# Patient Record
Sex: FEMALE | Race: WHITE | NOT HISPANIC OR LATINO | Employment: OTHER | URBAN - METROPOLITAN AREA
[De-identification: names, ages, dates, MRNs, and addresses within clinical notes are randomized per-mention and may not be internally consistent; named-entity substitution may affect disease eponyms.]

---

## 2018-02-08 ENCOUNTER — TRANSCRIBE ORDERS (OUTPATIENT)
Dept: ADMINISTRATIVE | Facility: HOSPITAL | Age: 83
End: 2018-02-08

## 2018-02-08 DIAGNOSIS — R11.0 NAUSEA: Primary | ICD-10-CM

## 2018-02-22 ENCOUNTER — HOSPITAL ENCOUNTER (OUTPATIENT)
Dept: RADIOLOGY | Facility: HOSPITAL | Age: 83
Discharge: HOME | End: 2018-02-22
Payer: MEDICARE

## 2018-02-22 DIAGNOSIS — R11.0 NAUSEA: ICD-10-CM

## 2018-02-22 PROCEDURE — 74220 X-RAY XM ESOPHAGUS 1CNTRST: CPT

## 2019-05-21 ENCOUNTER — APPOINTMENT (EMERGENCY)
Dept: RADIOLOGY | Facility: HOSPITAL | Age: 84
DRG: 854 | End: 2019-05-21
Payer: MEDICARE

## 2019-05-21 ENCOUNTER — APPOINTMENT (EMERGENCY)
Dept: CT IMAGING | Facility: HOSPITAL | Age: 84
DRG: 854 | End: 2019-05-21
Payer: MEDICARE

## 2019-05-21 ENCOUNTER — HOSPITAL ENCOUNTER (INPATIENT)
Facility: HOSPITAL | Age: 84
LOS: 4 days | Discharge: NURSING, REHAB OR LONG-TERM CARE | DRG: 854 | End: 2019-05-25
Attending: EMERGENCY MEDICINE | Admitting: HOSPITALIST
Payer: MEDICARE

## 2019-05-21 DIAGNOSIS — N13.30 HYDRONEPHROSIS: ICD-10-CM

## 2019-05-21 DIAGNOSIS — N20.1 URETEROLITHIASIS: Primary | ICD-10-CM

## 2019-05-21 DIAGNOSIS — N30.01 ACUTE CYSTITIS WITH HEMATURIA: ICD-10-CM

## 2019-05-21 DIAGNOSIS — N39.0 ACUTE URINARY TRACT INFECTION: ICD-10-CM

## 2019-05-21 DIAGNOSIS — R78.81 BACTEREMIA: ICD-10-CM

## 2019-05-21 DIAGNOSIS — R50.9 FEVER: ICD-10-CM

## 2019-05-21 DIAGNOSIS — I48.0 PAROXYSMAL A-FIB (HCC): ICD-10-CM

## 2019-05-21 PROBLEM — E78.2 MIXED HYPERLIPIDEMIA: Status: ACTIVE | Noted: 2019-05-21

## 2019-05-21 PROBLEM — N17.9 ACUTE KIDNEY INJURY (HCC): Status: ACTIVE | Noted: 2019-05-21

## 2019-05-21 PROBLEM — I10 ESSENTIAL HYPERTENSION: Status: ACTIVE | Noted: 2019-05-21

## 2019-05-21 PROBLEM — E66.01 MORBID OBESITY (HCC): Status: ACTIVE | Noted: 2019-05-21

## 2019-05-21 PROBLEM — R79.1 ELEVATED INR: Status: ACTIVE | Noted: 2019-05-21

## 2019-05-21 PROBLEM — D69.6 THROMBOCYTOPENIA (HCC): Status: ACTIVE | Noted: 2019-05-21

## 2019-05-21 LAB
ALBUMIN SERPL BCP-MCNC: 2.5 G/DL (ref 3.5–5)
ALP SERPL-CCNC: 56 U/L (ref 46–116)
ALT SERPL W P-5'-P-CCNC: 9 U/L (ref 12–78)
ANION GAP SERPL CALCULATED.3IONS-SCNC: 7 MMOL/L (ref 4–13)
APTT PPP: 48 SECONDS (ref 26–38)
AST SERPL W P-5'-P-CCNC: 28 U/L (ref 5–45)
BACTERIA UR QL AUTO: ABNORMAL /HPF
BASOPHILS # BLD MANUAL: 0 THOUSAND/UL (ref 0–0.1)
BASOPHILS NFR MAR MANUAL: 0 % (ref 0–1)
BILIRUB SERPL-MCNC: 0.78 MG/DL (ref 0.2–1)
BILIRUB UR QL STRIP: ABNORMAL
BUN SERPL-MCNC: 25 MG/DL (ref 5–25)
CALCIUM SERPL-MCNC: 8.9 MG/DL (ref 8.3–10.1)
CHLORIDE SERPL-SCNC: 108 MMOL/L (ref 100–108)
CLARITY UR: ABNORMAL
CO2 SERPL-SCNC: 26 MMOL/L (ref 21–32)
COLOR UR: ABNORMAL
CREAT SERPL-MCNC: 1.63 MG/DL (ref 0.6–1.3)
EOSINOPHIL # BLD MANUAL: 0 THOUSAND/UL (ref 0–0.4)
EOSINOPHIL NFR BLD MANUAL: 0 % (ref 0–6)
ERYTHROCYTE [DISTWIDTH] IN BLOOD BY AUTOMATED COUNT: 14 % (ref 11.6–15.1)
GFR SERPL CREATININE-BSD FRML MDRD: 29 ML/MIN/1.73SQ M
GLUCOSE SERPL-MCNC: 118 MG/DL (ref 65–140)
GLUCOSE UR STRIP-MCNC: NEGATIVE MG/DL
HCT VFR BLD AUTO: 39.2 % (ref 34.8–46.1)
HGB BLD-MCNC: 12.4 G/DL (ref 11.5–15.4)
HGB UR QL STRIP.AUTO: ABNORMAL
INR PPP: 2.81 (ref 0.86–1.17)
KETONES UR STRIP-MCNC: ABNORMAL MG/DL
LACTATE SERPL-SCNC: 1.1 MMOL/L (ref 0.5–2)
LEUKOCYTE ESTERASE UR QL STRIP: ABNORMAL
LYMPHOCYTES # BLD AUTO: 0.21 THOUSAND/UL (ref 0.6–4.47)
LYMPHOCYTES # BLD AUTO: 2 % (ref 14–44)
MCH RBC QN AUTO: 31.3 PG (ref 26.8–34.3)
MCHC RBC AUTO-ENTMCNC: 31.6 G/DL (ref 31.4–37.4)
MCV RBC AUTO: 99 FL (ref 82–98)
MONOCYTES # BLD AUTO: 0.85 THOUSAND/UL (ref 0–1.22)
MONOCYTES NFR BLD: 8 % (ref 4–12)
NEUTROPHILS # BLD MANUAL: 9.57 THOUSAND/UL (ref 1.85–7.62)
NEUTS BAND NFR BLD MANUAL: 8 % (ref 0–8)
NEUTS SEG NFR BLD AUTO: 82 % (ref 43–75)
NITRITE UR QL STRIP: POSITIVE
NON-SQ EPI CELLS URNS QL MICRO: ABNORMAL /HPF
NRBC BLD AUTO-RTO: 0 /100 WBCS
PH UR STRIP.AUTO: 8.5 [PH]
PLATELET # BLD AUTO: 139 THOUSANDS/UL (ref 149–390)
PLATELET BLD QL SMEAR: ADEQUATE
PMV BLD AUTO: 11.2 FL (ref 8.9–12.7)
POTASSIUM SERPL-SCNC: 3.8 MMOL/L (ref 3.5–5.3)
PROT SERPL-MCNC: 6.9 G/DL (ref 6.4–8.2)
PROT UR STRIP-MCNC: >=300 MG/DL
PROTHROMBIN TIME: 29.6 SECONDS (ref 11.8–14.2)
RBC # BLD AUTO: 3.96 MILLION/UL (ref 3.81–5.12)
RBC #/AREA URNS AUTO: ABNORMAL /HPF
RBC MORPH BLD: NORMAL
SODIUM SERPL-SCNC: 141 MMOL/L (ref 136–145)
SP GR UR STRIP.AUTO: 1.01 (ref 1–1.03)
TOTAL CELLS COUNTED SPEC: 100
UROBILINOGEN UR QL STRIP.AUTO: 1 E.U./DL
WBC # BLD AUTO: 10.63 THOUSAND/UL (ref 4.31–10.16)
WBC #/AREA URNS AUTO: ABNORMAL /HPF

## 2019-05-21 PROCEDURE — 96360 HYDRATION IV INFUSION INIT: CPT

## 2019-05-21 PROCEDURE — 85027 COMPLETE CBC AUTOMATED: CPT | Performed by: EMERGENCY MEDICINE

## 2019-05-21 PROCEDURE — 74176 CT ABD & PELVIS W/O CONTRAST: CPT

## 2019-05-21 PROCEDURE — 81001 URINALYSIS AUTO W/SCOPE: CPT | Performed by: EMERGENCY MEDICINE

## 2019-05-21 PROCEDURE — 99285 EMERGENCY DEPT VISIT HI MDM: CPT

## 2019-05-21 PROCEDURE — 87086 URINE CULTURE/COLONY COUNT: CPT | Performed by: EMERGENCY MEDICINE

## 2019-05-21 PROCEDURE — 83605 ASSAY OF LACTIC ACID: CPT | Performed by: EMERGENCY MEDICINE

## 2019-05-21 PROCEDURE — 99285 EMERGENCY DEPT VISIT HI MDM: CPT | Performed by: EMERGENCY MEDICINE

## 2019-05-21 PROCEDURE — 99223 1ST HOSP IP/OBS HIGH 75: CPT | Performed by: PHYSICIAN ASSISTANT

## 2019-05-21 PROCEDURE — 87040 BLOOD CULTURE FOR BACTERIA: CPT | Performed by: EMERGENCY MEDICINE

## 2019-05-21 PROCEDURE — 96361 HYDRATE IV INFUSION ADD-ON: CPT

## 2019-05-21 PROCEDURE — 80053 COMPREHEN METABOLIC PANEL: CPT | Performed by: EMERGENCY MEDICINE

## 2019-05-21 PROCEDURE — 85610 PROTHROMBIN TIME: CPT | Performed by: EMERGENCY MEDICINE

## 2019-05-21 PROCEDURE — 1123F ACP DISCUSS/DSCN MKR DOCD: CPT | Performed by: EMERGENCY MEDICINE

## 2019-05-21 PROCEDURE — 85730 THROMBOPLASTIN TIME PARTIAL: CPT | Performed by: EMERGENCY MEDICINE

## 2019-05-21 PROCEDURE — 36415 COLL VENOUS BLD VENIPUNCTURE: CPT | Performed by: EMERGENCY MEDICINE

## 2019-05-21 PROCEDURE — 87186 SC STD MICRODIL/AGAR DIL: CPT | Performed by: EMERGENCY MEDICINE

## 2019-05-21 PROCEDURE — 85007 BL SMEAR W/DIFF WBC COUNT: CPT | Performed by: EMERGENCY MEDICINE

## 2019-05-21 PROCEDURE — 0T9B70Z DRAINAGE OF BLADDER WITH DRAINAGE DEVICE, VIA NATURAL OR ARTIFICIAL OPENING: ICD-10-PCS | Performed by: EMERGENCY MEDICINE

## 2019-05-21 PROCEDURE — 87077 CULTURE AEROBIC IDENTIFY: CPT | Performed by: EMERGENCY MEDICINE

## 2019-05-21 PROCEDURE — 71046 X-RAY EXAM CHEST 2 VIEWS: CPT

## 2019-05-21 PROCEDURE — 84145 PROCALCITONIN (PCT): CPT | Performed by: EMERGENCY MEDICINE

## 2019-05-21 PROCEDURE — 93005 ELECTROCARDIOGRAM TRACING: CPT

## 2019-05-21 RX ORDER — DOCUSATE SODIUM 100 MG/1
100 CAPSULE, LIQUID FILLED ORAL 2 TIMES DAILY
COMMUNITY

## 2019-05-21 RX ORDER — ACETAMINOPHEN 325 MG/1
650 TABLET ORAL EVERY 6 HOURS PRN
COMMUNITY

## 2019-05-21 RX ORDER — CELECOXIB 100 MG/1
100 CAPSULE ORAL 2 TIMES DAILY
COMMUNITY
End: 2020-08-10 | Stop reason: ALTCHOICE

## 2019-05-21 RX ORDER — MINERAL OIL/PETROLATUM,WHITE
CREAM (GRAM) TOPICAL
COMMUNITY

## 2019-05-21 RX ORDER — MELATONIN
1000 DAILY
Status: DISCONTINUED | OUTPATIENT
Start: 2019-05-22 | End: 2019-05-25 | Stop reason: HOSPADM

## 2019-05-21 RX ORDER — FUROSEMIDE 40 MG/1
40 TABLET ORAL DAILY
COMMUNITY

## 2019-05-21 RX ORDER — LOSARTAN POTASSIUM 25 MG/1
25 TABLET ORAL DAILY
COMMUNITY
End: 2020-08-17 | Stop reason: HOSPADM

## 2019-05-21 RX ORDER — ESOMEPRAZOLE MAGNESIUM 40 MG/1
40 CAPSULE, DELAYED RELEASE ORAL
COMMUNITY
End: 2020-01-22 | Stop reason: HOSPADM

## 2019-05-21 RX ORDER — RANITIDINE 300 MG/1
300 CAPSULE ORAL EVERY MORNING
COMMUNITY
End: 2020-01-22 | Stop reason: HOSPADM

## 2019-05-21 RX ORDER — ATORVASTATIN CALCIUM 10 MG/1
10 TABLET, FILM COATED ORAL
Status: DISCONTINUED | OUTPATIENT
Start: 2019-05-22 | End: 2019-05-25 | Stop reason: HOSPADM

## 2019-05-21 RX ORDER — DOCUSATE SODIUM 100 MG/1
100 CAPSULE, LIQUID FILLED ORAL 2 TIMES DAILY
Status: DISCONTINUED | OUTPATIENT
Start: 2019-05-22 | End: 2019-05-25 | Stop reason: HOSPADM

## 2019-05-21 RX ORDER — B-COMPLEX WITH VITAMIN C
1 TABLET ORAL
Status: ON HOLD | COMMUNITY
End: 2020-09-30 | Stop reason: CLARIF

## 2019-05-21 RX ORDER — ALENDRONATE SODIUM 70 MG/1
70 TABLET ORAL SEE ADMIN INSTRUCTIONS
COMMUNITY

## 2019-05-21 RX ORDER — PRAMIPEXOLE DIHYDROCHLORIDE 0.25 MG/1
0.25 TABLET ORAL 3 TIMES DAILY
Status: DISCONTINUED | OUTPATIENT
Start: 2019-05-21 | End: 2019-05-25 | Stop reason: HOSPADM

## 2019-05-21 RX ORDER — ASPIRIN 81 MG/1
81 TABLET ORAL DAILY
COMMUNITY

## 2019-05-21 RX ORDER — NITROGLYCERIN 0.4 MG/1
0.4 TABLET SUBLINGUAL
COMMUNITY

## 2019-05-21 RX ORDER — B-COMPLEX WITH VITAMIN C
1 TABLET ORAL
Status: DISCONTINUED | OUTPATIENT
Start: 2019-05-22 | End: 2019-05-25 | Stop reason: HOSPADM

## 2019-05-21 RX ORDER — ACETAMINOPHEN 325 MG/1
650 TABLET ORAL EVERY 6 HOURS PRN
Status: DISCONTINUED | OUTPATIENT
Start: 2019-05-21 | End: 2019-05-25 | Stop reason: HOSPADM

## 2019-05-21 RX ORDER — ASPIRIN 81 MG/1
81 TABLET ORAL DAILY
Status: DISCONTINUED | OUTPATIENT
Start: 2019-05-22 | End: 2019-05-25 | Stop reason: HOSPADM

## 2019-05-21 RX ORDER — MELATONIN
2000 DAILY
COMMUNITY

## 2019-05-21 RX ORDER — PRAMIPEXOLE DIHYDROCHLORIDE 1 MG/1
0.5 TABLET ORAL
COMMUNITY

## 2019-05-21 RX ORDER — ONDANSETRON 2 MG/ML
4 INJECTION INTRAMUSCULAR; INTRAVENOUS EVERY 6 HOURS PRN
Status: DISCONTINUED | OUTPATIENT
Start: 2019-05-21 | End: 2019-05-25 | Stop reason: HOSPADM

## 2019-05-21 RX ORDER — LEVOTHYROXINE SODIUM 0.15 MG/1
150 TABLET ORAL
COMMUNITY

## 2019-05-21 RX ORDER — GLUCOSA SU 2KCL/CHONDROITIN SU 500-400 MG
CAPSULE ORAL
COMMUNITY

## 2019-05-21 RX ORDER — ACETAMINOPHEN 650 MG/1
650 SUPPOSITORY RECTAL EVERY 4 HOURS
COMMUNITY
End: 2020-01-22 | Stop reason: HOSPADM

## 2019-05-21 RX ORDER — SODIUM CHLORIDE 9 MG/ML
50 INJECTION, SOLUTION INTRAVENOUS CONTINUOUS
Status: DISCONTINUED | OUTPATIENT
Start: 2019-05-22 | End: 2019-05-25 | Stop reason: HOSPADM

## 2019-05-21 RX ORDER — FAMOTIDINE 20 MG/1
40 TABLET, FILM COATED ORAL
Status: DISCONTINUED | OUTPATIENT
Start: 2019-05-21 | End: 2019-05-25 | Stop reason: HOSPADM

## 2019-05-21 RX ORDER — ATORVASTATIN CALCIUM 10 MG/1
10 TABLET, FILM COATED ORAL
COMMUNITY

## 2019-05-21 RX ORDER — LOSARTAN POTASSIUM 25 MG/1
25 TABLET ORAL DAILY
Status: DISCONTINUED | OUTPATIENT
Start: 2019-05-22 | End: 2019-05-21

## 2019-05-21 RX ORDER — PANTOPRAZOLE SODIUM 40 MG/1
40 TABLET, DELAYED RELEASE ORAL
Status: DISCONTINUED | OUTPATIENT
Start: 2019-05-22 | End: 2019-05-25 | Stop reason: HOSPADM

## 2019-05-21 RX ORDER — BISACODYL 10 MG
10 SUPPOSITORY, RECTAL RECTAL AS NEEDED
COMMUNITY

## 2019-05-21 RX ORDER — LEVOTHYROXINE SODIUM 0.07 MG/1
150 TABLET ORAL
Status: DISCONTINUED | OUTPATIENT
Start: 2019-05-22 | End: 2019-05-25 | Stop reason: HOSPADM

## 2019-05-21 RX ORDER — VITAMIN E ACETATE 670 MG
6 CAPSULE ORAL DAILY
COMMUNITY
End: 2020-08-10 | Stop reason: CLARIF

## 2019-05-21 RX ADMIN — CEFTRIAXONE SODIUM 1000 MG: 10 INJECTION, POWDER, FOR SOLUTION INTRAVENOUS at 22:35

## 2019-05-21 RX ADMIN — SODIUM CHLORIDE 1000 ML: 0.9 INJECTION, SOLUTION INTRAVENOUS at 20:43

## 2019-05-22 ENCOUNTER — ANESTHESIA EVENT (INPATIENT)
Dept: PERIOP | Facility: HOSPITAL | Age: 84
DRG: 854 | End: 2019-05-22
Payer: MEDICARE

## 2019-05-22 ENCOUNTER — APPOINTMENT (INPATIENT)
Dept: RADIOLOGY | Facility: HOSPITAL | Age: 84
DRG: 854 | End: 2019-05-22
Payer: MEDICARE

## 2019-05-22 ENCOUNTER — ANESTHESIA (INPATIENT)
Dept: PERIOP | Facility: HOSPITAL | Age: 84
DRG: 854 | End: 2019-05-22
Payer: MEDICARE

## 2019-05-22 PROBLEM — N13.2 HYDRONEPHROSIS WITH OBSTRUCTING CALCULUS: Status: ACTIVE | Noted: 2019-05-21

## 2019-05-22 PROBLEM — N13.30 HYDRONEPHROSIS: Status: ACTIVE | Noted: 2019-05-21

## 2019-05-22 PROBLEM — N39.0 ACUTE URINARY TRACT INFECTION: Status: ACTIVE | Noted: 2019-05-21

## 2019-05-22 LAB
ANION GAP SERPL CALCULATED.3IONS-SCNC: 7 MMOL/L (ref 4–13)
ATRIAL RATE: 86 BPM
BASOPHILS # BLD AUTO: 0.02 THOUSANDS/ΜL (ref 0–0.1)
BASOPHILS NFR BLD AUTO: 0 % (ref 0–1)
BUN SERPL-MCNC: 26 MG/DL (ref 5–25)
CALCIUM SERPL-MCNC: 8.9 MG/DL (ref 8.3–10.1)
CHLORIDE SERPL-SCNC: 109 MMOL/L (ref 100–108)
CO2 SERPL-SCNC: 24 MMOL/L (ref 21–32)
CREAT SERPL-MCNC: 1.71 MG/DL (ref 0.6–1.3)
EOSINOPHIL # BLD AUTO: 0 THOUSAND/ΜL (ref 0–0.61)
EOSINOPHIL NFR BLD AUTO: 0 % (ref 0–6)
ERYTHROCYTE [DISTWIDTH] IN BLOOD BY AUTOMATED COUNT: 14.2 % (ref 11.6–15.1)
GFR SERPL CREATININE-BSD FRML MDRD: 27 ML/MIN/1.73SQ M
GLUCOSE SERPL-MCNC: 126 MG/DL (ref 65–140)
HCT VFR BLD AUTO: 36.9 % (ref 34.8–46.1)
HGB BLD-MCNC: 11.8 G/DL (ref 11.5–15.4)
IMM GRANULOCYTES # BLD AUTO: 0.05 THOUSAND/UL (ref 0–0.2)
IMM GRANULOCYTES NFR BLD AUTO: 1 % (ref 0–2)
LACTATE SERPL-SCNC: 1.3 MMOL/L (ref 0.5–2)
LYMPHOCYTES # BLD AUTO: 0.5 THOUSANDS/ΜL (ref 0.6–4.47)
LYMPHOCYTES NFR BLD AUTO: 5 % (ref 14–44)
MCH RBC QN AUTO: 32 PG (ref 26.8–34.3)
MCHC RBC AUTO-ENTMCNC: 32 G/DL (ref 31.4–37.4)
MCV RBC AUTO: 100 FL (ref 82–98)
MONOCYTES # BLD AUTO: 0.85 THOUSAND/ΜL (ref 0.17–1.22)
MONOCYTES NFR BLD AUTO: 8 % (ref 4–12)
NEUTROPHILS # BLD AUTO: 9.4 THOUSANDS/ΜL (ref 1.85–7.62)
NEUTS SEG NFR BLD AUTO: 86 % (ref 43–75)
NRBC BLD AUTO-RTO: 0 /100 WBCS
P AXIS: 91 DEGREES
PLATELET # BLD AUTO: 138 THOUSANDS/UL (ref 149–390)
PMV BLD AUTO: 11 FL (ref 8.9–12.7)
POTASSIUM SERPL-SCNC: 4.5 MMOL/L (ref 3.5–5.3)
PR INTERVAL: 190 MS
PROCALCITONIN SERPL-MCNC: 2.99 NG/ML
QRS AXIS: -35 DEGREES
QRSD INTERVAL: 80 MS
QT INTERVAL: 358 MS
QTC INTERVAL: 428 MS
RBC # BLD AUTO: 3.69 MILLION/UL (ref 3.81–5.12)
SODIUM SERPL-SCNC: 140 MMOL/L (ref 136–145)
T WAVE AXIS: 48 DEGREES
VENTRICULAR RATE: 86 BPM
WBC # BLD AUTO: 10.82 THOUSAND/UL (ref 4.31–10.16)

## 2019-05-22 PROCEDURE — 74420 UROGRAPHY RTRGR +-KUB: CPT

## 2019-05-22 PROCEDURE — 99222 1ST HOSP IP/OBS MODERATE 55: CPT | Performed by: UROLOGY

## 2019-05-22 PROCEDURE — C2617 STENT, NON-COR, TEM W/O DEL: HCPCS | Performed by: UROLOGY

## 2019-05-22 PROCEDURE — 99232 SBSQ HOSP IP/OBS MODERATE 35: CPT | Performed by: HOSPITALIST

## 2019-05-22 PROCEDURE — 52332 CYSTOSCOPY AND TREATMENT: CPT | Performed by: UROLOGY

## 2019-05-22 PROCEDURE — 85025 COMPLETE CBC W/AUTO DIFF WBC: CPT | Performed by: PHYSICIAN ASSISTANT

## 2019-05-22 PROCEDURE — BT1DYZZ FLUOROSCOPY OF RIGHT KIDNEY, URETER AND BLADDER USING OTHER CONTRAST: ICD-10-PCS | Performed by: UROLOGY

## 2019-05-22 PROCEDURE — C1769 GUIDE WIRE: HCPCS | Performed by: UROLOGY

## 2019-05-22 PROCEDURE — 93010 ELECTROCARDIOGRAM REPORT: CPT | Performed by: INTERNAL MEDICINE

## 2019-05-22 PROCEDURE — 83605 ASSAY OF LACTIC ACID: CPT | Performed by: PHYSICIAN ASSISTANT

## 2019-05-22 PROCEDURE — 80048 BASIC METABOLIC PNL TOTAL CA: CPT | Performed by: PHYSICIAN ASSISTANT

## 2019-05-22 PROCEDURE — 0T778DZ DILATION OF LEFT URETER WITH INTRALUMINAL DEVICE, VIA NATURAL OR ARTIFICIAL OPENING ENDOSCOPIC: ICD-10-PCS | Performed by: UROLOGY

## 2019-05-22 DEVICE — INLAY OPTIMA URETERAL STENT W/O GUIDEWIRE
Type: IMPLANTABLE DEVICE | Site: URETER | Status: FUNCTIONAL
Brand: BARD® INLAY OPTIMA® URETERAL STENT

## 2019-05-22 RX ORDER — DEXAMETHASONE SODIUM PHOSPHATE 4 MG/ML
INJECTION, SOLUTION INTRA-ARTICULAR; INTRALESIONAL; INTRAMUSCULAR; INTRAVENOUS; SOFT TISSUE AS NEEDED
Status: DISCONTINUED | OUTPATIENT
Start: 2019-05-22 | End: 2019-05-22 | Stop reason: SURG

## 2019-05-22 RX ORDER — PROPOFOL 10 MG/ML
INJECTION, EMULSION INTRAVENOUS AS NEEDED
Status: DISCONTINUED | OUTPATIENT
Start: 2019-05-22 | End: 2019-05-22 | Stop reason: SURG

## 2019-05-22 RX ORDER — ONDANSETRON 2 MG/ML
INJECTION INTRAMUSCULAR; INTRAVENOUS AS NEEDED
Status: DISCONTINUED | OUTPATIENT
Start: 2019-05-22 | End: 2019-05-22 | Stop reason: SURG

## 2019-05-22 RX ORDER — ONDANSETRON 2 MG/ML
4 INJECTION INTRAMUSCULAR; INTRAVENOUS ONCE
Status: DISCONTINUED | OUTPATIENT
Start: 2019-05-22 | End: 2019-05-22 | Stop reason: HOSPADM

## 2019-05-22 RX ORDER — PHENAZOPYRIDINE HYDROCHLORIDE 100 MG/1
100 TABLET, FILM COATED ORAL 3 TIMES DAILY PRN
Status: DISCONTINUED | OUTPATIENT
Start: 2019-05-22 | End: 2019-05-25 | Stop reason: HOSPADM

## 2019-05-22 RX ORDER — FENTANYL CITRATE 50 UG/ML
INJECTION, SOLUTION INTRAMUSCULAR; INTRAVENOUS AS NEEDED
Status: DISCONTINUED | OUTPATIENT
Start: 2019-05-22 | End: 2019-05-22 | Stop reason: SURG

## 2019-05-22 RX ORDER — MAGNESIUM HYDROXIDE 1200 MG/15ML
LIQUID ORAL AS NEEDED
Status: DISCONTINUED | OUTPATIENT
Start: 2019-05-22 | End: 2019-05-22 | Stop reason: HOSPADM

## 2019-05-22 RX ORDER — HYDROMORPHONE HCL/PF 1 MG/ML
0.2 SYRINGE (ML) INJECTION ONCE
Status: COMPLETED | OUTPATIENT
Start: 2019-05-22 | End: 2019-05-22

## 2019-05-22 RX ORDER — FENTANYL CITRATE/PF 50 MCG/ML
25 SYRINGE (ML) INJECTION
Status: DISCONTINUED | OUTPATIENT
Start: 2019-05-22 | End: 2019-05-22 | Stop reason: HOSPADM

## 2019-05-22 RX ADMIN — LIDOCAINE HYDROCHLORIDE 60 MG: 20 INJECTION, SOLUTION INTRAVENOUS at 09:54

## 2019-05-22 RX ADMIN — ONDANSETRON 4 MG: 2 INJECTION INTRAMUSCULAR; INTRAVENOUS at 06:03

## 2019-05-22 RX ADMIN — FENTANYL CITRATE 50 MCG: 50 INJECTION, SOLUTION INTRAMUSCULAR; INTRAVENOUS at 09:54

## 2019-05-22 RX ADMIN — PHENAZOPYRIDINE 100 MG: 100 TABLET ORAL at 16:23

## 2019-05-22 RX ADMIN — ACETAMINOPHEN 650 MG: 325 TABLET ORAL at 00:30

## 2019-05-22 RX ADMIN — FAMOTIDINE 40 MG: 20 TABLET ORAL at 00:11

## 2019-05-22 RX ADMIN — LEVOTHYROXINE SODIUM 150 MCG: 75 TABLET ORAL at 05:59

## 2019-05-22 RX ADMIN — PROPOFOL 200 MG: 10 INJECTION, EMULSION INTRAVENOUS at 09:54

## 2019-05-22 RX ADMIN — FENTANYL CITRATE 50 MCG: 50 INJECTION, SOLUTION INTRAMUSCULAR; INTRAVENOUS at 10:08

## 2019-05-22 RX ADMIN — DEXAMETHASONE SODIUM PHOSPHATE 4 MG: 4 INJECTION, SOLUTION INTRAMUSCULAR; INTRAVENOUS at 10:00

## 2019-05-22 RX ADMIN — PRAMIPEXOLE DIHYDROCHLORIDE 0.25 MG: 0.25 TABLET ORAL at 16:23

## 2019-05-22 RX ADMIN — SODIUM CHLORIDE 50 ML/HR: 0.9 INJECTION, SOLUTION INTRAVENOUS at 00:11

## 2019-05-22 RX ADMIN — ACETAMINOPHEN 650 MG: 325 TABLET ORAL at 20:11

## 2019-05-22 RX ADMIN — PANTOPRAZOLE SODIUM 40 MG: 40 TABLET, DELAYED RELEASE ORAL at 05:59

## 2019-05-22 RX ADMIN — ONDANSETRON HYDROCHLORIDE 4 MG: 2 INJECTION, SOLUTION INTRAVENOUS at 10:00

## 2019-05-22 RX ADMIN — PRAMIPEXOLE DIHYDROCHLORIDE 0.25 MG: 0.25 TABLET ORAL at 00:30

## 2019-05-22 RX ADMIN — CEFTRIAXONE SODIUM 1000 MG: 10 INJECTION, POWDER, FOR SOLUTION INTRAVENOUS at 22:17

## 2019-05-22 RX ADMIN — ONDANSETRON 4 MG: 2 INJECTION INTRAMUSCULAR; INTRAVENOUS at 00:30

## 2019-05-22 RX ADMIN — FAMOTIDINE 40 MG: 20 TABLET ORAL at 22:05

## 2019-05-22 RX ADMIN — PHENAZOPYRIDINE 100 MG: 100 TABLET ORAL at 22:05

## 2019-05-22 RX ADMIN — PHENYLEPHRINE HYDROCHLORIDE 100 MCG: 10 INJECTION INTRAVENOUS at 10:05

## 2019-05-22 RX ADMIN — HYDROMORPHONE HYDROCHLORIDE 0.2 MG: 1 INJECTION, SOLUTION INTRAMUSCULAR; INTRAVENOUS; SUBCUTANEOUS at 07:24

## 2019-05-22 RX ADMIN — METOPROLOL TARTRATE 25 MG: 25 TABLET ORAL at 08:55

## 2019-05-22 RX ADMIN — DOCUSATE SODIUM 100 MG: 100 CAPSULE, LIQUID FILLED ORAL at 20:11

## 2019-05-22 RX ADMIN — SODIUM CHLORIDE 50 ML/HR: 0.9 INJECTION, SOLUTION INTRAVENOUS at 14:28

## 2019-05-22 RX ADMIN — ATORVASTATIN CALCIUM 10 MG: 10 TABLET, FILM COATED ORAL at 16:23

## 2019-05-22 RX ADMIN — PRAMIPEXOLE DIHYDROCHLORIDE 0.25 MG: 0.25 TABLET ORAL at 22:06

## 2019-05-23 ENCOUNTER — TELEPHONE (OUTPATIENT)
Dept: OTHER | Facility: HOSPITAL | Age: 84
End: 2019-05-23

## 2019-05-23 PROBLEM — R78.81 BACTEREMIA: Status: ACTIVE | Noted: 2019-05-23

## 2019-05-23 LAB
ANION GAP SERPL CALCULATED.3IONS-SCNC: 9 MMOL/L (ref 4–13)
BASOPHILS # BLD AUTO: 0.01 THOUSANDS/ΜL (ref 0–0.1)
BASOPHILS NFR BLD AUTO: 0 % (ref 0–1)
BUN SERPL-MCNC: 24 MG/DL (ref 5–25)
CALCIUM SERPL-MCNC: 8.6 MG/DL (ref 8.3–10.1)
CHLORIDE SERPL-SCNC: 110 MMOL/L (ref 100–108)
CO2 SERPL-SCNC: 24 MMOL/L (ref 21–32)
CREAT SERPL-MCNC: 1.27 MG/DL (ref 0.6–1.3)
EOSINOPHIL # BLD AUTO: 0 THOUSAND/ΜL (ref 0–0.61)
EOSINOPHIL NFR BLD AUTO: 0 % (ref 0–6)
ERYTHROCYTE [DISTWIDTH] IN BLOOD BY AUTOMATED COUNT: 14.3 % (ref 11.6–15.1)
GFR SERPL CREATININE-BSD FRML MDRD: 39 ML/MIN/1.73SQ M
GLUCOSE SERPL-MCNC: 120 MG/DL (ref 65–140)
HCT VFR BLD AUTO: 35.7 % (ref 34.8–46.1)
HGB BLD-MCNC: 11.1 G/DL (ref 11.5–15.4)
IMM GRANULOCYTES # BLD AUTO: 0.08 THOUSAND/UL (ref 0–0.2)
IMM GRANULOCYTES NFR BLD AUTO: 1 % (ref 0–2)
LYMPHOCYTES # BLD AUTO: 0.38 THOUSANDS/ΜL (ref 0.6–4.47)
LYMPHOCYTES NFR BLD AUTO: 4 % (ref 14–44)
MAGNESIUM SERPL-MCNC: 2 MG/DL (ref 1.6–2.6)
MCH RBC QN AUTO: 31.4 PG (ref 26.8–34.3)
MCHC RBC AUTO-ENTMCNC: 31.1 G/DL (ref 31.4–37.4)
MCV RBC AUTO: 101 FL (ref 82–98)
MONOCYTES # BLD AUTO: 0.82 THOUSAND/ΜL (ref 0.17–1.22)
MONOCYTES NFR BLD AUTO: 8 % (ref 4–12)
NEUTROPHILS # BLD AUTO: 9.32 THOUSANDS/ΜL (ref 1.85–7.62)
NEUTS SEG NFR BLD AUTO: 87 % (ref 43–75)
NRBC BLD AUTO-RTO: 0 /100 WBCS
PLATELET # BLD AUTO: 142 THOUSANDS/UL (ref 149–390)
PMV BLD AUTO: 10.6 FL (ref 8.9–12.7)
POTASSIUM SERPL-SCNC: 3.6 MMOL/L (ref 3.5–5.3)
RBC # BLD AUTO: 3.53 MILLION/UL (ref 3.81–5.12)
SODIUM SERPL-SCNC: 143 MMOL/L (ref 136–145)
WBC # BLD AUTO: 10.61 THOUSAND/UL (ref 4.31–10.16)

## 2019-05-23 PROCEDURE — 87040 BLOOD CULTURE FOR BACTERIA: CPT | Performed by: INTERNAL MEDICINE

## 2019-05-23 PROCEDURE — 99232 SBSQ HOSP IP/OBS MODERATE 35: CPT | Performed by: PHYSICIAN ASSISTANT

## 2019-05-23 PROCEDURE — 87081 CULTURE SCREEN ONLY: CPT | Performed by: PHYSICIAN ASSISTANT

## 2019-05-23 PROCEDURE — 85025 COMPLETE CBC W/AUTO DIFF WBC: CPT | Performed by: HOSPITALIST

## 2019-05-23 PROCEDURE — 80048 BASIC METABOLIC PNL TOTAL CA: CPT | Performed by: HOSPITALIST

## 2019-05-23 PROCEDURE — 99223 1ST HOSP IP/OBS HIGH 75: CPT | Performed by: INTERNAL MEDICINE

## 2019-05-23 PROCEDURE — 99232 SBSQ HOSP IP/OBS MODERATE 35: CPT | Performed by: HOSPITALIST

## 2019-05-23 PROCEDURE — 83735 ASSAY OF MAGNESIUM: CPT | Performed by: HOSPITALIST

## 2019-05-23 RX ADMIN — PRAMIPEXOLE DIHYDROCHLORIDE 0.25 MG: 0.25 TABLET ORAL at 21:20

## 2019-05-23 RX ADMIN — DOCUSATE SODIUM 100 MG: 100 CAPSULE, LIQUID FILLED ORAL at 08:21

## 2019-05-23 RX ADMIN — FAMOTIDINE 40 MG: 20 TABLET ORAL at 21:20

## 2019-05-23 RX ADMIN — METOPROLOL TARTRATE 25 MG: 25 TABLET ORAL at 21:20

## 2019-05-23 RX ADMIN — VITAMIN D, TAB 1000IU (100/BT) 1000 UNITS: 25 TAB at 08:21

## 2019-05-23 RX ADMIN — PRAMIPEXOLE DIHYDROCHLORIDE 0.25 MG: 0.25 TABLET ORAL at 08:21

## 2019-05-23 RX ADMIN — PHENAZOPYRIDINE 100 MG: 100 TABLET ORAL at 12:58

## 2019-05-23 RX ADMIN — DOCUSATE SODIUM 100 MG: 100 CAPSULE, LIQUID FILLED ORAL at 17:24

## 2019-05-23 RX ADMIN — SODIUM CHLORIDE 50 ML/HR: 0.9 INJECTION, SOLUTION INTRAVENOUS at 08:22

## 2019-05-23 RX ADMIN — ASPIRIN 81 MG: 81 TABLET, COATED ORAL at 08:21

## 2019-05-23 RX ADMIN — PRAMIPEXOLE DIHYDROCHLORIDE 0.25 MG: 0.25 TABLET ORAL at 17:22

## 2019-05-23 RX ADMIN — PANTOPRAZOLE SODIUM 40 MG: 40 TABLET, DELAYED RELEASE ORAL at 05:57

## 2019-05-23 RX ADMIN — LEVOTHYROXINE SODIUM 150 MCG: 75 TABLET ORAL at 05:57

## 2019-05-23 RX ADMIN — CEFTRIAXONE SODIUM 2000 MG: 10 INJECTION, POWDER, FOR SOLUTION INTRAVENOUS at 21:23

## 2019-05-23 RX ADMIN — CALCIUM CARBONATE-VITAMIN D TAB 500 MG-200 UNIT 1 TABLET: 500-200 TAB at 08:21

## 2019-05-23 RX ADMIN — ACETAMINOPHEN 650 MG: 325 TABLET ORAL at 05:57

## 2019-05-23 RX ADMIN — ATORVASTATIN CALCIUM 10 MG: 10 TABLET, FILM COATED ORAL at 17:24

## 2019-05-24 LAB
ANION GAP SERPL CALCULATED.3IONS-SCNC: 9 MMOL/L (ref 4–13)
BACTERIA BLD CULT: ABNORMAL
BASOPHILS # BLD AUTO: 0.02 THOUSANDS/ΜL (ref 0–0.1)
BASOPHILS NFR BLD AUTO: 0 % (ref 0–1)
BUN SERPL-MCNC: 24 MG/DL (ref 5–25)
CALCIUM SERPL-MCNC: 8.7 MG/DL (ref 8.3–10.1)
CHLORIDE SERPL-SCNC: 110 MMOL/L (ref 100–108)
CO2 SERPL-SCNC: 23 MMOL/L (ref 21–32)
CREAT SERPL-MCNC: 1.06 MG/DL (ref 0.6–1.3)
EOSINOPHIL # BLD AUTO: 0.03 THOUSAND/ΜL (ref 0–0.61)
EOSINOPHIL NFR BLD AUTO: 0 % (ref 0–6)
ERYTHROCYTE [DISTWIDTH] IN BLOOD BY AUTOMATED COUNT: 14.3 % (ref 11.6–15.1)
GFR SERPL CREATININE-BSD FRML MDRD: 49 ML/MIN/1.73SQ M
GLUCOSE SERPL-MCNC: 92 MG/DL (ref 65–140)
GRAM STN SPEC: ABNORMAL
HCT VFR BLD AUTO: 36.2 % (ref 34.8–46.1)
HGB BLD-MCNC: 11.1 G/DL (ref 11.5–15.4)
IMM GRANULOCYTES # BLD AUTO: 0.06 THOUSAND/UL (ref 0–0.2)
IMM GRANULOCYTES NFR BLD AUTO: 1 % (ref 0–2)
LYMPHOCYTES # BLD AUTO: 0.63 THOUSANDS/ΜL (ref 0.6–4.47)
LYMPHOCYTES NFR BLD AUTO: 7 % (ref 14–44)
MCH RBC QN AUTO: 30.9 PG (ref 26.8–34.3)
MCHC RBC AUTO-ENTMCNC: 30.7 G/DL (ref 31.4–37.4)
MCV RBC AUTO: 101 FL (ref 82–98)
MONOCYTES # BLD AUTO: 0.58 THOUSAND/ΜL (ref 0.17–1.22)
MONOCYTES NFR BLD AUTO: 7 % (ref 4–12)
MRSA NOSE QL CULT: NORMAL
NEUTROPHILS # BLD AUTO: 7.46 THOUSANDS/ΜL (ref 1.85–7.62)
NEUTS SEG NFR BLD AUTO: 85 % (ref 43–75)
NRBC BLD AUTO-RTO: 0 /100 WBCS
PLATELET # BLD AUTO: 171 THOUSANDS/UL (ref 149–390)
PMV BLD AUTO: 11.1 FL (ref 8.9–12.7)
POTASSIUM SERPL-SCNC: 3.5 MMOL/L (ref 3.5–5.3)
RBC # BLD AUTO: 3.59 MILLION/UL (ref 3.81–5.12)
SODIUM SERPL-SCNC: 142 MMOL/L (ref 136–145)
WBC # BLD AUTO: 8.78 THOUSAND/UL (ref 4.31–10.16)

## 2019-05-24 PROCEDURE — G8987 SELF CARE CURRENT STATUS: HCPCS

## 2019-05-24 PROCEDURE — G8981 BODY POS CURRENT STATUS: HCPCS

## 2019-05-24 PROCEDURE — G8988 SELF CARE GOAL STATUS: HCPCS

## 2019-05-24 PROCEDURE — 85025 COMPLETE CBC W/AUTO DIFF WBC: CPT | Performed by: INTERNAL MEDICINE

## 2019-05-24 PROCEDURE — 97167 OT EVAL HIGH COMPLEX 60 MIN: CPT

## 2019-05-24 PROCEDURE — G8982 BODY POS GOAL STATUS: HCPCS

## 2019-05-24 PROCEDURE — 97163 PT EVAL HIGH COMPLEX 45 MIN: CPT

## 2019-05-24 PROCEDURE — 99232 SBSQ HOSP IP/OBS MODERATE 35: CPT | Performed by: HOSPITALIST

## 2019-05-24 PROCEDURE — G8989 SELF CARE D/C STATUS: HCPCS

## 2019-05-24 PROCEDURE — 99232 SBSQ HOSP IP/OBS MODERATE 35: CPT | Performed by: INTERNAL MEDICINE

## 2019-05-24 PROCEDURE — G8983 BODY POS D/C STATUS: HCPCS

## 2019-05-24 PROCEDURE — 80048 BASIC METABOLIC PNL TOTAL CA: CPT | Performed by: INTERNAL MEDICINE

## 2019-05-24 RX ORDER — CEFDINIR 300 MG/1
300 CAPSULE ORAL EVERY 12 HOURS SCHEDULED
Status: DISCONTINUED | OUTPATIENT
Start: 2019-05-24 | End: 2019-05-25 | Stop reason: HOSPADM

## 2019-05-24 RX ADMIN — CEFDINIR 300 MG: 300 CAPSULE ORAL at 21:21

## 2019-05-24 RX ADMIN — CEFDINIR 300 MG: 300 CAPSULE ORAL at 10:16

## 2019-05-24 RX ADMIN — FAMOTIDINE 40 MG: 20 TABLET ORAL at 21:21

## 2019-05-24 RX ADMIN — SODIUM CHLORIDE 50 ML/HR: 0.9 INJECTION, SOLUTION INTRAVENOUS at 05:13

## 2019-05-24 RX ADMIN — ACETAMINOPHEN 650 MG: 325 TABLET ORAL at 23:37

## 2019-05-24 RX ADMIN — LEVOTHYROXINE SODIUM 150 MCG: 75 TABLET ORAL at 05:11

## 2019-05-24 RX ADMIN — PHENAZOPYRIDINE 100 MG: 100 TABLET ORAL at 16:35

## 2019-05-24 RX ADMIN — METOPROLOL TARTRATE 25 MG: 25 TABLET ORAL at 08:13

## 2019-05-24 RX ADMIN — PRAMIPEXOLE DIHYDROCHLORIDE 0.25 MG: 0.25 TABLET ORAL at 21:21

## 2019-05-24 RX ADMIN — DOCUSATE SODIUM 100 MG: 100 CAPSULE, LIQUID FILLED ORAL at 08:13

## 2019-05-24 RX ADMIN — ASPIRIN 81 MG: 81 TABLET, COATED ORAL at 08:13

## 2019-05-24 RX ADMIN — ACETAMINOPHEN 650 MG: 325 TABLET ORAL at 01:56

## 2019-05-24 RX ADMIN — SODIUM CHLORIDE 50 ML/HR: 0.9 INJECTION, SOLUTION INTRAVENOUS at 23:38

## 2019-05-24 RX ADMIN — VITAMIN D, TAB 1000IU (100/BT) 1000 UNITS: 25 TAB at 08:13

## 2019-05-24 RX ADMIN — CALCIUM CARBONATE-VITAMIN D TAB 500 MG-200 UNIT 1 TABLET: 500-200 TAB at 07:38

## 2019-05-24 RX ADMIN — ATORVASTATIN CALCIUM 10 MG: 10 TABLET, FILM COATED ORAL at 16:34

## 2019-05-24 RX ADMIN — PRAMIPEXOLE DIHYDROCHLORIDE 0.25 MG: 0.25 TABLET ORAL at 08:13

## 2019-05-24 RX ADMIN — RIVAROXABAN 20 MG: 20 TABLET, FILM COATED ORAL at 16:35

## 2019-05-24 RX ADMIN — METOPROLOL TARTRATE 25 MG: 25 TABLET ORAL at 21:22

## 2019-05-24 RX ADMIN — DOCUSATE SODIUM 100 MG: 100 CAPSULE, LIQUID FILLED ORAL at 16:36

## 2019-05-24 RX ADMIN — PRAMIPEXOLE DIHYDROCHLORIDE 0.25 MG: 0.25 TABLET ORAL at 16:35

## 2019-05-24 RX ADMIN — PANTOPRAZOLE SODIUM 40 MG: 40 TABLET, DELAYED RELEASE ORAL at 05:11

## 2019-05-25 VITALS
OXYGEN SATURATION: 98 % | HEART RATE: 90 BPM | BODY MASS INDEX: 51.91 KG/M2 | RESPIRATION RATE: 18 BRPM | HEIGHT: 63 IN | DIASTOLIC BLOOD PRESSURE: 85 MMHG | WEIGHT: 293 LBS | SYSTOLIC BLOOD PRESSURE: 159 MMHG | TEMPERATURE: 97.6 F

## 2019-05-25 PROBLEM — N13.2 HYDRONEPHROSIS WITH OBSTRUCTING CALCULUS: Status: RESOLVED | Noted: 2019-05-21 | Resolved: 2019-05-25

## 2019-05-25 PROBLEM — N17.9 ACUTE KIDNEY INJURY (HCC): Status: RESOLVED | Noted: 2019-05-21 | Resolved: 2019-05-25

## 2019-05-25 PROBLEM — A49.8 BACTERIAL INFECTION DUE TO PROTEUS MIRABILIS: Status: ACTIVE | Noted: 2019-05-23

## 2019-05-25 LAB
ANION GAP SERPL CALCULATED.3IONS-SCNC: 7 MMOL/L (ref 4–13)
BACTERIA UR CULT: ABNORMAL
BACTERIA UR CULT: ABNORMAL
BASOPHILS # BLD AUTO: 0.03 THOUSANDS/ΜL (ref 0–0.1)
BASOPHILS NFR BLD AUTO: 1 % (ref 0–1)
BUN SERPL-MCNC: 18 MG/DL (ref 5–25)
CALCIUM SERPL-MCNC: 8.8 MG/DL (ref 8.3–10.1)
CHLORIDE SERPL-SCNC: 112 MMOL/L (ref 100–108)
CO2 SERPL-SCNC: 26 MMOL/L (ref 21–32)
CREAT SERPL-MCNC: 0.85 MG/DL (ref 0.6–1.3)
EOSINOPHIL # BLD AUTO: 0.17 THOUSAND/ΜL (ref 0–0.61)
EOSINOPHIL NFR BLD AUTO: 3 % (ref 0–6)
ERYTHROCYTE [DISTWIDTH] IN BLOOD BY AUTOMATED COUNT: 14.5 % (ref 11.6–15.1)
GFR SERPL CREATININE-BSD FRML MDRD: 64 ML/MIN/1.73SQ M
GLUCOSE SERPL-MCNC: 98 MG/DL (ref 65–140)
HCT VFR BLD AUTO: 37.1 % (ref 34.8–46.1)
HGB BLD-MCNC: 11.6 G/DL (ref 11.5–15.4)
IMM GRANULOCYTES # BLD AUTO: 0.08 THOUSAND/UL (ref 0–0.2)
IMM GRANULOCYTES NFR BLD AUTO: 1 % (ref 0–2)
LYMPHOCYTES # BLD AUTO: 0.82 THOUSANDS/ΜL (ref 0.6–4.47)
LYMPHOCYTES NFR BLD AUTO: 13 % (ref 14–44)
MAGNESIUM SERPL-MCNC: 1.9 MG/DL (ref 1.6–2.6)
MCH RBC QN AUTO: 31.3 PG (ref 26.8–34.3)
MCHC RBC AUTO-ENTMCNC: 31.3 G/DL (ref 31.4–37.4)
MCV RBC AUTO: 100 FL (ref 82–98)
MONOCYTES # BLD AUTO: 0.52 THOUSAND/ΜL (ref 0.17–1.22)
MONOCYTES NFR BLD AUTO: 8 % (ref 4–12)
NEUTROPHILS # BLD AUTO: 4.88 THOUSANDS/ΜL (ref 1.85–7.62)
NEUTS SEG NFR BLD AUTO: 74 % (ref 43–75)
NRBC BLD AUTO-RTO: 0 /100 WBCS
PLATELET # BLD AUTO: 189 THOUSANDS/UL (ref 149–390)
PMV BLD AUTO: 10.5 FL (ref 8.9–12.7)
POTASSIUM SERPL-SCNC: 3.3 MMOL/L (ref 3.5–5.3)
RBC # BLD AUTO: 3.71 MILLION/UL (ref 3.81–5.12)
SODIUM SERPL-SCNC: 145 MMOL/L (ref 136–145)
WBC # BLD AUTO: 6.5 THOUSAND/UL (ref 4.31–10.16)

## 2019-05-25 PROCEDURE — 85025 COMPLETE CBC W/AUTO DIFF WBC: CPT | Performed by: HOSPITALIST

## 2019-05-25 PROCEDURE — 99239 HOSP IP/OBS DSCHRG MGMT >30: CPT | Performed by: HOSPITALIST

## 2019-05-25 PROCEDURE — 80048 BASIC METABOLIC PNL TOTAL CA: CPT | Performed by: HOSPITALIST

## 2019-05-25 PROCEDURE — 83735 ASSAY OF MAGNESIUM: CPT | Performed by: HOSPITALIST

## 2019-05-25 RX ORDER — GUAIFENESIN 600 MG
1200 TABLET, EXTENDED RELEASE 12 HR ORAL EVERY 12 HOURS SCHEDULED
Qty: 10 TABLET | Refills: 0 | Status: SHIPPED | OUTPATIENT
Start: 2019-05-25 | End: 2020-01-22 | Stop reason: HOSPADM

## 2019-05-25 RX ORDER — CEFDINIR 300 MG/1
300 CAPSULE ORAL EVERY 12 HOURS SCHEDULED
Qty: 16 CAPSULE | Refills: 0 | Status: SHIPPED | OUTPATIENT
Start: 2019-05-25 | End: 2019-06-02

## 2019-05-25 RX ADMIN — PHENAZOPYRIDINE 100 MG: 100 TABLET ORAL at 08:38

## 2019-05-25 RX ADMIN — METOPROLOL TARTRATE 25 MG: 25 TABLET ORAL at 08:37

## 2019-05-25 RX ADMIN — LEVOTHYROXINE SODIUM 150 MCG: 75 TABLET ORAL at 06:12

## 2019-05-25 RX ADMIN — ATORVASTATIN CALCIUM 10 MG: 10 TABLET, FILM COATED ORAL at 17:30

## 2019-05-25 RX ADMIN — DOCUSATE SODIUM 100 MG: 100 CAPSULE, LIQUID FILLED ORAL at 08:38

## 2019-05-25 RX ADMIN — PANTOPRAZOLE SODIUM 40 MG: 40 TABLET, DELAYED RELEASE ORAL at 06:13

## 2019-05-25 RX ADMIN — PRAMIPEXOLE DIHYDROCHLORIDE 0.25 MG: 0.25 TABLET ORAL at 08:38

## 2019-05-25 RX ADMIN — ASPIRIN 81 MG: 81 TABLET, COATED ORAL at 08:38

## 2019-05-25 RX ADMIN — DOCUSATE SODIUM 100 MG: 100 CAPSULE, LIQUID FILLED ORAL at 17:31

## 2019-05-25 RX ADMIN — CEFDINIR 300 MG: 300 CAPSULE ORAL at 08:37

## 2019-05-25 RX ADMIN — RIVAROXABAN 20 MG: 20 TABLET, FILM COATED ORAL at 17:31

## 2019-05-25 RX ADMIN — CALCIUM CARBONATE-VITAMIN D TAB 500 MG-200 UNIT 1 TABLET: 500-200 TAB at 08:37

## 2019-05-25 RX ADMIN — PRAMIPEXOLE DIHYDROCHLORIDE 0.25 MG: 0.25 TABLET ORAL at 17:30

## 2019-05-25 RX ADMIN — VITAMIN D, TAB 1000IU (100/BT) 1000 UNITS: 25 TAB at 08:37

## 2019-05-26 LAB — BACTERIA BLD CULT: NORMAL

## 2019-05-28 LAB
BACTERIA BLD CULT: NORMAL
BACTERIA BLD CULT: NORMAL

## 2019-05-30 ENCOUNTER — TELEPHONE (OUTPATIENT)
Dept: UROLOGY | Facility: MEDICAL CENTER | Age: 84
End: 2019-05-30

## 2019-06-11 ENCOUNTER — OFFICE VISIT (OUTPATIENT)
Dept: UROLOGY | Facility: MEDICAL CENTER | Age: 84
End: 2019-06-11
Payer: MEDICARE

## 2019-06-11 VITALS
HEIGHT: 63 IN | BODY MASS INDEX: 51.91 KG/M2 | HEART RATE: 66 BPM | DIASTOLIC BLOOD PRESSURE: 80 MMHG | SYSTOLIC BLOOD PRESSURE: 118 MMHG | WEIGHT: 293 LBS

## 2019-06-11 DIAGNOSIS — N20.1 LEFT URETERAL CALCULUS: Primary | ICD-10-CM

## 2019-06-11 PROCEDURE — 87086 URINE CULTURE/COLONY COUNT: CPT | Performed by: UROLOGY

## 2019-06-11 PROCEDURE — 99214 OFFICE O/P EST MOD 30 MIN: CPT | Performed by: UROLOGY

## 2019-06-11 RX ORDER — LEVOFLOXACIN 5 MG/ML
750 INJECTION, SOLUTION INTRAVENOUS ONCE
Status: CANCELLED | OUTPATIENT
Start: 2019-06-26 | End: 2019-06-11

## 2019-06-11 RX ORDER — WARFARIN SODIUM 2 MG/1
TABLET ORAL
COMMUNITY
End: 2019-06-26 | Stop reason: HOSPADM

## 2019-06-11 RX ORDER — DARIFENACIN HYDROBROMIDE 7.5 MG/1
TABLET, EXTENDED RELEASE ORAL
COMMUNITY
Start: 2011-11-03 | End: 2020-01-22 | Stop reason: HOSPADM

## 2019-06-11 RX ORDER — LINEZOLID 600 MG/1
TABLET, FILM COATED ORAL
COMMUNITY
End: 2020-01-22 | Stop reason: HOSPADM

## 2019-06-11 RX ORDER — HYDROCODONE BITARTRATE AND ACETAMINOPHEN 5; 325 MG/1; MG/1
TABLET ORAL
COMMUNITY
End: 2020-01-22 | Stop reason: HOSPADM

## 2019-06-11 RX ORDER — AMLODIPINE BESYLATE 10 MG/1
10 TABLET ORAL DAILY
COMMUNITY
End: 2020-08-17 | Stop reason: HOSPADM

## 2019-06-11 RX ORDER — GABAPENTIN 300 MG/1
CAPSULE ORAL
COMMUNITY
End: 2020-01-22 | Stop reason: HOSPADM

## 2019-06-11 RX ORDER — POTASSIUM CHLORIDE 20 MEQ/1
TABLET, EXTENDED RELEASE ORAL
COMMUNITY
End: 2020-01-22 | Stop reason: HOSPADM

## 2019-06-12 PROBLEM — N20.1 LEFT URETERAL CALCULUS: Status: ACTIVE | Noted: 2019-06-12

## 2019-06-12 LAB — BACTERIA UR CULT: NORMAL

## 2019-06-24 ENCOUNTER — TELEPHONE (OUTPATIENT)
Dept: UROLOGY | Facility: AMBULATORY SURGERY CENTER | Age: 84
End: 2019-06-24

## 2019-06-24 DIAGNOSIS — E87.6 HYPOKALEMIA: Primary | ICD-10-CM

## 2019-06-24 RX ORDER — POTASSIUM CHLORIDE 20 MEQ/1
20 TABLET, EXTENDED RELEASE ORAL 2 TIMES DAILY
Qty: 4 TABLET | Refills: 0 | Status: SHIPPED | OUTPATIENT
Start: 2019-06-24 | End: 2020-01-22 | Stop reason: HOSPADM

## 2019-06-25 ENCOUNTER — ANESTHESIA EVENT (OUTPATIENT)
Dept: PERIOP | Facility: HOSPITAL | Age: 84
End: 2019-06-25
Payer: MEDICARE

## 2019-06-26 ENCOUNTER — TELEPHONE (OUTPATIENT)
Dept: UROLOGY | Facility: MEDICAL CENTER | Age: 84
End: 2019-06-26

## 2019-06-26 ENCOUNTER — HOSPITAL ENCOUNTER (OUTPATIENT)
Facility: HOSPITAL | Age: 84
Setting detail: OUTPATIENT SURGERY
Discharge: HOME | End: 2019-06-26
Attending: UROLOGY | Admitting: UROLOGY
Payer: MEDICARE

## 2019-06-26 ENCOUNTER — APPOINTMENT (OUTPATIENT)
Dept: RADIOLOGY | Facility: HOSPITAL | Age: 84
End: 2019-06-26
Payer: MEDICARE

## 2019-06-26 ENCOUNTER — ANESTHESIA (OUTPATIENT)
Dept: PERIOP | Facility: HOSPITAL | Age: 84
End: 2019-06-26
Payer: MEDICARE

## 2019-06-26 VITALS
OXYGEN SATURATION: 94 % | RESPIRATION RATE: 17 BRPM | WEIGHT: 293 LBS | SYSTOLIC BLOOD PRESSURE: 128 MMHG | BODY MASS INDEX: 51.91 KG/M2 | TEMPERATURE: 97.4 F | HEART RATE: 74 BPM | DIASTOLIC BLOOD PRESSURE: 60 MMHG | HEIGHT: 63 IN

## 2019-06-26 DIAGNOSIS — N20.1 LEFT URETERAL CALCULUS: ICD-10-CM

## 2019-06-26 LAB
ANION GAP SERPL CALCULATED.3IONS-SCNC: 5 MMOL/L (ref 4–13)
BUN SERPL-MCNC: 19 MG/DL (ref 5–25)
CALCIUM SERPL-MCNC: 8.8 MG/DL (ref 8.3–10.1)
CHLORIDE SERPL-SCNC: 110 MMOL/L (ref 100–108)
CO2 SERPL-SCNC: 31 MMOL/L (ref 21–32)
CREAT SERPL-MCNC: 1.02 MG/DL (ref 0.6–1.3)
GFR SERPL CREATININE-BSD FRML MDRD: 51 ML/MIN/1.73SQ M
GLUCOSE P FAST SERPL-MCNC: 91 MG/DL (ref 65–99)
GLUCOSE SERPL-MCNC: 91 MG/DL (ref 65–140)
POTASSIUM SERPL-SCNC: 4 MMOL/L (ref 3.5–5.3)
SODIUM SERPL-SCNC: 146 MMOL/L (ref 136–145)

## 2019-06-26 PROCEDURE — 80048 BASIC METABOLIC PNL TOTAL CA: CPT | Performed by: UROLOGY

## 2019-06-26 PROCEDURE — C2617 STENT, NON-COR, TEM W/O DEL: HCPCS | Performed by: UROLOGY

## 2019-06-26 PROCEDURE — C1758 CATHETER, URETERAL: HCPCS | Performed by: UROLOGY

## 2019-06-26 PROCEDURE — 52356 CYSTO/URETERO W/LITHOTRIPSY: CPT | Performed by: UROLOGY

## 2019-06-26 PROCEDURE — C1769 GUIDE WIRE: HCPCS | Performed by: UROLOGY

## 2019-06-26 PROCEDURE — 76000 FLUOROSCOPY <1 HR PHYS/QHP: CPT

## 2019-06-26 RX ORDER — LEVOFLOXACIN 250 MG/1
250 TABLET ORAL EVERY 24 HOURS
Qty: 3 TABLET | Refills: 0 | Status: SHIPPED | OUTPATIENT
Start: 2019-06-26 | End: 2019-06-29

## 2019-06-26 RX ORDER — NEOSTIGMINE METHYLSULFATE 1 MG/ML
INJECTION INTRAVENOUS AS NEEDED
Status: DISCONTINUED | OUTPATIENT
Start: 2019-06-26 | End: 2019-06-26 | Stop reason: SURG

## 2019-06-26 RX ORDER — HYDROMORPHONE HCL/PF 1 MG/ML
0.5 SYRINGE (ML) INJECTION
Status: DISCONTINUED | OUTPATIENT
Start: 2019-06-26 | End: 2019-06-26 | Stop reason: HOSPADM

## 2019-06-26 RX ORDER — MAGNESIUM HYDROXIDE 1200 MG/15ML
LIQUID ORAL AS NEEDED
Status: DISCONTINUED | OUTPATIENT
Start: 2019-06-26 | End: 2019-06-26 | Stop reason: HOSPADM

## 2019-06-26 RX ORDER — SODIUM CHLORIDE 9 MG/ML
125 INJECTION, SOLUTION INTRAVENOUS CONTINUOUS
Status: DISCONTINUED | OUTPATIENT
Start: 2019-06-26 | End: 2019-06-26 | Stop reason: HOSPADM

## 2019-06-26 RX ORDER — HYDROCODONE BITARTRATE AND ACETAMINOPHEN 5; 325 MG/1; MG/1
1-2 TABLET ORAL EVERY 6 HOURS PRN
Qty: 20 TABLET | Refills: 0 | Status: SHIPPED | OUTPATIENT
Start: 2019-06-26 | End: 2020-01-22 | Stop reason: HOSPADM

## 2019-06-26 RX ORDER — PROPOFOL 10 MG/ML
INJECTION, EMULSION INTRAVENOUS AS NEEDED
Status: DISCONTINUED | OUTPATIENT
Start: 2019-06-26 | End: 2019-06-26 | Stop reason: SURG

## 2019-06-26 RX ORDER — EPHEDRINE SULFATE 50 MG/ML
INJECTION INTRAVENOUS AS NEEDED
Status: DISCONTINUED | OUTPATIENT
Start: 2019-06-26 | End: 2019-06-26 | Stop reason: SURG

## 2019-06-26 RX ORDER — FENTANYL CITRATE 50 UG/ML
INJECTION, SOLUTION INTRAMUSCULAR; INTRAVENOUS AS NEEDED
Status: DISCONTINUED | OUTPATIENT
Start: 2019-06-26 | End: 2019-06-26 | Stop reason: SURG

## 2019-06-26 RX ORDER — MEPERIDINE HYDROCHLORIDE 50 MG/ML
12.5 INJECTION INTRAMUSCULAR; INTRAVENOUS; SUBCUTANEOUS ONCE AS NEEDED
Status: DISCONTINUED | OUTPATIENT
Start: 2019-06-26 | End: 2019-06-26 | Stop reason: HOSPADM

## 2019-06-26 RX ORDER — FENTANYL CITRATE/PF 50 MCG/ML
50 SYRINGE (ML) INJECTION
Status: DISCONTINUED | OUTPATIENT
Start: 2019-06-26 | End: 2019-06-26 | Stop reason: HOSPADM

## 2019-06-26 RX ORDER — ONDANSETRON 2 MG/ML
INJECTION INTRAMUSCULAR; INTRAVENOUS AS NEEDED
Status: DISCONTINUED | OUTPATIENT
Start: 2019-06-26 | End: 2019-06-26 | Stop reason: SURG

## 2019-06-26 RX ORDER — LEVOFLOXACIN 5 MG/ML
750 INJECTION, SOLUTION INTRAVENOUS ONCE
Status: COMPLETED | OUTPATIENT
Start: 2019-06-26 | End: 2019-06-26

## 2019-06-26 RX ORDER — OXYBUTYNIN CHLORIDE 5 MG/1
5 TABLET, EXTENDED RELEASE ORAL DAILY
Status: DISCONTINUED | OUTPATIENT
Start: 2019-06-26 | End: 2019-06-26 | Stop reason: HOSPADM

## 2019-06-26 RX ORDER — DEXAMETHASONE SODIUM PHOSPHATE 4 MG/ML
INJECTION, SOLUTION INTRA-ARTICULAR; INTRALESIONAL; INTRAMUSCULAR; INTRAVENOUS; SOFT TISSUE AS NEEDED
Status: DISCONTINUED | OUTPATIENT
Start: 2019-06-26 | End: 2019-06-26 | Stop reason: SURG

## 2019-06-26 RX ORDER — GLYCOPYRROLATE 0.2 MG/ML
INJECTION INTRAMUSCULAR; INTRAVENOUS AS NEEDED
Status: DISCONTINUED | OUTPATIENT
Start: 2019-06-26 | End: 2019-06-26 | Stop reason: SURG

## 2019-06-26 RX ORDER — HYDROCODONE BITARTRATE AND ACETAMINOPHEN 5; 325 MG/1; MG/1
1 TABLET ORAL EVERY 6 HOURS PRN
Status: DISCONTINUED | OUTPATIENT
Start: 2019-06-26 | End: 2019-06-26 | Stop reason: HOSPADM

## 2019-06-26 RX ORDER — ONDANSETRON 2 MG/ML
4 INJECTION INTRAMUSCULAR; INTRAVENOUS ONCE AS NEEDED
Status: DISCONTINUED | OUTPATIENT
Start: 2019-06-26 | End: 2019-06-26 | Stop reason: HOSPADM

## 2019-06-26 RX ORDER — ROCURONIUM BROMIDE 10 MG/ML
INJECTION, SOLUTION INTRAVENOUS AS NEEDED
Status: DISCONTINUED | OUTPATIENT
Start: 2019-06-26 | End: 2019-06-26 | Stop reason: SURG

## 2019-06-26 RX ADMIN — SODIUM CHLORIDE 125 ML/HR: 0.9 INJECTION, SOLUTION INTRAVENOUS at 08:32

## 2019-06-26 RX ADMIN — PROPOFOL 200 MG: 10 INJECTION, EMULSION INTRAVENOUS at 10:33

## 2019-06-26 RX ADMIN — FENTANYL CITRATE 50 MCG: 50 INJECTION, SOLUTION INTRAMUSCULAR; INTRAVENOUS at 10:33

## 2019-06-26 RX ADMIN — ONDANSETRON HYDROCHLORIDE 4 MG: 2 INJECTION, SOLUTION INTRAVENOUS at 10:44

## 2019-06-26 RX ADMIN — NEOSTIGMINE METHYLSULFATE 4 MG: 1 INJECTION, SOLUTION INTRAVENOUS at 11:38

## 2019-06-26 RX ADMIN — DEXAMETHASONE SODIUM PHOSPHATE 4 MG: 4 INJECTION, SOLUTION INTRAMUSCULAR; INTRAVENOUS at 10:44

## 2019-06-26 RX ADMIN — EPHEDRINE SULFATE 10 MG: 50 INJECTION, SOLUTION INTRAVENOUS at 10:44

## 2019-06-26 RX ADMIN — EPHEDRINE SULFATE 10 MG: 50 INJECTION, SOLUTION INTRAVENOUS at 11:10

## 2019-06-26 RX ADMIN — ROCURONIUM BROMIDE 30 MG: 10 INJECTION, SOLUTION INTRAVENOUS at 10:33

## 2019-06-26 RX ADMIN — SODIUM CHLORIDE: 0.9 INJECTION, SOLUTION INTRAVENOUS at 11:05

## 2019-06-26 RX ADMIN — FENTANYL CITRATE 50 MCG: 50 INJECTION, SOLUTION INTRAMUSCULAR; INTRAVENOUS at 11:01

## 2019-06-26 RX ADMIN — LEVOFLOXACIN: 5 INJECTION, SOLUTION INTRAVENOUS at 10:36

## 2019-06-26 RX ADMIN — EPHEDRINE SULFATE 10 MG: 50 INJECTION, SOLUTION INTRAVENOUS at 10:41

## 2019-06-26 RX ADMIN — GLYCOPYRROLATE 0.8 MG: 0.2 INJECTION INTRAMUSCULAR; INTRAVENOUS at 11:38

## 2019-07-19 ENCOUNTER — PROCEDURE VISIT (OUTPATIENT)
Dept: UROLOGY | Facility: MEDICAL CENTER | Age: 84
End: 2019-07-19
Payer: MEDICARE

## 2019-07-19 DIAGNOSIS — N20.1 LEFT URETERAL CALCULUS: Primary | ICD-10-CM

## 2019-07-19 DIAGNOSIS — E87.6 HYPOKALEMIA: ICD-10-CM

## 2019-07-19 PROCEDURE — 52310 CYSTOSCOPY AND TREATMENT: CPT | Performed by: UROLOGY

## 2019-07-19 PROCEDURE — 1124F ACP DISCUSS-NO DSCNMKR DOCD: CPT | Performed by: UROLOGY

## 2019-07-19 PROCEDURE — 99212 OFFICE O/P EST SF 10 MIN: CPT | Performed by: UROLOGY

## 2019-07-19 RX ORDER — CIPROFLOXACIN 500 MG/1
500 TABLET, FILM COATED ORAL ONCE
Qty: 1 TABLET | Refills: 0 | Status: SHIPPED | OUTPATIENT
Start: 2019-07-19 | End: 2019-07-19

## 2019-07-19 NOTE — PROGRESS NOTES
[address] [facility]  [facility]  [address] [facility]  [place], [address] [place] [address]  [phone]  www  [facility]  org      NAME: Julia Mcfarland  AGE: 81 y o  SEX: female  : 1935   MRN: [id number]    DATE: 2019  TIME: 2:51 PM    Assessment and Plan:    Left ureteral renal calculi, status post laser lithotripsy-stent removed cystoscopically  Follow-up 6 months with renal ultrasound  Chief Complaint   No chief complaint on file  History of Present Illness   Status post cystoscopy and left ureteroscopy with laser lithotripsy of left proximal ureteral and renal calculi  Here for cystoscopy and stent removal     Cystoscopy  Date/Time: 2019 2:53 PM  Performed by: Dari Haley MD  Authorized by: Dari Haley MD     Procedure details: simple removal of a foreign body, stone, or stent    Additional Procedure Details: Cystoscopy Procedure Note        Pre-operative Diagnosis:  Retained left ureteral stent    Post-operative Diagnosis:  Same    Procedure: Flexible cystoscopy, cystoscopic removal of retained left ureteral stent    Surgeon: Gena King MD    Anesthesia: 1% Xylocaine per urethra    EBL: Minimal    Complications: none    Procedure Details   The risks, benefits, complications, treatment options, and expected outcomes were discussed with the patient  The patient concurred with the proposed plan, giving informed consent  Cystoscopy was performed today under local anesthesia, using sterile technique  The patient was placed in the dorsal lithotomy position, prepped with Betadine, and draped in the usual sterile fashion  The flexible cystocope was used to inspect both the urethra and bladder    Findings:  Urethra:  Normal without stricture  Bladder:  Smooth, not trabeculated and there were no stones tumors or other lesions  The orifices were orthotopic and intact  The stent was grasped and removed intact             Specimens:  None Complications:  None           Disposition: To home            Condition:  Stable            The following portions of the patient's history were reviewed and updated as appropriate: allergies, current medications, past family history, past medical history, past social history, past surgical history and problem list   Past Medical History:   Diagnosis Date    Acute kidney failure ([facility] )     Anemia     Arthritis     Chafing     folds of skin and back of thighs    Chronic indwelling Montes De Oca catheter     #16Fr    Difficulty walking     Discitis     Discitis     Dysphagia     Dysphagia     Dysuria     Gait abnormality     GERD (gastroesophageal reflux disease)     Hypothyroidism     Kidney stone     Lymphedema     Major depressive disorder     MDD (major depressive disorder)     [facility]     Morbid obesity ([facility] )     Morbid obesity ([facility] )     Muscle weakness     Muscle weakness (generalized)     NSTEMI (non-ST elevated myocardial infarction) ([place] [address] )     Osteoporosis     Paroxysmal A-fib ([place] [address] )     Renal disorder     Sleep apnea      Past Surgical History:   Procedure Laterality Date    CHOLECYSTECTOMY      FL RETROGRADE PYELOGRAM  5/22/2019    HYSTERECTOMY      JOINT REPLACEMENT Bilateral     knee replacment    LAPAROSCOPIC GASTRIC BANDING      MT CYSTO/URETERO W/LITHOTRIPSY &INDWELL STENT INSRT Left 6/26/2019    Procedure: CYSTO, URETEROSCOPY W/HOLMIUM LASER, STENT EXCHANGE;  Surgeon: Angel Guadalupe MD;  [facility];  Service: Urology    MT CYSTOURETHROSCOPY,URETER CATHETER Left 5/22/2019    Procedure: CYSTOSCOPY; RIGHT RETROGRADE PYELOGRAM WITH INSERTION STENT URETERAL LEFT;  Surgeon: Angel Guadalupe MD;  [facility];  Service: Urology    REMOVAL GASTRIC BAND LAPAROSCOPIC      VENTRAL HERNIA REPAIR      x4     shoulder  Review of Systems   Review of Systems    Active Problem List     Patient Active Problem List   Diagnosis    Ureterolithiasis    Urinary tract infection    Essential hypertension    Morbid obesity (HCC)    Mixed hyperlipidemia    Paroxysmal A-fib (HCC)    Elevated INR    Thrombocytopenia (HCC)    Acute urinary tract infection    Bacterial infection due to Proteus mirabilis    Left ureteral calculus       Objective   There were no vitals taken for this visit      Physical Exam        Current Medications     Current Outpatient Medications:     acetaminophen (TYLENOL) 325 mg tablet, Take 650 mg by mouth every 6 (six) hours as needed for mild pain , Disp: , Rfl:     acetaminophen (TYLENOL) 650 mg suppository, 650 mg every 4 (four) hours, Disp: , Rfl:     alendronate (FOSAMAX) 70 mg tablet, Take 70 mg by mouth every 7 days, Disp: , Rfl:     amLODIPine (NORVASC) 10 mg tablet, amlodipine 10 mg tablet, Disp: , Rfl:     aspirin (ECOTRIN LOW STRENGTH) 81 mg EC tablet, Take 81 mg by mouth daily, Disp: , Rfl:     atorvastatin (LIPITOR) 10 mg tablet, Take 10 mg by mouth daily, Disp: , Rfl:     bisacodyl (DULCOLAX) 10 mg suppository, Insert 10 mg into the rectum daily as needed , Disp: , Rfl:     calcium carbonate-vitamin D (OSCAL-D) 500 mg-200 units per tablet, Take 1 tablet by mouth daily with breakfast, Disp: , Rfl:     celecoxib (CeleBREX) 100 mg capsule, Take 100 mg by mouth 2 (two) times a day, Disp: , Rfl:     cholecalciferol (VITAMIN D3) 1,000 units tablet, Take 1,000 Units by mouth daily, Disp: , Rfl:     Coenzyme Q10 (CO Q10) 100 MG CAPS, Take by mouth, Disp: , Rfl:     darifenacin (ENABLEX) 7 5 MG 24 hr tablet, Enablex 7 5 mg tablet,extended release, Disp: , Rfl:     docusate sodium (COLACE) 100 mg capsule, Take 100 mg by mouth 2 (two) times a day, Disp: , Rfl:     esomeprazole (NexIUM) 40 MG capsule, Take 40 mg by mouth every morning before breakfast, Disp: , Rfl:     fluticasone (FLOVENT DISKUS) 50 MCG/BLIST diskus inhaler, Inhale 1 puff 2 (two) times a day, Disp: , Rfl:     furosemide (LASIX) 20 mg tablet, Take 20 mg by mouth 2 (two) times a day, Disp: , Rfl:     gabapentin (NEURONTIN) 300 mg capsule, gabapentin 300 mg capsule, Disp: , Rfl:     guaiFENesin (MUCINEX) 600 mg 12 hr tablet, Take 2 tablets (1,200 mg total) by mouth every 12 (twelve) hours (Patient not taking: Reported on 6/11/2019), Disp: 10 tablet, Rfl: 0    HYDROcodone-acetaminophen (NORCO) 5-325 mg per tablet, hydrocodone 5 mg-acetaminophen 325 mg tablet, Disp: , Rfl:     HYDROcodone-acetaminophen (NORCO) 5-325 mg per tablet, Take 1-2 tablets by mouth every 6 (six) hours as needed for pain for up to 20 dosesMax Daily Amount: 8 tablets, Disp: 20 tablet, Rfl: 0    levothyroxine 150 mcg tablet, Take 150 mcg by mouth daily, Disp: , Rfl:     linezolid (ZYVOX) 600 mg tablet, linezolid 600 mg tablet, Disp: , Rfl:     losartan (COZAAR) 25 mg tablet, Take 25 mg by mouth daily, Disp: , Rfl:     Lutein-Zeaxanthin 6-0 24 MG CAPS, Take 6 mg by mouth, Disp: , Rfl:     magnesium hydroxide (MILK OF MAGNESIA) 400 mg/5 mL oral suspension, Milk of Magnesia  2400mg/30ml, Disp: , Rfl:     Methenamine-Sodium Salicylate (CYSTEX PO), Take 15 mL by mouth 2 (two) times a day, Disp: , Rfl:     metoprolol tartrate (LOPRESSOR) 25 mg tablet, Take 25 mg by mouth every 12 (twelve) hours, Disp: , Rfl:     nitroglycerin (NITROSTAT) 0 4 mg SL tablet, Place 0 4 mg under the tongue every 5 (five) minutes as needed for chest pain , Disp: , Rfl:     potassium chloride (K-DUR,KLOR-CON) 20 mEq tablet, potassium chloride ER 20 mEq tablet,extended release(part/cryst), Disp: , Rfl:     potassium chloride (K-DUR,KLOR-CON) 20 mEq tablet, Take 1 tablet (20 mEq total) by mouth 2 (two) times a day for 2 days, Disp: 4 tablet, Rfl: 0    pramipexole (MIRAPEX) 1 mg tablet, Take 1 mg by mouth daily at bedtime , Disp: , Rfl:     ranitidine (ZANTAC) 300 MG capsule, Take 300 mg by mouth every morning , Disp: , Rfl:     rivaroxaban (XARELTO) 20 mg tablet, Take 20 mg by mouth every evening , Disp: , Rfl:     Skin Protectants, Misc   (DERMACERIN) CREA, Apply topically, Disp: , Rfl:     talc, Apply topically as needed for irritation, Disp: , Rfl:         Yaritza Pederson MD

## 2019-07-19 NOTE — LETTER
2019     Gideon Rodgers MD  [address] [facility]  [address]    Patient: Carrington Gerardo   YOB: 1935   Date of Visit: 2019       Dear Dr Jd Jarvis: Thank you for referring Patricia Boyce to me for evaluation  Below are my notes for this consultation  If you have questions, please do not hesitate to call me  I look forward to following your patient along with you  Sincerely,        Surinder Leonardo MD        CC: No Recipients  Surinder Leonardo MD  2019  4:25 PM  Sign at close encounter  [address] [facility]  [address], [address] [place] [phone]  www  [facility]  org      NAME: Carrington Gerardo  AGE: 81 y o  SEX: female  : 1935   MRN: [id number]    DATE: 2019  TIME: 2:51 PM    Assessment and Plan:    Left ureteral renal calculi, status post laser lithotripsy-stent removed cystoscopically  Follow-up 6 months with renal ultrasound  Chief Complaint   No chief complaint on file  History of Present Illness   Status post cystoscopy and left ureteroscopy with laser lithotripsy of left proximal ureteral and renal calculi  Here for cystoscopy and stent removal     Cystoscopy  Date/Time: 2019 2:53 PM  Performed by: Surinder Leonardo MD  Authorized by: Surinder Leonardo MD     Procedure details: simple removal of a foreign body, stone, or stent    Additional Procedure Details: Cystoscopy Procedure Note        Pre-operative Diagnosis:  Retained left ureteral stent    Post-operative Diagnosis:  Same    Procedure: Flexible cystoscopy, cystoscopic removal of retained left ureteral stent    Surgeon: Paul Stock MD    Anesthesia: 1% Xylocaine per urethra    EBL: Minimal    Complications: none    Procedure Details   The risks, benefits, complications, treatment options, and expected outcomes were discussed with the patient   The patient concurred with the proposed plan, giving informed consent  Cystoscopy was performed today under local anesthesia, using sterile technique  The patient was placed in the dorsal lithotomy position, prepped with Betadine, and draped in the usual sterile fashion  The flexible cystocope was used to inspect both the urethra and bladder    Findings:  Urethra:  Normal without stricture  Bladder:  Smooth, not trabeculated and there were no stones tumors or other lesions  The orifices were orthotopic and intact  The stent was grasped and removed intact             Specimens:  None                 Complications:  None           Disposition: To home            Condition:  Stable            The following portions of the patient's history were reviewed and updated as appropriate: allergies, current medications, past family history, past medical history, past social history, past surgical history and problem list   Past Medical History:   Diagnosis Date    [facility] ([facility] )     Anemia     Arthritis     Chafing     folds of skin and back of thighs    Chronic indwelling Montes De Oca catheter     #16Fr    Difficulty walking     Discitis     Discitis     Dysphagia     Dysphagia     Dysuria     Gait abnormality     GERD (gastroesophageal reflux disease)     Hypothyroidism     Kidney stone     Lymphedema     Major depressive disorder     MDD (major depressive disorder)     MI (my[facility] infarction) ([facility] )     Morbid obesity ([facility] )     Morbid obesity ([facility] )     Muscle weakness     Muscle weakness (generalized)     NSTEMI (non-ST elevated myocardial infarction) ([facility] )     Osteoporosis     Paroxysmal A-fib ([facility] )     Renal disorder     Sleep apnea      Past Surgical History:   Procedure Laterality Date    CHOLECYSTECTOMY      FL RETROGRADE PYELOGRAM  5/22/2019    HYSTERECTOMY      JOINT REPLACEMENT Bilateral     knee replacment    LAPAROSCOPIC GASTRIC BANDING      [place] CYSTO/URETERO W/LITHOTRIPSY &INDWELL STENT INSRT Left 6/26/2019    Procedure: CYSTO, URETEROSCOPY W/HOLMIUM LASER, STENT EXCHANGE;  Surgeon: Elder Marcus MD;  [facility];  Service: Urology    NE CYSTOURETHROSCOPY,URETER CATHETER Left 5/22/2019    Procedure: CYSTOSCOPY; RIGHT RETROGRADE PYELOGRAM WITH INSERTION STENT URETERAL LEFT;  Surgeon: Elder Marcus MD;  [facility];  Service: Urology    REMOVAL GASTRIC BAND LAPAROSCOPIC      VENTRAL HERNIA REPAIR      x4     shoulder  Review of Systems   Review of Systems    Active Problem List     Patient Active Problem List   Diagnosis    Ureterolithiasis    Urinary tract infection    Essential hypertension    [facility]    Mixed hyperlipidemia    Paroxysmal A-fib ([facility] )    Elevated INR    Thrombocytopenia (HCC)    Acute urinary tract infection    Bacterial infection due to Proteus mirabilis    Left ureteral calculus       Objective   There were no vitals taken for this visit      Physical Exam        Current Medications     Current Outpatient Medications:     acetaminophen (TYLENOL) 325 mg tablet, Take 650 mg by mouth every 6 (six) hours as needed for mild pain , Disp: , Rfl:     acetaminophen (TYLENOL) 650 mg suppository, 650 mg every 4 (four) hours, Disp: , Rfl:     alendronate (FOSAMAX) 70 mg tablet, Take 70 mg by mouth every 7 days, Disp: , Rfl:     amLODIPine (NORVASC) 10 mg tablet, amlodipine 10 mg tablet, Disp: , Rfl:     aspirin (ECOTRIN LOW STRENGTH) 81 mg EC tablet, Take 81 mg by mouth daily, Disp: , Rfl:     atorvastatin (LIPITOR) 10 mg tablet, Take 10 mg by mouth daily, Disp: , Rfl:     bisacodyl (DULCOLAX) 10 mg suppository, Insert 10 mg into the rectum daily as needed , Disp: , Rfl:     calcium carbonate-vitamin D (OSCAL-D) 500 mg-200 units per tablet, Take 1 tablet by mouth daily with breakfast, Disp: , Rfl:     celecoxib (CeleBREX) 100 mg capsule, Take 100 mg by mouth 2 (two) times a day, Disp: , Rfl:     cholecalciferol (VITAMIN D3) 1,000 units tablet, Take 1,000 Units by mouth daily, Disp: , Rfl:     Coenzyme Q10 (CO Q10) 100 MG CAPS, Take by mouth, Disp: , Rfl:     darifenacin (ENABLEX) 7 5 MG 24 hr tablet, Enablex 7 5 mg tablet,extended release, Disp: , Rfl:     docusate sodium (COLACE) 100 mg capsule, Take 100 mg by mouth 2 (two) times a day, Disp: , Rfl:     esomeprazole (NexIUM) 40 MG capsule, Take 40 mg by mouth every morning before breakfast, Disp: , Rfl:     fluticasone (FLOVENT DISKUS) 50 MCG/BLIST diskus inhaler, Inhale 1 puff 2 (two) times a day, Disp: , Rfl:     furosemide (LASIX) 20 mg tablet, Take 20 mg by mouth 2 (two) times a day, Disp: , Rfl:     gabapentin (NEURONTIN) 300 mg capsule, gabapentin 300 mg capsule, Disp: , Rfl:     guaiFENesin (MUCINEX) 600 mg 12 hr tablet, Take 2 tablets (1,200 mg total) by mouth every 12 (twelve) hours (Patient not taking: Reported on 6/11/2019), Disp: 10 tablet, Rfl: 0    HYDROcodone-acetaminophen (NORCO) 5-325 mg per tablet, hydrocodone 5 mg-acetaminophen 325 mg tablet, Disp: , Rfl:     HYDROcodone-acetaminophen (NORCO) 5-325 mg per tablet, Take 1-2 tablets by mouth every 6 (six) hours as needed for pain for up to 20 dosesMax Daily Amount: 8 tablets, Disp: 20 tablet, Rfl: 0    levothyroxine 150 mcg tablet, Take 150 mcg by mouth daily, Disp: , Rfl:     linezolid (ZYVOX) 600 mg tablet, linezolid 600 mg tablet, Disp: , Rfl:     losartan (COZAAR) 25 mg tablet, Take 25 mg by mouth daily, Disp: , Rfl:     Lutein-Zeaxanthin 6-0 24 MG CAPS, Take 6 mg by mouth, Disp: , Rfl:     magnesium hydroxide (MILK OF MAGNESIA) 400 mg/5 mL oral suspension, Milk of Magnesia  2400mg/30ml, Disp: , Rfl:     Methenamine-Sodium Salicylate (CYSTEX PO), Take 15 mL by mouth 2 (two) times a day, Disp: , Rfl:     metoprolol tartrate (LOPRESSOR) 25 mg tablet, Take 25 mg by mouth every 12 (twelve) hours, Disp: , Rfl:     nitroglycerin (NITROSTAT) 0 4 mg SL tablet, Place 0 4 mg under the tongue every 5 (five) minutes as needed for chest pain , Disp: , Rfl:    potassium chloride (K-DUR,KLOR-CON) 20 mEq tablet, potassium chloride ER 20 mEq tablet,extended release(part/cryst), Disp: , Rfl:     potassium chloride (K-DUR,KLOR-CON) 20 mEq tablet, Take 1 tablet (20 mEq total) by mouth 2 (two) times a day for 2 days, Disp: 4 tablet, Rfl: 0    pramipexole (MIRAPEX) 1 mg tablet, Take 1 mg by mouth daily at bedtime , Disp: , Rfl:     ranitidine (ZANTAC) 300 MG capsule, Take 300 mg by mouth every morning , Disp: , Rfl:     rivaroxaban (XARELTO) 20 mg tablet, Take 20 mg by mouth every evening , Disp: , Rfl:     Skin Protectants, Misc   (DERMACERIN) CREA, Apply topically, Disp: , Rfl:     talc, Apply topically as needed for irritation, Disp: , Rfl:         Dari Haley MD

## 2019-07-24 ENCOUNTER — TELEPHONE (OUTPATIENT)
Dept: UROLOGY | Facility: MEDICAL CENTER | Age: 84
End: 2019-07-24

## 2019-07-24 NOTE — TELEPHONE ENCOUNTER
Vik Hodgson is a patient of Madie Mohs and is seen at [facility] called to find out what orders and appointments are needed  Please call

## 2019-07-24 NOTE — TELEPHONE ENCOUNTER
Called the facility for [place] Left message with  to call back office regarding orders for Essence Banda  Orders from  is 6 months follow up and Renal Ultrasound

## 2020-01-08 ENCOUNTER — TELEPHONE (OUTPATIENT)
Dept: UROLOGY | Facility: MEDICAL CENTER | Age: 85
End: 2020-01-08

## 2020-01-08 NOTE — TELEPHONE ENCOUNTER
Call placed to [facility] [address] [place] [address] where patient resides and spoke with Leigh Ann Chowdhury from patient scheduling and offered appointment with Dr Garth Ulrich for this Friday January 10th at 1:15pm  Leigh Ann Chowdhury accepted appointment  Spoke with patient's nurse, Froy Lambert, and advised possibly obtaining a UA C&S and KUB prior to appointment on Friday based on patient's symptoms or burning with urination and blood in her urine  Patient's nurse, Froy Lambert, stated she will talk to the Physician at [facility] and see what type of testing they can do  Advised patient's nurse to call the office back with any questions, concerns, or if symptoms worsen

## 2020-01-08 NOTE — TELEPHONE ENCOUNTER
Spoke with Noreen Manriquez, PB not in the office on 1/13 and no openings on 1/15    Scheduled pt 1/17

## 2020-01-08 NOTE — TELEPHONE ENCOUNTER
Nursing home called back and they cannot get transportation on 1/10/20  They said they could come in 1/13/20 or 1/15/20  Please call Chetna Saavedra at [phone]

## 2020-01-08 NOTE — TELEPHONE ENCOUNTER
Patient of Dr Mario Mcgowan from [address] [place] [address] calling for sooner appointment for patient  Patient has blood in urine with pain  Willow Mcgowan states no clots or fever  Please advise

## 2020-01-15 ENCOUNTER — HOSPITAL ENCOUNTER (INPATIENT)
Facility: HOSPITAL | Age: 85
LOS: 7 days | Discharge: NURSING, REHAB OR LONG-TERM CARE | DRG: 813 | End: 2020-01-22
Attending: EMERGENCY MEDICINE | Admitting: INTERNAL MEDICINE
Payer: MEDICARE

## 2020-01-15 ENCOUNTER — APPOINTMENT (EMERGENCY)
Dept: RADIOLOGY | Facility: HOSPITAL | Age: 85
DRG: 813 | End: 2020-01-15
Payer: MEDICARE

## 2020-01-15 DIAGNOSIS — B37.81 CANDIDAL ESOPHAGITIS (HCC): ICD-10-CM

## 2020-01-15 DIAGNOSIS — D64.9 SYMPTOMATIC ANEMIA: ICD-10-CM

## 2020-01-15 DIAGNOSIS — I50.9 CHF (CONGESTIVE HEART FAILURE) (HCC): ICD-10-CM

## 2020-01-15 DIAGNOSIS — R31.9 HEMATURIA: ICD-10-CM

## 2020-01-15 DIAGNOSIS — J20.5 RSV BRONCHITIS: ICD-10-CM

## 2020-01-15 DIAGNOSIS — D64.9 SEVERE ANEMIA: Primary | ICD-10-CM

## 2020-01-15 DIAGNOSIS — R55 SYNCOPE, UNSPECIFIED SYNCOPE TYPE: ICD-10-CM

## 2020-01-15 LAB
ABO GROUP BLD: NORMAL
ALBUMIN SERPL BCP-MCNC: 2.9 G/DL (ref 3.5–5)
ALP SERPL-CCNC: 62 U/L (ref 46–116)
ALT SERPL W P-5'-P-CCNC: 11 U/L (ref 12–78)
ANION GAP SERPL CALCULATED.3IONS-SCNC: 3 MMOL/L (ref 4–13)
APTT PPP: 34 SECONDS (ref 23–37)
AST SERPL W P-5'-P-CCNC: 15 U/L (ref 5–45)
BASOPHILS # BLD AUTO: 0 THOUSANDS/ΜL (ref 0–0.1)
BASOPHILS NFR BLD AUTO: 0 % (ref 0–1)
BILIRUB SERPL-MCNC: 0.53 MG/DL (ref 0.2–1)
BLD GP AB SCN SERPL QL: NEGATIVE
BUN SERPL-MCNC: 13 MG/DL (ref 5–25)
CALCIUM SERPL-MCNC: 8.3 MG/DL (ref 8.3–10.1)
CHLORIDE SERPL-SCNC: 111 MMOL/L (ref 100–108)
CO2 SERPL-SCNC: 31 MMOL/L (ref 21–32)
CREAT SERPL-MCNC: 0.97 MG/DL (ref 0.6–1.3)
EOSINOPHIL # BLD AUTO: 0.04 THOUSAND/ΜL (ref 0–0.61)
EOSINOPHIL NFR BLD AUTO: 1 % (ref 0–6)
ERYTHROCYTE [DISTWIDTH] IN BLOOD BY AUTOMATED COUNT: 22.7 % (ref 11.6–15.1)
FERRITIN SERPL-MCNC: 5 NG/ML (ref 8–388)
FLUAV RNA NPH QL NAA+PROBE: ABNORMAL
FLUBV RNA NPH QL NAA+PROBE: ABNORMAL
GFR SERPL CREATININE-BSD FRML MDRD: 54 ML/MIN/1.73SQ M
GLUCOSE SERPL-MCNC: 106 MG/DL (ref 65–140)
HCT VFR BLD AUTO: 17.2 % (ref 34.8–46.1)
HGB BLD-MCNC: 3.8 G/DL (ref 11.5–15.4)
IMM GRANULOCYTES # BLD AUTO: 0.07 THOUSAND/UL (ref 0–0.2)
IMM GRANULOCYTES NFR BLD AUTO: 2 % (ref 0–2)
INR PPP: 1.82 (ref 0.84–1.19)
IRON SATN MFR SERPL: 12 %
IRON SERPL-MCNC: 44 UG/DL (ref 50–170)
LYMPHOCYTES # BLD AUTO: 0.55 THOUSANDS/ΜL (ref 0.6–4.47)
LYMPHOCYTES NFR BLD AUTO: 18 % (ref 14–44)
MCH RBC QN AUTO: 15.3 PG (ref 26.8–34.3)
MCHC RBC AUTO-ENTMCNC: 22.1 G/DL (ref 31.4–37.4)
MCV RBC AUTO: 69 FL (ref 82–98)
MONOCYTES # BLD AUTO: 0.43 THOUSAND/ΜL (ref 0.17–1.22)
MONOCYTES NFR BLD AUTO: 14 % (ref 4–12)
NEUTROPHILS # BLD AUTO: 1.99 THOUSANDS/ΜL (ref 1.85–7.62)
NEUTS SEG NFR BLD AUTO: 65 % (ref 43–75)
NRBC BLD AUTO-RTO: 1 /100 WBCS
NT-PROBNP SERPL-MCNC: 1203 PG/ML
PLATELET # BLD AUTO: 182 THOUSANDS/UL (ref 149–390)
PMV BLD AUTO: 10.3 FL (ref 8.9–12.7)
POTASSIUM SERPL-SCNC: 4 MMOL/L (ref 3.5–5.3)
PROT SERPL-MCNC: 6.6 G/DL (ref 6.4–8.2)
PROTHROMBIN TIME: 21.4 SECONDS (ref 11.6–14.5)
RBC # BLD AUTO: 2.48 MILLION/UL (ref 3.81–5.12)
RH BLD: POSITIVE
RSV RNA NPH QL NAA+PROBE: DETECTED
SODIUM SERPL-SCNC: 145 MMOL/L (ref 136–145)
SPECIMEN EXPIRATION DATE: NORMAL
TIBC SERPL-MCNC: 366 UG/DL (ref 250–450)
TROPONIN I SERPL-MCNC: 0.02 NG/ML
TROPONIN I SERPL-MCNC: 0.02 NG/ML
WBC # BLD AUTO: 3.08 THOUSAND/UL (ref 4.31–10.16)

## 2020-01-15 PROCEDURE — 99223 1ST HOSP IP/OBS HIGH 75: CPT | Performed by: INTERNAL MEDICINE

## 2020-01-15 PROCEDURE — 82272 OCCULT BLD FECES 1-3 TESTS: CPT

## 2020-01-15 PROCEDURE — 83550 IRON BINDING TEST: CPT | Performed by: INTERNAL MEDICINE

## 2020-01-15 PROCEDURE — 80053 COMPREHEN METABOLIC PANEL: CPT | Performed by: EMERGENCY MEDICINE

## 2020-01-15 PROCEDURE — 93005 ELECTROCARDIOGRAM TRACING: CPT

## 2020-01-15 PROCEDURE — 86850 RBC ANTIBODY SCREEN: CPT | Performed by: EMERGENCY MEDICINE

## 2020-01-15 PROCEDURE — 86923 COMPATIBILITY TEST ELECTRIC: CPT

## 2020-01-15 PROCEDURE — P9040 RBC LEUKOREDUCED IRRADIATED: HCPCS

## 2020-01-15 PROCEDURE — 84484 ASSAY OF TROPONIN QUANT: CPT | Performed by: EMERGENCY MEDICINE

## 2020-01-15 PROCEDURE — P9016 RBC LEUKOCYTES REDUCED: HCPCS

## 2020-01-15 PROCEDURE — 1123F ACP DISCUSS/DSCN MKR DOCD: CPT | Performed by: INTERNAL MEDICINE

## 2020-01-15 PROCEDURE — 96374 THER/PROPH/DIAG INJ IV PUSH: CPT

## 2020-01-15 PROCEDURE — 82728 ASSAY OF FERRITIN: CPT | Performed by: INTERNAL MEDICINE

## 2020-01-15 PROCEDURE — C9113 INJ PANTOPRAZOLE SODIUM, VIA: HCPCS | Performed by: INTERNAL MEDICINE

## 2020-01-15 PROCEDURE — 85025 COMPLETE CBC W/AUTO DIFF WBC: CPT | Performed by: EMERGENCY MEDICINE

## 2020-01-15 PROCEDURE — 85610 PROTHROMBIN TIME: CPT | Performed by: EMERGENCY MEDICINE

## 2020-01-15 PROCEDURE — 83540 ASSAY OF IRON: CPT | Performed by: INTERNAL MEDICINE

## 2020-01-15 PROCEDURE — 30233N1 TRANSFUSION OF NONAUTOLOGOUS RED BLOOD CELLS INTO PERIPHERAL VEIN, PERCUTANEOUS APPROACH: ICD-10-PCS | Performed by: FAMILY MEDICINE

## 2020-01-15 PROCEDURE — 99285 EMERGENCY DEPT VISIT HI MDM: CPT

## 2020-01-15 PROCEDURE — 85730 THROMBOPLASTIN TIME PARTIAL: CPT | Performed by: EMERGENCY MEDICINE

## 2020-01-15 PROCEDURE — 36430 TRANSFUSION BLD/BLD COMPNT: CPT

## 2020-01-15 PROCEDURE — 83880 ASSAY OF NATRIURETIC PEPTIDE: CPT | Performed by: EMERGENCY MEDICINE

## 2020-01-15 PROCEDURE — 84484 ASSAY OF TROPONIN QUANT: CPT | Performed by: INTERNAL MEDICINE

## 2020-01-15 PROCEDURE — 71045 X-RAY EXAM CHEST 1 VIEW: CPT

## 2020-01-15 PROCEDURE — 36415 COLL VENOUS BLD VENIPUNCTURE: CPT | Performed by: EMERGENCY MEDICINE

## 2020-01-15 PROCEDURE — 86901 BLOOD TYPING SEROLOGIC RH(D): CPT | Performed by: EMERGENCY MEDICINE

## 2020-01-15 PROCEDURE — 86900 BLOOD TYPING SEROLOGIC ABO: CPT | Performed by: EMERGENCY MEDICINE

## 2020-01-15 PROCEDURE — 99291 CRITICAL CARE FIRST HOUR: CPT | Performed by: EMERGENCY MEDICINE

## 2020-01-15 PROCEDURE — 87631 RESP VIRUS 3-5 TARGETS: CPT | Performed by: EMERGENCY MEDICINE

## 2020-01-15 RX ORDER — SODIUM CHLORIDE FOR INHALATION 0.9 %
3 VIAL, NEBULIZER (ML) INHALATION
Status: DISCONTINUED | OUTPATIENT
Start: 2020-01-16 | End: 2020-01-22 | Stop reason: HOSPADM

## 2020-01-15 RX ORDER — ALBUTEROL SULFATE 2.5 MG/3ML
SOLUTION RESPIRATORY (INHALATION)
Status: DISPENSED
Start: 2020-01-15 | End: 2020-01-16

## 2020-01-15 RX ORDER — ALBUTEROL SULFATE 2.5 MG/3ML
5 SOLUTION RESPIRATORY (INHALATION) ONCE
Status: COMPLETED | OUTPATIENT
Start: 2020-01-15 | End: 2020-01-15

## 2020-01-15 RX ORDER — FLUTICASONE FUROATE AND VILANTEROL 100; 25 UG/1; UG/1
1 POWDER RESPIRATORY (INHALATION) DAILY
Status: DISCONTINUED | OUTPATIENT
Start: 2020-01-16 | End: 2020-01-22 | Stop reason: HOSPADM

## 2020-01-15 RX ORDER — ACETAMINOPHEN 325 MG/1
650 TABLET ORAL EVERY 6 HOURS PRN
Status: DISCONTINUED | OUTPATIENT
Start: 2020-01-15 | End: 2020-01-15 | Stop reason: SDUPTHER

## 2020-01-15 RX ORDER — POTASSIUM CHLORIDE 20 MEQ/1
20 TABLET, EXTENDED RELEASE ORAL DAILY
Status: DISCONTINUED | OUTPATIENT
Start: 2020-01-16 | End: 2020-01-18

## 2020-01-15 RX ORDER — FUROSEMIDE 10 MG/ML
40 INJECTION INTRAMUSCULAR; INTRAVENOUS ONCE
Status: COMPLETED | OUTPATIENT
Start: 2020-01-15 | End: 2020-01-15

## 2020-01-15 RX ORDER — PANTOPRAZOLE SODIUM 40 MG/1
40 INJECTION, POWDER, FOR SOLUTION INTRAVENOUS EVERY 12 HOURS SCHEDULED
Status: DISCONTINUED | OUTPATIENT
Start: 2020-01-15 | End: 2020-01-21

## 2020-01-15 RX ORDER — PRAMIPEXOLE DIHYDROCHLORIDE 0.5 MG/1
0.5 TABLET ORAL
Status: DISCONTINUED | OUTPATIENT
Start: 2020-01-15 | End: 2020-01-22 | Stop reason: HOSPADM

## 2020-01-15 RX ORDER — BENZONATATE 100 MG/1
200 CAPSULE ORAL 3 TIMES DAILY
Status: DISCONTINUED | OUTPATIENT
Start: 2020-01-15 | End: 2020-01-22 | Stop reason: HOSPADM

## 2020-01-15 RX ORDER — LEVOTHYROXINE SODIUM 0.07 MG/1
150 TABLET ORAL
Status: DISCONTINUED | OUTPATIENT
Start: 2020-01-16 | End: 2020-01-22 | Stop reason: HOSPADM

## 2020-01-15 RX ORDER — LEVALBUTEROL 1.25 MG/.5ML
1.25 SOLUTION, CONCENTRATE RESPIRATORY (INHALATION) EVERY 6 HOURS
Status: DISCONTINUED | OUTPATIENT
Start: 2020-01-15 | End: 2020-01-22 | Stop reason: HOSPADM

## 2020-01-15 RX ORDER — ACETAMINOPHEN 325 MG/1
650 TABLET ORAL EVERY 6 HOURS PRN
Status: DISCONTINUED | OUTPATIENT
Start: 2020-01-15 | End: 2020-01-15

## 2020-01-15 RX ORDER — ACETAMINOPHEN 325 MG/1
650 TABLET ORAL EVERY 6 HOURS PRN
Status: DISCONTINUED | OUTPATIENT
Start: 2020-01-15 | End: 2020-01-22 | Stop reason: HOSPADM

## 2020-01-15 RX ORDER — ATORVASTATIN CALCIUM 10 MG/1
10 TABLET, FILM COATED ORAL
Status: DISCONTINUED | OUTPATIENT
Start: 2020-01-16 | End: 2020-01-22 | Stop reason: HOSPADM

## 2020-01-15 RX ORDER — GUAIFENESIN 600 MG
1200 TABLET, EXTENDED RELEASE 12 HR ORAL EVERY 12 HOURS SCHEDULED
Status: DISCONTINUED | OUTPATIENT
Start: 2020-01-15 | End: 2020-01-22 | Stop reason: HOSPADM

## 2020-01-15 RX ORDER — METHYLPREDNISOLONE SODIUM SUCCINATE 40 MG/ML
20 INJECTION, POWDER, LYOPHILIZED, FOR SOLUTION INTRAMUSCULAR; INTRAVENOUS EVERY 12 HOURS SCHEDULED
Status: DISCONTINUED | OUTPATIENT
Start: 2020-01-15 | End: 2020-01-17

## 2020-01-15 RX ORDER — MELATONIN
1000 DAILY
Status: DISCONTINUED | OUTPATIENT
Start: 2020-01-16 | End: 2020-01-22 | Stop reason: HOSPADM

## 2020-01-15 RX ORDER — ONDANSETRON 2 MG/ML
4 INJECTION INTRAMUSCULAR; INTRAVENOUS EVERY 6 HOURS PRN
Status: DISCONTINUED | OUTPATIENT
Start: 2020-01-15 | End: 2020-01-22 | Stop reason: HOSPADM

## 2020-01-15 RX ORDER — FUROSEMIDE 10 MG/ML
40 INJECTION INTRAMUSCULAR; INTRAVENOUS
Status: DISCONTINUED | OUTPATIENT
Start: 2020-01-16 | End: 2020-01-19

## 2020-01-15 RX ADMIN — BENZONATATE 200 MG: 100 CAPSULE ORAL at 20:55

## 2020-01-15 RX ADMIN — PANTOPRAZOLE SODIUM 40 MG: 40 INJECTION, POWDER, FOR SOLUTION INTRAVENOUS at 21:36

## 2020-01-15 RX ADMIN — METOPROLOL TARTRATE 25 MG: 25 TABLET ORAL at 20:55

## 2020-01-15 RX ADMIN — LEVALBUTEROL HYDROCHLORIDE 1.25 MG: 1.25 SOLUTION, CONCENTRATE RESPIRATORY (INHALATION) at 20:26

## 2020-01-15 RX ADMIN — ALBUTEROL SULFATE 5 MG: 2.5 SOLUTION RESPIRATORY (INHALATION) at 15:24

## 2020-01-15 RX ADMIN — GUAIFENESIN 1200 MG: 600 TABLET, EXTENDED RELEASE ORAL at 20:55

## 2020-01-15 RX ADMIN — METHYLPREDNISOLONE SODIUM SUCCINATE 20 MG: 40 INJECTION, POWDER, FOR SOLUTION INTRAMUSCULAR; INTRAVENOUS at 21:38

## 2020-01-15 RX ADMIN — FUROSEMIDE 40 MG: 10 INJECTION, SOLUTION INTRAMUSCULAR; INTRAVENOUS at 16:37

## 2020-01-15 RX ADMIN — IPRATROPIUM BROMIDE 0.5 MG: 0.5 SOLUTION RESPIRATORY (INHALATION) at 15:24

## 2020-01-15 RX ADMIN — PRAMIPEXOLE DIHYDROCHLORIDE 0.5 MG: 0.5 TABLET ORAL at 23:56

## 2020-01-15 NOTE — H&P
History and Physical - [facility]    Patient Information: Cale Cisneros 81 y o  female MRN: [id number]  Unit/Bed#: ED 09 Encounter: [id number]  Admitting Physician: Russel Dugan DO  PCP: Cesar Pérez MD  Date of Admission:  01/15/20    Assessment/Plan:  1  Syncopal episode due to anemia and rsv    2  Acute respiratory failure due to rsv and anemia- will start supportive treatment consisting of bronchodilators, mucinex, IV solumedrol, and breo  Will also add cough medications  3  Symptomatic anemia on xarelto- likely multifactorial due to hematuria and gi bleed while on xarelto  Hemoglobin is 3 8  Will d/c xarelto  Will transfuse 2 units  Check hemoglobin after 2 units and treat accordingly  Will consult both gi and urology  Will check ua  Will start IV protonix bid  May need to taper steroids quickly if worry about upper gi bleed    4  Elevated bnp- pt does appear fluid overloaded  Will check echo to r/o cardiomyopathy from anemia  Will continue IV lasix  Will monitor creatinine  5  paf- hold xarelto  Continue metoprolol  6  Hyperlipidemia- continue statin    VTE Prophylaxis: Pharmacologic VTE Prophylaxis contraindicated due to anemia  / sequential compression device   Code Status: dnr/dni    Anticipated Length of Stay:  Patient will be admitted on an Inpatient basis with an anticipated length of stay of  Greater than 2 midnights  Chief Complaint:   Cough and shortness of breath    History of Present Illness:    Cale Cisneros is a 81 y o  female who presents with cough and shortness of breath  Pt is a resident at [facility]  She was speaking to her doctor when she had a syncopal episode in bed  She also had a fever at the nursing home  Pt was found to be wheezing in the ed  She was given a neb and lasix  Her breathing has improved   She also was found to have a hemoglobin of 3 8  She was heme positive on rectal exam  Pt also reports that she has been having blood in her urine  It is not continuous  She reports sometimes she passes clots  She does follow with urology as outpt and has an appointment with urology on Friday  No cp no n/v/d no abd pain    Review of Systems:    Review of Systems   Constitutional: Positive for chills and fever  Negative for appetite change  HENT: Negative  Eyes: Negative  Respiratory: Positive for cough, shortness of breath and wheezing  Cardiovascular: Positive for leg swelling  Gastrointestinal: Negative  Endocrine: Negative  Genitourinary: Positive for hematuria  Musculoskeletal: Negative  Neurological: Negative  Hematological: Negative  Psychiatric/Behavioral: Negative        Past Medical and Surgical History:     Past Medical History:   Diagnosis Date    Acute kidney failure (HCC)     Anemia     Arthritis     Chafing     folds of skin and back of thighs    Chronic indwelling [place] catheter     #16Fr    Difficulty walking     Discitis     Discitis     Dysphagia     Dysphagia     Dysuria     Gait abnormality     GERD (gastroesophageal reflux disease)     Hypothyroidism     Kidney stone     Lymphedema     Major depressive disorder     MDD (major depressive disorder)     MI (myocardial infarction) ([address]     Morbid obesity ([address]     Morbid obesity (HCC)     Muscle weakness     Muscle weakness (generalized)     NSTEMI (non-ST elevated myocardial infarction) ([address] )     Osteoporosis     Paroxysmal A-fib ([address] )     Renal disorder     Sleep apnea        Past Surgical History:   Procedure Laterality Date    CHOLECYSTECTOMY      FL RETROGRADE PYELOGRAM  5/22/2019    HYSTERECTOMY      JOINT REPLACEMENT Bilateral     knee replacment    LAPAROSCOPIC GASTRIC BANDING      MO CYSTO/URETERO W/LITHOTRIPSY &INDWELL STENT INSRT Left 6/26/2019    Procedure: CYSTO, URETEROSCOPY W/HOLMIUM LASER, STENT EXCHANGE;  Surgeon: Chadd Gray MD;  Location: [facility];  Service: Urology    MO [address] [place] [address],[facility] Left 5/22/2019    Procedure: CYSTOSCOPY; RIGHT RETROGRADE PYELOGRAM WITH INSERTION STENT URETERAL LEFT;  Surgeon: Erickson Ceja MD;  Location: [facility];  Service: Urology    REMOVAL GASTRIC BAND LAPAROSCOPIC      VENTRAL HERNIA REPAIR      x4       Meds/Allergies:    Prior to Admission medications    Medication Sig Start Date End Date Taking?  Authorizing Provider   acetaminophen (TYLENOL) 325 mg tablet Take 650 mg by mouth every 6 (six) hours as needed for mild pain    Yes Historical Provider, MD   acetaminophen (TYLENOL) 650 mg suppository 650 mg every 4 (four) hours   Yes Historical Provider, MD   alendronate (FOSAMAX) 70 mg tablet Take 70 mg by mouth every 7 days   Yes Historical Provider, MD   aspirin (ECOTRIN LOW STRENGTH) 81 mg EC tablet Take 81 mg by mouth daily   Yes Historical Provider, MD   atorvastatin (LIPITOR) 10 mg tablet Take 10 mg by mouth daily   Yes Historical Provider, MD   bisacodyl (DULCOLAX) 10 mg suppository Insert 10 mg into the rectum daily as needed    Yes Historical Provider, MD   calcium carbonate-vitamin D (OSCAL-D) 500 mg-200 units per tablet Take 1 tablet by mouth daily with breakfast   Yes Historical Provider, MD   celecoxib (CeleBREX) 100 mg capsule Take 100 mg by mouth 2 (two) times a day   Yes Historical Provider, MD   cholecalciferol (VITAMIN D3) 1,000 units tablet Take 1,000 Units by mouth daily   Yes Historical Provider, MD   Coenzyme Q10 (CO Q10) 100 MG CAPS Take by mouth   Yes Historical Provider, MD   docusate sodium (COLACE) 100 mg capsule Take 100 mg by mouth 2 (two) times a day   Yes Historical Provider, MD   esomeprazole (NexIUM) 40 MG capsule Take 40 mg by mouth every morning before breakfast   Yes Historical Provider, MD   furosemide (LASIX) 20 mg tablet Take 40 mg by mouth daily    Yes Historical Provider, MD   levothyroxine 150 mcg tablet Take 150 mcg by mouth daily   Yes Historical Provider, MD   Lutein-Zeaxanthin 6-0 24 MG CAPS Take 6 mg by mouth daily    Yes Historical Provider, MD   magnesium hydroxide (MILK OF MAGNESIA) 400 mg/5 mL oral suspension daily as needed    Yes Historical Provider, MD   metoprolol tartrate (LOPRESSOR) 25 mg tablet Take 25 mg by mouth every 12 (twelve) hours   Yes Historical Provider, MD   nitroglycerin (NITROSTAT) 0 4 mg SL tablet Place 0 4 mg under the tongue every 5 (five) minutes as needed for chest pain    Yes Historical Provider, MD   potassium chloride (K-DUR,KLOR-CON) 20 mEq tablet potassium chloride ER 20 mEq tablet,extended release(part/cryst)   Yes Historical Provider, MD   pramipexole (MIRAPEX) 1 mg tablet Take 0 5 mg by mouth daily at bedtime    Yes Historical Provider, MD   ranitidine (ZANTAC) 300 MG capsule Take 300 mg by mouth every morning    Yes Historical Provider, MD   rivaroxaban (XARELTO) 20 mg tablet Take 20 mg by mouth every evening    Yes Historical Provider, MD   Skin Protectants, Misc   (DERMACERIN) CREA Apply topically   Yes Historical Provider, MD   talc Apply topically as needed for irritation   Yes Historical Provider, MD   amLODIPine (NORVASC) 10 mg tablet amlodipine 10 mg tablet    Historical Provider, MD   darifenacin (ENABLEX) 7 5 MG 24 hr tablet Enablex 7 5 mg tablet,extended release 11/3/11   Historical Provider, MD   fluticasone (FLOVENT DISKUS) 50 MCG/BLIST diskus inhaler Inhale 1 puff 2 (two) times a day    Historical Provider, MD   gabapentin (NEURONTIN) 300 mg capsule gabapentin 300 mg capsule    Historical Provider, MD   guaiFENesin (MUCINEX) 600 mg 12 hr tablet Take 2 tablets (1,200 mg total) by mouth every 12 (twelve) hours  Patient not taking: Reported on 6/11/2019 5/25/19   Maldonado [place],    HYDROcodone-acetaminophen (NORCO) 5-325 mg per tablet hydrocodone 5 mg-acetaminophen 325 mg tablet    Historical Provider, MD   HYDROcodone-acetaminophen (NORCO) 5-325 mg per tablet Take 1-2 tablets by mouth every 6 (six) hours as needed for pain for up to 20 dosesMax Daily Amount: 8 tablets  Patient not taking: Reported on 1/15/2020 6/26/19   Mojgan Colvin MD   linezolid (ZYVOX) 600 mg tablet linezolid 600 mg tablet    Historical Provider, MD   losartan (COZAAR) 25 mg tablet Take 25 mg by mouth daily    Historical Provider, MD   Methenamine-Sodium Salicylate (CYSTEX PO) Take 15 mL by mouth 2 (two) times a day    Historical Provider, MD   potassium chloride (K-DUR,KLOR-CON) 20 mEq tablet Take 1 tablet (20 mEq total) by mouth 2 (two) times a day for 2 days 6/24/19 6/26/19  DHRUV Rojo     I have reviewed home medications with patient personally  Allergies: Allergies   Allergen Reactions    Augmentin [Amoxicillin-Pot Clavulanate] Rash     Patient reports that she has tolerated amoxicillin in the past and would be willing to try penicillin if needed   Hydralazine Other (See Comments)     Headache, dizziness, nausea,    Sulfamethoxazole-Trimethoprim Rash    Actonel  [Risedronate Sodium]     Lisinopril Other (See Comments)     "cough"      Medical Tape      Pulls off skin      Meloxicam Cough    Wound Dressing Adhesive Other (See Comments)     "pulls skin off"- darrel paper tape    Conjugated Estrogens Rash     Premarin Cream      Neurontin [Gabapentin] Rash       Social History:     Marital Status:       Substance Use History:   Social History     Substance and Sexual Activity   Alcohol Use Yes    Alcohol/week: 1 0 standard drinks    Types: 1 Glasses of wine per week    Frequency: Monthly or less    Drinks per session: 1 or 2    Comment: socially      Social History     Tobacco Use   Smoking Status Former Smoker   Smokeless Tobacco Never Used   Tobacco Comment    smoked when was very much younger for one summer     Social History     Substance and Sexual Activity   Drug Use Never       Family History:    non-contributory    Physical Exam:     Vitals:   Blood Pressure: 143/65 (01/15/20 1814)  Pulse: 73 (01/15/20 1814)  Temperature: 97 8 °F (36 6 °C) (01/15/20 1814)  Temp Source: Oral (01/15/20 1814)  Respirations: 20 (01/15/20 1814)  SpO2: 97 % (01/15/20 1814)    Physical Exam        Additional Data:     Lab Results: I have personally reviewed pertinent reports  rsv positive  bnp is 1203  Trop 0 02  inr 1 82    Results from last 7 days   Lab Units 01/15/20  1532   WBC Thousand/uL 3 08*   HEMOGLOBIN g/dL 3 8*   HEMATOCRIT % 17 2*   PLATELETS Thousands/uL 182   NEUTROS PCT % 65   LYMPHS PCT % 18   MONOS PCT % 14*   EOS PCT % 1     Results from last 7 days   Lab Units 01/15/20  1532   POTASSIUM mmol/L 4 0   CHLORIDE mmol/L 111*   CO2 mmol/L 31   BUN mg/dL 13   CREATININE mg/dL 0 97   CALCIUM mg/dL 8 3   ALK PHOS U/L 62   ALT U/L 11*   AST U/L 15     Results from last 7 days   Lab Units 01/15/20  1532   INR  1 82*       Imaging: I have personally reviewed pertinent reports  Xr Chest 1 View Portable    Result Date: 1/15/2020  Narrative: CHEST INDICATION:   sob  COMPARISON:  5/21/2019 EXAM PERFORMED/VIEWS:  XR CHEST PORTABLE  AP semierect Images: 1 FINDINGS: Heart shadow is enlarged but unchanged from prior exam  The lungs are clear  No pneumothorax or pleural effusion  Osseous structures appear within normal limits for patient age  Impression: No acute cardiopulmonary disease  Workstation performed: [id number]       EKG, Pathology, and Other Studies Reviewed on Admission:   · EKG: junctional at 64 bpm    [facility] / Care Everywhere Records Reviewed:  Yes

## 2020-01-15 NOTE — ED NOTES
Attempted to obtain EKG; unable to get clear image d/t Pt's breathing  Dr Mary Conner aware and ok with retrying once Pt settles        Sae Garrison  97/12/49 [id number]

## 2020-01-15 NOTE — ED PROVIDER NOTES
History  Chief Complaint   Patient presents with    Shortness of Breath     Pt from [facility]  Reports speaking with doctor in bed when she had a syncopal episode  Denies headache or dizziness  C/o feeling "unwell" x1 week with cough and sob  She also has nausea  Denies chest pain, v/d  1 albuterol treatment given by ems  28-year-old female from a nursing home with a history of atrial fibrillation, hypertension presents to the emergency department after syncopal event and also shortness of breath  Patient states she started with a cough productive of clear sputum last night with shortness of breath  Today she was speaking with her doctor while lying in bed and had a syncopal episode  Uncertain how long this lasted  She states she has had these in the past   She denies chest pain, fevers or chills  EMS found patient's O2 sats in the upper 80s and she was given albuterol pre-hospital       History provided by:  Patient   used: No    Shortness of Breath   Severity:  Unable to specify  Onset quality:  Gradual  Duration:  1 day  Timing:  Constant  Progression:  Unchanged  Chronicity:  New  Context: URI    Relieved by:  Nothing  Worsened by:   Activity and coughing  Ineffective treatments:  None tried  Associated symptoms: cough, fever, sputum production and wheezing    Associated symptoms: no abdominal pain, no chest pain, no claudication, no diaphoresis, no ear pain, no headaches, no hemoptysis, no neck pain, no PND, no rash, no sore throat, no syncope, no swollen glands and no vomiting    Cough:     Cough characteristics:  Productive    Sputum characteristics:  Clear    Severity:  Unable to specify    Onset quality:  Gradual    Duration:  1 day    Timing:  Intermittent    Progression:  Unchanged    Chronicity:  New  Fever:     Duration:  1 day    Timing:  Intermittent    Max temp PTA:  101  Risk factors: obesity    Risk factors: no tobacco use    Syncope   Episode history:  Single  Most recent episode: Today  Duration: Unknown  Progression:  Resolved  Chronicity:  Recurrent  Context: inactivity    Witnessed: yes    Associated symptoms: fever and shortness of breath    Associated symptoms: no chest pain, no diaphoresis, no dizziness, no headaches, no nausea, no palpitations, no rectal bleeding, no vomiting and no weakness    Shortness of breath:     Severity:  Unable to specify    Onset quality:  Gradual    Duration:  1 day    Timing:  Constant    Progression:  Unchanged      Prior to Admission Medications   Prescriptions Last Dose Informant Patient Reported? Taking? Coenzyme Q10 (CO Q10) 100 MG CAPS   Yes No   Sig: Take by mouth   HYDROcodone-acetaminophen (NORCO) 5-325 mg per tablet   Yes No   Sig: hydrocodone 5 mg-acetaminophen 325 mg tablet   HYDROcodone-acetaminophen (NORCO) 5-325 mg per tablet   No No   Sig: Take 1-2 tablets by mouth every 6 (six) hours as needed for pain for up to 20 dosesMax Daily Amount: 8 tablets   Lutein-Zeaxanthin 6-0 24 MG CAPS   Yes No   Sig: Take 6 mg by mouth   Methenamine-Sodium Salicylate (CYSTEX PO)  Outside Facility (Specify) Yes No   Sig: Take 15 mL by mouth 2 (two) times a day   Skin Protectants, Misc   (Jaquita Penning) CREA   Yes No   Sig: Apply topically   acetaminophen (TYLENOL) 325 mg tablet   Yes No   Sig: Take 650 mg by mouth every 6 (six) hours as needed for mild pain    acetaminophen (TYLENOL) 650 mg suppository   Yes No   Si mg every 4 (four) hours   alendronate (FOSAMAX) 70 mg tablet   Yes No   Sig: Take 70 mg by mouth every 7 days   amLODIPine (NORVASC) 10 mg tablet   Yes No   Sig: amlodipine 10 mg tablet   aspirin (ECOTRIN LOW STRENGTH) 81 mg EC tablet   Yes No   Sig: Take 81 mg by mouth daily   atorvastatin (LIPITOR) 10 mg tablet   Yes No   Sig: Take 10 mg by mouth daily   bisacodyl (DULCOLAX) 10 mg suppository   Yes No   Sig: Insert 10 mg into the rectum daily as needed    calcium carbonate-vitamin D (OSCAL-D) 500 mg-200 units per tablet Yes No   Sig: Take 1 tablet by mouth daily with breakfast   celecoxib (CeleBREX) 100 mg capsule   Yes No   Sig: Take 100 mg by mouth 2 (two) times a day   cholecalciferol (VITAMIN D3) 1,000 units tablet   Yes No   Sig: Take 1,000 Units by mouth daily   darifenacin (ENABLEX) 7 5 MG 24 hr tablet   Yes No   Sig: Enablex 7 5 mg tablet,extended release   docusate sodium (COLACE) 100 mg capsule   Yes No   Sig: Take 100 mg by mouth 2 (two) times a day   esomeprazole (NexIUM) 40 MG capsule   Yes No   Sig: Take 40 mg by mouth every morning before breakfast   fluticasone (FLOVENT DISKUS) 50 MCG/BLIST diskus inhaler   Yes No   Sig: Inhale 1 puff 2 (two) times a day   furosemide (LASIX) 20 mg tablet   Yes No   Sig: Take 20 mg by mouth 2 (two) times a day   gabapentin (NEURONTIN) 300 mg capsule   Yes No   Sig: gabapentin 300 mg capsule   guaiFENesin (MUCINEX) 600 mg 12 hr tablet   No No   Sig: Take 2 tablets (1,200 mg total) by mouth every 12 (twelve) hours   Patient not taking: Reported on 6/11/2019   levothyroxine 150 mcg tablet   Yes No   Sig: Take 150 mcg by mouth daily   linezolid (ZYVOX) 600 mg tablet   Yes No   Sig: linezolid 600 mg tablet   losartan (COZAAR) 25 mg tablet   Yes No   Sig: Take 25 mg by mouth daily   magnesium hydroxide (MILK OF MAGNESIA) 400 mg/5 mL oral suspension   Yes No   Sig: Milk of Magnesia   2400mg/30ml   metoprolol tartrate (LOPRESSOR) 25 mg tablet   Yes No   Sig: Take 25 mg by mouth every 12 (twelve) hours   nitroglycerin (NITROSTAT) 0 4 mg SL tablet   Yes No   Sig: Place 0 4 mg under the tongue every 5 (five) minutes as needed for chest pain    potassium chloride (K-DUR,KLOR-CON) 20 mEq tablet   Yes No   Sig: potassium chloride ER 20 mEq tablet,extended release(part/cryst)   potassium chloride (K-DUR,KLOR-CON) 20 mEq tablet   No No   Sig: Take 1 tablet (20 mEq total) by mouth 2 (two) times a day for 2 days   pramipexole (MIRAPEX) 1 mg tablet   Yes No   Sig: Take 1 mg by mouth daily at bedtime ranitidine (ZANTAC) 300 MG capsule   Yes No   Sig: Take 300 mg by mouth every morning    rivaroxaban (XARELTO) 20 mg tablet   Yes No   Sig: Take 20 mg by mouth every evening    talc   Yes No   Sig: Apply topically as needed for irritation      Facility-Administered Medications: None       Past Medical History:   Diagnosis Date    Acute kidney failure (HCC)     Anemia     Arthritis     Chafing     folds of skin and back of thighs    Chronic indwelling [place] [facility] catheter     #16Fr    Difficulty walking     Discitis     Discitis     Dysphagia     Dysphagia     Dysuria     Gait abnormality     GERD (gastroesophageal reflux disease)     Hypothyroidism     Kidney stone     Lymphedema     Major depressive disorder     MDD (major depressive disorder)     [facility] [facility]     Morbid obesity [facility] [facility]     Morbid obesity (HCC)     Muscle weakness     Muscle weakness (generalized)     NSTEMI (non-ST elevated myocardial infarction) ([facility] [address] [facility]     Osteoporosis     Paroxysmal A-fib (HCC)     Renal disorder     Sleep apnea        Past Surgical History:   Procedure Laterality Date    CHOLECYSTECTOMY      FL RETROGRADE PYELOGRAM  5/22/2019    HYSTERECTOMY      JOINT REPLACEMENT Bilateral     knee replacment    LAPAROSCOPIC GASTRIC BANDING      CO CYSTO/URETERO W/LITHOTRIPSY &INDWELL STENT INSRT Left 6/26/2019    Procedure: CYSTO, URETEROSCOPY W/HOLMIUM LASER, STENT EXCHANGE;  Surgeon: Haroon Chun MD;  Location: [facility];  Service: Urology    CO CYSTOURETHROSCOPY,URETER CATHETER Left 5/22/2019    Procedure: CYSTOSCOPY; RIGHT RETROGRADE PYELOGRAM WITH INSERTION STENT URETERAL LEFT;  Surgeon: Haroon Chun MD;  Location: [facility];  Service: Urology    REMOVAL GASTRIC BAND LAPAROSCOPIC      VENTRAL HERNIA REPAIR      x4       History reviewed  No pertinent family history  I have reviewed and agree with the history as documented      Social History     Tobacco Use    Smoking status: Former Smoker    Smokeless tobacco: Never Used    Tobacco comment: smoked when was very much younger for one summer   Substance Use Topics    Alcohol use: Yes     Alcohol/week: 1 0 standard drinks     Types: 1 Glasses of wine per week     Frequency: Monthly or less     Drinks per session: 1 or 2     Comment: socially     Drug use: Never        Review of Systems   Constitutional: Positive for fever  Negative for chills, diaphoresis and fatigue  HENT: Negative  Negative for congestion, ear pain, rhinorrhea and sore throat  Eyes: Negative  Negative for discharge, redness and itching  Respiratory: Positive for cough, sputum production, shortness of breath and wheezing  Negative for apnea, hemoptysis and chest tightness  Cardiovascular: Negative for chest pain, palpitations, claudication, leg swelling, syncope and PND  Gastrointestinal: Negative  Negative for abdominal pain, nausea and vomiting  Endocrine: Negative  Genitourinary: Negative  Negative for flank pain, frequency and urgency  Musculoskeletal: Negative  Negative for back pain and neck pain  Skin: Negative  Negative for rash  Allergic/Immunologic: Negative  Neurological: Negative  Negative for dizziness, syncope, weakness, light-headedness, numbness and headaches  Hematological: Negative  All other systems reviewed and are negative  Physical Exam  Physical Exam   Constitutional: She is oriented to person, place, and time  She appears well-developed and well-nourished  Non-toxic appearance  She has a sickly appearance  She does not appear ill  No distress  HENT:   Head: Normocephalic and atraumatic  Right Ear: External ear normal    Left Ear: External ear normal    Mouth/Throat: Oropharynx is clear and moist    Eyes: Pupils are equal, round, and reactive to light  Conjunctivae are normal  Right eye exhibits no discharge  Left eye exhibits no discharge  No scleral icterus     Cardiovascular: Normal rate, regular rhythm and normal heart sounds  Exam reveals no gallop and no friction rub  No murmur heard  Pulmonary/Chest: Effort normal  No accessory muscle usage or stridor  No tachypnea  No respiratory distress  She has wheezes in the right upper field, the right middle field, the right lower field, the left upper field, the left middle field and the left lower field  She has no rales  She exhibits no tenderness  Abdominal: Soft  Bowel sounds are normal  She exhibits no distension and no mass  There is no tenderness  No hernia  Obese   Genitourinary: Rectal exam shows guaiac positive stool (Brown heme-positive)  Musculoskeletal: Normal range of motion  She exhibits no edema, tenderness or deformity  Neurological: She is alert and oriented to person, place, and time  She has normal reflexes  She displays normal reflexes  No cranial nerve deficit  She exhibits normal muscle tone  Coordination normal    Skin: Skin is warm and dry  No rash noted  She is not diaphoretic  No erythema  There is pallor  Psychiatric: She has a normal mood and affect  Nursing note and vitals reviewed        Vital Signs  ED Triage Vitals [01/15/20 1505]   Temperature Pulse Respirations Blood Pressure SpO2   98 °F (36 7 °C) 69 22 143/52 95 %      Temp Source Heart Rate Source Patient Position - Orthostatic VS BP Location FiO2 (%)   Oral Monitor Lying Right arm --      Pain Score       No Pain           Vitals:    01/15/20 1505   BP: 143/52   Pulse: 69   Patient Position - Orthostatic VS: Lying         Visual Acuity  Visual Acuity      Most Recent Value   L Pupil Size (mm)  3   R Pupil Size (mm)  3          ED Medications  Medications   albuterol (2 5 mg/3 mL) 0 083 % inhalation solution **ADS Override Pull** (has no administration in time range)   albuterol inhalation solution 5 mg (5 mg Nebulization Given 1/15/20 1524)   ipratropium (ATROVENT) 0 02 % inhalation solution 0 5 mg (0 5 mg Nebulization Given 1/15/20 1524)       Diagnostic Studies  Results Reviewed     Procedure Component Value Units Date/Time    CBC and differential [[id number]] Collected:  01/15/20 1532    Lab Status:  No result Specimen:  Blood from Arm, Right     Protime-INR [[id number]] Collected:  01/15/20 1532    Lab Status:  No result Specimen:  Blood from Arm, Right     APTT [[id number]] Collected:  01/15/20 1532    Lab Status:  No result Specimen:  Blood from Arm, Right     Comprehensive metabolic panel [[id number]] Collected:  01/15/20 1532    Lab Status:  No result Specimen:  Blood from Arm, Right     Troponin I [[id number]] Collected:  01/15/20 1532    Lab Status:  No result Specimen:  Blood from Arm, Right     NT-BNP PRO [[id number]] Collected:  01/15/20 1532    Lab Status:  No result Specimen:  Blood from Arm, Right     Influenza A/B and RSV PCR [[id number]] Collected:  01/15/20 1532    Lab Status:  No result Specimen:  Nose                  XR chest 1 view portable    (Results Pending)              Procedures  ECG 12 Lead Documentation Only  Date/Time: 1/15/2020 4:15 PM  Performed by: Helena Brownlee DO  Authorized by: Helena Brownlee DO     Indications / Diagnosis:  Syncope  ECG reviewed by me, the ED Provider: yes    Patient location:  ED  Interpretation:     Interpretation: normal    Rate:     ECG rate assessment: normal    Rhythm:     Rhythm: sinus rhythm    Ectopy:     Ectopy: none    Conduction:     Conduction: abnormal      Abnormal conduction: incomplete RBBB    ST segments:     ST segments:  Normal  T waves:     T waves: normal      CriticalCare Time  Performed by: Helena Brownlee DO  Authorized by: Helena Brownlee DO     Critical care provider statement:     Critical care time (minutes):  30    Critical care time was exclusive of:  Separately billable procedures and treating other patients and teaching time    Critical care was necessary to treat or prevent imminent or life-threatening deterioration of the following conditions:  Cardiac failure and shock    Critical care was time spent personally by me on the following activities:  Blood draw for specimens, obtaining history from patient or surrogate, development of treatment plan with patient or surrogate, discussions with consultants, evaluation of patient's response to treatment, examination of patient, interpretation of cardiac output measurements, ordering and performing treatments and interventions, ordering and review of laboratory studies, ordering and review of radiographic studies, re-evaluation of patient's condition and review of old charts    I assumed direction of critical care for this patient from another provider in my specialty: no               ED Course                               MDM  Number of Diagnoses or Management Options  Diagnosis management comments: 32-year-old female from a nursing home presents with cough productive of clear sputum, intermittent fevers, shortness of breath and syncopal event  She states she was laying in bed this morning and speaking with her doctor when she passed out for an unknown period of time  She states that this has happened to her before in the past   No chest pain  Over the last day she has had a cough productive clear sputum, fevers of 101 intermittently and shortness of breath  On arrival to the nursing home today EMS noted patient to have O2 sats in the upper 80s  She was given a albuterol treatment  On exam here in the emergency department she is able to converse but has audible wheezing  Will do cardiac workup, give albuterol treatment  She will require admission for syncope and shortness of breath         Amount and/or Complexity of Data Reviewed  Clinical lab tests: ordered and reviewed  Tests in the radiology section of CPT®: ordered and reviewed  Review and summarize past medical records: yes  Discuss the patient with other providers: yes  Independent visualization of images, tracings, or specimens: yes          Disposition  Final diagnoses:   None     ED Disposition     None      Follow-up Information    None         Patient's Medications   Discharge Prescriptions    No medications on file     No discharge procedures on file      ED Provider  Electronically Signed by           Brittni Lee,   01/15/20 1600 ZAHIRA Wilson DO  01/15/20 9595

## 2020-01-16 ENCOUNTER — APPOINTMENT (INPATIENT)
Dept: NON INVASIVE DIAGNOSTICS | Facility: HOSPITAL | Age: 85
DRG: 813 | End: 2020-01-16
Payer: MEDICARE

## 2020-01-16 LAB
ABO GROUP BLD BPU: NORMAL
ABO GROUP BLD BPU: NORMAL
ANION GAP SERPL CALCULATED.3IONS-SCNC: 4 MMOL/L (ref 4–13)
BACTERIA UR QL AUTO: ABNORMAL /HPF
BILIRUB UR QL STRIP: NEGATIVE
BPU ID: NORMAL
BPU ID: NORMAL
BUN SERPL-MCNC: 11 MG/DL (ref 5–25)
CALCIUM SERPL-MCNC: 8.2 MG/DL (ref 8.3–10.1)
CHLORIDE SERPL-SCNC: 109 MMOL/L (ref 100–108)
CLARITY UR: ABNORMAL
CO2 SERPL-SCNC: 32 MMOL/L (ref 21–32)
COLOR UR: ABNORMAL
CREAT SERPL-MCNC: 0.85 MG/DL (ref 0.6–1.3)
CROSSMATCH: NORMAL
CROSSMATCH: NORMAL
ERYTHROCYTE [DISTWIDTH] IN BLOOD BY AUTOMATED COUNT: 23.1 % (ref 11.6–15.1)
GFR SERPL CREATININE-BSD FRML MDRD: 64 ML/MIN/1.73SQ M
GLUCOSE SERPL-MCNC: 109 MG/DL (ref 65–140)
GLUCOSE UR STRIP-MCNC: NEGATIVE MG/DL
HCT VFR BLD AUTO: 23.5 % (ref 34.8–46.1)
HGB BLD-MCNC: 6.3 G/DL (ref 11.5–15.4)
HGB UR QL STRIP.AUTO: ABNORMAL
KETONES UR STRIP-MCNC: NEGATIVE MG/DL
LEUKOCYTE ESTERASE UR QL STRIP: ABNORMAL
MCH RBC QN AUTO: 20.1 PG (ref 26.8–34.3)
MCHC RBC AUTO-ENTMCNC: 26.8 G/DL (ref 31.4–37.4)
MCV RBC AUTO: 75 FL (ref 82–98)
NITRITE UR QL STRIP: POSITIVE
NON-SQ EPI CELLS URNS QL MICRO: ABNORMAL /HPF
PH UR STRIP.AUTO: 7 [PH]
PLATELET # BLD AUTO: 188 THOUSANDS/UL (ref 149–390)
PMV BLD AUTO: 11.4 FL (ref 8.9–12.7)
POTASSIUM SERPL-SCNC: 3.7 MMOL/L (ref 3.5–5.3)
PROT UR STRIP-MCNC: ABNORMAL MG/DL
RBC # BLD AUTO: 3.13 MILLION/UL (ref 3.81–5.12)
RBC #/AREA URNS AUTO: ABNORMAL /HPF
SODIUM SERPL-SCNC: 145 MMOL/L (ref 136–145)
SP GR UR STRIP.AUTO: 1.02 (ref 1–1.03)
TROPONIN I SERPL-MCNC: 0.02 NG/ML
UNIT DISPENSE STATUS: NORMAL
UNIT DISPENSE STATUS: NORMAL
UNIT PRODUCT CODE: NORMAL
UNIT PRODUCT CODE: NORMAL
UNIT RH: NORMAL
UNIT RH: NORMAL
UROBILINOGEN UR QL STRIP.AUTO: 0.2 E.U./DL
WBC # BLD AUTO: 5.94 THOUSAND/UL (ref 4.31–10.16)
WBC #/AREA URNS AUTO: ABNORMAL /HPF

## 2020-01-16 PROCEDURE — 87086 URINE CULTURE/COLONY COUNT: CPT | Performed by: INTERNAL MEDICINE

## 2020-01-16 PROCEDURE — 94640 AIRWAY INHALATION TREATMENT: CPT

## 2020-01-16 PROCEDURE — 87077 CULTURE AEROBIC IDENTIFY: CPT | Performed by: INTERNAL MEDICINE

## 2020-01-16 PROCEDURE — 30233N1 TRANSFUSION OF NONAUTOLOGOUS RED BLOOD CELLS INTO PERIPHERAL VEIN, PERCUTANEOUS APPROACH: ICD-10-PCS | Performed by: FAMILY MEDICINE

## 2020-01-16 PROCEDURE — 93306 TTE W/DOPPLER COMPLETE: CPT | Performed by: INTERNAL MEDICINE

## 2020-01-16 PROCEDURE — 81001 URINALYSIS AUTO W/SCOPE: CPT | Performed by: INTERNAL MEDICINE

## 2020-01-16 PROCEDURE — C9113 INJ PANTOPRAZOLE SODIUM, VIA: HCPCS | Performed by: INTERNAL MEDICINE

## 2020-01-16 PROCEDURE — 99223 1ST HOSP IP/OBS HIGH 75: CPT | Performed by: UROLOGY

## 2020-01-16 PROCEDURE — 94760 N-INVAS EAR/PLS OXIMETRY 1: CPT

## 2020-01-16 PROCEDURE — 99232 SBSQ HOSP IP/OBS MODERATE 35: CPT | Performed by: INTERNAL MEDICINE

## 2020-01-16 PROCEDURE — P9016 RBC LEUKOCYTES REDUCED: HCPCS

## 2020-01-16 PROCEDURE — 84484 ASSAY OF TROPONIN QUANT: CPT | Performed by: INTERNAL MEDICINE

## 2020-01-16 PROCEDURE — 87186 SC STD MICRODIL/AGAR DIL: CPT | Performed by: INTERNAL MEDICINE

## 2020-01-16 PROCEDURE — 93306 TTE W/DOPPLER COMPLETE: CPT

## 2020-01-16 PROCEDURE — 80048 BASIC METABOLIC PNL TOTAL CA: CPT | Performed by: INTERNAL MEDICINE

## 2020-01-16 PROCEDURE — 85027 COMPLETE CBC AUTOMATED: CPT | Performed by: INTERNAL MEDICINE

## 2020-01-16 RX ADMIN — FUROSEMIDE 40 MG: 10 INJECTION, SOLUTION INTRAMUSCULAR; INTRAVENOUS at 21:37

## 2020-01-16 RX ADMIN — BENZONATATE 200 MG: 100 CAPSULE ORAL at 15:31

## 2020-01-16 RX ADMIN — LEVALBUTEROL HYDROCHLORIDE 1.25 MG: 1.25 SOLUTION, CONCENTRATE RESPIRATORY (INHALATION) at 19:03

## 2020-01-16 RX ADMIN — ISODIUM CHLORIDE 3 ML: 0.03 SOLUTION RESPIRATORY (INHALATION) at 01:15

## 2020-01-16 RX ADMIN — LEVALBUTEROL HYDROCHLORIDE 1.25 MG: 1.25 SOLUTION, CONCENTRATE RESPIRATORY (INHALATION) at 13:04

## 2020-01-16 RX ADMIN — ISODIUM CHLORIDE 3 ML: 0.03 SOLUTION RESPIRATORY (INHALATION) at 13:04

## 2020-01-16 RX ADMIN — LEVALBUTEROL HYDROCHLORIDE 1.25 MG: 1.25 SOLUTION, CONCENTRATE RESPIRATORY (INHALATION) at 01:15

## 2020-01-16 RX ADMIN — MELATONIN 1000 UNITS: at 12:23

## 2020-01-16 RX ADMIN — GUAIFENESIN 1200 MG: 600 TABLET, EXTENDED RELEASE ORAL at 21:28

## 2020-01-16 RX ADMIN — METHYLPREDNISOLONE SODIUM SUCCINATE 20 MG: 40 INJECTION, POWDER, FOR SOLUTION INTRAMUSCULAR; INTRAVENOUS at 21:28

## 2020-01-16 RX ADMIN — LEVALBUTEROL HYDROCHLORIDE 1.25 MG: 1.25 SOLUTION, CONCENTRATE RESPIRATORY (INHALATION) at 07:31

## 2020-01-16 RX ADMIN — ATORVASTATIN CALCIUM 10 MG: 10 TABLET, FILM COATED ORAL at 15:31

## 2020-01-16 RX ADMIN — METOPROLOL TARTRATE 25 MG: 25 TABLET ORAL at 21:28

## 2020-01-16 RX ADMIN — PANTOPRAZOLE SODIUM 40 MG: 40 INJECTION, POWDER, FOR SOLUTION INTRAVENOUS at 21:28

## 2020-01-16 RX ADMIN — FLUTICASONE FUROATE AND VILANTEROL TRIFENATATE 1 PUFF: 100; 25 POWDER RESPIRATORY (INHALATION) at 08:58

## 2020-01-16 RX ADMIN — BENZONATATE 200 MG: 100 CAPSULE ORAL at 21:28

## 2020-01-16 RX ADMIN — FUROSEMIDE 40 MG: 10 INJECTION, SOLUTION INTRAMUSCULAR; INTRAVENOUS at 08:43

## 2020-01-16 RX ADMIN — CEFTRIAXONE 1000 MG: 1 INJECTION, POWDER, FOR SOLUTION INTRAMUSCULAR; INTRAVENOUS at 21:27

## 2020-01-16 RX ADMIN — ISODIUM CHLORIDE 3 ML: 0.03 SOLUTION RESPIRATORY (INHALATION) at 07:31

## 2020-01-16 RX ADMIN — GUAIFENESIN 1200 MG: 600 TABLET, EXTENDED RELEASE ORAL at 12:22

## 2020-01-16 RX ADMIN — BENZONATATE 200 MG: 100 CAPSULE ORAL at 12:22

## 2020-01-16 RX ADMIN — ISODIUM CHLORIDE 3 ML: 0.03 SOLUTION RESPIRATORY (INHALATION) at 19:03

## 2020-01-16 RX ADMIN — POTASSIUM CHLORIDE 20 MEQ: 1500 TABLET, EXTENDED RELEASE ORAL at 12:22

## 2020-01-16 RX ADMIN — METOPROLOL TARTRATE 25 MG: 25 TABLET ORAL at 12:23

## 2020-01-16 RX ADMIN — PANTOPRAZOLE SODIUM 40 MG: 40 INJECTION, POWDER, FOR SOLUTION INTRAVENOUS at 08:43

## 2020-01-16 RX ADMIN — METHYLPREDNISOLONE SODIUM SUCCINATE 20 MG: 40 INJECTION, POWDER, FOR SOLUTION INTRAMUSCULAR; INTRAVENOUS at 08:42

## 2020-01-16 RX ADMIN — PRAMIPEXOLE DIHYDROCHLORIDE 0.5 MG: 0.5 TABLET ORAL at 21:34

## 2020-01-16 NOTE — PROGRESS NOTES
[facility] Progress Note  Patient: Miley Mi 81 y o  female   MRN: [id number]  PCP: Santy Hester MD  Unit/Bed#: E4 -02 Encounter: [id number]  Date Of Visit: 01/16/20      Assessment/plan  1  Syncopal episode due to anemia and rsv     2  Acute respiratory failure due to rsv and anemia-continue supportive treatment consisting of bronchodilators, mucinex, IV solumedrol, and breo  continue cough medications  Will taper steroids tomorrow  Wean oxygen as tolerated       3  Symptomatic anemia on xarelto- likely multifactorial due to hematuria and gi bleed while on xarelto  Hemoglobin was 3 8 on admission  Increased to 6 3 after 2 units  Pt transfused additional 2 units  Will check h/h in am  Holding asa and xarelto  Appreciate both gi and urology recommendations  Pt will continue IV protonix bid  She will require egd but will need to wait for respiratory status to improve  For the hematuria urology stated to treat empirically with antibiotics as awaiting urine culture  Pt may require cysto with fulguration  Will monitor  Will allow clear liquids     4  Acute/chronic diastolic chf- continue IV lasix  Check bmp in am  Monitor I/o  Reviewed echo       5  paf- hold xarelto  Continue metoprolol       6  Hyperlipidemia- continue statin    Subjective:   Pt seen and examined  Pt reports her breathing has improved  She states she is still coughing  She states she has some upper abd discomfort at times  No f/c no cp no n/v/d no abd pain  Objective:     Vitals: Blood pressure 134/60, pulse 76, temperature 98 6 °F (37 °C), temperature source Oral, resp  rate 20, weight (!) 138 kg (304 lb 10 8 oz), SpO2 98 %  ,Body mass index is 53 97 kg/m²      Lab, Imaging and other studies:  Results from last 7 days   Lab Units 01/16/20  0502 01/15/20  1532   WBC Thousand/uL 5 94 3 08*   HEMOGLOBIN g/dL 6 3* 3 8*   HEMATOCRIT % 23 5* 17 2*   PLATELETS Thousands/uL 188 182   INR   --  1 82*     Results from last 7 days Lab Units 01/16/20  0502 01/15/20  1532   POTASSIUM mmol/L 3 7 4 0   CHLORIDE mmol/L 109* 111*   CO2 mmol/L 32 31   BUN mg/dL 11 13   CREATININE mg/dL 0 85 0 97   CALCIUM mg/dL 8 2* 8 3   ALK PHOS U/L  --  62   ALT U/L  --  11*   AST U/L  --  15     Results from last 7 days   Lab Units 01/16/20  0053 01/15/20  2032 01/15/20  1532   TROPONIN I ng/mL 0 02 0 02 0 02     Lab Results   Component Value Date    BLOODCX No Growth After 5 Days  05/23/2019    BLOODCX No Growth After 5 Days  05/23/2019    BLOODCX No Growth After 5 Days  05/21/2019    URINECX >100,000 cfu/ml  06/11/2019    URINECX >100,000 cfu/ml Proteus mirabilis (A) 05/21/2019    URINECX 10,000-19,000 cfu/ml Enterococcus avium (A) 05/21/2019         Xr Chest 1 View Portable    Result Date: 1/15/2020  Narrative: CHEST INDICATION:   sob  COMPARISON:  5/21/2019 EXAM PERFORMED/VIEWS:  XR CHEST PORTABLE  AP semierect Images: 1 FINDINGS: Heart shadow is enlarged but unchanged from prior exam  The lungs are clear  No pneumothorax or pleural effusion  Osseous structures appear within normal limits for patient age  Impression: No acute cardiopulmonary disease   Workstation performed: [id number]       Scheduled Meds:   Current Facility-Administered Medications:  acetaminophen 650 mg Oral Q6H PRN Veronica Ambron, DO   atorvastatin 10 mg Oral Daily With Dexter-Val, DO   benzonatate 200 mg Oral TID Veronica Ambron, DO   cefTRIAXone 1,000 mg Intravenous Q24H Veronica Ambron, DO   cholecalciferol 1,000 Units Oral Daily Veronica Ambron, DO   fluticasone-vilanterol 1 puff Inhalation Daily Veronica Ambron, DO   furosemide 40 mg Intravenous BID (diuretic) Veronica Ambron, DO   guaiFENesin 1,200 mg Oral Q12H [facility] Veronica Ambron, DO   levalbuterol 1 25 mg Nebulization Q6H Veronica Ambron, DO   levothyroxine 150 mcg Oral Early Morning Veronica Ambron, DO   methylPREDNISolone sodium succinate 20 mg Intravenous Q12H [address] Veronica Farias DO   metoprolol tartrate 25 mg Oral Q12H [address] Veronica Ambron, DO   ondansetron 4 mg Intravenous Q6H PRN Veronica Ambron, DO   pantoprazole 40 mg Intravenous Q12H [address] Veronica Ambron, DO   potassium chloride 20 mEq Oral Daily Veronica Ambron, DO   pramipexole 0 5 mg Oral HS Veronica Ambron, DO   sodium chloride 3 mL Nebulization Q6H Veronica Ambron, DO     Continuous Infusions:    PRN Meds:   acetaminophen    ondansetron      Physical exam:  Physical Exam  General appearance: alert and oriented, in no acute distress  Head: Normocephalic, without obvious abnormality, atraumatic  Eyes: conjunctivae/corneas clear  PERRL, EOM's intact  Fundi benign    Neck: no adenopathy, no carotid bruit, no JVD, supple, symmetrical, trachea midline and thyroid not enlarged, symmetric, no tenderness/mass/nodules  Lungs: exp wheezing throughout  Heart: regular rate and rhythm, S1, S2 normal, no murmur, click, rub or gallop  Abdomen: soft, non-tender; bowel sounds normal; no masses,  no organomegaly  Extremities: edema +1 bilateral lower ext  Pulses: 2+ and symmetric  Skin: Skin color, texture, turgor normal  No rashes or lesions  Neurologic: Mental status: Alert, oriented, thought content appropriate      VTE Pharmacologic Prophylaxis: Reason for no pharmacologic prophylaxis anemia  VTE Mechanical Prophylaxis: sequential compression device    Counseling / Coordination of Care  Total floor / unit time spent today 20 minutes     Current Length of Stay: 1 day(s)    Current Patient Status: Inpatient     Code Status: Level 3 - DNAR and DNI

## 2020-01-16 NOTE — OCCUPATIONAL THERAPY NOTE
Occupational Therapy Cancellation Note        Patient Name: Rainer BECKMAN Date: 1/16/2020      OT consult received  Pt currently not appropriate for OT as receiving a blood transfusion  Will continue to follow to address OT POC      Jazz Ashley MS, OTR/L

## 2020-01-16 NOTE — CONSULTS
UROLOGY CONSULT    Patient Name: Kimberly Lares  Patient MRN: [id number]  Date of Service: 1/16/2020   Date / Time Note Created: 1/16/2020 2:03 PM   Referring Provider:  Dr Naila Nielsen  Provider Creating Note: Fabián Amaro MD    PCP: Ruiz Harrington  Attending Provider:  Jermaine Bryant DO    Reason for Consult: Hematuria    HPI   Source:the patient  The patient has been having gross hematuria since July  She has a history of symptomatic nephrolithiasis, abdomen been treated with cystoscopy, left ureteroscopy and laser lithotripsy in July of this year by Dr Jose Brannon  She denies any dysuria, difficulty voiding or flank pains  While emergency room she was found to be critically anemic with a hemoglobin of 3 6 so she was admitted for transfusion and further evaluation  Impressions  Hematuria  History nephrolithiasis    Recommendations  1  Check urine culture  2  Monitor H&H  Hold A/C ( if applicable)   3  ATBs empirically & await urine cultures  4  Renal US or CT if not completed   5  If urine does not improve or patient shows deterioration may need to perform a cystoscopy with fulguration  6  Will follow & update              Past Medical History:   Diagnosis Date    Acute kidney failure (HCC)     Anemia     Arthritis     Chafing     folds of skin and back of thighs    Chronic indwelling [place] catheter     #16Fr    Difficulty walking     Discitis     Discitis     Dysphagia     Dysphagia     Dysuria     Gait abnormality     GERD (gastroesophageal reflux disease)     Hypothyroidism     Kidney stone     Lymphedema     Major depressive disorder     MDD (major depressive disorder)     MI (myocardial infarction) ([address]     Morbid obesity ([address] )     Morbid obesity (HCC)     Muscle weakness     Muscle weakness (generalized)     NSTEMI (non-ST elevated myocardial infarction) ([address] )     Osteoporosis     Paroxysmal A-fib (HCC)     Renal disorder     Sleep apnea        Past Surgical History: Procedure Laterality Date    CHOLECYSTECTOMY      FL RETROGRADE PYELOGRAM  5/22/2019    HYSTERECTOMY      JOINT REPLACEMENT Bilateral     knee replacment    LAPAROSCOPIC GASTRIC BANDING      FL CYSTO/URETERO W/LITHOTRIPSY &INDWELL STENT INSRT Left 6/26/2019    Procedure: CYSTO, URETEROSCOPY W/HOLMIUM LASER, STENT EXCHANGE;  Surgeon: Corona Lockwood MD;  Location: [facility];  Service: Urology    FL CYSTOURETHROSCOPY,URETER CATHETER Left 5/22/2019    Procedure: CYSTOSCOPY; RIGHT RETROGRADE PYELOGRAM WITH INSERTION STENT URETERAL LEFT;  Surgeon: Corona Lockwood MD;  Location: [facility];  Service: Urology    REMOVAL GASTRIC BAND LAPAROSCOPIC      VENTRAL HERNIA REPAIR      x4       History reviewed  No pertinent family history  Social History     Socioeconomic History    Marital status:      Spouse name: Not on file    Number of children: Not on file    Years of education: Not on file    Highest education level: Not on file   Occupational History    Not on file   Social Needs    Financial resource strain: Not on file    Food insecurity:     Worry: Not on file     Inability: Not on file    Transportation needs:     Medical: Not on file     Non-medical: Not on file   Tobacco Use    Smoking status: Former Smoker    Smokeless tobacco: Never Used    Tobacco comment: smoked when was very much younger for one summer   Substance and Sexual Activity    Alcohol use:  Yes     Alcohol/week: 1 0 standard drinks     Types: 1 Glasses of wine per week     Frequency: Monthly or less     Drinks per session: 1 or 2     Comment: socially     Drug use: Never    Sexual activity: Not on file   Lifestyle    Physical activity:     Days per week: Not on file     Minutes per session: Not on file    Stress: Not on file   Relationships    Social connections:     Talks on phone: Not on file     Gets together: Not on file     Attends [employer] service: Not on file     Active member of club or organization: Not on file Attends meetings of clubs or organizations: Not on file     Relationship status: Not on file    Intimate partner violence:     Fear of current or ex partner: Not on file     Emotionally abused: Not on file     Physically abused: Not on file     Forced sexual activity: Not on file   Other Topics Concern    Not on file   Social History Narrative    Not on file       Allergies   Allergen Reactions    Augmentin [Amoxicillin-Pot Clavulanate] Rash     Patient reports that she has tolerated amoxicillin in the past and would be willing to try penicillin if needed      Hydralazine Other (See Comments)     Headache, dizziness, nausea,    Sulfamethoxazole-Trimethoprim Rash    Actonel  [Risedronate Sodium]     Lisinopril Other (See Comments)     "cough"      Medical Tape      Pulls off skin      Meloxicam Cough    Wound Dressing Adhesive Other (See Comments)     "pulls skin off"- darrel paper tape    Conjugated Estrogens Rash     Premarin Cream      Neurontin [Gabapentin] Rash       Review of Systems  10 point review of systems negative except as noted in HPI history of hematuria as mentioned above    Chart Review   Allergies, current medications, history, problem list    Vital Signs  /60   Pulse 76   Temp 98 6 °F (37 °C) (Oral)   Resp 20   Wt (!) 138 kg (304 lb 10 8 oz)   SpO2 98%   BMI 53 97 kg/m²     Physical Exam  /60   Pulse 76   Temp 98 6 °F (37 °C) (Oral)   Resp 20   Wt (!) 138 kg (304 lb 10 8 oz)   SpO2 98%   BMI 53 97 kg/m²     General Appearance:    Alert, cooperative, no distress, appears stated age, morbidly obese   Head:    Normocephalic, without obvious abnormality, atraumatic   Eyes:    PERRL, conjunctiva/corneas clear, EOM's intact, fundi     benign, both eyes   Ears:    Normal TM's and external ear canals, both ears   Nose:   Nares normal, septum midline, mucosa normal, no drainage    or sinus tenderness   Throat:   Lips, mucosa, and tongue normal; teeth and gums normal Neck:   Supple, symmetrical, trachea midline, no adenopathy;     thyroid:  no enlargement/tenderness/nodules; no carotid    bruit or JVD   Back:     Symmetric, no curvature, ROM normal, no CVA tenderness   Lungs:     Clear to auscultation bilaterally, respirations unlabored   Chest Wall:    No tenderness or deformity    Heart:    Regular rate and rhythm, S1 and S2 normal, no murmur, rub   or gallop   Breast Exam:    No tenderness, masses, or nipple abnormality   Abdomen:     Soft, non-tender, bowel sounds active all four quadrants,     no masses, no organomegaly   Genitalia:    Normal female without lesion, discharge or tenderness   Rectal:    Normal tone, no masses or tenderness; guaiac negative stool   Extremities:   Extremities with 2 to 3+ pitting edema   Pulses:   2+ and symmetric all extremities   Skin:   Skin color, texture, turgor normal, no rashes or lesions   Lymph nodes:   Cervical, supraclavicular, and axillary nodes normal   Neurologic:   CNII-XII intact, normal strength, sensation and reflexes     throughout        Laboratory Studies  Lab Results   Component Value Date     10/10/2014    K 3 7 01/16/2020    K 3 8 10/10/2014     (H) 01/16/2020     10/10/2014    CO2 32 01/16/2020    CO2 26 10/10/2014    GLUCOSE 97 10/10/2014    CREATININE 0 85 01/16/2020    CREATININE 0 63 10/10/2014    BUN 11 01/16/2020    BUN 11 10/10/2014    MG 1 9 05/25/2019    MG 1 6 09/30/2014     Lab Results   Component Value Date    WBC 5 94 01/16/2020    WBC 5 96 10/09/2014    RBC 3 13 (L) 01/16/2020    RBC 4 43 10/09/2014    HGB 6 3 (LL) 01/16/2020    HGB 13 5 10/09/2014    HCT 23 5 (L) 01/16/2020    HCT 41 2 10/09/2014    MCV 75 (L) 01/16/2020    MCV 93 10/09/2014    MCH 20 1 (L) 01/16/2020    MCH 30 5 10/09/2014    RDW 23 1 (H) 01/16/2020    RDW 14 4 10/09/2014     01/16/2020     10/09/2014       Imaging and Other Studies  )Xr Chest 1 View Portable    Result Date: 1/15/2020  Narrative: CHEST INDICATION:   sob  COMPARISON:  5/21/2019 EXAM PERFORMED/VIEWS:  XR CHEST PORTABLE  AP semierect Images: 1 FINDINGS: Heart shadow is enlarged but unchanged from prior exam  The lungs are clear  No pneumothorax or pleural effusion  Osseous structures appear within normal limits for patient age  Impression: No acute cardiopulmonary disease  Workstation performed: [id number]         Medications   reviewed    Total time spent with patient 45 minutes, >50% spent counseling and/or coordination of care           Ramona Shaver MD

## 2020-01-16 NOTE — PHYSICAL THERAPY NOTE
Physical Therapy Cancellation Note      PT orders received  Chart reviewed  Per nsg pt not appropriate for therapy at this time as she is receiving her second blood transfusion due to low hemoglobin levels  Will continue to follow during hospital stay as appropriate      Aviva Buck, PT

## 2020-01-16 NOTE — APP STUDENT NOTE
[facility]UC[facility] STUDENT  Inpatient Progress Note for TRAINING ONLY  Not Part of Legal Medical Record       Progress Note - Gabriella Rocha 81 y o  female MRN: [id number]    Unit/Bed#: E4 -01 Encounter: [id number]      Assessment/Plan:  1  Symptomatic anemia  Presented with Hb 3 8  Most recent labs reveal Hb of 6 3 following transfusion of 2 units  Improvement of symptoms with transfusion  Scheduled to receive 2 more units today  xarelto discontinued    2  Dysphagia  Associated with mid-sternal pain and post-prandial emesis   She was seen by GI who recommends EGD following improvement of respiratory symptoms  Will continue IV PPI BID    3  Hematuria  She continues to have hematuria which she states has been ongoing since July  Urology consulted     4  Elevated pro-BNP  Echo was done this morning to rule out cardiomyopathy, awaiting results  Continue furosemide 40mg IV BID  Monitor I&O's    5  Paroxysmal atrial fibrillation   xarelto discontinued  Continue metoprolol     6  Respiratory failure due to RSV    Continue guaifenesin, fluticasone-vilanterol inhaler, tessalon perles, albuterol nebulizer    7  HLD  Continue statin    Subjective:   Patient was seen and examined  She is moving bowels and bladder without difficulty  Patient states that she is feeling "90%" better following blood transfusion  She continues to have hematuria which she states has been ongoing since July  She is still experiencing cough productive of clear mucus and mild wheezing, but states her symptoms are improving with breathing treatments  Patient denies CP, palpitations, dizziness, SOB, abdominal pain, N/V    Objective:     Vitals: Blood pressure 143/60, pulse 67, temperature 99 4 °F (37 4 °C), temperature source Temporal, resp  rate 20, weight (!) 138 kg (304 lb 10 8 oz), SpO2 97 %  ,Body mass index is 53 97 kg/m²        Intake/Output Summary (Last 24 hours) at 1/16/2020 1100  Last data filed at 1/16/2020 0130  Gross per 24 hour   Intake 703 ml   Output 1650 ml   Net -947 ml       Physical Exam: General appearance: alert and oriented, in no acute distress and morbidly obese  Head: Normocephalic, without obvious abnormality, atraumatic  Neck: no adenopathy, no carotid bruit, no JVD, supple, symmetrical, trachea midline and thyroid not enlarged, symmetric, no tenderness/mass/nodules  Lungs: wheezes  Abdomen: soft, non-tender; bowel sounds normal; no masses,  no organomegaly  Extremities: edema of lower extremities biaterally     Invasive Devices     Peripheral Intravenous Line            Peripheral IV 01/15/20 Right Antecubital less than 1 day                Lab, Imaging and other studies: I have personally reviewed pertinent reports       Results Reviewed     Procedure Component Value Units Date/Time    Basic metabolic panel [[id number]]  (Abnormal) Collected:  01/16/20 0502    Lab Status:  Final result Specimen:  Blood from Arm, Right Updated:  01/16/20 0706     Sodium 145 mmol/L      Potassium 3 7 mmol/L      Chloride 109 mmol/L      CO2 32 mmol/L      ANION GAP 4 mmol/L      BUN 11 mg/dL      Creatinine 0 85 mg/dL      Glucose 109 mg/dL      Calcium 8 2 mg/dL      eGFR 64 ml/min/1 73sq m     Narrative:       [place] guidelines for Chronic Kidney Disease (CKD):     Stage 1 with normal or high GFR (GFR > 90 mL/min/1 73 square meters)    Stage 2 Mild CKD (GFR = 60-89 mL/min/1 73 square meters)    Stage 3A Moderate CKD (GFR = 45-59 mL/min/1 73 square meters)    Stage 3B Moderate CKD (GFR = 30-44 mL/min/1 73 square meters)    Stage 4 Severe CKD (GFR = 15-29 mL/min/1 73 square meters)    Stage 5 End Stage CKD (GFR <15 mL/min/1 73 square meters)  Note: GFR calculation is accurate only with a steady state creatinine    CBC (With Platelets) [[id number]]  (Abnormal) Collected:  01/16/20 0502    Lab Status:  Final result Specimen:  Blood from Arm, Right Updated:  01/16/20 0652     WBC 5 94 Thousand/uL      RBC 3 13 Million/uL Hemoglobin 6 3 g/dL      Hematocrit 23 5 %      MCV 75 fL      MCH 20 1 pg      MCHC 26 8 g/dL      RDW 23 1 %      Platelets 816 Thousands/uL      MPV 11 4 fL     Troponin I [[id number]]  (Normal) Collected:  01/16/20 0053    Lab Status:  Final result Specimen:  Blood from Hand, Left Updated:  01/16/20 0120     Troponin I 0 02 ng/mL     UA w Reflex to Microscopic w Reflex to Culture [[id number]]  (Abnormal) Collected:  01/16/20 0057    Lab Status:  Final result Specimen:  Urine, Other Updated:  01/16/20 0116     Color, UA Red     Clarity, UA Cloudy     Specific [place], UA 1 020     pH, UA 7 0     Leukocytes, UA Moderate     Nitrite, UA Positive     Protein,  (2+) mg/dl      Glucose, UA Negative mg/dl      Ketones, UA Negative mg/dl      Urobilinogen, UA 0 2 E U /dl      Bilirubin, UA Negative     Blood, UA Large    Iron Saturation % [[id number]]  (Abnormal) Collected:  01/15/20 2032    Lab Status:  Final result Specimen:  Blood from Arm, Left Updated:  01/15/20 2224     Iron Saturation 12 %      TIBC 366 ug/dL      Iron 44 ug/dL     Ferritin [[id number]]  (Abnormal) Collected:  01/15/20 2032    Lab Status:  Final result Specimen:  Blood from Arm, Left Updated:  01/15/20 2224     Ferritin 5 ng/mL     Troponin I [[id number]]  (Normal) Collected:  01/15/20 2032    Lab Status:  Final result Specimen:  Blood from Arm, Left Updated:  01/15/20 2058     Troponin I 0 02 ng/mL     Influenza A/B and RSV PCR [[id number]]  (Abnormal) Collected:  01/15/20 1532    Lab Status:  Final result Specimen:  Nose Updated:  01/15/20 1711     INFLUENZA A PCR None Detected     INFLUENZA B PCR None Detected     RSV PCR Detected    CBC and differential [[id number]]  (Abnormal) Collected:  01/15/20 1532    Lab Status:  Final result Specimen:  Blood from Arm, Right Updated:  01/15/20 1609     WBC 3 08 Thousand/uL      RBC 2 48 Million/uL      Hemoglobin 3 8 g/dL      Hematocrit 17 2 %      MCV 69 fL      MCH 15 3 pg      MCHC 22 1 g/dL RDW 22 7 %      MPV 10 3 fL      Platelets 635 Thousands/uL      nRBC 1 /100 WBCs      Neutrophils Relative 65 %      Immat GRANS % 2 %      Lymphocytes Relative 18 %      Monocytes Relative 14 %      Eosinophils Relative 1 %      Basophils Relative 0 %      Neutrophils Absolute 1 99 Thousands/µL      Immature Grans Absolute 0 07 Thousand/uL      Lymphocytes Absolute 0 55 Thousands/µL      Monocytes Absolute 0 43 Thousand/µL      Eosinophils Absolute 0 04 Thousand/µL      Basophils Absolute 0 00 Thousands/µL     NT-BNP PRO [[id number]]  (Abnormal) Collected:  01/15/20 1532    Lab Status:  Final result Specimen:  Blood from Arm, Right Updated:  01/15/20 1604     NT-proBNP 1,203 pg/mL     Protime-INR [[id number]]  (Abnormal) Collected:  01/15/20 1532    Lab Status:  Final result Specimen:  Blood from Arm, Right Updated:  01/15/20 1602     Protime 21 4 seconds      INR 1 82    APTT [[id number]]  (Normal) Collected:  01/15/20 1532    Lab Status:  Final result Specimen:  Blood from Arm, Right Updated:  01/15/20 1602     PTT 34 seconds     Troponin I [[id number]]  (Normal) Collected:  01/15/20 1532    Lab Status:  Final result Specimen:  Blood from Arm, Right Updated:  01/15/20 1558     Troponin I 0 02 ng/mL     Comprehensive metabolic panel [[id number]]  (Abnormal) Collected:  01/15/20 1532    Lab Status:  Final result Specimen:  Blood from Arm, Right Updated:  01/15/20 1555     Sodium 145 mmol/L      Potassium 4 0 mmol/L      Chloride 111 mmol/L      CO2 31 mmol/L      ANION GAP 3 mmol/L      BUN 13 mg/dL      Creatinine 0 97 mg/dL      Glucose 106 mg/dL      Calcium 8 3 mg/dL      AST 15 U/L      ALT 11 U/L      Alkaline Phosphatase 62 U/L      Total Protein 6 6 g/dL      Albumin 2 9 g/dL      Total Bilirubin 0 53 mg/dL      eGFR 54 ml/min/1 73sq m     Narrative:       [place] guidelines for Chronic Kidney Disease (CKD):     Stage 1 with normal or high GFR (GFR > 90 mL/min/1 73 square meters)   Stage 2 Mild CKD (GFR = 60-89 mL/min/1 73 square meters)    Stage 3A Moderate CKD (GFR = 45-59 mL/min/1 73 square meters)    Stage 3B Moderate CKD (GFR = 30-44 mL/min/1 73 square meters)    Stage 4 Severe CKD (GFR = 15-29 mL/min/1 73 square meters)    Stage 5 End Stage CKD (GFR <15 mL/min/1 73 square meters)  Note: GFR calculation is accurate only with a steady state creatinine        VTE Pharmacologic Prophylaxis: Sequential compression device (Venodyne)

## 2020-01-16 NOTE — CONSULTS
Patient MRN: [id number]  Date of Service: 1/16/2020  Referring Physician: Jaret Navarrete  Provider Creating Note: DHRUV Christian  PCP: Rob Nieves  Reason for Consult:  Severe anemia with heme-positive stool  HPI  Thaddeus Srinivasan is a 81 y o  female who was admitted with Symptomatic anemia  She presented after an episode of syncope at [facility]  Patient states that she has been feeling tired over the last few days  She does have a history of intermittent hematuria since she had her lithotripsy a year ago  She has not noted any bright red blood per rectum but was not sure whether her stool was dark or not  After presenting to the ER she was noted have a hemoglobin of 3 8 and heme-positive stool  She does have a history of 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 and band removed 2013  Patient states that she has had worsening reflux over the last few months and pain in the mid sternal region with eating  She tells me that sometime she will eat and have pain in the midsternal region and cannot eat anymore than will vomit and then will be able to eat a full meal   She has no trouble with liquids  She states that she is on Fosamax and Celebrex  She has been on Nexium which had helped with the reflux in the past but she feels that it is no longer effective  She has had no abdominal pain  She tells me that she had a colonoscopy between 5 and 10 years ago and some polyps removed this was done at [facility]   Patient is on Eliquis which has now been held  Patient also presented with acute respiratory failure rapid flu was negative, RSV was positive    Patient also states she has had a fever recently  Past Medical History:   Diagnosis Date    Acute kidney failure (HCC)     Anemia     Arthritis     Chafing     folds of skin and back of thighs    Chronic indwelling [place] catheter     #16Fr    Difficulty walking     Discitis     Discitis     Dysphagia     Dysphagia     Dysuria     Gait abnormality     GERD (gastroesophageal reflux disease)     Hypothyroidism     Kidney stone     Lymphedema     Major depressive disorder     MDD (major depressive disorder)     [facility]     Morbid obesity [facility]     Morbid obesity (HCC)     Muscle weakness     Muscle weakness (generalized)     [facility]STEM[facility]non-[facility]     Osteoporosis     Paroxysmal A-fib (HCC)     Renal disorder     Sleep apnea      Past Surgical History:   Procedure Laterality Date    CHOLECYSTECTOMY      FL RETROGRADE PYELOGRAM  5/22/2019    HYSTERECTOMY      JOINT REPLACEMENT Bilateral     knee replacment    LAPAROSCOPIC GASTRIC BANDING      SD CYSTO/URETERO W/LITHOTRIPSY &INDWELL STENT INSRT Left 6/26/2019    Procedure: CYSTO, URETEROSCOPY W/HOLMIUM LASER, STENT EXCHANGE;  Surgeon: Cat Thomas MD;  Location: [facility];  Service: Urology    SD CYSTOURETHROSCOPY,URETER CATHETER Left 5/22/2019    Procedure: CYSTOSCOPY; RIGHT RETROGRADE PYELOGRAM WITH INSERTION STENT URETERAL LEFT;  Surgeon: Cat Thomas MD;  Location[facility];  Service: Urology    REMOVAL GASTRIC BAND LAPAROSCOPIC      VENTRAL HERNIA REPAIR      x4     Medications  Home Medications:   Prior to Admission medications    Medication Sig Start Date End Date Taking?  Authorizing Provider   acetaminophen (TYLENOL) 325 mg tablet Take 650 mg by mouth every 6 (six) hours as needed for mild pain    Yes Historical Provider, MD   acetaminophen (TYLENOL) 650 mg suppository 650 mg every 4 (four) hours   Yes Historical Provider, MD   alendronate (FOSAMAX) 70 mg tablet Take 70 mg by mouth every 7 days   Yes Historical Provider, MD   aspirin (ECOTRIN LOW STRENGTH) 81 mg EC tablet Take 81 mg by mouth daily   Yes Historical Provider, MD   atorvastatin (LIPITOR) 10 mg tablet Take 10 mg by mouth daily   Yes Historical Provider, MD   bisacodyl (DULCOLAX) 10 mg suppository Insert 10 mg into the rectum daily as needed    Yes Historical Provider, MD   calcium carbonate-vitamin D (OSCAL-D) 500 mg-200 units per tablet Take 1 tablet by mouth daily with breakfast   Yes Historical Provider, MD   celecoxib (CeleBREX) 100 mg capsule Take 100 mg by mouth 2 (two) times a day   Yes Historical Provider, MD   cholecalciferol (VITAMIN D3) 1,000 units tablet Take 1,000 Units by mouth daily   Yes Historical Provider, MD   Coenzyme Q10 (CO Q10) 100 MG CAPS Take by mouth   Yes Historical Provider, MD   docusate sodium (COLACE) 100 mg capsule Take 100 mg by mouth 2 (two) times a day   Yes Historical Provider, MD   esomeprazole (NexIUM) 40 MG capsule Take 40 mg by mouth every morning before breakfast   Yes Historical Provider, MD   furosemide (LASIX) 20 mg tablet Take 40 mg by mouth daily    Yes Historical Provider, MD   levothyroxine 150 mcg tablet Take 150 mcg by mouth daily   Yes Historical Provider, MD   Lutein-Zeaxanthin 6-0 24 MG CAPS Take 6 mg by mouth daily    Yes Historical Provider, MD   magnesium hydroxide (MILK OF MAGNESIA) 400 mg/5 mL oral suspension daily as needed    Yes Historical Provider, MD   metoprolol tartrate (LOPRESSOR) 25 mg tablet Take 25 mg by mouth every 12 (twelve) hours   Yes Historical Provider, MD   nitroglycerin (NITROSTAT) 0 4 mg SL tablet Place 0 4 mg under the tongue every 5 (five) minutes as needed for chest pain    Yes Historical Provider, MD   potassium chloride (K-DUR,KLOR-CON) 20 mEq tablet potassium chloride ER 20 mEq tablet,extended release(part/cryst)   Yes Historical Provider, MD   pramipexole (MIRAPEX) 1 mg tablet Take 0 5 mg by mouth daily at bedtime    Yes Historical Provider, MD   ranitidine (ZANTAC) 300 MG capsule Take 300 mg by mouth every morning    Yes Historical Provider, MD   rivaroxaban (XARELTO) 20 mg tablet Take 20 mg by mouth every evening    Yes Historical Provider, MD   Skin Protectants, Misc   (DERMACERIN) CREA Apply topically   Yes Historical Provider, MD   talc Apply topically as needed for irritation   Yes Historical Provider, MD   amLODIPine (NORVASC) 10 mg tablet amlodipine 10 mg tablet    Historical Provider, MD   darifenacin (ENABLEX) 7 5 MG 24 hr tablet Enablex 7 5 mg tablet,extended release 11/3/11   Historical Provider, MD   fluticasone (FLOVENT DISKUS) 50 MCG/BLIST diskus inhaler Inhale 1 puff 2 (two) times a day    Historical Provider, MD   gabapentin (NEURONTIN) 300 mg capsule gabapentin 300 mg capsule    Historical Provider, MD   guaiFENesin (MUCINEX) 600 mg 12 hr tablet Take 2 tablets (1,200 mg total) by mouth every 12 (twelve) hours 5/25/19   Claritza Moody DO   HYDROcodone-acetaminophen (NORCO) 5-325 mg per tablet hydrocodone 5 mg-acetaminophen 325 mg tablet    Historical Provider, MD   HYDROcodone-acetaminophen ([address]) 5-325 mg per tablet Take 1-2 tablets by mouth every 6 (six) hours as needed for pain for up to 20 dosesMax Daily Amount: 8 tablets  Patient not taking: Reported on 1/15/2020 6/26/19   Cat Thomas MD   linezolid (ZYVOX) 600 mg tablet linezolid 600 mg tablet    Historical Provider, MD   losartan (COZAAR) 25 mg tablet Take 25 mg by mouth daily    Historical Provider, MD   Methenamine-Sodium Salicylate (CYSTEX PO) Take 15 mL by mouth 2 (two) times a day    Historical Provider, MD   potassium chloride (K-DUR,KLOR-CON) 20 mEq tablet Take 1 tablet (20 mEq total) by mouth 2 (two) times a day for 2 days 6/24/19 6/26/19  Merline Games, CRNP       Inhouse Medications    Current Facility-Administered Medications:     acetaminophen (TYLENOL) tablet 650 mg, 650 mg, Oral, Q6H PRN    atorvastatin (LIPITOR) tablet 10 mg, 10 mg, Oral, Daily With Dinner    benzonatate (TESSALON PERLES) capsule 200 mg, 200 mg, Oral, TID, 200 mg at 01/15/20 2055    cholecalciferol (VITAMIN D3) tablet 1,000 Units, 1,000 Units, Oral, Daily    fluticasone-vilanterol (BREO ELLIPTA) 100-25 mcg/inh inhaler 1 puff, 1 puff, Inhalation, Daily    furosemide (LASIX) injection 40 mg, 40 mg, Intravenous, BID (diuretic)    guaiFENesin (MUCINEX) 12 hr tablet 1,200 mg, 1,200 mg, Oral, Q12H NAVA, 1,200 mg at 01/15/20 2055    levalbuterol (XOPENEX) inhalation solution 1 25 mg, 1 25 mg, Nebulization, Q6H, 1 25 mg at 01/16/20 0731    levothyroxine tablet 150 mcg, 150 mcg, Oral, Early Morning, Stopped at 01/16/20 0656    methylPREDNISolone sodium succinate (Solu-MEDROL) injection 20 mg, 20 mg, Intravenous, Q12H [facility], 20 mg at 01/15/20 2138    metoprolol tartrate (LOPRESSOR) tablet 25 mg, 25 mg, Oral, Q12H NAVA, 25 mg at 01/15/20 2055    ondansetron (ZOFRAN) injection 4 mg, 4 mg, Intravenous, Q6H PRN    pantoprazole (PROTONIX) injection 40 mg, 40 mg, Intravenous, Q12H NAVA, 40 mg at 01/15/20 2136    potassium chloride (K-DUR,KLOR-CON) CR tablet 20 mEq, 20 mEq, Oral, Daily    pramipexole (MIRAPEX) tablet 0 5 mg, 0 5 mg, Oral, HS, 0 5 mg at 01/15/20 2356    sodium chloride 0 9 % inhalation solution 3 mL, 3 mL, Nebulization, Q6H, 3 mL at 01/16/20 0731    Allergies  Allergies   Allergen Reactions    Augmentin [Amoxicillin-Pot Clavulanate] Rash     Patient reports that she has tolerated amoxicillin in the past and would be willing to try penicillin if needed   Hydralazine Other (See Comments)     Headache, dizziness, nausea,    Sulfamethoxazole-Trimethoprim Rash    Actonel  [Risedronate Sodium]     Lisinopril Other (See Comments)     "cough"      Medical Tape      Pulls off skin      Meloxicam Cough    Wound Dressing Adhesive Other (See Comments)     "pulls skin off"- darrel paper tape    Conjugated Estrogens Rash     Premarin Cream      Neurontin [Gabapentin] Rash     Social History   reports that she has quit smoking  She has never used smokeless tobacco  She reports that she drinks about 1 0 standard drinks of alcohol per week  She reports that she does not use drugs  Family History  History reviewed  No pertinent family history  ROS  ROS: Denies CP, abdominal pain nausea or vomiting    Positive for mid turned sternal pain/burning with eating, dysphagia and reflux, fever and chills, shortness of breath, hematuria, or lower extremity swelling  All others negative except as noted in HPI  Objective   Vitals  Blood pressure 143/60, pulse 67, temperature 99 4 °F (37 4 °C), temperature source Temporal, resp  rate 20, weight (!) 138 kg (304 lb 10 8 oz), SpO2 97 %  General: Alert, no apparent distress  Eyes: No scleral icterus  ENT: MMM  Card: RRR no murmur  Lungs:  Expiratory wheezes throughout  Abdomen: Soft  Morbidly obese Nontender  Nondistended  Bowel sounds present and normoactive  Skin: No jaundice  Neuro: Alert and oriented x3  Extremities:  Bilateral lower extremity edema      Laboratory Studies  Lab Results   Component Value Date    WBC 5 94 01/16/2020    HGB 6 3 (LL) 01/16/2020    HCT 23 5 (L) 01/16/2020     01/16/2020    MCV 75 (L) 01/16/2020     Lab Results   Component Value Date    CREATININE 0 85 01/16/2020    BUN 11 01/16/2020    SODIUM 145 01/16/2020    K 3 7 01/16/2020     (H) 01/16/2020    CO2 32 01/16/2020    GLUCOSE 97 10/10/2014    CALCIUM 8 2 (L) 01/16/2020    PROT 7 8 09/29/2014    ALKPHOS 62 01/15/2020    BILITOT 0 9 09/29/2014    AST 15 01/15/2020    ALT 11 (L) 01/15/2020     Lab Results   Component Value Date    PROTIME 21 4 (H) 01/15/2020    INR 1 82 (H) 01/15/2020       Imaging and Other Studies      Assessment and Plan:  1  Severe symptomatic anemia with heme-positive stool and hematuria  Patient also describing intermittent dysphagia to solids with pain and burning in the mid sternal region with eating  Patient has a history of gastric band with torsion and removal   Patient is also on Fosamax and Celebrex  She will need an EGD when her respiratory status improves  Ppi IV b i d  Xarelto on hold, hold any NSAIDs  Frequent H&H transfuse as needed  Patient has already received 2 units is scheduled for 2 more  2  Hematuria-urology to follow  3  Elevated BMP with fluid overload  Patient scheduled for an echo to rule out cardiomyopathy  4   Acute respiratory failure due to RSV and anemia patient on bronchodilators, Mucinex on IV Solu-Medrol and Breo  Principal Problem:    Symptomatic anemia      DHRUV Mccartney

## 2020-01-16 NOTE — PLAN OF CARE
Problem: Prexisting or High Potential for Compromised Skin Integrity  Goal: Skin integrity is maintained or improved  Description  INTERVENTIONS:  - Identify patients at risk for skin breakdown  - Assess and monitor skin integrity  - Assess and monitor nutrition and hydration status  - Monitor labs   - Assess for incontinence   - Turn and reposition patient  - Assist with mobility/ambulation  - Relieve pressure over bony prominences  - Avoid friction and shearing  - Provide appropriate hygiene as needed including keeping skin clean and dry  - Evaluate need for skin moisturizer/barrier cream  - Collaborate with interdisciplinary team   - Patient/family teaching  - Consider wound care consult   Outcome: Progressing     Problem: Potential for Falls  Goal: Patient will remain free of falls  Description  INTERVENTIONS:  - Assess patient frequently for physical needs  -  Identify cognitive and physical deficits and behaviors that affect risk of falls    -  [place] fall precautions as indicated by assessment   - Educate patient/family on patient safety including physical limitations  - Instruct patient to call for assistance with activity based on assessment  - Modify environment to reduce risk of injury  - Consider OT/PT consult to assist with strengthening/mobility  Outcome: Progressing     Problem: CARDIOVASCULAR - ADULT  Goal: Maintains optimal cardiac output and hemodynamic stability  Description  INTERVENTIONS:  - Monitor I/O, vital signs and rhythm  - Monitor for S/S and trends of decreased cardiac output  - Administer and titrate ordered vasoactive medications to optimize hemodynamic stability  - Assess quality of pulses, skin color and temperature  - Assess for signs of decreased coronary artery perfusion  - Instruct patient to report change in severity of symptoms  Outcome: Progressing     Problem: HEMATOLOGIC - ADULT  Goal: Maintains hematologic stability  Description  INTERVENTIONS  - Assess for signs and symptoms of bleeding or hemorrhage  - Monitor labs  - Administer supportive blood products/factors as ordered and appropriate  Outcome: Progressing

## 2020-01-17 ENCOUNTER — APPOINTMENT (INPATIENT)
Dept: ULTRASOUND IMAGING | Facility: HOSPITAL | Age: 85
DRG: 813 | End: 2020-01-17
Payer: MEDICARE

## 2020-01-17 PROBLEM — I50.33 ACUTE ON CHRONIC DIASTOLIC CHF (CONGESTIVE HEART FAILURE) (HCC): Status: ACTIVE | Noted: 2020-01-17

## 2020-01-17 PROBLEM — R55 SYNCOPE: Status: ACTIVE | Noted: 2020-01-17

## 2020-01-17 PROBLEM — J96.00 ACUTE RESPIRATORY FAILURE (HCC): Status: ACTIVE | Noted: 2020-01-17

## 2020-01-17 LAB
ABO GROUP BLD BPU: NORMAL
ABO GROUP BLD BPU: NORMAL
ANION GAP SERPL CALCULATED.3IONS-SCNC: 4 MMOL/L (ref 4–13)
BPU ID: NORMAL
BPU ID: NORMAL
BUN SERPL-MCNC: 11 MG/DL (ref 5–25)
CALCIUM SERPL-MCNC: 8.2 MG/DL (ref 8.3–10.1)
CHLORIDE SERPL-SCNC: 107 MMOL/L (ref 100–108)
CO2 SERPL-SCNC: 32 MMOL/L (ref 21–32)
CREAT SERPL-MCNC: 0.93 MG/DL (ref 0.6–1.3)
CROSSMATCH: NORMAL
CROSSMATCH: NORMAL
ERYTHROCYTE [DISTWIDTH] IN BLOOD BY AUTOMATED COUNT: 23.7 % (ref 11.6–15.1)
GFR SERPL CREATININE-BSD FRML MDRD: 57 ML/MIN/1.73SQ M
GLUCOSE SERPL-MCNC: 113 MG/DL (ref 65–140)
HCT VFR BLD AUTO: 28.9 % (ref 34.8–46.1)
HCT VFR BLD AUTO: 32.6 % (ref 34.8–46.1)
HGB BLD-MCNC: 8.3 G/DL (ref 11.5–15.4)
HGB BLD-MCNC: 9.1 G/DL (ref 11.5–15.4)
MCH RBC QN AUTO: 22.3 PG (ref 26.8–34.3)
MCHC RBC AUTO-ENTMCNC: 28.7 G/DL (ref 31.4–37.4)
MCV RBC AUTO: 78 FL (ref 82–98)
PLATELET # BLD AUTO: 171 THOUSANDS/UL (ref 149–390)
POTASSIUM SERPL-SCNC: 3.6 MMOL/L (ref 3.5–5.3)
RBC # BLD AUTO: 3.72 MILLION/UL (ref 3.81–5.12)
SODIUM SERPL-SCNC: 143 MMOL/L (ref 136–145)
UNIT DISPENSE STATUS: NORMAL
UNIT DISPENSE STATUS: NORMAL
UNIT PRODUCT CODE: NORMAL
UNIT PRODUCT CODE: NORMAL
UNIT RH: NORMAL
UNIT RH: NORMAL
WBC # BLD AUTO: 5.85 THOUSAND/UL (ref 4.31–10.16)

## 2020-01-17 PROCEDURE — 99233 SBSQ HOSP IP/OBS HIGH 50: CPT | Performed by: NURSE PRACTITIONER

## 2020-01-17 PROCEDURE — 97163 PT EVAL HIGH COMPLEX 45 MIN: CPT

## 2020-01-17 PROCEDURE — 85018 HEMOGLOBIN: CPT | Performed by: NURSE PRACTITIONER

## 2020-01-17 PROCEDURE — 94760 N-INVAS EAR/PLS OXIMETRY 1: CPT

## 2020-01-17 PROCEDURE — 85027 COMPLETE CBC AUTOMATED: CPT | Performed by: INTERNAL MEDICINE

## 2020-01-17 PROCEDURE — C9113 INJ PANTOPRAZOLE SODIUM, VIA: HCPCS | Performed by: INTERNAL MEDICINE

## 2020-01-17 PROCEDURE — 76770 US EXAM ABDO BACK WALL COMP: CPT

## 2020-01-17 PROCEDURE — 94640 AIRWAY INHALATION TREATMENT: CPT

## 2020-01-17 PROCEDURE — 99232 SBSQ HOSP IP/OBS MODERATE 35: CPT | Performed by: UROLOGY

## 2020-01-17 PROCEDURE — 85014 HEMATOCRIT: CPT | Performed by: NURSE PRACTITIONER

## 2020-01-17 PROCEDURE — 80048 BASIC METABOLIC PNL TOTAL CA: CPT | Performed by: INTERNAL MEDICINE

## 2020-01-17 PROCEDURE — 97166 OT EVAL MOD COMPLEX 45 MIN: CPT

## 2020-01-17 RX ORDER — POTASSIUM CHLORIDE 20 MEQ/1
40 TABLET, EXTENDED RELEASE ORAL ONCE
Status: COMPLETED | OUTPATIENT
Start: 2020-01-17 | End: 2020-01-17

## 2020-01-17 RX ORDER — METHYLPREDNISOLONE SODIUM SUCCINATE 40 MG/ML
20 INJECTION, POWDER, LYOPHILIZED, FOR SOLUTION INTRAMUSCULAR; INTRAVENOUS DAILY
Status: DISCONTINUED | OUTPATIENT
Start: 2020-01-17 | End: 2020-01-19

## 2020-01-17 RX ADMIN — PANTOPRAZOLE SODIUM 40 MG: 40 INJECTION, POWDER, FOR SOLUTION INTRAVENOUS at 10:31

## 2020-01-17 RX ADMIN — ISODIUM CHLORIDE 3 ML: 0.03 SOLUTION RESPIRATORY (INHALATION) at 07:53

## 2020-01-17 RX ADMIN — PRAMIPEXOLE DIHYDROCHLORIDE 0.5 MG: 0.5 TABLET ORAL at 21:34

## 2020-01-17 RX ADMIN — FUROSEMIDE 40 MG: 10 INJECTION, SOLUTION INTRAMUSCULAR; INTRAVENOUS at 18:11

## 2020-01-17 RX ADMIN — BENZONATATE 200 MG: 100 CAPSULE ORAL at 18:11

## 2020-01-17 RX ADMIN — ISODIUM CHLORIDE 3 ML: 0.03 SOLUTION RESPIRATORY (INHALATION) at 00:21

## 2020-01-17 RX ADMIN — GUAIFENESIN 1200 MG: 600 TABLET, EXTENDED RELEASE ORAL at 21:30

## 2020-01-17 RX ADMIN — FLUTICASONE FUROATE AND VILANTEROL TRIFENATATE 1 PUFF: 100; 25 POWDER RESPIRATORY (INHALATION) at 10:27

## 2020-01-17 RX ADMIN — GUAIFENESIN 1200 MG: 600 TABLET, EXTENDED RELEASE ORAL at 10:27

## 2020-01-17 RX ADMIN — FUROSEMIDE 40 MG: 10 INJECTION, SOLUTION INTRAMUSCULAR; INTRAVENOUS at 10:35

## 2020-01-17 RX ADMIN — POTASSIUM CHLORIDE 20 MEQ: 1500 TABLET, EXTENDED RELEASE ORAL at 10:29

## 2020-01-17 RX ADMIN — POTASSIUM CHLORIDE 40 MEQ: 1500 TABLET, EXTENDED RELEASE ORAL at 18:11

## 2020-01-17 RX ADMIN — LEVALBUTEROL HYDROCHLORIDE 1.25 MG: 1.25 SOLUTION, CONCENTRATE RESPIRATORY (INHALATION) at 00:21

## 2020-01-17 RX ADMIN — LEVALBUTEROL HYDROCHLORIDE 1.25 MG: 1.25 SOLUTION, CONCENTRATE RESPIRATORY (INHALATION) at 18:38

## 2020-01-17 RX ADMIN — ISODIUM CHLORIDE 3 ML: 0.03 SOLUTION RESPIRATORY (INHALATION) at 18:38

## 2020-01-17 RX ADMIN — ATORVASTATIN CALCIUM 10 MG: 10 TABLET, FILM COATED ORAL at 18:12

## 2020-01-17 RX ADMIN — METHYLPREDNISOLONE SODIUM SUCCINATE 20 MG: 40 INJECTION, POWDER, FOR SOLUTION INTRAMUSCULAR; INTRAVENOUS at 10:34

## 2020-01-17 RX ADMIN — MELATONIN 1000 UNITS: at 10:28

## 2020-01-17 RX ADMIN — BENZONATATE 200 MG: 100 CAPSULE ORAL at 21:31

## 2020-01-17 RX ADMIN — LEVOTHYROXINE SODIUM 150 MCG: 75 TABLET ORAL at 05:38

## 2020-01-17 RX ADMIN — CEFTRIAXONE 1000 MG: 1 INJECTION, POWDER, FOR SOLUTION INTRAMUSCULAR; INTRAVENOUS at 21:34

## 2020-01-17 RX ADMIN — METOPROLOL TARTRATE 25 MG: 25 TABLET ORAL at 10:29

## 2020-01-17 RX ADMIN — METOPROLOL TARTRATE 25 MG: 25 TABLET ORAL at 21:31

## 2020-01-17 RX ADMIN — BENZONATATE 200 MG: 100 CAPSULE ORAL at 10:28

## 2020-01-17 RX ADMIN — ISODIUM CHLORIDE 3 ML: 0.03 SOLUTION RESPIRATORY (INHALATION) at 13:21

## 2020-01-17 RX ADMIN — LEVALBUTEROL HYDROCHLORIDE 1.25 MG: 1.25 SOLUTION, CONCENTRATE RESPIRATORY (INHALATION) at 07:53

## 2020-01-17 RX ADMIN — PANTOPRAZOLE SODIUM 40 MG: 40 INJECTION, POWDER, FOR SOLUTION INTRAVENOUS at 21:30

## 2020-01-17 RX ADMIN — LEVALBUTEROL HYDROCHLORIDE 1.25 MG: 1.25 SOLUTION, CONCENTRATE RESPIRATORY (INHALATION) at 13:21

## 2020-01-17 NOTE — PROGRESS NOTES
Progress Note - Ester García 1935, 81 y o  female MRN: [id number]    Unit/Bed#: E4 -01 Encounter: [id number]    Primary Care Provider: Julian Valentin MD   Date and time admitted to hospital: 1/15/2020  2:55 PM        * Syncope  Assessment & Plan  Secondary to severe anemia and RSV  Improving  Status post 4 units of RBC transfusion  Hemoglobin 8 3 this morning  Symptomatic anemia  Assessment & Plan  Likely multifactorial due to hematuria and GI bleed while on Xarelto  Stool occult blood positive  Hemoglobin was 3 8 on admission  Received 4 units of RBC total   Hemoglobin 8 3 today  Holding aspirin and Xarelto  Consulted urology and GI, appreciate input  · Continue IV Protonix b i d  Abhinav Ma · Will need EGD once respiratory status improves  · Treat empirically with antibiotics for hematuria while await urine culture  Patient may require cysto with fulguration  · Renal ultrasound pending  · Clear liquid diet  Iron panel - iron 44, ferritin 5  Check TSH  Monitor H&H every 12 hours  Need iron supplement on discharge    Acute respiratory failure [facility])  Assessment & Plan  Secondary to RSV and anemia  Continue bronchodilators, Mucinex, IV Solu-Medrol and Breo  Incentive spirometer  Patient is on room air, satting low to mid 90s currently  Acute on chronic diastolic CHF (congestive heart failure) (HCC)  Assessment & Plan  Wt Readings from Last 3 Encounters:   01/17/20 (!) 137 kg (301 lb 9 4 oz)   06/26/19 (!) 140 kg (309 lb)   06/11/19 (!) 147 kg (325 lb)     2D echo normal EF,G2DD  Continue IV Lasix  Continue metoprolol  Continue daily weight and I&Os  Weight improving  Urinary tract infection  Assessment & Plan  UTI with hematuria  Urine culture prelim report >100,000 cfu/ml Gram Negative Asim  Continue IV Rocephin,day # 2  Paroxysmal A-fib [facility])  Assessment & Plan  Continue metoprolol  Holding Xarelto  EKG sinus rhythm in ED        Mixed hyperlipidemia  Assessment & Plan  Continue Lipitor    Essential hypertension  Assessment & Plan  BP stable  Continue metoprolol  Monitor          VTE Pharmacologic Prophylaxis:   Pharmacologic: Pharmacologic VTE Prophylaxis contraindicated due to GI BLEED  Mechanical VTE Prophylaxis in Place: Yes    Patient Centered Rounds: I have performed bedside rounds with nursing staff today  Discussions with Specialists or Other Care Team Provider: yes    Education and Discussions with Family / Patient: yes    Time Spent for Care: 30 minutes  More than 50% of total time spent on counseling and coordination of care as described above  Current Length of Stay: 2 day(s)    Current Patient Status: Inpatient   Certification Statement: The patient will continue to require additional inpatient hospital stay due to Syncope, symptomatic anemia, hematuria/UTI, acute respiratory failure, acute on chronic CHF    Discharge Plan:  Return to NH once medically cleared    Code Status: Level 3 - DNAR and DNI      Subjective:   Patient reports feeling much much better  Denies dizziness, lightheadedness, chest pain, headaches, nausea, vomiting, diarrhea, constipation, fever or chills  Reports shortness breath at times, reports cough with clear phlegm ever since her URI since     Objective:     Vitals:   Temp (24hrs), Av 1 °F (36 7 °C), Min:96 8 °F (36 °C), Max:98 7 °F (37 1 °C)    Temp:  [96 8 °F (36 °C)-98 7 °F (37 1 °C)] 97 9 °F (36 6 °C)  HR:  [64-76] 64  Resp:  [18-20] 20  BP: (109-141)/(54-73) 138/73  SpO2:  [92 %-99 %] 94 %  Body mass index is 53 42 kg/m²  Input and Output Summary (last 24 hours): Intake/Output Summary (Last 24 hours) at 2020 1014  Last data filed at 2020 7296  Gross per 24 hour   Intake    Output 2400 ml   Net -2400 ml       Physical Exam:     Physical Exam   Constitutional: She is oriented to person, place, and time  She appears well-developed and well-nourished     HENT:   Head: Normocephalic and atraumatic  Neck: Normal range of motion  Neck supple  No JVD present  No tracheal deviation present  No thyromegaly present  Cardiovascular: Normal rate and regular rhythm  Murmur heard  Pulmonary/Chest: Effort normal  No respiratory distress  She has wheezes  She has no rales  Diffuse faint expiratory wheezing on auscultation  On room air, satting low to mid 90s  Abdominal: Soft  Bowel sounds are normal  She exhibits no distension  There is no tenderness  There is no guarding  Patient is obese   Musculoskeletal: Normal range of motion  She exhibits edema  She exhibits no tenderness or deformity  Lymphedema to lower extremities   Neurological: She is alert and oriented to person, place, and time  Skin: Skin is warm and dry  Psychiatric: She has a normal mood and affect  Judgment normal    Nursing note and vitals reviewed  Additional Data:     Labs:    Results from last 7 days   Lab Units 01/17/20  0623  01/15/20  1532   WBC Thousand/uL 5 85   < > 3 08*   HEMOGLOBIN g/dL 8 3*   < > 3 8*   HEMATOCRIT % 28 9*   < > 17 2*   PLATELETS Thousands/uL 171   < > 182   NEUTROS PCT %  --   --  65   LYMPHS PCT %  --   --  18   MONOS PCT %  --   --  14*   EOS PCT %  --   --  1    < > = values in this interval not displayed  Results from last 7 days   Lab Units 01/17/20  0623  01/15/20  1532   POTASSIUM mmol/L 3 6   < > 4 0   CHLORIDE mmol/L 107   < > 111*   CO2 mmol/L 32   < > 31   BUN mg/dL 11   < > 13   CREATININE mg/dL 0 93   < > 0 97   CALCIUM mg/dL 8 2*   < > 8 3   ALK PHOS U/L  --   --  62   ALT U/L  --   --  11*   AST U/L  --   --  15    < > = values in this interval not displayed  Results from last 7 days   Lab Units 01/15/20  1532   INR  1 82*       * I Have Reviewed All Lab Data Listed Above  * Additional Pertinent Lab Tests Reviewed:  All Labs For Current Hospital Admission Reviewed    Imaging:    Imaging Reports Reviewed Today Include:  Chest x-ray, 2D echo  Imaging Personally Reviewed by Myself Includes:  None    Recent Cultures (last 7 days):     Results from last 7 days   Lab Units 01/16/20  0057   URINE CULTURE  >100,000 cfu/ml Gram Negative Asim*  50,000-59,000 cfu/ml        Last 24 Hours Medication List:     Current Facility-Administered Medications:  acetaminophen 650 mg Oral Q6H PRN Veronica Ambron, DO    atorvastatin 10 mg Oral Daily With Dexter-Val, DO    benzonatate 200 mg Oral TID Veronica Ambron, DO    cefTRIAXone 1,000 mg Intravenous Q24H Veronica Ambron, DO Last Rate: 1,000 mg (01/16/20 2127)   cholecalciferol 1,000 Units Oral Daily Veronica Ambron, DO    fluticasone-vilanterol 1 puff Inhalation Daily Veronica Ambron, DO    furosemide 40 mg Intravenous BID (diuretic) Veronica Ambron, DO    guaiFENesin 1,200 mg Oral Q12H [facility] Veronica Ambron, DO    levalbuterol 1 25 mg Nebulization Q6H Veronica Ambron, DO    levothyroxine 150 mcg Oral Early Morning Veronica Ambron, DO    methylPREDNISolone sodium succinate 20 mg Intravenous Daily Veronica Ambron, DO    metoprolol tartrate 25 mg Oral Q12H [facility] Veronica Ambron, DO    ondansetron 4 mg Intravenous Q6H PRN Veronica Ambron, DO    pantoprazole 40 mg Intravenous Q12H [facility] Veronica Ambron, DO    potassium chloride 20 mEq Oral Daily Veronica Ambron, DO    pramipexole 0 5 mg Oral HS Veronica Ambron, DO    sodium chloride 3 mL Nebulization Q6H Veronica Ambron, DO         Today, Patient Was Seen By: DHRUV Smith    ** Please Note: Dragon 360 Dictation voice to text software may have been used in the creation of this document   **

## 2020-01-17 NOTE — ASSESSMENT & PLAN NOTE
Likely multifactorial due to hematuria and GI bleed while on Xarelto  Stool occult blood positive  Hemoglobin was 3 8 on admission  Received 4 units of RBC total   Hemoglobin 8 3 today  Holding aspirin and Xarelto  Consulted urology and GI, appreciate input  · Continue IV Protonix b i d  Velna Quamba · Will need EGD once respiratory status improves  · Treat empirically with antibiotics for hematuria while await urine culture  Patient may require cysto with fulguration  · Renal ultrasound pending  · Clear liquid diet  Iron panel - iron 44, ferritin 5  Check TSH  Monitor H&H every 12 hours     Need iron supplement on discharge

## 2020-01-17 NOTE — PROGRESS NOTES
Patient Name: Reece Anderson  Patient MRN: [id number]  Date: 01/17/20  Service: [facility]    Subjective   Reece Anderson is a 81 y o  female who was admitted with Syncope  She received 2 more units of packed red blood cells last night and her hemoglobin is currently 8 3  She is tolerating her liquids  She did have some loose stool during the night  She has had no bright red blood per rectum  Urology has scheduled her for an ultrasound today  She continues to have difficulty breathing with inspiratory wheezing as well as a productive cough  Objective     Vitals  Blood pressure 138/73, pulse 64, temperature 97 9 °F (36 6 °C), temperature source Temporal, resp  rate 20, weight (!) 137 kg (301 lb 9 4 oz), SpO2 94 %  General: Alert, no apparent distress  Eyes: No scleral icterus  ENT: MMM  Card: RRR no murmur  Lungs:  Coarse breath sounds with inspiratory wheezes throughout  Abdomen: Soft  Nontender  Nondistended   Bowel sounds present and normoactive  Skin: No jaundice  Neuro: Alert and oriented x3        Laboratory Studies  Lab Results   Component Value Date    CREATININE 0 93 01/17/2020    BUN 11 01/17/2020    SODIUM 143 01/17/2020    K 3 6 01/17/2020     01/17/2020    CO2 32 01/17/2020    GLUCOSE 97 10/10/2014    CALCIUM 8 2 (L) 01/17/2020    PROT 7 8 09/29/2014    ALKPHOS 62 01/15/2020    BILITOT 0 9 09/29/2014    AST 15 01/15/2020    ALT 11 (L) 01/15/2020     Lab Results   Component Value Date    WBC 5 85 01/17/2020    HGB 8 3 (L) 01/17/2020    HCT 28 9 (L) 01/17/2020     01/17/2020    MCV 78 (L) 01/17/2020     Lab Results   Component Value Date    PROTIME 21 4 (H) 01/15/2020    INR 1 82 (H) 01/15/2020       Imaging and Other Studies    Inhouse Medications       Current Facility-Administered Medications:     acetaminophen (TYLENOL) tablet 650 mg, 650 mg, Oral, Q6H PRN    atorvastatin (LIPITOR) tablet 10 mg, 10 mg, Oral, Daily With Dinner, 10 mg at 01/16/20 1531   benzonatate (TESSALON PERLES) capsule 200 mg, 200 mg, Oral, TID, 200 mg at 01/17/20 1028    cefTRIAXone (ROCEPHIN) 1,000 mg in dextrose 5 % 50 mL IVPB, 1,000 mg, Intravenous, Q24H, 1,000 mg at 01/16/20 2127    cholecalciferol (VITAMIN D3) tablet 1,000 Units, 1,000 Units, Oral, Daily, 1,000 Units at 01/17/20 1028    fluticasone-vilanterol (BREO ELLIPTA) 100-25 mcg/inh inhaler 1 puff, 1 puff, Inhalation, Daily, 1 puff at 01/17/20 1027    furosemide (LASIX) injection 40 mg, 40 mg, Intravenous, BID (diuretic), 40 mg at 01/17/20 1035    guaiFENesin (MUCINEX) 12 hr tablet 1,200 mg, 1,200 mg, Oral, Q12H NAVA, 1,200 mg at 01/17/20 1027    levalbuterol (XOPENEX) inhalation solution 1 25 mg, 1 25 mg, Nebulization, Q6H, 1 25 mg at 01/17/20 0753    levothyroxine tablet 150 mcg, 150 mcg, Oral, Early Morning, 150 mcg at 01/17/20 0538    methylPREDNISolone sodium succinate (Solu-MEDROL) injection 20 mg, 20 mg, Intravenous, Daily, 20 mg at 01/17/20 1034    metoprolol tartrate (LOPRESSOR) tablet 25 mg, 25 mg, Oral, Q12H NAVA, 25 mg at 01/17/20 1029    ondansetron (ZOFRAN) injection 4 mg, 4 mg, Intravenous, Q6H PRN    pantoprazole (PROTONIX) injection 40 mg, 40 mg, Intravenous, Q12H NAVA, 40 mg at 01/17/20 1031    potassium chloride (K-DUR,KLOR-CON) CR tablet 20 mEq, 20 mEq, Oral, Daily, 20 mEq at 01/17/20 1029    potassium chloride (K-DUR,KLOR-CON) CR tablet 40 mEq, 40 mEq, Oral, Once    pramipexole (MIRAPEX) tablet 0 5 mg, 0 5 mg, Oral, HS, 0 5 mg at 01/16/20 2134    sodium chloride 0 9 % inhalation solution 3 mL, 3 mL, Nebulization, Q6H, 3 mL at 01/17/20 0753      Assessment/Plan:  1  Severe symptomatic anemia with hematuria and melena  Patient has received 4 units of packed red blood cells and her hemoglobin has come up nicely  Patient will eventually need EGD for complete evaluation when respiratory status is optimized  If EGD is negative then colonoscopy may need to be considered    She has been taking Eliquis, Fosamax and Celebrex  Continue PPI b i d   2  Hematuria urology following  3  Acute respiratory failure secondary to RSV  Monitor H&H transfuse as needed      DHRUV Pike

## 2020-01-17 NOTE — ASSESSMENT & PLAN NOTE
Secondary to severe anemia and RSV  Improving  Status post 4 units of RBC transfusion  Hemoglobin 8 3 this morning

## 2020-01-17 NOTE — ASSESSMENT & PLAN NOTE
Secondary to RSV and anemia  Continue bronchodilators, Mucinex, IV Solu-Medrol and Breo  Incentive spirometer  Patient is on room air, satting low to mid 90s currently

## 2020-01-17 NOTE — OCCUPATIONAL THERAPY NOTE
[address]  Evaluation     Patient Name: Juanito SANDRA Date: 1/17/2020  Problem List  Principal Problem:    Syncope  Active Problems:    Urinary tract infection    Essential hypertension    Mixed hyperlipidemia    Paroxysmal A-fib (HCC)    Symptomatic anemia    Acute respiratory failure (HCC)    Acute on chronic diastolic CHF (congestive heart failure) ([facility] )    Past Medical History  Past Medical History:   Diagnosis Date    Acute kidney failure (HCC)     Anemia     Arthritis     Chafing     folds of skin and back of thighs    Chronic indwelling [place] catheter     #16Fr    Difficulty walking     Discitis     Discitis     Dysphagia     Dysphagia     Dysuria     Gait abnormality     GERD (gastroesophageal reflux disease)     Hypothyroidism     Kidney stone     Lymphedema     Major depressive disorder     MDD (major depressive disorder)     MI (myocardial infarction) ([facility] )     Morbid obesity ([facility] )     Morbid obesity (HCC)     Muscle weakness     Muscle weakness (generalized)     [facility]     Osteoporosis     Paroxysmal A-fib (HCC)     Renal disorder     Sleep apnea      Past Surgical History  Past Surgical History:   Procedure Laterality Date    CHOLECYSTECTOMY      FL RETROGRADE PYELOGRAM  5/22/2019    HYSTERECTOMY      JOINT REPLACEMENT Bilateral     knee replacment    LAPAROSCOPIC GASTRIC BANDING      NV CYSTO/URETERO W/LITHOTRIPSY &INDWELL STENT INSRT Left 6/26/2019    Procedure: CYSTO, URETEROSCOPY W/HOLMIUM LASER, STENT EXCHANGE;  Surgeon: Lien Addison MD;  [facility]: [facility];  Service: Urology    NV CYSTOURETHROSCOPY,URETER CATHETER Left 5/22/2019    Procedure: CYSTOSCOPY; RIGHT RETROGRADE PYELOGRAM WITH INSERTION STENT URETERAL LEFT;  Surgeon: Lien Addison MD;  [facility];  Service: Urology    REMOVAL GASTRIC BAND LAPAROSCOPIC      VENTRAL HERNIA REPAIR      x4             01/17/20 1245   Note Type Note type Eval/Treat   Restrictions/Precautions   Weight Bearing Precautions Per Order No   Other Precautions Bed Alarm; Fall Risk;Multiple lines   Pain Assessment   Pain Assessment No/denies pain   Pain Score No Pain   Home Living   Type of Home SNF  ([facility])   Home Layout One level;Ramped entrance   Bathroom Shower/Tub Walk-in shower   Bathroom Toilet Raised   Bathroom Equipment Grab bars in shower; Shower chair;Grab bars around toilet   [address] Wheelchair-electric   Additional Comments pt reports sit<>stand lift typically at [facility] and most recently past 2 weeks was having syncopal episodes and using leonidas lift OOB to electric scooter, reports supervision to EOB at facility   Prior Function   Level of [place] Needs assistance with ADLs and functional mobility; Needs assistance with IADLs   Lives With Facility staff   Receives Help From Personal care attendant   ADL Assistance Needs assistance  (setup UB ADLS, assist LB ADLs, toileting)   IADLs Needs assistance   Falls in the last 6 months 0   Vocational Retired   Comments pt reports active in [employer] in [employer]   Lifestyle   Autonomy per pt setup grooming and UB ADLs, assist w/ LB ADLs and bathing, assist w/ toileting, assist sit<>stand lift and leonidas, assist w/ IADLs   Reciprocal Relationships facility staff   Service to Others retired worked    [employer] 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[facility], [facility], resident assistance w/ hospice patients   ADL   Where Assessed Supine, bed   Eating Assistance 5  Supervision/Setup   Grooming Assistance 5  [address] [facility] [address]  [address] [facility] 1  [address] [place] [address]  [address] 1  [address]  1  Total Assistance   Bed Mobility   Rolling R 3  Moderate assistance   Additional items Assist x 2; Increased time required;Verbal cues;LE management; Bedrails   Rolling L 3  Moderate assistance   Additional items Assist x 2; Increased time required;Verbal cues; Bedrails   Additional Comments pt decline to sit EOB this session due to testing in the afternoon   Transfers   Sit to Stand Unable to assess   Additional Comments pt not appropriate for OOB at this time; recommend leonidas lift OOB   Activity Tolerance   Activity Tolerance Patient limited by fatigue;Treatment limited secondary to medical complications (Comment)   Nurse Made Aware appropriate to see per Rach DUBON   RUJOESPH Assessment   RUE Assessment X  (limited elevation R UE 2* RTC injury; distal 3+/5)   LUE Assessment   LUE Assessment WFL  (4-/5)   Hand Function   Gross Motor Coordination Functional   Fine Motor Coordination Functional   Sensation   Light Touch No apparent deficits   Sharp/Dull No apparent deficits   Proprioception   Proprioception No apparent deficits   Vision-Basic Assessment   Current Vision Wears glasses all the time   Vision - Complex Assessment   Ocular Range of Motion [facility]   Acuity Able to read clock/calendar on wall without difficulty   Perception   Inattention/Neglect Appears intact   Cognition   Overall Cognitive Status [facility]   Arousal/Participation Alert; Cooperative   Attention Within functional limits   Orientation Level Oriented X4   Memory Within functional limits   Following Commands Follows one step commands without difficulty   Comments pt engages in appropriate conversations   Assessment   Limitation Decreased ADL status; Decreased UE strength;Decreased cognition;Decreased Safe judgement during ADL;Decreased endurance;Decreased self-care trans;Decreased high-level ADLs   Prognosis Good   Assessment Pt is a 81 y o  female seen for OT evaluation s/p admit to [facility] on 1/15/2020 w/ Syncope and anemia and s/p 4 units RBC transfusion    Comorbidities affecting pt's functional performance at time of assessment include: symptomatic anemia, acute respiratory failure, CHF, UTI, a-fib, hyperlipidemia, HTN  Personal factors affecting pt at time of IE include: use of electric scooter at [employer] w/ sit<>stand lift or  Ira lift for OOB  Prior to admission, pt was living w/ at [facility] and reports in past few weeks was using a ira lift instead of sit<>stand due to syncopal episodes, assist w/ LB ADLs, setup UB ADLs, supervision bed mobility, assist w/ IADLs, MOD I mobility w/ electric scooter  Upon evaluation: Pt requires MOD assist x2 rolling in bed w/ bed rails, MAX-total assist toileting, MIN assist UB ADLs, MAX-total assist LB ADLs 2* the following deficits impacting occupational performance: impaired balance, increased pain, decreased strength and endurance, impaired activity tolerance, multiple lines, increased fatigue, incontinence of urine (requiring max assist x2 of changing sheets)  Pt to benefit from continued skilled OT tx while in the hospital to address deficits as defined above and maximize level of functional independence w ADL's and functional mobility  Pt not appropriate for OOB at this time, recommend ira lift w/ NSG will trial quick move transfer for OOB next session  Occupational Performance areas to address include: grooming, bathing/shower, toilet hygiene, dressing, health maintenance, functional mobility and clothing management, bed mobility, repositioning education, OOB transfer  From OT standpoint, recommendation at time of d/c would be return to [facility] w/ OT services  Goals   Patient Goals "to get better"   LTG Time Frame 10-14   Long Term Goal please see below goals   Plan   Treatment Interventions ADL retraining;UE strengthening/ROM; Functional transfer training; Endurance training;Cognitive reorientation;Patient/family training;Equipment evaluation/education; Compensatory technique education; Energy conservation; Activityengagement   Goal Expiration Date 01/31/20   OT Frequency 2-3x/wk   Recommendation   OT Discharge Recommendation Other (Comment)  (OT at facility)   OT - OK to Discharge   (to rehab when medically stable)   Barthel Index   Feeding 10   Bathing 0   Grooming Score 5   Dressing Score 5   Bladder Score 0   Bowels Score 5   Toilet Use Score 5   Transfers (Bed/Chair) Score 0   Mobility (Level Surface) Score 0   Stairs Score 0   Barthel Index Score 30   Modified [place] Scale   Modified [place] Scale 5     Occupational Therapy Goals to be met in 10-14 days:  1) Pt will improve activity tolerance to G for  30 min txment sessions to enhance ADLs  2) Pt will complete UB ADLs/self care w/ supervision and mod assist LB ADLs  3) Pt will complete toileting w/ mod assist w/ G hygiene/thoroughness using DME PRN  4) OTR to assess functional transfers as appropriate  5) Pt will engage in depression screen/leisure interest checklist w/ G participation to monitor s/s depression and ID 3 positive coping strategies to A w/ emotional regulation and management  6) Pt will demonstrate 100% carryover of E C  techniques w/ mod I t/o fx'l I/ADL/leisure tasks w/o cues s/p skilled education  7) Pt will demonstrate improved rolling in bed to min assist to enhance ADLs  8) Pt will demonstrate improved b/l UE strength by 1 MMT grade to enhance ADLS and functional transfers     Documentation completed by: Megan Gutiérrez MS, OTR/L

## 2020-01-17 NOTE — PLAN OF CARE
Problem: Prexisting or High Potential for Compromised Skin Integrity  Goal: Skin integrity is maintained or improved  Description  INTERVENTIONS:  - Identify patients at risk for skin breakdown  - Assess and monitor skin integrity  - Assess and monitor nutrition and hydration status  - Monitor labs   - Assess for incontinence   - Turn and reposition patient  - Assist with mobility/ambulation  - Relieve pressure over bony prominences  - Avoid friction and shearing  - Provide appropriate hygiene as needed including keeping skin clean and dry  - Evaluate need for skin moisturizer/barrier cream  - Collaborate with interdisciplinary team   - Patient/family teaching  - Consider wound care consult   Outcome: Progressing     Problem: Potential for Falls  Goal: Patient will remain free of falls  Description  INTERVENTIONS:  - Assess patient frequently for physical needs  -  Identify cognitive and physical deficits and behaviors that affect risk of falls    -  [place] fall precautions as indicated by assessment   - Educate patient/family on patient safety including physical limitations  - Instruct patient to call for assistance with activity based on assessment  - Modify environment to reduce risk of injury  - Consider OT/PT consult to assist with strengthening/mobility  Outcome: Progressing     Problem: CARDIOVASCULAR - ADULT  Goal: Maintains optimal cardiac output and hemodynamic stability  Description  INTERVENTIONS:  - Monitor I/O, vital signs and rhythm  - Monitor for S/S and trends of decreased cardiac output  - Administer and titrate ordered vasoactive medications to optimize hemodynamic stability  - Assess quality of pulses, skin color and temperature  - Assess for signs of decreased coronary artery perfusion  - Instruct patient to report change in severity of symptoms  Outcome: Progressing     Problem: HEMATOLOGIC - ADULT  Goal: Maintains hematologic stability  Description  INTERVENTIONS  - Assess for signs and symptoms of bleeding or hemorrhage  - Monitor labs  - Administer supportive blood products/factors as ordered and appropriate  Outcome: Progressing     Problem: DISCHARGE PLANNING  Goal: Discharge to home or other facility with appropriate resources  Description  INTERVENTIONS:  - Identify barriers to discharge w/patient and caregiver  - Arrange for needed discharge resources and transportation as appropriate  - Identify discharge learning needs (meds, wound care, etc )  - Arrange for interpretive services to assist at discharge as needed  - Refer to Case Management Department for coordinating discharge planning if the patient needs post-hospital services based on physician/advanced practitioner order or complex needs related to functional status, cognitive ability, or social support system  Outcome: Progressing     Problem: Knowledge Deficit  Goal: Patient/family/caregiver demonstrates understanding of disease process, treatment plan, medications, and discharge instructions  Description  Complete learning assessment and assess knowledge base    Interventions:  - Provide teaching at level of understanding  - Provide teaching via preferred learning methods  Outcome: Progressing     Problem: GENITOURINARY - ADULT  Goal: Maintains or returns to baseline urinary function  Description  INTERVENTIONS:  - Assess urinary function  - Encourage oral fluids to ensure adequate hydration if ordered  - Administer IV fluids as ordered to ensure adequate hydration  - Administer ordered medications as needed  - Offer frequent toileting  - Follow urinary retention protocol if ordered  Outcome: Progressing     Problem: SKIN/TISSUE INTEGRITY - ADULT  Goal: Skin integrity remains intact  Description  INTERVENTIONS  - Identify patients at risk for skin breakdown  - Assess and monitor skin integrity  - Assess and monitor nutrition and hydration status  - Monitor labs (i e  albumin)  - Assess for incontinence   - Turn and reposition patient  - Assist with mobility/ambulation  - Relieve pressure over bony prominences  - Avoid friction and shearing  - Provide appropriate hygiene as needed including keeping skin clean and dry  - Evaluate need for skin moisturizer/barrier cream  - Collaborate with interdisciplinary team (i e  Nutrition, Rehabilitation, etc )   - Patient/family teaching  Outcome: Progressing

## 2020-01-17 NOTE — PHYSICAL THERAPY NOTE
PT EVALUATION    81 y o     [id number]    CHF (congestive heart failure) ([facility]) [I50 9]  Hematuria [R31 9]  RSV bronchitis [J20 5]  Severe anemia [D64 9]    Past Medical History:   Diagnosis Date    Acute kidney failure ([facility])     Anemia     Arthritis     Chafing     folds of skin and back of thighs    Chronic indwelling Montes De Oca catheter     #16Fr    Difficulty walking     Discitis     Discitis     Dysphagia     Dysphagia     Dysuria     Gait abnormality     GERD (gastroesophageal reflux disease)     Hypothyroidism     Kidney stone     Lymphedema     Major depressive disorder     MDD (major depressive disorder)     [facility]     Morbid obesity [facility]     Morbid obesity (HCC)     Muscle weakness     Muscle weakness (generalized)     [facility]     Osteoporosis     Paroxysmal A-fib ([place] [address] )     Renal disorder     Sleep apnea          Past Surgical History:   Procedure Laterality Date    CHOLECYSTECTOMY      FL RETROGRADE PYELOGRAM  5/22/2019    HYSTERECTOMY      JOINT REPLACEMENT Bilateral     knee replacment    LAPAROSCOPIC GASTRIC BANDING      MO CYSTO/URETERO W/LITHOTRIPSY &INDWELL STENT INSRT Left 6/26/2019    Procedure: CYSTO, URETEROSCOPY W/HOLMIUM LASER, STENT EXCHANGE;  Surgeon: El Ellison MD;  [facility];  Service: Urology    MO CYSTOURETHROSCOPY,URETER CATHETER Left 5/22/2019    Procedure: CYSTOSCOPY; RIGHT RETROGRADE PYELOGRAM WITH INSERTION STENT URETERAL LEFT;  Surgeon: El Ellison MD;  [facility];  Service: Urology    REMOVAL GASTRIC BAND LAPAROSCOPIC      VENTRAL HERNIA REPAIR      x4        01/17/20 1240   Note Type   Note type Eval only   Pain Assessment   Pain Assessment No/denies pain   Pain Score No Pain   Home Living   Type of Home SNF  ([facility])   Home Layout One level;Ramped entrance;Elevator   Bathroom Shower/Tub Walk-in shower   Bathroom Toilet Raised   Bathroom Equipment Grab bars in shower; Shower chair;Grab bars around toilet   [address] Wheelchair-electric   Prior Function   Level of [place] Needs assistance with IADLs; Needs assistance with ADLs and functional mobility   Lives With Facility staff   Receives Help From Personal care attendant   ADL Assistance Needs assistance   IADLs Needs assistance   Falls in the last 6 months 1 to 4  (due to syncopal episodes per pt )   Vocational Retired   Comments Pt reports typically sit to stand/ pivot to electric scooter with assist, but since having syncopal episodes x a couple of week, staff is using mechanical lift for OOB  Pt very active at SNF  Restrictions/Precautions   Weight Bearing Precautions Per Order No   Other Precautions Fall Risk;Multiple lines   General   Family/Caregiver Present No   Cognition   Overall Cognitive Status WFL   Arousal/Participation Alert   Attention Within functional limits   Orientation Level Oriented X4   Memory Within functional limits   Following Commands Follows one step commands without difficulty   RUE Assessment   RUE Assessment   (Refer to OT eval)   LUE Assessment   LUE Assessment   (Refer to OT eval)   RLE Assessment   RLE Assessment   ((+) generalized weakness, but unable to MMT in supine)   LLE Assessment   LLE Assessment   ((+) generalized weakness, but unable to MMT in supine)   Coordination   Movements are Fluid and Coordinated 1   Bed Mobility   Rolling R 3  Moderate assistance   Additional items Assist x 2;Bedrails; Increased time required;Verbal cues;LE management   Rolling L 3  Moderate assistance   Additional items Assist x 2;Bedrails; Increased time required;LE management;Verbal cues   Additional Comments Pt declined supine to sit due to imminent testing   Transfers   Sit to Stand Unable to assess   Additional Comments Recommend mechanical lift at this time     Ambulation/Elevation   Gait pattern Not appropriate  (Pt is non-ambulatory)   Activity Tolerance   Activity Tolerance Patient limited by fatigue  (Pt declined EOB/ OOB due to testing this afternoon )   Medical Staff Made Aware ERIKA Yanez   Nurse Made Aware Donte Blank RN   Assessment   Problem List Decreased strength;Decreased range of motion;Decreased endurance; Impaired balance;Decreased mobility;Obesity   Assessment Pt is an 81year old female admitted to [facility] [place] on 1/15/20 with c/o cough and SOB  Pt is being treated for syncopal episode due to anemia and RSV, acute respiratory failure and anemia (3 8 on admission - now 8 3 s/p transfusion)  PT consulted with eval and treat and activity as tolerated orders  Pt lives at [facility]  At baseline, pt typically performs sit to stand/ stand pivot with assist to electric scooter  Since having syncopal episodes x a couple of weeks, staff is using mechanical lift for OOB  Pt performed rolling bilaterally with mod A x 2  Pt deferred supine to sit and sit EOB due to imminent testing this afternoon  Pt presents with weakness and impaired mobility  PT to follow 2-3x/wk during acute stay to address deficits and further evaluation  Recommend STR upon return to SNF to maximize function and mobility  Barriers to Discharge None   Goals   Patient Goals to return to [facility]   STG Expiration Date 01/27/20   Short Term Goal #1 10 Days:1)  Pt will perform bed mobility with Nicolas demonstrating appropriate technique 100% of the time in order to improve function  2)  Improve overall strength and balance 1/2 grade in order to optimize ability to perform functional tasks and reduce fall risk  3) Increase activity tolerance to 45 minutes in order to improve endurance to functional tasks  4) PT to evaluate supine to sit, sitting balance and transfers as appropriate  5)PT for ongoing patient and family/caregiver education, DME needs and d/c planning in order to promote highest level of function in least restrictive environment     PT Treatment Day 0   Plan Treatment/Interventions Continued evaluation; Bed mobility; Equipment eval/education; Therapeutic exercise;Patient/family training; Endurance training   PT Frequency Other (Comment)  (2-3x/wk)   Recommendation   Recommendation Short-term skilled PT   PT - OK to Discharge Yes   Additional Comments to SNF when medically stable   Barthel Index   Feeding 10   Bathing 0   Grooming Score 5   Dressing Score 5   Bladder Score 0   Bowels Score 5   Toilet Use Score 5   Transfers (Bed/Chair) Score 0   Mobility (Level Surface) Score 0   Stairs Score 0   Barthel Index Score 30     History: co - morbidities, fall risk, use of assistive device, assist for adl's, multiple lines  Exam: impairments in locomotion, musculoskeletal, balance, posture, cardiac, pulmonary   Clinical: unstable/unpredictable  Complexity:high    Chicho Taylor, PT

## 2020-01-17 NOTE — PLAN OF CARE
Problem: PHYSICAL THERAPY ADULT  Goal: Performs mobility at highest level of function for planned discharge setting  See evaluation for individualized goals  Description  Treatment/Interventions: Continued evaluation, Bed mobility, Equipment eval/education, Therapeutic exercise, Patient/family training, Endurance training          See flowsheet documentation for full assessment, interventions and recommendations  Note:      Problem List: Decreased strength, Decreased range of motion, Decreased endurance, Impaired balance, Decreased mobility, Obesity  Assessment: Pt is an 81year old female admitted to [place] [address] on 1/15/20 with c/o cough and SOB  Pt is being treated for syncopal episode due to anemia and RSV, acute respiratory failure and anemia (3 8 on admission - now 8 3 s/p transfusion)  PT consulted with eval and treat and activity as tolerated orders  Pt lives at [facility]  At baseline, pt typically performs sit to stand/ stand pivot with assist to electric scooter  Since having syncopal episodes x a couple of weeks, staff is using mechanical lift for OOB  Pt performed rolling bilaterally with mod A x 2  Pt deferred supine to sit and sit EOB due to imminent testing this afternoon  Pt presents with weakness and impaired mobility  PT to follow 2-3x/wk during acute stay to address deficits and further evaluation  Recommend STR upon return to SNF to maximize function and mobility  Barriers to Discharge: None     Recommendation: Short-term skilled PT     PT - OK to Discharge: Yes    See flowsheet documentation for full assessment

## 2020-01-17 NOTE — PROGRESS NOTES
Progress Note - Urology  Ronel Ruiz 1935, 81 y o  female MRN: [id number]    Unit/Bed#: E4 -01 Encounter: [id number]    Urinary tract infection  Assessment & Plan  Urine culture with Morganella morganii  Current coverage with ceftriaxone  With gross hematuria  Cleared up after holding anticoagulation  Is on Xarelto for AFib  Recommend holding anticoagulation until hemoglobin normalizes and urine remains clear ongoing  Outpatient Urology follow up  Bedside rounds performed with RN  Discussed with Dr Tiffanie Mondragon  Urology will sign off but remain available for any further inpatient needs  Please feel free to call with questions or concerns  Subjective/Objective     Subjective:   CC: "My urine is yellow now  "  HPI:  Mynor Rater reports her urine has been clearing  Is now clear yellow where was dark red before  She denies any dysuria, frequency, urgency, burning  No suprapubic pleasure or bladder spasm  She reports she has a headache and she feels hungry today    ROS:  Review of Systems   Constitutional: Negative for activity change and appetite change  HENT: Negative for congestion and ear pain  Eyes: Negative for pain  Respiratory: Negative for cough and shortness of breath  Cardiovascular: Negative for chest pain and palpitations  Gastrointestinal: Negative for abdominal distention, abdominal pain, blood in stool, constipation, diarrhea and nausea  Genitourinary: Negative for difficulty urinating and dysuria  Musculoskeletal: Negative for arthralgias and myalgias  Skin: Negative for rash  Allergic/Immunologic: Negative for immunocompromised state  Neurological: Negative for dizziness and headaches  Hematological: Negative for adenopathy  Does not bruise/bleed easily  Psychiatric/Behavioral: Negative for agitation  The patient is not nervous/anxious          Objective:  Vitals: Blood pressure 138/73, pulse 64, temperature 97 9 °F (36 6 °C), temperature source Temporal, resp  rate 20, weight (!) 137 kg (301 lb 9 4 oz), SpO2 90 %  ,Body mass index is 53 42 kg/m²  Intake/Output Summary (Last 24 hours) at 1/17/2020 1438  Last data filed at 1/17/2020 9486  Gross per 24 hour   Intake    Output 1500 ml   Net -1500 ml     Invasive Devices     Peripheral Intravenous Line            Peripheral IV 01/15/20 Right Antecubital 1 day          Drain            External Urinary Catheter 1 day                Physical Exam   Constitutional: She is oriented to person, place, and time  She appears well-developed and well-nourished  She is cooperative  She does not appear ill  No distress  HENT:   Head: Normocephalic and atraumatic  Moist mucous membranes  Eyes: Conjunctivae and EOM are normal    Neck: Normal range of motion  Neck supple  No tracheal deviation present  Cardiovascular: Normal rate, regular rhythm and normal heart sounds  No murmur heard  Pulmonary/Chest: Effort normal and breath sounds normal  No respiratory distress  She has no wheezes  Good airflow bilaterally on deep inspiration  Abdominal: Soft  Bowel sounds are normal  She exhibits no distension and no mass  There is no tenderness  Abdomen soft and benign without significant tenderness   Genitourinary:   Genitourinary Comments: External suction catheter being used, clear yellow urine  Musculoskeletal: Normal range of motion  She exhibits no edema  Neurological: She is alert and oriented to person, place, and time  Skin: Skin is warm and dry  No rash noted  She is not diaphoretic  No erythema  No pallor  Psychiatric: She has a normal mood and affect  Her behavior is normal  Judgment and thought content normal    Nursing note and vitals reviewed        History:    Past Medical History:   Diagnosis Date    Acute kidney failure (HCC)     Anemia     Arthritis     Chafing     folds of skin and back of thighs    Chronic indwelling Montes De Oca catheter     #16Fr    Difficulty walking     Discitis  Discitis     Dysphagia     Dysphagia     Dysuria     Gait abnormality     GERD (gastroesophageal reflux disease)     Hypothyroidism     Kidney stone     Lymphedema     Major depressive disorder     MDD (major depressive disorder)     [facility]     [facility]     Morbid obesity (HCC)     Muscle weakness     Muscle weakness (generalized)     NSTEMI [facility]non-ST elevated [facility] infarction) ([facility]     Osteoporosis     Paroxysmal A-fib ([place] [address] )     Renal disorder     Sleep apnea      Past Surgical History:   Procedure Laterality Date    CHOLECYSTECTOMY      FL RETROGRADE PYELOGRAM  5/22/2019    HYSTERECTOMY      JOINT REPLACEMENT Bilateral     knee replacment    LAPAROSCOPIC GASTRIC BANDING      [place] CYSTO/URETERO W/LITHOTRIPSY &INDWELL STENT INSRT Left 6/26/2019    Procedure: CYSTO, URETEROSCOPY W/HOLMIUM LASER, STENT EXCHANGE;  Surgeon: Lien Addison MD;  [facility];  Service: Urology    WA CYSTOURETHROSCOPY,URETER CATHETER Left 5/22/2019    Procedure: CYSTOSCOPY; RIGHT RETROGRADE PYELOGRAM WITH INSERTION STENT URETERAL LEFT;  Surgeon: Lien Addison MD;  [facility];  Service: Urology    REMOVAL GASTRIC BAND LAPAROSCOPIC      VENTRAL HERNIA REPAIR      x4     History reviewed  No pertinent family history  Social History     Socioeconomic History    Marital status:      Spouse name: None    Number of children: None    Years of education: None    Highest education level: None   Occupational History    None   Social Needs    Financial resource strain: None    Food insecurity:     Worry: None     Inability: None    Transportation needs:     Medical: None     Non-medical: None   Tobacco Use    Smoking status: Former Smoker    Smokeless tobacco: Never Used    Tobacco comment: smoked when was very much younger for one summer   Substance and Sexual Activity    Alcohol use:  Yes     Alcohol/week: 1 0 standard drinks     Types: 1 Glasses of wine per week     Frequency: Monthly or less     Drinks per session: 1 or 2     Comment: socially     Drug use: Never    Sexual activity: None   Lifestyle    Physical activity:     Days per week: None     Minutes per session: None    Stress: None   Relationships    Social connections:     Talks on phone: None     Gets together: None     Attends Jew service: None     Active member of club or organization: None     Attends meetings of clubs or organizations: None     Relationship status: None    Intimate partner violence:     Fear of current or ex partner: None     Emotionally abused: None     Physically abused: None     Forced sexual activity: None   Other Topics Concern    None   Social History Narrative    None       Labs:  Results from last 7 days   Lab Units 01/17/20  0623 01/16/20  0502 01/15/20  1532   WBC Thousand/uL 5 85 5 94 3 08*   HEMOGLOBIN g/dL 8 3* 6 3* 3 8*   PLATELETS Thousands/uL 171 188 182     Results from last 7 days   Lab Units 01/17/20  0623 01/16/20  0502 01/15/20  1532   SODIUM mmol/L 143 145 145   POTASSIUM mmol/L 3 6 3 7 4 0   CHLORIDE mmol/L 107 109* 111*   CO2 mmol/L 32 32 31   BUN mg/dL 11 11 13   CREATININE mg/dL 0 93 0 85 0 97   EGFR ml/min/1 73sq m 57 64 54   CALCIUM mg/dL 8 2* 8 2* 8 3   AST U/L  --   --  15   ALT U/L  --   --  11*   ALK PHOS U/L  --   --  62     Results from last 7 days   Lab Units 01/16/20  0057   URINE CULTURE  >100,000 cfu/ml Morganella morganii*  50,000-59,000 cfu/ml            Koki Landis PA-C  Date: 1/17/2020 Time: 2:38 PM

## 2020-01-17 NOTE — ASSESSMENT & PLAN NOTE
UTI with hematuria  Urine culture prelim report >100,000 cfu/ml Gram Negative Asim  Continue IV Rocephin,day # 2

## 2020-01-17 NOTE — ASSESSMENT & PLAN NOTE
Urine culture with Morganella morganii  Current coverage with ceftriaxone  With gross hematuria  Cleared up after holding anticoagulation  Is on Xarelto for AFib  Recommend holding anticoagulation until hemoglobin normalizes and urine remains clear ongoing  Outpatient Urology follow up

## 2020-01-17 NOTE — PLAN OF CARE
Problem: OCCUPATIONAL THERAPY ADULT  Goal: Performs self-care activities at highest level of function for planned discharge setting  See evaluation for individualized goals  Description  Treatment Interventions: ADL retraining, UE strengthening/ROM, Functional transfer training, Endurance training, Cognitive reorientation, Patient/family training, Equipment evaluation/education, Compensatory technique education, Energy conservation, Activityengagement          See flowsheet documentation for full assessment, interventions and recommendations  Note:   Limitation: Decreased ADL status, Decreased UE strength, Decreased cognition, Decreased Safe judgement during ADL, Decreased endurance, Decreased self-care trans, Decreased high-level ADLs  Prognosis: Good  Assessment: Pt is a 81 y o  female seen for OT evaluation s/p admit to [facility] on 1/15/2020 w/ Syncope and anemia and s/p 4 units RBC transfusion  Comorbidities affecting pt's functional performance at time of assessment include: symptomatic anemia, acute respiratory failure, CHF, UTI, a-fib, hyperlipidemia, HTN  Personal factors affecting pt at time of IE include: use of electric scooter at [facility] w/ sit<>stand lift or  Ira lift for OOB  Prior to admission, pt was living w/ at [facility] and reports in past few weeks was using a ira lift instead of sit<>stand due to syncopal episodes, assist w/ LB ADLs, setup UB ADLs, supervision bed mobility, assist w/ IADLs, MOD I mobility w/ electric scooter  Upon evaluation: Pt requires MOD assist x2 rolling in bed w/ bed rails, MAX-total assist toileting, MIN assist UB ADLs, MAX-total assist LB ADLs 2* the following deficits impacting occupational performance: impaired balance, increased pain, decreased strength and endurance, impaired activity tolerance, multiple lines, increased fatigue, incontinence of urine (requiring max assist x2 of changing sheets)   Pt to benefit from continued skilled OT tx while in the hospital to address deficits as defined above and maximize level of functional independence w ADL's and functional mobility  Pt not appropriate for OOB at this time, recommend leonidas lift w/ NSG will trial quick move transfer for OOB next session  Occupational Performance areas to address include: grooming, bathing/shower, toilet hygiene, dressing, health maintenance, functional mobility and clothing management, bed mobility, repositioning education, OOB transfer  From OT standpoint, recommendation at time of d/c would be return to [facility] w/ OT services       OT Discharge Recommendation: Other (Comment)(OT at facility)  OT - OK to Discharge: (to rehab when medically stable)

## 2020-01-17 NOTE — ASSESSMENT & PLAN NOTE
Wt Readings from Last 3 Encounters:   01/17/20 (!) 137 kg (301 lb 9 4 oz)   06/26/19 (!) 140 kg (309 lb)   06/11/19 (!) 147 kg (325 lb)     2D echo normal EF,G2DD  Continue IV Lasix  Continue metoprolol  Continue daily weight and I&Os  Weight improving

## 2020-01-18 LAB
ANION GAP SERPL CALCULATED.3IONS-SCNC: 3 MMOL/L (ref 4–13)
ATRIAL RATE: 326 BPM
BUN SERPL-MCNC: 10 MG/DL (ref 5–25)
CALCIUM SERPL-MCNC: 8.2 MG/DL (ref 8.3–10.1)
CHLORIDE SERPL-SCNC: 106 MMOL/L (ref 100–108)
CO2 SERPL-SCNC: 35 MMOL/L (ref 21–32)
CREAT SERPL-MCNC: 0.91 MG/DL (ref 0.6–1.3)
ERYTHROCYTE [DISTWIDTH] IN BLOOD BY AUTOMATED COUNT: 25.8 % (ref 11.6–15.1)
GFR SERPL CREATININE-BSD FRML MDRD: 59 ML/MIN/1.73SQ M
GLUCOSE SERPL-MCNC: 92 MG/DL (ref 65–140)
HCT VFR BLD AUTO: 32.9 % (ref 34.8–46.1)
HGB BLD-MCNC: 9.1 G/DL (ref 11.5–15.4)
MAGNESIUM SERPL-MCNC: 1.9 MG/DL (ref 1.6–2.6)
MCH RBC QN AUTO: 21.9 PG (ref 26.8–34.3)
MCHC RBC AUTO-ENTMCNC: 27.7 G/DL (ref 31.4–37.4)
MCV RBC AUTO: 79 FL (ref 82–98)
PLATELET # BLD AUTO: 166 THOUSANDS/UL (ref 149–390)
PMV BLD AUTO: 10 FL (ref 8.9–12.7)
POTASSIUM SERPL-SCNC: 3 MMOL/L (ref 3.5–5.3)
QRS AXIS: -13 DEGREES
QRSD INTERVAL: 82 MS
QT INTERVAL: 398 MS
QTC INTERVAL: 410 MS
RBC # BLD AUTO: 4.15 MILLION/UL (ref 3.81–5.12)
SODIUM SERPL-SCNC: 144 MMOL/L (ref 136–145)
T WAVE AXIS: 85 DEGREES
VENTRICULAR RATE: 64 BPM
WBC # BLD AUTO: 7.62 THOUSAND/UL (ref 4.31–10.16)

## 2020-01-18 PROCEDURE — 93010 ELECTROCARDIOGRAM REPORT: CPT

## 2020-01-18 PROCEDURE — 80048 BASIC METABOLIC PNL TOTAL CA: CPT | Performed by: NURSE PRACTITIONER

## 2020-01-18 PROCEDURE — 94640 AIRWAY INHALATION TREATMENT: CPT

## 2020-01-18 PROCEDURE — 99233 SBSQ HOSP IP/OBS HIGH 50: CPT | Performed by: INTERNAL MEDICINE

## 2020-01-18 PROCEDURE — 85027 COMPLETE CBC AUTOMATED: CPT | Performed by: NURSE PRACTITIONER

## 2020-01-18 PROCEDURE — C9113 INJ PANTOPRAZOLE SODIUM, VIA: HCPCS | Performed by: INTERNAL MEDICINE

## 2020-01-18 PROCEDURE — 94760 N-INVAS EAR/PLS OXIMETRY 1: CPT

## 2020-01-18 PROCEDURE — 83735 ASSAY OF MAGNESIUM: CPT | Performed by: NURSE PRACTITIONER

## 2020-01-18 RX ORDER — POTASSIUM CHLORIDE 20 MEQ/1
40 TABLET, EXTENDED RELEASE ORAL 2 TIMES DAILY WITH MEALS
Status: DISCONTINUED | OUTPATIENT
Start: 2020-01-18 | End: 2020-01-22 | Stop reason: HOSPADM

## 2020-01-18 RX ADMIN — ISODIUM CHLORIDE 3 ML: 0.03 SOLUTION RESPIRATORY (INHALATION) at 01:01

## 2020-01-18 RX ADMIN — GUAIFENESIN 1200 MG: 600 TABLET, EXTENDED RELEASE ORAL at 22:16

## 2020-01-18 RX ADMIN — CEFTRIAXONE 1000 MG: 1 INJECTION, POWDER, FOR SOLUTION INTRAMUSCULAR; INTRAVENOUS at 22:20

## 2020-01-18 RX ADMIN — LEVALBUTEROL HYDROCHLORIDE 1.25 MG: 1.25 SOLUTION, CONCENTRATE RESPIRATORY (INHALATION) at 07:39

## 2020-01-18 RX ADMIN — BENZONATATE 200 MG: 100 CAPSULE ORAL at 22:16

## 2020-01-18 RX ADMIN — LEVALBUTEROL HYDROCHLORIDE 1.25 MG: 1.25 SOLUTION, CONCENTRATE RESPIRATORY (INHALATION) at 20:11

## 2020-01-18 RX ADMIN — METOPROLOL TARTRATE 25 MG: 25 TABLET ORAL at 10:00

## 2020-01-18 RX ADMIN — FUROSEMIDE 40 MG: 10 INJECTION, SOLUTION INTRAMUSCULAR; INTRAVENOUS at 10:05

## 2020-01-18 RX ADMIN — POTASSIUM CHLORIDE 40 MEQ: 1500 TABLET, EXTENDED RELEASE ORAL at 18:00

## 2020-01-18 RX ADMIN — ISODIUM CHLORIDE 3 ML: 0.03 SOLUTION RESPIRATORY (INHALATION) at 14:01

## 2020-01-18 RX ADMIN — LEVOTHYROXINE SODIUM 150 MCG: 75 TABLET ORAL at 05:44

## 2020-01-18 RX ADMIN — METHYLPREDNISOLONE SODIUM SUCCINATE 20 MG: 40 INJECTION, POWDER, FOR SOLUTION INTRAMUSCULAR; INTRAVENOUS at 10:00

## 2020-01-18 RX ADMIN — BENZONATATE 200 MG: 100 CAPSULE ORAL at 10:00

## 2020-01-18 RX ADMIN — FUROSEMIDE 40 MG: 10 INJECTION, SOLUTION INTRAMUSCULAR; INTRAVENOUS at 18:00

## 2020-01-18 RX ADMIN — METOPROLOL TARTRATE 25 MG: 25 TABLET ORAL at 22:16

## 2020-01-18 RX ADMIN — GUAIFENESIN 1200 MG: 600 TABLET, EXTENDED RELEASE ORAL at 10:00

## 2020-01-18 RX ADMIN — FLUTICASONE FUROATE AND VILANTEROL TRIFENATATE 1 PUFF: 100; 25 POWDER RESPIRATORY (INHALATION) at 10:00

## 2020-01-18 RX ADMIN — ACETAMINOPHEN 650 MG: 325 TABLET ORAL at 04:24

## 2020-01-18 RX ADMIN — PANTOPRAZOLE SODIUM 40 MG: 40 INJECTION, POWDER, FOR SOLUTION INTRAVENOUS at 10:01

## 2020-01-18 RX ADMIN — ISODIUM CHLORIDE 3 ML: 0.03 SOLUTION RESPIRATORY (INHALATION) at 07:39

## 2020-01-18 RX ADMIN — BENZONATATE 200 MG: 100 CAPSULE ORAL at 18:00

## 2020-01-18 RX ADMIN — LEVALBUTEROL HYDROCHLORIDE 1.25 MG: 1.25 SOLUTION, CONCENTRATE RESPIRATORY (INHALATION) at 01:01

## 2020-01-18 RX ADMIN — ATORVASTATIN CALCIUM 10 MG: 10 TABLET, FILM COATED ORAL at 18:00

## 2020-01-18 RX ADMIN — POTASSIUM CHLORIDE 20 MEQ: 1500 TABLET, EXTENDED RELEASE ORAL at 10:00

## 2020-01-18 RX ADMIN — PRAMIPEXOLE DIHYDROCHLORIDE 0.5 MG: 0.5 TABLET ORAL at 22:17

## 2020-01-18 RX ADMIN — PANTOPRAZOLE SODIUM 40 MG: 40 INJECTION, POWDER, FOR SOLUTION INTRAVENOUS at 22:16

## 2020-01-18 RX ADMIN — ISODIUM CHLORIDE 3 ML: 0.03 SOLUTION RESPIRATORY (INHALATION) at 20:11

## 2020-01-18 RX ADMIN — LEVALBUTEROL HYDROCHLORIDE 1.25 MG: 1.25 SOLUTION, CONCENTRATE RESPIRATORY (INHALATION) at 14:01

## 2020-01-18 RX ADMIN — MELATONIN 1000 UNITS: at 10:00

## 2020-01-18 NOTE — PLAN OF CARE
Problem: Prexisting or High Potential for Compromised Skin Integrity  Goal: Skin integrity is maintained or improved  Description  INTERVENTIONS:  - Identify patients at risk for skin breakdown  - Assess and monitor skin integrity  - Assess and monitor nutrition and hydration status  - Monitor labs   - Assess for incontinence   - Turn and reposition patient  - Assist with mobility/ambulation  - Relieve pressure over bony prominences  - Avoid friction and shearing  - Provide appropriate hygiene as needed including keeping skin clean and dry  - Evaluate need for skin moisturizer/barrier cream  - Collaborate with interdisciplinary team   - Patient/family teaching  - Consider wound care consult   Outcome: Progressing     Problem: Potential for Falls  Goal: Patient will remain free of falls  Description  INTERVENTIONS:  - Assess patient frequently for physical needs  -  Identify cognitive and physical deficits and behaviors that affect risk of falls    -  [place] fall precautions as indicated by assessment   - Educate patient/family on patient safety including physical limitations  - Instruct patient to call for assistance with activity based on assessment  - Modify environment to reduce risk of injury  - Consider OT/PT consult to assist with strengthening/mobility  Outcome: Progressing     Problem: CARDIOVASCULAR - ADULT  Goal: Maintains optimal cardiac output and hemodynamic stability  Description  INTERVENTIONS:  - Monitor I/O, vital signs and rhythm  - Monitor for S/S and trends of decreased cardiac output  - Administer and titrate ordered vasoactive medications to optimize hemodynamic stability  - Assess quality of pulses, skin color and temperature  - Assess for signs of decreased coronary artery perfusion  - Instruct patient to report change in severity of symptoms  Outcome: Progressing     Problem: HEMATOLOGIC - ADULT  Goal: Maintains hematologic stability  Description  INTERVENTIONS  - Assess for signs and symptoms of bleeding or hemorrhage  - Monitor labs  - Administer supportive blood products/factors as ordered and appropriate  Outcome: Progressing     Problem: DISCHARGE PLANNING  Goal: Discharge to home or other facility with appropriate resources  Description  INTERVENTIONS:  - Identify barriers to discharge w/patient and caregiver  - Arrange for needed discharge resources and transportation as appropriate  - Identify discharge learning needs (meds, wound care, etc )  - Arrange for interpretive services to assist at discharge as needed  - Refer to Case Management Department for coordinating discharge planning if the patient needs post-hospital services based on physician/advanced practitioner order or complex needs related to functional status, cognitive ability, or social support system  Outcome: Progressing     Problem: Knowledge Deficit  Goal: Patient/family/caregiver demonstrates understanding of disease process, treatment plan, medications, and discharge instructions  Description  Complete learning assessment and assess knowledge base    Interventions:  - Provide teaching at level of understanding  - Provide teaching via preferred learning methods  Outcome: Progressing     Problem: GENITOURINARY - ADULT  Goal: Maintains or returns to baseline urinary function  Description  INTERVENTIONS:  - Assess urinary function  - Encourage oral fluids to ensure adequate hydration if ordered  - Administer IV fluids as ordered to ensure adequate hydration  - Administer ordered medications as needed  - Offer frequent toileting  - Follow urinary retention protocol if ordered  Outcome: Progressing     Problem: SKIN/TISSUE INTEGRITY - ADULT  Goal: Skin integrity remains intact  Description  INTERVENTIONS  - Identify patients at risk for skin breakdown  - Assess and monitor skin integrity  - Assess and monitor nutrition and hydration status  - Monitor labs (i e  albumin)  - Assess for incontinence   - Turn and reposition patient  - Assist with mobility/ambulation  - Relieve pressure over bony prominences  - Avoid friction and shearing  - Provide appropriate hygiene as needed including keeping skin clean and dry  - Evaluate need for skin moisturizer/barrier cream  - Collaborate with interdisciplinary team (i e  Nutrition, Rehabilitation, etc )   - Patient/family teaching  Outcome: Progressing

## 2020-01-18 NOTE — PROGRESS NOTES
[facility] Internal Medicine Progress Note  Patient: Nery Cifuentes 81 y o  female   MRN: [id number]  PCP: Duyen Silva MD  Unit/Bed#: E4 MS [facility]-57 Encounter: [id number]  Date Of Visit: 01/18/20      Assessment/plan  1  Syncopal episode due to anemia and rsv     2  Acute respiratory failure due to rsv and anemia-continue supportive treatment consisting of bronchodilators, mucinex, breo and steroid taper  continue cough medications  Oxygen was weaned to off  Will continue to monitor  Will add flutter valve       3  Symptomatic anemia on xarelto- likely multifactorial due to hematuria and gi bleed while on xarelto  Hemoglobin was 3 8 on admission  Increased to 6 3 after 2 units  Pt transfused additional 2 units  Hemoglobin of 9 2 today Holding asa and xarelto  Appreciate both gi and urology recommendations  Pt will continue IV protonix bid  She will require egd but will need to wait for respiratory status to improve  For the hematuria urology stated to treat uti  Urine culture growing morgenella morganii  Will continue ceftriaxone day 2  Follow up with urology outpt       4  Acute/chronic diastolic chf- continue IV lasix  Check bmp in am  Monitor I/o  Reviewed echo  possibly change to po lasix in 24 to 48 hours  Pt wants to change to [facility] cardiology  Pt down 4 pounds       5  paf- hold xarelto  Continue metoprolol       6  Hyperlipidemia- continue statin    7  Hypokalemia- will replace     Subjective:   Pt seen and examined  Pt states her breathing is better but she is unable to cough up mucous today  She is also very hungry  She is nonambulatory at the nh  She uses a power scooter to get around  No f/c no cp no n/v/d she only had abd pain with coughing  Objective:     Vitals: Blood pressure 148/65, pulse 82, temperature 98 1 °F (36 7 °C), temperature source Tympanic, resp  rate 18, height 5' 3" (1 6 m), weight (!) 136 kg (300 lb 4 3 oz), SpO2 92 %  ,Body mass index is 53 19 kg/m²      Lab, Imaging and other studies:  Results from last 7 days   Lab Units 01/18/20  0642  01/15/20  1532   WBC Thousand/uL 7 62   < > 3 08*   HEMOGLOBIN g/dL 9 1*   < > 3 8*   HEMATOCRIT % 32 9*   < > 17 2*   PLATELETS Thousands/uL 166   < > 182   INR   --   --  1 82*    < > = values in this interval not displayed  Results from last 7 days   Lab Units 01/18/20  0642  01/15/20  1532   POTASSIUM mmol/L 3 0*   < > 4 0   CHLORIDE mmol/L 106   < > 111*   CO2 mmol/L 35*   < > 31   BUN mg/dL 10   < > 13   CREATININE mg/dL 0 91   < > 0 97   CALCIUM mg/dL 8 2*   < > 8 3   ALK PHOS U/L  --   --  62   ALT U/L  --   --  11*   AST U/L  --   --  15    < > = values in this interval not displayed  Results from last 7 days   Lab Units 01/16/20  0053 01/15/20  2032 01/15/20  1532   TROPONIN I ng/mL 0 02 0 02 0 02     Lab Results   Component Value Date    BLOODCX No Growth After 5 Days  05/23/2019    BLOODCX No Growth After 5 Days  05/23/2019    BLOODCX No Growth After 5 Days  05/21/2019    URINECX >100,000 cfu/ml Morganella morganii (A) 01/16/2020    URINECX 50,000-59,000 cfu/ml  01/16/2020    URINECX >100,000 cfu/ml  06/11/2019         Xr Chest 1 View Portable    Result Date: 1/15/2020  Narrative: CHEST INDICATION:   sob  COMPARISON:  5/21/2019 EXAM PERFORMED/VIEWS:  XR CHEST PORTABLE  AP semierect Images: 1 FINDINGS: Heart shadow is enlarged but unchanged from prior exam  The lungs are clear  No pneumothorax or pleural effusion  Osseous structures appear within normal limits for patient age  Impression: No acute cardiopulmonary disease  Workstation performed: [id number]     [facility] Kidney And Bladder    Result Date: 1/18/2020  Narrative: RENAL ULTRASOUND INDICATION: Hematuria  History of kidney stones and prior stent  COMPARISON: CT abdomen pelvis 5/21/2019 TECHNIQUE:   Ultrasound of the retroperitoneum was performed with a curvilinear transducer utilizing volumetric sweeps and still imaging techniques   FINDINGS: Study is limited by patient body habitus  KIDNEYS: Symmetric and normal size  Right kidney:  11 1 x 5 9 cm  Left kidney:  11 9 x 6 2 cm  Right kidney Somewhat limited visualization of the upper pole  Normal echogenicity and contour  No suspicious masses detected  No hydronephrosis  No shadowing calculi  No perinephric fluid collections  Left kidney Normal echogenicity and contour  No suspicious masses detected  No hydronephrosis  1 4 cm linear echogenic focus in the upper pole could represent a calculus although there is no discernible posterior shadowing  No perinephric fluid collections  URETERS: Nonvisualized  BLADDER: Poorly distended  No obvious pathology within limitations  Impression: Limited study  No hydronephrosis  1 4 cm nonobstructing left renal calculus suspected  Workstation performed: [id number]     Radiology Results    Result Date: 1/17/2020  Narrative: Ordered by an unspecified provider        Scheduled Meds:   Current Facility-Administered Medications:  acetaminophen 650 mg Oral Q6H PRN Veronica Ambron, DO    atorvastatin 10 mg Oral Daily With [place]-Val, DO    benzonatate 200 mg Oral TID Veronica Ambron, DO    cefTRIAXone 1,000 mg Intravenous Q24H Neha Isabel, DO Last Rate: Stopped (01/17/20 2204)   cholecalciferol 1,000 Units Oral Daily Veronica Ambron, DO    fluticasone-vilanterol 1 puff Inhalation Daily Veronica Ambron, DO    furosemide 40 mg Intravenous BID (diuretic) Veronica Ambron, DO    guaiFENesin 1,200 mg Oral Q12H [address] Veronica Ambron, DO    levalbuterol 1 25 mg Nebulization Q6H Veronica Ambron, DO    levothyroxine 150 mcg Oral Early Morning Veronica Ambron, DO    methylPREDNISolone sodium succinate 20 mg Intravenous Daily Veronica Ambron, DO    metoprolol tartrate 25 mg Oral Q12H [address] Veronica Ambron, DO    ondansetron 4 mg Intravenous Q6H PRN Veronica Ambron, DO    pantoprazole 40 mg Intravenous Q12H [address] Veronica Ambron, DO    potassium chloride 20 mEq Oral Daily Veronica Ambron, DO    pramipexole 0 5 mg Oral HS Veronica Farias DO    sodium chloride 3 mL Nebulization Q6H Veronica Farias DO      Continuous Infusions:    PRN Meds:   acetaminophen    ondansetron      Physical exam:  Physical Exam  General appearance: alert and oriented, in no acute distress  Head: Normocephalic, without obvious abnormality, atraumatic  Eyes: conjunctivae/corneas clear  PERRL, EOM's intact  Fundi benign    Neck: no adenopathy, no carotid bruit, no JVD, supple, symmetrical, trachea midline and thyroid not enlarged, symmetric, no tenderness/mass/nodules  Lungs: minimal wheezing bilateral  coarse breath sounds througho ut  Heart: regular rate and rhythm, S1, S2 normal, no murmur, click, rub or gallop  Abdomen: soft, non-tender; bowel sounds normal; no masses,  no organomegaly  Extremities: edema +2 edema bilateral lower ext  Pulses: 2+ and symmetric  Skin: Skin color, texture, turgor normal  No rashes or lesions  Neurologic: Mental status: Alert, oriented, thought content appropriate      VTE Pharmacologic Prophylaxis: Reason for no pharmacologic prophylaxis anemia  VTE Mechanical Prophylaxis: sequential compression device    Counseling / Coordination of Care  Total floor / unit time spent today 20 minutes     Current Length of Stay: 3 day(s)    Current Patient Status: Inpatient     Code Status: Level 3 - DNAR and DNI

## 2020-01-19 LAB
ANION GAP SERPL CALCULATED.3IONS-SCNC: 4 MMOL/L (ref 4–13)
BACTERIA UR CULT: ABNORMAL
BACTERIA UR CULT: ABNORMAL
BUN SERPL-MCNC: 12 MG/DL (ref 5–25)
CALCIUM SERPL-MCNC: 8.1 MG/DL (ref 8.3–10.1)
CHLORIDE SERPL-SCNC: 108 MMOL/L (ref 100–108)
CO2 SERPL-SCNC: 36 MMOL/L (ref 21–32)
CREAT SERPL-MCNC: 0.92 MG/DL (ref 0.6–1.3)
ERYTHROCYTE [DISTWIDTH] IN BLOOD BY AUTOMATED COUNT: 27.5 % (ref 11.6–15.1)
GFR SERPL CREATININE-BSD FRML MDRD: 58 ML/MIN/1.73SQ M
GLUCOSE SERPL-MCNC: 87 MG/DL (ref 65–140)
HCT VFR BLD AUTO: 33.6 % (ref 34.8–46.1)
HGB BLD-MCNC: 9 G/DL (ref 11.5–15.4)
MCH RBC QN AUTO: 22.1 PG (ref 26.8–34.3)
MCHC RBC AUTO-ENTMCNC: 26.8 G/DL (ref 31.4–37.4)
MCV RBC AUTO: 83 FL (ref 82–98)
PLATELET # BLD AUTO: 152 THOUSANDS/UL (ref 149–390)
POTASSIUM SERPL-SCNC: 4.2 MMOL/L (ref 3.5–5.3)
RBC # BLD AUTO: 4.07 MILLION/UL (ref 3.81–5.12)
SODIUM SERPL-SCNC: 148 MMOL/L (ref 136–145)
WBC # BLD AUTO: 4.88 THOUSAND/UL (ref 4.31–10.16)

## 2020-01-19 PROCEDURE — 80048 BASIC METABOLIC PNL TOTAL CA: CPT | Performed by: INTERNAL MEDICINE

## 2020-01-19 PROCEDURE — 94640 AIRWAY INHALATION TREATMENT: CPT

## 2020-01-19 PROCEDURE — 94760 N-INVAS EAR/PLS OXIMETRY 1: CPT

## 2020-01-19 PROCEDURE — 99232 SBSQ HOSP IP/OBS MODERATE 35: CPT | Performed by: INTERNAL MEDICINE

## 2020-01-19 PROCEDURE — C9113 INJ PANTOPRAZOLE SODIUM, VIA: HCPCS | Performed by: INTERNAL MEDICINE

## 2020-01-19 PROCEDURE — 99233 SBSQ HOSP IP/OBS HIGH 50: CPT | Performed by: INTERNAL MEDICINE

## 2020-01-19 PROCEDURE — 85027 COMPLETE CBC AUTOMATED: CPT | Performed by: INTERNAL MEDICINE

## 2020-01-19 RX ORDER — PREDNISONE 20 MG/1
40 TABLET ORAL DAILY
Status: DISCONTINUED | OUTPATIENT
Start: 2020-01-19 | End: 2020-01-22 | Stop reason: HOSPADM

## 2020-01-19 RX ORDER — FUROSEMIDE 40 MG/1
40 TABLET ORAL DAILY
Status: DISCONTINUED | OUTPATIENT
Start: 2020-01-20 | End: 2020-01-22 | Stop reason: HOSPADM

## 2020-01-19 RX ADMIN — ATORVASTATIN CALCIUM 10 MG: 10 TABLET, FILM COATED ORAL at 17:12

## 2020-01-19 RX ADMIN — METHYLPREDNISOLONE SODIUM SUCCINATE 20 MG: 40 INJECTION, POWDER, FOR SOLUTION INTRAMUSCULAR; INTRAVENOUS at 08:22

## 2020-01-19 RX ADMIN — BENZONATATE 200 MG: 100 CAPSULE ORAL at 08:23

## 2020-01-19 RX ADMIN — POTASSIUM CHLORIDE 40 MEQ: 1500 TABLET, EXTENDED RELEASE ORAL at 17:12

## 2020-01-19 RX ADMIN — LEVALBUTEROL HYDROCHLORIDE 1.25 MG: 1.25 SOLUTION, CONCENTRATE RESPIRATORY (INHALATION) at 01:07

## 2020-01-19 RX ADMIN — ACETAMINOPHEN 650 MG: 325 TABLET ORAL at 08:27

## 2020-01-19 RX ADMIN — CEFTRIAXONE 1000 MG: 1 INJECTION, POWDER, FOR SOLUTION INTRAMUSCULAR; INTRAVENOUS at 23:10

## 2020-01-19 RX ADMIN — MELATONIN 1000 UNITS: at 08:23

## 2020-01-19 RX ADMIN — POTASSIUM CHLORIDE 40 MEQ: 1500 TABLET, EXTENDED RELEASE ORAL at 08:22

## 2020-01-19 RX ADMIN — ISODIUM CHLORIDE 3 ML: 0.03 SOLUTION RESPIRATORY (INHALATION) at 01:07

## 2020-01-19 RX ADMIN — LEVOTHYROXINE SODIUM 150 MCG: 75 TABLET ORAL at 06:39

## 2020-01-19 RX ADMIN — METOPROLOL TARTRATE 25 MG: 25 TABLET ORAL at 08:23

## 2020-01-19 RX ADMIN — LEVALBUTEROL HYDROCHLORIDE 1.25 MG: 1.25 SOLUTION, CONCENTRATE RESPIRATORY (INHALATION) at 14:41

## 2020-01-19 RX ADMIN — FUROSEMIDE 40 MG: 10 INJECTION, SOLUTION INTRAMUSCULAR; INTRAVENOUS at 08:23

## 2020-01-19 RX ADMIN — LEVALBUTEROL HYDROCHLORIDE 1.25 MG: 1.25 SOLUTION, CONCENTRATE RESPIRATORY (INHALATION) at 08:01

## 2020-01-19 RX ADMIN — PRAMIPEXOLE DIHYDROCHLORIDE 0.5 MG: 0.5 TABLET ORAL at 22:53

## 2020-01-19 RX ADMIN — GUAIFENESIN 1200 MG: 600 TABLET, EXTENDED RELEASE ORAL at 08:23

## 2020-01-19 RX ADMIN — PANTOPRAZOLE SODIUM 40 MG: 40 INJECTION, POWDER, FOR SOLUTION INTRAVENOUS at 08:22

## 2020-01-19 RX ADMIN — BENZONATATE 200 MG: 100 CAPSULE ORAL at 22:29

## 2020-01-19 RX ADMIN — METOPROLOL TARTRATE 25 MG: 25 TABLET ORAL at 22:30

## 2020-01-19 RX ADMIN — ISODIUM CHLORIDE 3 ML: 0.03 SOLUTION RESPIRATORY (INHALATION) at 08:02

## 2020-01-19 RX ADMIN — LEVALBUTEROL HYDROCHLORIDE 1.25 MG: 1.25 SOLUTION, CONCENTRATE RESPIRATORY (INHALATION) at 19:26

## 2020-01-19 RX ADMIN — FLUTICASONE FUROATE AND VILANTEROL TRIFENATATE 1 PUFF: 100; 25 POWDER RESPIRATORY (INHALATION) at 08:22

## 2020-01-19 RX ADMIN — BENZONATATE 200 MG: 100 CAPSULE ORAL at 17:12

## 2020-01-19 RX ADMIN — GUAIFENESIN 1200 MG: 600 TABLET, EXTENDED RELEASE ORAL at 22:30

## 2020-01-19 RX ADMIN — ISODIUM CHLORIDE 3 ML: 0.03 SOLUTION RESPIRATORY (INHALATION) at 19:26

## 2020-01-19 RX ADMIN — PANTOPRAZOLE SODIUM 40 MG: 40 INJECTION, POWDER, FOR SOLUTION INTRAVENOUS at 22:31

## 2020-01-19 RX ADMIN — ISODIUM CHLORIDE 3 ML: 0.03 SOLUTION RESPIRATORY (INHALATION) at 14:41

## 2020-01-19 NOTE — PROGRESS NOTES
[facility] Internal Medicine Progress Note  Patient: Nery Cifuentes 81 y o  female   MRN: [id number]  PCP: Duyen Silva MD  Unit/Bed#: E4 -23 Encounter: [id number]  Date Of Visit: 01/19/20      Assessment/plan  1  Syncopal episode due to anemia and rsv     2  Acute respiratory failure due to rsv and anemia-continue supportive treatment consisting of bronchodilators, mucinex, breo and steroid taper  Change to prednisone 40mg daily and continue taper  continue cough medications  Oxygen was weaned to off  Will continue to monitor  Continue flutter valve       3  Symptomatic anemia on xarelto- likely multifactorial due to hematuria and gi bleed while on xarelto  Hemoglobin was 3 8 on admission  Increased to 6 3 after 2 units  Pt transfused additional 2 units  Hemoglobin of 9 0 today Holding asa and xarelto  Appreciate both gi and urology recommendations  Pt will continue IV protonix bid  She will require egd but will need to wait for respiratory status to improve  Possible egd tomorrow if pt continues to improve  For the hematuria urology stated to treat uti  Urine culture growing morgenella morganii  Will continue ceftriaxone day 3  Follow up with urology outpt       4  Acute/chronic diastolic chf- continue IV lasix  Check bmp in am  Monitor I/o  Reviewed echo  Pt is down 10 pounds  Will change lasix to 40mg daily  Pt wants to change to [facility] cardiology       5  paf- hold xarelto  Continue metoprolol       6  Hyperlipidemia- continue statin     7  Hypokalemia- replaced    8  Hyponatremia- changed IV lasix to pills  Check bmp in am  Pt encouraged to drink water       Subjective:   Pt seen and examined  Pt states she is finally able to cough up mucous  She states she feels like she breathing better  She is a little upset that her "food all tastes the same " she thinks it might be due to the breathing treatments  No f/c no cp no n/v/d no abd pain       Objective:     Vitals: Blood pressure 118/78, pulse 78, temperature 97 5 °F (36 4 °C), temperature source Tympanic, resp  rate 20, height 5' 3" (1 6 m), weight 133 kg (294 lb 1 5 oz), SpO2 98 %  ,Body mass index is 52 1 kg/m²  Lab, Imaging and other studies:  Results from last 7 days   Lab Units 01/19/20  0659  01/15/20  1532   WBC Thousand/uL 4 88   < > 3 08*   HEMOGLOBIN g/dL 9 0*   < > 3 8*   HEMATOCRIT % 33 6*   < > 17 2*   PLATELETS Thousands/uL 152   < > 182   INR   --   --  1 82*    < > = values in this interval not displayed  Results from last 7 days   Lab Units 01/19/20  0659  01/15/20  1532   POTASSIUM mmol/L 4 2   < > 4 0   CHLORIDE mmol/L 108   < > 111*   CO2 mmol/L 36*   < > 31   BUN mg/dL 12   < > 13   CREATININE mg/dL 0 92   < > 0 97   CALCIUM mg/dL 8 1*   < > 8 3   ALK PHOS U/L  --   --  62   ALT U/L  --   --  11*   AST U/L  --   --  15    < > = values in this interval not displayed  Results from last 7 days   Lab Units 01/16/20  0053 01/15/20  2032 01/15/20  1532   TROPONIN I ng/mL 0 02 0 02 0 02     Lab Results   Component Value Date    BLOODCX No Growth After 5 Days  05/23/2019    BLOODCX No Growth After 5 Days  05/23/2019    BLOODCX No Growth After 5 Days   05/21/2019    URINECX >100,000 cfu/ml Morganella morganii (A) 01/16/2020    URINECX 50,000-59,000 cfu/ml  01/16/2020    URINECX >100,000 cfu/ml  06/11/2019     Scheduled Meds:   Current Facility-Administered Medications:  acetaminophen 650 mg Oral Q6H PRN Veronica Ambron, DO    atorvastatin 10 mg Oral Daily With [place]-Val, DO    benzonatate 200 mg Oral TID Veronica Ambron, DO    cefTRIAXone 1,000 mg Intravenous Q24H Veronica Ambron, DO Last Rate: 1,000 mg (01/18/20 2220)   cholecalciferol 1,000 Units Oral Daily Veronica Ambron, DO    fluticasone-vilanterol 1 puff Inhalation Daily Veronica Farias DO    [START ON 1/20/2020] furosemide 40 mg Oral Daily Veronica Farias DO    guaiFENesin 1,200 mg Oral Q12H [address] Veronica Farias DO    levalbuterol 1 25 mg Nebulization Q6H Veronica Ambron, DO    levothyroxine 150 mcg Oral Early Morning Veronica Ambron, DO    metoprolol tartrate 25 mg Oral Q12H [address] Veronica Ambron, DO    ondansetron 4 mg Intravenous Q6H PRN Veronica Ambron, DO    pantoprazole 40 mg Intravenous Q12H [address] Veronica Ambron, DO    potassium chloride 40 mEq Oral BID With Meals Veronica Ambron, DO    pramipexole 0 5 mg Oral HS Veronica Ambron, DO    predniSONE 40 mg Oral Daily Veronica Ambron, DO    sodium chloride 3 mL Nebulization Q6H Veronica Ambron, DO      Continuous Infusions:    PRN Meds:   acetaminophen    ondansetron      Physical exam:  Physical Exam  General appearance: alert and oriented, in no acute distress  Head: Normocephalic, without obvious abnormality, atraumatic  Eyes: conjunctivae/corneas clear  PERRL, EOM's intact  Fundi benign  Neck: no adenopathy, no carotid bruit, no JVD, supple, symmetrical, trachea midline and thyroid not enlarged, symmetric, no tenderness/mass/nodules  Lungs: minimal wheezing bilateral  no rhonchi   no crackles  Heart: regular rate and rhythm, S1, S2 normal, no murmur, click, rub or gallop  Abdomen: soft, non-tender; bowel sounds normal; no masses,  no organomegaly  Extremities: edema +1 edema bilateral lower ext  Pulses: 2+ and symmetric  Skin: Skin color, texture, turgor normal  No rashes or lesions  Neurologic: Mental status: Alert, oriented, thought content appropriate      VTE Pharmacologic Prophylaxis: Reason for no pharmacologic prophylaxis anemia  VTE Mechanical Prophylaxis: sequential compression device    Counseling / Coordination of Care  Total floor / unit time spent today 20 minutes      Current Length of Stay: 4 day(s)    Current Patient Status: Inpatient       Code Status: Level 3 - DNAR and DNI

## 2020-01-19 NOTE — PROGRESS NOTES
[facility]  Progress Note - Kaia Hargrove 81 y o  female MRN: [id number]    Unit/Bed#: E4 -01 Encounter: [id number]    Assessment/Plan:  81 palmer old female with PMH of CHF, A fib on Xarelto is admitted with symptomatic anemia and acute respiratory failure due to RSV  1  Symptomatic anemia  2  Melena  3  A fib on anticoagulation   -Her Xarelto has been held, her hgb has been stable at 9    -She reportedly had "dark stool", no overt GI bleeding while admitted  Her hematuria is resolving  Her anemia is likely due to urologic and possibly GI losses  -EGD recommended to evaluate for occult GI bleeding; she reports her breathing is better and she has been weaned off oxygen  She is still coughing and does have some wheezing on exam    -Discussed with Dr Ham Mosley, given her improvement in respiratory status we can plan for EGD tomorrow however if she does not continue to improve, this may have be to be rescheduled  Discussed with patient and she understands  -NPO after midnight   -Continue PPI BID; continue to monitor hgb and transfuse as necessary, continue to monitor bowel movements for signs of GI bleeding  Subjective:   She reports that her breathing is better; she continues to cough but she feels this is lessening  She is bringing up mucous  She denies any new complaints today  She is feeling better from a fatigue standpoint  Objective:     Vitals: Blood pressure 118/78, pulse 78, temperature 97 5 °F (36 4 °C), temperature source Tympanic, resp  rate 20, height 5' 3" (1 6 m), weight 133 kg (294 lb 1 5 oz), SpO2 98 %  ,Body mass index is 52 1 kg/m²  Intake/Output Summary (Last 24 hours) at 1/19/2020 1040  Last data filed at 1/18/2020 2352  Gross per 24 hour   Intake    Output 1746 ml   Net -1746 ml       Review of Systems: as per HPI  Review of Systems   Constitutional: Positive for activity change and fatigue   Negative for appetite change, chills, fever and unexpected weight change  HENT: Negative for mouth sores, sore throat and trouble swallowing  Respiratory: Negative for shortness of breath  Cardiovascular: Negative for chest pain  Gastrointestinal: Negative for abdominal distention, abdominal pain, blood in stool, constipation, diarrhea, nausea and vomiting  Skin: Negative for color change, pallor, rash and wound  Neurological: Positive for syncope  Negative for tremors  Psychiatric/Behavioral: The patient is not nervous/anxious  All other systems reviewed and are negative  Physical Exam:     Physical Exam   Constitutional: She is oriented to person, place, and time  She appears well-developed  No distress  Morbidly obese   HENT:   Head: Normocephalic and atraumatic  Eyes: Right eye exhibits no discharge  Left eye exhibits no discharge  No scleral icterus  Neck: Neck supple  No tracheal deviation present  Cardiovascular: Normal rate, regular rhythm and normal heart sounds  Exam reveals no gallop and no friction rub  No murmur heard  Pulmonary/Chest: Effort normal  No respiratory distress  She has wheezes  Abdominal: Soft  Bowel sounds are normal  She exhibits no distension and no mass  There is no tenderness  There is no rebound and no guarding  Neurological: She is alert and oriented to person, place, and time  Skin: Skin is warm and dry  Psychiatric: She has a normal mood and affect           Invasive Devices     Peripheral Intravenous Line            Peripheral IV 01/19/20 Left;Ventral (anterior) Forearm less than 1 day                        CBC:   Lab Results   Component Value Date    WBC 4 88 01/19/2020    HGB 9 0 (L) 01/19/2020    HCT 33 6 (L) 01/19/2020    MCV 83 01/19/2020     01/19/2020    MCH 22 1 (L) 01/19/2020    MCHC 26 8 (L) 01/19/2020    RDW 27 5 (H) 01/19/2020   ,   CMP:   Lab Results   Component Value Date    K 4 2 01/19/2020     01/19/2020    CO2 36 (H) 01/19/2020    BUN 12 01/19/2020    CREATININE 0 92 01/19/2020    CALCIUM 8 1 (L) 01/19/2020    EGFR 58 01/19/2020   ,

## 2020-01-19 NOTE — PLAN OF CARE
Problem: Prexisting or High Potential for Compromised Skin Integrity  Goal: Skin integrity is maintained or improved  Description  INTERVENTIONS:  - Identify patients at risk for skin breakdown  - Assess and monitor skin integrity  - Assess and monitor nutrition and hydration status  - Monitor labs   - Assess for incontinence   - Turn and reposition patient  - Assist with mobility/ambulation  - Relieve pressure over bony prominences  - Avoid friction and shearing  - Provide appropriate hygiene as needed including keeping skin clean and dry  - Evaluate need for skin moisturizer/barrier cream  - Collaborate with interdisciplinary team   - Patient/family teaching  - Consider wound care consult   Outcome: Progressing     Problem: Potential for Falls  Goal: Patient will remain free of falls  Description  INTERVENTIONS:  - Assess patient frequently for physical needs  -  Identify cognitive and physical deficits and behaviors that affect risk of falls    -  Onyx fall precautions as indicated by assessment   - Educate patient/family on patient safety including physical limitations  - Instruct patient to call for assistance with activity based on assessment  - Modify environment to reduce risk of injury  - Consider OT/PT consult to assist with strengthening/mobility  Outcome: Progressing     Problem: CARDIOVASCULAR - ADULT  Goal: Maintains optimal cardiac output and hemodynamic stability  Description  INTERVENTIONS:  - Monitor I/O, vital signs and rhythm  - Monitor for S/S and trends of decreased cardiac output  - Administer and titrate ordered vasoactive medications to optimize hemodynamic stability  - Assess quality of pulses, skin color and temperature  - Assess for signs of decreased coronary artery perfusion  - Instruct patient to report change in severity of symptoms  Outcome: Progressing     Problem: HEMATOLOGIC - ADULT  Goal: Maintains hematologic stability  Description  INTERVENTIONS  - Assess for signs and symptoms of bleeding or hemorrhage  - Monitor labs  - Administer supportive blood products/factors as ordered and appropriate  Outcome: Progressing     Problem: DISCHARGE PLANNING  Goal: Discharge to home or other facility with appropriate resources  Description  INTERVENTIONS:  - Identify barriers to discharge w/patient and caregiver  - Arrange for needed discharge resources and transportation as appropriate  - Identify discharge learning needs (meds, wound care, etc )  - Arrange for interpretive services to assist at discharge as needed  - Refer to Case Management Department for coordinating discharge planning if the patient needs post-hospital services based on physician/advanced practitioner order or complex needs related to functional status, cognitive ability, or social support system  Outcome: Progressing     Problem: Knowledge Deficit  Goal: Patient/family/caregiver demonstrates understanding of disease process, treatment plan, medications, and discharge instructions  Description  Complete learning assessment and assess knowledge base    Interventions:  - Provide teaching at level of understanding  - Provide teaching via preferred learning methods  Outcome: Progressing     Problem: GENITOURINARY - ADULT  Goal: Maintains or returns to baseline urinary function  Description  INTERVENTIONS:  - Assess urinary function  - Encourage oral fluids to ensure adequate hydration if ordered  - Administer IV fluids as ordered to ensure adequate hydration  - Administer ordered medications as needed  - Offer frequent toileting  - Follow urinary retention protocol if ordered  Outcome: Progressing     Problem: SKIN/TISSUE INTEGRITY - ADULT  Goal: Skin integrity remains intact  Description  INTERVENTIONS  - Identify patients at risk for skin breakdown  - Assess and monitor skin integrity  - Assess and monitor nutrition and hydration status  - Monitor labs (i e  albumin)  - Assess for incontinence   - Turn and reposition patient  - Assist with mobility/ambulation  - Relieve pressure over bony prominences  - Avoid friction and shearing  - Provide appropriate hygiene as needed including keeping skin clean and dry  - Evaluate need for skin moisturizer/barrier cream  - Collaborate with interdisciplinary team (i e  Nutrition, Rehabilitation, etc )   - Patient/family teaching  Outcome: Progressing

## 2020-01-20 ENCOUNTER — ANESTHESIA EVENT (INPATIENT)
Dept: GASTROENTEROLOGY | Facility: HOSPITAL | Age: 85
DRG: 813 | End: 2020-01-20
Payer: MEDICARE

## 2020-01-20 ENCOUNTER — APPOINTMENT (INPATIENT)
Dept: GASTROENTEROLOGY | Facility: HOSPITAL | Age: 85
DRG: 813 | End: 2020-01-20
Payer: MEDICARE

## 2020-01-20 ENCOUNTER — ANESTHESIA (INPATIENT)
Dept: GASTROENTEROLOGY | Facility: HOSPITAL | Age: 85
DRG: 813 | End: 2020-01-20
Payer: MEDICARE

## 2020-01-20 LAB
ANION GAP SERPL CALCULATED.3IONS-SCNC: 5 MMOL/L (ref 4–13)
BUN SERPL-MCNC: 14 MG/DL (ref 5–25)
CALCIUM SERPL-MCNC: 8.3 MG/DL (ref 8.3–10.1)
CHLORIDE SERPL-SCNC: 108 MMOL/L (ref 100–108)
CO2 SERPL-SCNC: 32 MMOL/L (ref 21–32)
CREAT SERPL-MCNC: 0.88 MG/DL (ref 0.6–1.3)
ERYTHROCYTE [DISTWIDTH] IN BLOOD BY AUTOMATED COUNT: 27.9 % (ref 11.6–15.1)
GFR SERPL CREATININE-BSD FRML MDRD: 61 ML/MIN/1.73SQ M
GLUCOSE SERPL-MCNC: 95 MG/DL (ref 65–140)
HCT VFR BLD AUTO: 37.6 % (ref 34.8–46.1)
HGB BLD-MCNC: 9.9 G/DL (ref 11.5–15.4)
MCH RBC QN AUTO: 22 PG (ref 26.8–34.3)
MCHC RBC AUTO-ENTMCNC: 26.3 G/DL (ref 31.4–37.4)
MCV RBC AUTO: 84 FL (ref 82–98)
PLATELET # BLD AUTO: 152 THOUSANDS/UL (ref 149–390)
POTASSIUM SERPL-SCNC: 3.9 MMOL/L (ref 3.5–5.3)
RBC # BLD AUTO: 4.5 MILLION/UL (ref 3.81–5.12)
SODIUM SERPL-SCNC: 145 MMOL/L (ref 136–145)
WBC # BLD AUTO: 6.44 THOUSAND/UL (ref 4.31–10.16)

## 2020-01-20 PROCEDURE — 88112 CYTOPATH CELL ENHANCE TECH: CPT | Performed by: PATHOLOGY

## 2020-01-20 PROCEDURE — 85027 COMPLETE CBC AUTOMATED: CPT | Performed by: INTERNAL MEDICINE

## 2020-01-20 PROCEDURE — 94640 AIRWAY INHALATION TREATMENT: CPT

## 2020-01-20 PROCEDURE — 0DD38ZX EXTRACTION OF LOWER ESOPHAGUS, VIA NATURAL OR ARTIFICIAL OPENING ENDOSCOPIC, DIAGNOSTIC: ICD-10-PCS | Performed by: INTERNAL MEDICINE

## 2020-01-20 PROCEDURE — 43235 EGD DIAGNOSTIC BRUSH WASH: CPT | Performed by: INTERNAL MEDICINE

## 2020-01-20 PROCEDURE — 94760 N-INVAS EAR/PLS OXIMETRY 1: CPT

## 2020-01-20 PROCEDURE — 99233 SBSQ HOSP IP/OBS HIGH 50: CPT | Performed by: FAMILY MEDICINE

## 2020-01-20 PROCEDURE — 80048 BASIC METABOLIC PNL TOTAL CA: CPT | Performed by: INTERNAL MEDICINE

## 2020-01-20 PROCEDURE — C9113 INJ PANTOPRAZOLE SODIUM, VIA: HCPCS | Performed by: INTERNAL MEDICINE

## 2020-01-20 RX ORDER — PROPOFOL 10 MG/ML
INJECTION, EMULSION INTRAVENOUS AS NEEDED
Status: DISCONTINUED | OUTPATIENT
Start: 2020-01-20 | End: 2020-01-20 | Stop reason: SURG

## 2020-01-20 RX ORDER — SODIUM CHLORIDE 9 MG/ML
125 INJECTION, SOLUTION INTRAVENOUS CONTINUOUS
Status: DISCONTINUED | OUTPATIENT
Start: 2020-01-20 | End: 2020-01-21

## 2020-01-20 RX ORDER — ONDANSETRON 2 MG/ML
4 INJECTION INTRAMUSCULAR; INTRAVENOUS EVERY 6 HOURS PRN
Status: CANCELLED | OUTPATIENT
Start: 2020-01-20

## 2020-01-20 RX ADMIN — PROPOFOL 50 MG: 10 INJECTION, EMULSION INTRAVENOUS at 14:46

## 2020-01-20 RX ADMIN — PANTOPRAZOLE SODIUM 40 MG: 40 INJECTION, POWDER, FOR SOLUTION INTRAVENOUS at 09:30

## 2020-01-20 RX ADMIN — PRAMIPEXOLE DIHYDROCHLORIDE 0.5 MG: 0.5 TABLET ORAL at 21:34

## 2020-01-20 RX ADMIN — LEVALBUTEROL HYDROCHLORIDE 1.25 MG: 1.25 SOLUTION, CONCENTRATE RESPIRATORY (INHALATION) at 01:55

## 2020-01-20 RX ADMIN — PROPOFOL 30 MG: 10 INJECTION, EMULSION INTRAVENOUS at 14:49

## 2020-01-20 RX ADMIN — PROPOFOL 50 MG: 10 INJECTION, EMULSION INTRAVENOUS at 14:43

## 2020-01-20 RX ADMIN — LEVALBUTEROL HYDROCHLORIDE 1.25 MG: 1.25 SOLUTION, CONCENTRATE RESPIRATORY (INHALATION) at 14:00

## 2020-01-20 RX ADMIN — BENZONATATE 200 MG: 100 CAPSULE ORAL at 21:34

## 2020-01-20 RX ADMIN — PREDNISONE 40 MG: 20 TABLET ORAL at 09:29

## 2020-01-20 RX ADMIN — LEVALBUTEROL HYDROCHLORIDE 1.25 MG: 1.25 SOLUTION, CONCENTRATE RESPIRATORY (INHALATION) at 19:19

## 2020-01-20 RX ADMIN — METOPROLOL TARTRATE 25 MG: 25 TABLET ORAL at 09:30

## 2020-01-20 RX ADMIN — MELATONIN 1000 UNITS: at 09:30

## 2020-01-20 RX ADMIN — ISODIUM CHLORIDE 3 ML: 0.03 SOLUTION RESPIRATORY (INHALATION) at 01:55

## 2020-01-20 RX ADMIN — GUAIFENESIN 1200 MG: 600 TABLET, EXTENDED RELEASE ORAL at 09:29

## 2020-01-20 RX ADMIN — LEVOTHYROXINE SODIUM 150 MCG: 75 TABLET ORAL at 05:00

## 2020-01-20 RX ADMIN — ISODIUM CHLORIDE 3 ML: 0.03 SOLUTION RESPIRATORY (INHALATION) at 07:52

## 2020-01-20 RX ADMIN — FUROSEMIDE 40 MG: 40 TABLET ORAL at 09:30

## 2020-01-20 RX ADMIN — POTASSIUM CHLORIDE 40 MEQ: 1500 TABLET, EXTENDED RELEASE ORAL at 17:23

## 2020-01-20 RX ADMIN — FLUTICASONE FUROATE AND VILANTEROL TRIFENATATE 1 PUFF: 100; 25 POWDER RESPIRATORY (INHALATION) at 09:30

## 2020-01-20 RX ADMIN — SODIUM CHLORIDE 125 ML/HR: 0.9 INJECTION, SOLUTION INTRAVENOUS at 14:07

## 2020-01-20 RX ADMIN — POTASSIUM CHLORIDE 40 MEQ: 1500 TABLET, EXTENDED RELEASE ORAL at 09:29

## 2020-01-20 RX ADMIN — BENZONATATE 200 MG: 100 CAPSULE ORAL at 09:29

## 2020-01-20 RX ADMIN — ISODIUM CHLORIDE 3 ML: 0.03 SOLUTION RESPIRATORY (INHALATION) at 19:19

## 2020-01-20 RX ADMIN — ISODIUM CHLORIDE 3 ML: 0.03 SOLUTION RESPIRATORY (INHALATION) at 14:00

## 2020-01-20 RX ADMIN — LEVALBUTEROL HYDROCHLORIDE 1.25 MG: 1.25 SOLUTION, CONCENTRATE RESPIRATORY (INHALATION) at 07:52

## 2020-01-20 RX ADMIN — GUAIFENESIN 1200 MG: 600 TABLET, EXTENDED RELEASE ORAL at 21:34

## 2020-01-20 RX ADMIN — BENZONATATE 200 MG: 100 CAPSULE ORAL at 17:23

## 2020-01-20 RX ADMIN — METOPROLOL TARTRATE 25 MG: 25 TABLET ORAL at 21:34

## 2020-01-20 RX ADMIN — ATORVASTATIN CALCIUM 10 MG: 10 TABLET, FILM COATED ORAL at 17:23

## 2020-01-20 RX ADMIN — PANTOPRAZOLE SODIUM 40 MG: 40 INJECTION, POWDER, FOR SOLUTION INTRAVENOUS at 21:34

## 2020-01-20 NOTE — SOCIAL WORK
Met with pt to completed assessment and explain role  PCP is Dr Erlin Mueller  Pt lives in [facility] at [facility]  Pt was able to completed upper ext bathing and dressing, but needed help with lowers  Pt is unable to amb, but was using a sit to stand or leonidas lift from bed to power chair  DME:  Power chair  Pt is not open with [facility] and has help from staff at [facility]  Pt uses pharmacy at [facility]  Pt has hx of major depression disorder, but no D&A  Pt has POA/Living Will which is her dtr   is dtr Meche Momin  Pt's preference for discharge is back to [facility]  Sent a referral back to [facility] and asked if pt is a MA bed hold  IMM was explained to pt, copy given and she signed form today  A post acute care recommendation was made by your care team for [facility]  Discussed Freedom of Choice with patient  List of facilities given to patient via in person  patient aware the list is custom filtered for them by preference  and that [facility] post acute providers are designated

## 2020-01-20 NOTE — ASSESSMENT & PLAN NOTE
Wt Readings from Last 3 Encounters:   01/20/20 133 kg (294 lb)   06/26/19 (!) 140 kg (309 lb)   06/11/19 (!) 147 kg (325 lb)     2D echo normal EF,G2DD  Continue Lasix 40 mg daily  Continue metoprolol  Continue daily weight and I&Os  Weight improving

## 2020-01-20 NOTE — ANESTHESIA POSTPROCEDURE EVALUATION
Post-Op Assessment Note    CV Status:  Stable    Pain management: adequate     Mental Status:  Alert and awake   Hydration Status:  Euvolemic   PONV Controlled:  Controlled   Airway Patency:  Patent   Post Op Vitals Reviewed: Yes      Staff: Anesthesiologist, CRNA           /61 (01/20/20 1518)    Temp      Pulse (!) 107 (01/20/20 1518)   Resp 22 (01/20/20 1518)    SpO2 93 % (01/20/20 1518)

## 2020-01-20 NOTE — PROGRESS NOTES
Progress Note - Carmen Sánchez 1935, 81 y o  female MRN: [id number]    Unit/Bed#: E4 -01 Encounter: [id number]    Primary Care Provider: Sandro Cannon MD   Date and time admitted to hospital: 1/15/2020  2:55 PM        * Syncope  Assessment & Plan  Secondary to severe anemia and RSV  Improving  Status post 4 units of RBC transfusion  Hemoglobin 9 9 this morning  Acute respiratory failure [facility])  Assessment & Plan  Secondary to RSV and anemia  Continue bronchodilators, Mucinex, prednisone and Breo  Incentive spirometer  Patient is on room air, satting low to mid 90s currently  Symptomatic anemia  Assessment & Plan  Likely multifactorial due to hematuria and GI bleed while on Xarelto  Stool occult blood positive  Hemoglobin was 3 8 on admission  Received 4 units of RBC total   Hemoglobin 9 9 today  Consulted urology and GI, appreciate input  EGD performed which showed esophagitis and white plaques  Brushings obtained to rule out candidal esophagitis  Continue Protonix 40 mg IV BID; transition to oral Protonix BID tomorrow  Will need to be on Protonix BID for 12 weeks and then will need to repeat EGD to ensure healing  Resume Xarelto and aspirin tomorrow 1/21  Acute on chronic diastolic CHF (congestive heart failure) (HCC)  Assessment & Plan  Wt Readings from Last 3 Encounters:   01/20/20 133 kg (294 lb)   06/26/19 (!) 140 kg (309 lb)   06/11/19 (!) 147 kg (325 lb)     2D echo normal EF,G2DD  Continue Lasix 40 mg daily  Continue metoprolol  Continue daily weight and I&Os  Weight improving  Urinary tract infection  Assessment & Plan  UTI with hematuria  Urine culture prelim report >100,000 cfu/ml Gram Negative Asim  Completed 4 days of IV antibiotic therapy with Rocephin  Antibiotics discontinued  Paroxysmal A-fib [facility])  Assessment & Plan  Continue metoprolol  Resume Xarelto tomorrow 1/21  EKG sinus rhythm in ED        Mixed hyperlipidemia  Assessment & Plan  Continue Lipitor    Essential hypertension  Assessment & Plan  BP stable  Continue metoprolol  Monitor        VTE Pharmacologic Prophylaxis:   Pharmacologic: Pharmacologic VTE Prophylaxis contraindicated due to Esophagitis with symptomatic anemia  Mechanical VTE Prophylaxis in Place: Yes    Patient Centered Rounds: I have performed bedside rounds with nursing staff today  Discussions with Specialists or Other Care Team Provider:  Yes    Education and Discussions with Family / Patient:  Yes    Time Spent for Care: 15 minutes  More than 50% of total time spent on counseling and coordination of care as described above  Current Length of Stay: 5 day(s)    Current Patient Status: Inpatient   Certification Statement: The patient will continue to require additional inpatient hospital stay due to Esophagitis with symptomatic anemia    Discharge Plan: To [facility] possibly tomorrow    Code Status: Level 3 - DNAR and DNI      Subjective:   Patient has no complaints at this time  Objective:     Vitals:   Temp (24hrs), Av 1 °F (36 7 °C), Min:97 6 °F (36 4 °C), Max:98 7 °F (37 1 °C)    Temp:  [97 6 °F (36 4 °C)-98 7 °F (37 1 °C)] 98 3 °F (36 8 °C)  HR:  [] 107  Resp:  [18-26] 24  BP: (120-151)/(63-72) 133/63  SpO2:  [89 %-98 %] 94 %  Body mass index is 52 08 kg/m²  Input and Output Summary (last 24 hours): Intake/Output Summary (Last 24 hours) at 2020 1515  Last data filed at 2020 1507  Gross per 24 hour   Intake 930 ml   Output 3408 ml   Net -2478 ml       Physical Exam:     Physical Exam   Constitutional: She is oriented to person, place, and time  She appears well-developed and well-nourished  No distress  HENT:   Head: Normocephalic and atraumatic  Neck: No JVD present  Cardiovascular: Normal rate and normal heart sounds  No murmur heard  Irregularly irregular rhythm   Pulmonary/Chest: Effort normal and breath sounds normal  She has no wheezes  Abdominal: Soft  Bowel sounds are normal  She exhibits no distension  There is no tenderness  Musculoskeletal: She exhibits no edema  Neurological: She is alert and oriented to person, place, and time  Skin: Skin is warm and dry  She is not diaphoretic  Additional Data:     Labs:    Results from last 7 days   Lab Units 01/20/20  0550  01/15/20  1532   WBC Thousand/uL 6 44   < > 3 08*   HEMOGLOBIN g/dL 9 9*   < > 3 8*   HEMATOCRIT % 37 6   < > 17 2*   PLATELETS Thousands/uL 152   < > 182   NEUTROS PCT %  --   --  65   LYMPHS PCT %  --   --  18   MONOS PCT %  --   --  14*   EOS PCT %  --   --  1    < > = values in this interval not displayed  Results from last 7 days   Lab Units 01/20/20  0550  01/15/20  1532   SODIUM mmol/L 145   < > 145   POTASSIUM mmol/L 3 9   < > 4 0   CHLORIDE mmol/L 108   < > 111*   CO2 mmol/L 32   < > 31   BUN mg/dL 14   < > 13   CREATININE mg/dL 0 88   < > 0 97   ANION GAP mmol/L 5   < > 3*   CALCIUM mg/dL 8 3   < > 8 3   ALBUMIN g/dL  --   --  2 9*   TOTAL BILIRUBIN mg/dL  --   --  0 53   ALK PHOS U/L  --   --  62   ALT U/L  --   --  11*   AST U/L  --   --  15   GLUCOSE RANDOM mg/dL 95   < > 106    < > = values in this interval not displayed  Results from last 7 days   Lab Units 01/15/20  1532   INR  1 82*                       * I Have Reviewed All Lab Data Listed Above  * Additional Pertinent Lab Tests Reviewed:  [facility]    Imaging:    Imaging Reports Reviewed Today Include:  None   Imaging Personally Reviewed by Myself Includes:  None    Recent Cultures (last 7 days):     Results from last 7 days   Lab Units 01/16/20  0057   URINE CULTURE  >100,000 cfu/ml Morganella morganii*  50,000-59,000 cfu/ml        Last 24 Hours Medication List:     Current Facility-Administered Medications:  acetaminophen 650 mg Oral Q6H PRN Veronica Ambron, DO    atorvastatin 10 mg Oral Daily With [place]-Val, DO    benzonatate 200 mg Oral TID Douglas Mcallister, DO    cholecalciferol 1,000 Units Oral Daily Veronica Ambron, DO    fluticasone-vilanterol 1 puff Inhalation Daily Veronica Ambron, DO    furosemide 40 mg Oral Daily Veronica Ambron, DO    guaiFENesin 1,200 mg Oral Q12H [address] Veronica Ambron, DO    levalbuterol 1 25 mg Nebulization Q6H Veronica Ambron, DO    levothyroxine 150 mcg Oral Early Morning Veronica Ambron, DO    metoprolol tartrate 25 mg Oral Q12H [address] Veronica Ambron, DO    ondansetron 4 mg Intravenous Q6H PRN Veronica Ambron, DO    pantoprazole 40 mg Intravenous Q12H [address] Veronica Ambron, DO    potassium chloride 40 mEq Oral BID With Meals Veronica Ambron, DO    pramipexole 0 5 mg Oral HS Veronica Ambron, DO    predniSONE 40 mg Oral Daily Veronica Ambron, DO    sodium chloride 125 mL/hr Intravenous Continuous Reese Silva DO Last Rate: Stopped (01/20/20 1507)   sodium chloride 3 mL Nebulization Q6H Veronica Ambron, DO         Today, Patient Was Seen By: Les Schneider MD    ** Please Note: Dictation voice to text software may have been used in the creation of this document   **

## 2020-01-20 NOTE — ANESTHESIA PREPROCEDURE EVALUATION
Review of Systems/Medical History  Patient summary reviewed  Chart reviewed      Cardiovascular  Exercise tolerance (METS): <4,  Hyperlipidemia, Hypertension , Past MI , CAD , Dysrhythmias , atrial fibrillation, CHF ,   Comment: 2015   EF 60%,  Pulmonary  Sleep apnea CPAP,   Comment: Resolving URI - now with dry cough - improving      GI/Hepatic    GERD poorly controlled,        Kidney stones,        Endo/Other  History of thyroid disease , hypothyroidism,   Obesity  super morbid obesity   GYN       Hematology  Anemia ,    Comment: rec 4 units prbc last week  Musculoskeletal    Arthritis     Neurology  Negative neurology ROS      Psychology   Depression ,              Physical Exam    Airway    Mallampati score: III  TM Distance: >3 FB  Neck ROM: full     Dental   lower dentures and upper dentures,     Cardiovascular  Rhythm: regular, Rate: normal,     Pulmonary  Comment: Very slight end expiratory wheeze , Breath sounds clear to auscultation,     Other Findings        Anesthesia Plan  ASA Score- 4     Anesthesia Type- IV sedation with anesthesia with ASA Monitors  Additional Monitors:   Airway Plan:     Comment: Consider using nasal cpap   Plan Factors- Patient instructed to abstain from smoking on day of procedure  Patient did not smoke on day of surgery  Induction- intravenous  Postoperative Plan-     Informed Consent- Anesthetic plan and risks discussed with patient

## 2020-01-20 NOTE — OCCUPATIONAL THERAPY NOTE
Occupational Therapy Cancellation Note        Patient Name: Reece Mcclendon  [id number] Date: 1/20/2020      Attempted to see patient for OT session and pt off floor currently  Will continue to follow OT POC as appropriate      Cuong Cota MS, OTR/L

## 2020-01-20 NOTE — ASSESSMENT & PLAN NOTE
Secondary to RSV and anemia  Continue bronchodilators, Mucinex, prednisone and Breo  Incentive spirometer  Patient is on room air, satting low to mid 90s currently

## 2020-01-20 NOTE — ASSESSMENT & PLAN NOTE
Secondary to severe anemia and RSV  Improving  Status post 4 units of RBC transfusion  Hemoglobin 9 9 this morning

## 2020-01-20 NOTE — ASSESSMENT & PLAN NOTE
UTI with hematuria  Urine culture prelim report >100,000 cfu/ml Gram Negative Asim  Completed 4 days of IV antibiotic therapy with Rocephin  Antibiotics discontinued

## 2020-01-20 NOTE — ASSESSMENT & PLAN NOTE
Likely multifactorial due to hematuria and GI bleed while on Xarelto  Stool occult blood positive  Hemoglobin was 3 8 on admission  Received 4 units of RBC total   Hemoglobin 9 9 today  Consulted urology and GI, appreciate input  EGD performed which showed esophagitis and white plaques  Brushings obtained to rule out candidal esophagitis  Continue Protonix 40 mg IV BID; transition to oral Protonix BID tomorrow  Will need to be on Protonix BID for 12 weeks and then will need to repeat EGD to ensure healing  Resume Xarelto and aspirin tomorrow 1/21

## 2020-01-21 LAB
ANION GAP SERPL CALCULATED.3IONS-SCNC: 6 MMOL/L (ref 4–13)
BASOPHILS # BLD MANUAL: 0 THOUSAND/UL (ref 0–0.1)
BASOPHILS NFR MAR MANUAL: 0 % (ref 0–1)
BUN SERPL-MCNC: 19 MG/DL (ref 5–25)
CALCIUM SERPL-MCNC: 8.4 MG/DL (ref 8.3–10.1)
CHLORIDE SERPL-SCNC: 107 MMOL/L (ref 100–108)
CO2 SERPL-SCNC: 33 MMOL/L (ref 21–32)
CREAT SERPL-MCNC: 1.01 MG/DL (ref 0.6–1.3)
EOSINOPHIL # BLD MANUAL: 0 THOUSAND/UL (ref 0–0.4)
EOSINOPHIL NFR BLD MANUAL: 0 % (ref 0–6)
ERYTHROCYTE [DISTWIDTH] IN BLOOD BY AUTOMATED COUNT: 28.5 % (ref 11.6–15.1)
GFR SERPL CREATININE-BSD FRML MDRD: 52 ML/MIN/1.73SQ M
GLUCOSE SERPL-MCNC: 98 MG/DL (ref 65–140)
HCT VFR BLD AUTO: 35.4 % (ref 34.8–46.1)
HGB BLD-MCNC: 9.7 G/DL (ref 11.5–15.4)
LYMPHOCYTES # BLD AUTO: 1.33 THOUSAND/UL (ref 0.6–4.47)
LYMPHOCYTES # BLD AUTO: 22 % (ref 14–44)
MCH RBC QN AUTO: 22.6 PG (ref 26.8–34.3)
MCHC RBC AUTO-ENTMCNC: 27.4 G/DL (ref 31.4–37.4)
MCV RBC AUTO: 82 FL (ref 82–98)
MONOCYTES # BLD AUTO: 0.24 THOUSAND/UL (ref 0–1.22)
MONOCYTES NFR BLD: 4 % (ref 4–12)
NEUTROPHILS # BLD MANUAL: 4.4 THOUSAND/UL (ref 1.85–7.62)
NEUTS BAND NFR BLD MANUAL: 7 % (ref 0–8)
NEUTS SEG NFR BLD AUTO: 66 % (ref 43–75)
NRBC BLD AUTO-RTO: 0 /100 WBCS
PLATELET # BLD AUTO: 155 THOUSANDS/UL (ref 149–390)
PLATELET BLD QL SMEAR: ADEQUATE
POTASSIUM SERPL-SCNC: 3.6 MMOL/L (ref 3.5–5.3)
RBC # BLD AUTO: 4.3 MILLION/UL (ref 3.81–5.12)
SODIUM SERPL-SCNC: 146 MMOL/L (ref 136–145)
TOTAL CELLS COUNTED SPEC: 100
VARIANT LYMPHS # BLD AUTO: 1 %
WBC # BLD AUTO: 6.03 THOUSAND/UL (ref 4.31–10.16)

## 2020-01-21 PROCEDURE — 85007 BL SMEAR W/DIFF WBC COUNT: CPT | Performed by: FAMILY MEDICINE

## 2020-01-21 PROCEDURE — 99232 SBSQ HOSP IP/OBS MODERATE 35: CPT | Performed by: FAMILY MEDICINE

## 2020-01-21 PROCEDURE — 94760 N-INVAS EAR/PLS OXIMETRY 1: CPT

## 2020-01-21 PROCEDURE — 80048 BASIC METABOLIC PNL TOTAL CA: CPT | Performed by: FAMILY MEDICINE

## 2020-01-21 PROCEDURE — 85027 COMPLETE CBC AUTOMATED: CPT | Performed by: FAMILY MEDICINE

## 2020-01-21 PROCEDURE — 94640 AIRWAY INHALATION TREATMENT: CPT

## 2020-01-21 RX ORDER — ASPIRIN 81 MG/1
81 TABLET ORAL DAILY
Status: DISCONTINUED | OUTPATIENT
Start: 2020-01-21 | End: 2020-01-22 | Stop reason: HOSPADM

## 2020-01-21 RX ORDER — PANTOPRAZOLE SODIUM 40 MG/1
40 TABLET, DELAYED RELEASE ORAL
Status: DISCONTINUED | OUTPATIENT
Start: 2020-01-21 | End: 2020-01-22 | Stop reason: HOSPADM

## 2020-01-21 RX ADMIN — FLUTICASONE FUROATE AND VILANTEROL TRIFENATATE 1 PUFF: 100; 25 POWDER RESPIRATORY (INHALATION) at 09:00

## 2020-01-21 RX ADMIN — POTASSIUM CHLORIDE 40 MEQ: 1500 TABLET, EXTENDED RELEASE ORAL at 17:02

## 2020-01-21 RX ADMIN — FUROSEMIDE 40 MG: 40 TABLET ORAL at 08:59

## 2020-01-21 RX ADMIN — ACETAMINOPHEN 650 MG: 325 TABLET ORAL at 02:53

## 2020-01-21 RX ADMIN — MELATONIN 1000 UNITS: at 08:59

## 2020-01-21 RX ADMIN — LEVALBUTEROL HYDROCHLORIDE 1.25 MG: 1.25 SOLUTION, CONCENTRATE RESPIRATORY (INHALATION) at 23:45

## 2020-01-21 RX ADMIN — ACETAMINOPHEN 650 MG: 325 TABLET ORAL at 09:08

## 2020-01-21 RX ADMIN — LEVALBUTEROL HYDROCHLORIDE 1.25 MG: 1.25 SOLUTION, CONCENTRATE RESPIRATORY (INHALATION) at 14:10

## 2020-01-21 RX ADMIN — METOPROLOL TARTRATE 25 MG: 25 TABLET ORAL at 21:46

## 2020-01-21 RX ADMIN — ISODIUM CHLORIDE 3 ML: 0.03 SOLUTION RESPIRATORY (INHALATION) at 14:10

## 2020-01-21 RX ADMIN — POTASSIUM CHLORIDE 40 MEQ: 1500 TABLET, EXTENDED RELEASE ORAL at 08:59

## 2020-01-21 RX ADMIN — RIVAROXABAN 20 MG: 20 TABLET, FILM COATED ORAL at 17:02

## 2020-01-21 RX ADMIN — BENZONATATE 200 MG: 100 CAPSULE ORAL at 21:49

## 2020-01-21 RX ADMIN — ISODIUM CHLORIDE 3 ML: 0.03 SOLUTION RESPIRATORY (INHALATION) at 19:22

## 2020-01-21 RX ADMIN — LEVALBUTEROL HYDROCHLORIDE 1.25 MG: 1.25 SOLUTION, CONCENTRATE RESPIRATORY (INHALATION) at 19:22

## 2020-01-21 RX ADMIN — GUAIFENESIN 1200 MG: 600 TABLET, EXTENDED RELEASE ORAL at 08:59

## 2020-01-21 RX ADMIN — LEVALBUTEROL HYDROCHLORIDE 1.25 MG: 1.25 SOLUTION, CONCENTRATE RESPIRATORY (INHALATION) at 01:05

## 2020-01-21 RX ADMIN — PANTOPRAZOLE SODIUM 40 MG: 40 TABLET, DELAYED RELEASE ORAL at 08:59

## 2020-01-21 RX ADMIN — PANTOPRAZOLE SODIUM 40 MG: 40 TABLET, DELAYED RELEASE ORAL at 17:02

## 2020-01-21 RX ADMIN — BENZONATATE 200 MG: 100 CAPSULE ORAL at 17:02

## 2020-01-21 RX ADMIN — BENZONATATE 200 MG: 100 CAPSULE ORAL at 08:59

## 2020-01-21 RX ADMIN — LEVOTHYROXINE SODIUM 150 MCG: 75 TABLET ORAL at 05:05

## 2020-01-21 RX ADMIN — GUAIFENESIN 1200 MG: 600 TABLET, EXTENDED RELEASE ORAL at 21:49

## 2020-01-21 RX ADMIN — ACETAMINOPHEN 650 MG: 325 TABLET ORAL at 21:50

## 2020-01-21 RX ADMIN — METOPROLOL TARTRATE 25 MG: 25 TABLET ORAL at 08:59

## 2020-01-21 RX ADMIN — LEVALBUTEROL HYDROCHLORIDE 1.25 MG: 1.25 SOLUTION, CONCENTRATE RESPIRATORY (INHALATION) at 08:17

## 2020-01-21 RX ADMIN — PREDNISONE 40 MG: 20 TABLET ORAL at 08:59

## 2020-01-21 RX ADMIN — ISODIUM CHLORIDE 3 ML: 0.03 SOLUTION RESPIRATORY (INHALATION) at 01:05

## 2020-01-21 RX ADMIN — ISODIUM CHLORIDE 3 ML: 0.03 SOLUTION RESPIRATORY (INHALATION) at 08:17

## 2020-01-21 RX ADMIN — ATORVASTATIN CALCIUM 10 MG: 10 TABLET, FILM COATED ORAL at 17:02

## 2020-01-21 RX ADMIN — ISODIUM CHLORIDE 3 ML: 0.03 SOLUTION RESPIRATORY (INHALATION) at 23:45

## 2020-01-21 RX ADMIN — PRAMIPEXOLE DIHYDROCHLORIDE 0.5 MG: 0.5 TABLET ORAL at 21:46

## 2020-01-21 RX ADMIN — ASPIRIN 81 MG: 81 TABLET, COATED ORAL at 08:59

## 2020-01-21 NOTE — ASSESSMENT & PLAN NOTE
Wt Readings from Last 3 Encounters:   01/21/20 133 kg (292 lb 5 3 oz)   06/26/19 (!) 140 kg (309 lb)   06/11/19 (!) 147 kg (325 lb)     2D echo normal EF,G2DD  Continue Lasix 40 mg daily  Continue metoprolol  Continue daily weight and I&Os  Weight improving

## 2020-01-21 NOTE — ASSESSMENT & PLAN NOTE
Likely multifactorial due to hematuria and GI bleed while on Xarelto  Stool occult blood positive  Hemoglobin was 3 8 on admission  Received 4 units of RBC total   Hemoglobin 9 7 today  Consulted urology and GI, appreciate input  EGD performed which showed esophagitis and white plaques  Brushings obtained to rule out candidal esophagitis  Continue Protonix 40 mg PO BID; Will need to be on Protonix BID for 12 weeks and then will need to repeat EGD to ensure healing  Xarelto and aspirin resumed

## 2020-01-21 NOTE — PROGRESS NOTES
Progress Note - Thaddeus Srinivasan 1935, 81 y o  female MRN: [id number]    Unit/Bed#: E4 -01 Encounter: [id number]    Primary Care Provider: Rob Nieves MD   Date and time admitted to hospital: 1/15/2020  2:55 PM        * Syncope  Assessment & Plan  Secondary to severe anemia and RSV  Improving  Status post 4 units of RBC transfusion  Hemoglobin 9 7 this morning  Patient is hemodynamically and medically stable for discharge back to [facility] tomorrow 1/22  Acute respiratory failure [facility])  Assessment & Plan  Secondary to RSV and anemia  Continue bronchodilators, Mucinex, prednisone and Breo  Incentive spirometer  Patient is on room air, satting low to mid 90s currently  Symptomatic anemia  Assessment & Plan  Likely multifactorial due to hematuria and GI bleed while on Xarelto  Stool occult blood positive  Hemoglobin was 3 8 on admission  Received 4 units of RBC total   Hemoglobin 9 7 today  Consulted urology and GI, appreciate input  EGD performed which showed esophagitis and white plaques  Brushings obtained to rule out candidal esophagitis  Continue Protonix 40 mg PO BID; Will need to be on Protonix BID for 12 weeks and then will need to repeat EGD to ensure healing  Xarelto and aspirin resumed  Acute on chronic diastolic CHF (congestive heart failure) (HCC)  Assessment & Plan  Wt Readings from Last 3 Encounters:   01/21/20 133 kg (292 lb 5 3 oz)   06/26/19 (!) 140 kg (309 lb)   06/11/19 (!) 147 kg (325 lb)     2D echo normal EF,G2DD  Continue Lasix 40 mg daily  Continue metoprolol  Continue daily weight and I&Os  Weight improving  Urinary tract infection  Assessment & Plan  UTI with hematuria  Urine culture prelim report >100,000 cfu/ml Gram Negative Asim  Completed 4 days of IV antibiotic therapy with Rocephin  Antibiotics discontinued  Paroxysmal A-fib [facility])  Assessment & Plan  Continue metoprolol    Xarelto resumed today 1/21 for anticoagulation  EKG sinus rhythm in ED  Mixed hyperlipidemia  Assessment & Plan  Continue Lipitor    Essential hypertension  Assessment & Plan  BP stable  Continue metoprolol  Monitor      VTE Pharmacologic Prophylaxis:   Pharmacologic: Rivaroxaban (Xarelto)  Mechanical VTE Prophylaxis in Place: Yes    Patient Centered Rounds: I have performed bedside rounds with nursing staff today  Discussions with Specialists or Other Care Team Provider:  Yes    Education and Discussions with Family / Patient:  Yes    Time Spent for Care: 15 minutes  More than 50% of total time spent on counseling and coordination of care as described above  Current Length of Stay: 6 day(s)    Current Patient Status: Inpatient   Certification Statement: The patient will continue to require additional inpatient hospital stay due to Stable for rehab tomorrow  Discharge Plan:  Discharge back to [facility] tomorrow  Code Status: Level 3 - DNAR and DNI      Subjective:   Patient has no complaints at this time  Objective:     Vitals:   Temp (24hrs), Av 6 °F (36 4 °C), Min:97 4 °F (36 3 °C), Max:97 8 °F (36 6 °C)    Temp:  [97 4 °F (36 3 °C)-97 8 °F (36 6 °C)] 97 4 °F (36 3 °C)  HR:  [82-91] 91  Resp:  [18-20] 20  BP: (121-145)/(60-83) 130/83  SpO2:  [94 %-97 %] 95 %  Body mass index is 51 78 kg/m²  Input and Output Summary (last 24 hours): Intake/Output Summary (Last 24 hours) at 2020 1600  Last data filed at 2020 1447  Gross per 24 hour   Intake    Output 4484 ml   Net -4484 ml       Physical Exam:     Physical Exam   Constitutional: She is oriented to person, place, and time  She appears well-developed and well-nourished  No distress  HENT:   Head: Normocephalic and atraumatic  Eyes: No scleral icterus  Neck: No JVD present  Cardiovascular: Normal rate and normal heart sounds     Irregularly irregular rhythm   Pulmonary/Chest: Effort normal and breath sounds normal    Abdominal: Soft  Bowel sounds are normal    Musculoskeletal: She exhibits no edema  Neurological: She is alert and oriented to person, place, and time  Skin: Skin is warm and dry  She is not diaphoretic  Additional Data:     Labs:    Results from last 7 days   Lab Units 01/21/20  0921  01/15/20  1532   WBC Thousand/uL 6 03   < > 3 08*   HEMOGLOBIN g/dL 9 7*   < > 3 8*   HEMATOCRIT % 35 4   < > 17 2*   PLATELETS Thousands/uL 155   < > 182   BANDS PCT % 7  --   --    NEUTROS PCT %  --   --  65   LYMPHS PCT %  --   --  18   LYMPHO PCT % 22  --   --    MONOS PCT %  --   --  14*   MONO PCT % 4  --   --    EOS PCT % 0  --  1    < > = values in this interval not displayed  Results from last 7 days   Lab Units 01/21/20  0617  01/15/20  1532   SODIUM mmol/L 146*   < > 145   POTASSIUM mmol/L 3 6   < > 4 0   CHLORIDE mmol/L 107   < > 111*   CO2 mmol/L 33*   < > 31   BUN mg/dL 19   < > 13   CREATININE mg/dL 1 01   < > 0 97   ANION GAP mmol/L 6   < > 3*   CALCIUM mg/dL 8 4   < > 8 3   ALBUMIN g/dL  --   --  2 9*   TOTAL BILIRUBIN mg/dL  --   --  0 53   ALK PHOS U/L  --   --  62   ALT U/L  --   --  11*   AST U/L  --   --  15   GLUCOSE RANDOM mg/dL 98   < > 106    < > = values in this interval not displayed  Results from last 7 days   Lab Units 01/15/20  1532   INR  1 82*                       * I Have Reviewed All Lab Data Listed Above  * Additional Pertinent Lab Tests Reviewed:  [address] Admission Reviewed    Imaging:    Imaging Reports Reviewed Today Include:  None available  Imaging Personally Reviewed by Myself Includes:  None    Recent Cultures (last 7 days):     Results from last 7 days   Lab Units 01/16/20  0057   URINE CULTURE  >100,000 cfu/ml Morganella morganii*  50,000-59,000 cfu/ml        Last 24 Hours Medication List:     Current Facility-Administered Medications:  acetaminophen 650 mg Oral Q6H PRN Veronica Farias DO   aspirin 81 mg Oral Daily George Sousa MD   atorvastatin 10 mg Oral Daily With [place]-Val, DO   benzonatate 200 mg Oral TID Veronica Ambron, DO   cholecalciferol 1,000 Units Oral Daily Veronica Ambron, DO   fluticasone-vilanterol 1 puff Inhalation Daily Veronica Ambron, DO   furosemide 40 mg Oral Daily Veronica Ambron, DO   guaiFENesin 1,200 mg Oral Q12H [facility] Veronica Ambron, DO   levalbuterol 1 25 mg Nebulization Q6H Veronica Ambron, DO   levothyroxine 150 mcg Oral Early Morning Veronica Ambron, DO   metoprolol tartrate 25 mg Oral Q12H [facility] Veronica Ambron, DO   ondansetron 4 mg Intravenous Q6H PRN Veronica Ambron, DO   pantoprazole 40 mg Oral BID AC Angel Luis Tejeda MD   potassium chloride 40 mEq Oral BID With Meals Veronica Ambron, DO   pramipexole 0 5 mg Oral HS Veronica Ambron, DO   predniSONE 40 mg Oral Daily Veronica Ambron, DO   rivaroxaban 20 mg Oral QPM Angel Luis Tejeda MD   sodium chloride 3 mL Nebulization Q6H Veronica Dinora, DO        Today, Patient Was Seen By: Ibrahima Morrow MD    ** Please Note: Dictation voice to text software may have been used in the creation of this document   **

## 2020-01-21 NOTE — ASSESSMENT & PLAN NOTE
Secondary to severe anemia and RSV  Improving  Status post 4 units of RBC transfusion  Hemoglobin 9 7 this morning  Patient is hemodynamically and medically stable for discharge back to [facility] tomorrow 1/22

## 2020-01-21 NOTE — PLAN OF CARE
Problem: Prexisting or High Potential for Compromised Skin Integrity  Goal: Skin integrity is maintained or improved  Description  INTERVENTIONS:  - Identify patients at risk for skin breakdown  - Assess and monitor skin integrity  - Assess and monitor nutrition and hydration status  - Monitor labs   - Assess for incontinence   - Turn and reposition patient  - Assist with mobility/ambulation  - Relieve pressure over bony prominences  - Avoid friction and shearing  - Provide appropriate hygiene as needed including keeping skin clean and dry  - Evaluate need for skin moisturizer/barrier cream  - Collaborate with interdisciplinary team   - Patient/family teaching  - Consider wound care consult   Outcome: Progressing     Problem: Potential for Falls  Goal: Patient will remain free of falls  Description  INTERVENTIONS:  - Assess patient frequently for physical needs  -  Identify cognitive and physical deficits and behaviors that affect risk of falls    -  [place] fall precautions as indicated by assessment   - Educate patient/family on patient safety including physical limitations  - Instruct patient to call for assistance with activity based on assessment  - Modify environment to reduce risk of injury  - Consider OT/PT consult to assist with strengthening/mobility  Outcome: Progressing     Problem: CARDIOVASCULAR - ADULT  Goal: Maintains optimal cardiac output and hemodynamic stability  Description  INTERVENTIONS:  - Monitor I/O, vital signs and rhythm  - Monitor for S/S and trends of decreased cardiac output  - Administer and titrate ordered vasoactive medications to optimize hemodynamic stability  - Assess quality of pulses, skin color and temperature  - Assess for signs of decreased coronary artery perfusion  - Instruct patient to report change in severity of symptoms  Outcome: Progressing     Problem: HEMATOLOGIC - ADULT  Goal: Maintains hematologic stability  Description  INTERVENTIONS  - Assess for signs and symptoms of bleeding or hemorrhage  - Monitor labs  - Administer supportive blood products/factors as ordered and appropriate  Outcome: Progressing     Problem: DISCHARGE PLANNING  Goal: Discharge to home or other facility with appropriate resources  Description  INTERVENTIONS:  - Identify barriers to discharge w/patient and caregiver  - Arrange for needed discharge resources and transportation as appropriate  - Identify discharge learning needs (meds, wound care, etc )  - Arrange for interpretive services to assist at discharge as needed  - Refer to Case Management Department for coordinating discharge planning if the patient needs post-hospital services based on physician/advanced practitioner order or complex needs related to functional status, cognitive ability, or social support system  Outcome: Progressing     Problem: Knowledge Deficit  Goal: Patient/family/caregiver demonstrates understanding of disease process, treatment plan, medications, and discharge instructions  Description  Complete learning assessment and assess knowledge base    Interventions:  - Provide teaching at level of understanding  - Provide teaching via preferred learning methods  Outcome: Progressing     Problem: GENITOURINARY - ADULT  Goal: Maintains or returns to baseline urinary function  Description  INTERVENTIONS:  - Assess urinary function  - Encourage oral fluids to ensure adequate hydration if ordered  - Administer IV fluids as ordered to ensure adequate hydration  - Administer ordered medications as needed  - Offer frequent toileting  - Follow urinary retention protocol if ordered  Outcome: Progressing     Problem: SKIN/TISSUE INTEGRITY - ADULT  Goal: Skin integrity remains intact  Description  INTERVENTIONS  - Identify patients at risk for skin breakdown  - Assess and monitor skin integrity  - Assess and monitor nutrition and hydration status  - Monitor labs (i e  albumin)  - Assess for incontinence   - Turn and reposition patient  - Assist with mobility/ambulation  - Relieve pressure over bony prominences  - Avoid friction and shearing  - Provide appropriate hygiene as needed including keeping skin clean and dry  - Evaluate need for skin moisturizer/barrier cream  - Collaborate with interdisciplinary team (i e  Nutrition, Rehabilitation, etc )   - Patient/family teaching  Outcome: Progressing     Problem: RESPIRATORY - ADULT  Goal: Achieves optimal ventilation and oxygenation  Description  INTERVENTIONS:  - Assess for changes in respiratory status  - Assess for changes in mentation and behavior  - Position to facilitate oxygenation and minimize respiratory effort  - Oxygen administered by appropriate delivery if ordered  - Initiate smoking cessation education as indicated  - Encourage broncho-pulmonary hygiene including cough, deep breathe, Incentive Spirometry  - Assess the need for suctioning and aspirate as needed  - Assess and instruct to report SOB or any respiratory difficulty  - Respiratory Therapy support as indicated  Outcome: Progressing     Problem: GASTROINTESTINAL - ADULT  Goal: Maintains or returns to baseline bowel function  Description  INTERVENTIONS:  - Assess bowel function  - Encourage oral fluids to ensure adequate hydration  - Administer IV fluids if ordered to ensure adequate hydration  - Administer ordered medications as needed  - Encourage mobilization and activity  - Consider nutritional services referral to assist patient with adequate nutrition and appropriate food choices  - Monitor for bleeding    Outcome: Progressing

## 2020-01-21 NOTE — SOCIAL WORK
MD planning on sending pt back to [facility] tomorrow  [facility] can accept pt back tomorrow  TC to [facility] for BLS and waiting for a call back  Medical Necessity form was completed and put in binder

## 2020-01-21 NOTE — TRANSPORTATION MEDICAL NECESSITY
Section I - General Information    Name of Patient: Carrillo Martinez                 : 1935    Medicare #: [id number]  Transport Date: 20 (PCS is valid for round trips on this date and for all repetitive trips in the 60-day range as noted below )  Origin: [address]                                                         Destination: [facility]  Is the pt's stay covered under Medicare Part A (PPS/DRG)   [x]     Closest appropriate facility? If no, why is transport to more distant facility required? Yes  If hospice pt, is this transport related to pt's terminal illness? NA       Section II - Medical Necessity Questionnaire  Ambulance transportation is medically necessary only if other means of transport are contraindicated or would be potentially harmful to the patient  To meet this requirement, the patient must either be "bed confined" or suffer from a condition such that transport by means other than ambulance is contraindicated by the patient's condition  The following questions must be answered by the medical professional signing below for this form to be valid:    1)  Describe the MEDICAL CONDITION (physical and/or mental) of this patient AT [address] [place] [address] that requires the patient to be transported in an ambulance and why transport by other means is contraindicated by the patient's condition:  : Patient's WT is 292 lbs, contact for CRE in urine, fall risk, decreased safety judgement, decreased UE strength, not safe to ride in w/c van alone, non- ambulatory and uses power chair, but needs leonidas lift to chair  2) Is the patient "bed confined" as defined below? No  To be "be confined" the patient must satisfy all three of the following conditions: (1) unable to get up from bed without Assistance; AND (2) unable to ambulate; AND (3) unable to sit in a chair or wheelchair      3) Can this patient safely be transported by car or wheelchair Arley Hazel (i e , seated during transport without a medical attendant or monitoring)? No    4) In addition to completing questions 1-3 above, please check any of the following conditions that apply*:   *Note: supporting documentation for any boxes checked must be maintained in the patient's medical records  If hosp-hosp transfer, describe services needed at 2nd facility not available at 1st facility? Medical attendant required   Special handling/isolation/infection control precautions required   Unable to tolerate seated position for time needed to transport   Morbid obesity requires additional personnel/equipment to safely handle patient       Section III - Signature of Physician or Healthcare Professional  I certify that the above information is true and correct based on my evaluation of this patient, and represent that the patient requires transport by ambulance and that other forms of transport are contraindicated  I understand that this information will be used by the Centers for Medicare and Medicaid Services (CMS) to support the determination of medical necessity for ambulance services, and I represent that I have personal knowledge of the patient's condition at time of transport  []  If this box is checked, I also certify that the patient is physically or mentally incapable of signing the ambulance service's claim and that the institution with which I am affiliated has furnished care, services, or assistance to the patient  My signature below is made on behalf of the patient pursuant to 42 CFR §424 36(b)(4)  In accordance with 42 CFR §424 37, the specific reason(s) that the patient is physically or mentally incapable of signing the claim form is as follows        Signature of Physician* or Healthcare Professional_____________Becki MILLER, ASHLEY_________________________________________________  Signature Date 01/22/20 (For scheduled repetitive transports, this form is not valid for transports performed more than 60 days after this date)    Printed Name & Credentials of Physician or Healthcare Professional (MD, DO, RN, etc )_____________Becki Franklin ASHLEY Robles___________________  *Form must be signed by patient's attending physician for scheduled, repetitive transports   For non-repetitive, unscheduled ambulance transports, if unable to obtain the signature of the attending physician, any of the following may sign (choose appropriate option below)  [] Physician Assistant []  Clinical Nurse Specialist []  Registered Nurse  []  Nurse Practitioner  [x] Discharge Planner

## 2020-01-22 VITALS
TEMPERATURE: 98.1 F | BODY MASS INDEX: 51.84 KG/M2 | WEIGHT: 292.55 LBS | SYSTOLIC BLOOD PRESSURE: 126 MMHG | HEART RATE: 85 BPM | OXYGEN SATURATION: 96 % | RESPIRATION RATE: 19 BRPM | HEIGHT: 63 IN | DIASTOLIC BLOOD PRESSURE: 70 MMHG

## 2020-01-22 PROBLEM — B37.81 CANDIDAL ESOPHAGITIS (HCC): Status: ACTIVE | Noted: 2020-01-22

## 2020-01-22 PROCEDURE — 99239 HOSP IP/OBS DSCHRG MGMT >30: CPT | Performed by: FAMILY MEDICINE

## 2020-01-22 PROCEDURE — 94640 AIRWAY INHALATION TREATMENT: CPT

## 2020-01-22 PROCEDURE — 94760 N-INVAS EAR/PLS OXIMETRY 1: CPT

## 2020-01-22 RX ORDER — FLUCONAZOLE 200 MG/1
200 TABLET ORAL DAILY
Qty: 21 TABLET | Refills: 0
Start: 2020-01-23 | End: 2020-02-13

## 2020-01-22 RX ORDER — PANTOPRAZOLE SODIUM 40 MG/1
40 TABLET, DELAYED RELEASE ORAL
Qty: 170 TABLET | Refills: 0
Start: 2020-01-22

## 2020-01-22 RX ORDER — FLUCONAZOLE 100 MG/1
400 TABLET ORAL DAILY
Status: COMPLETED | OUTPATIENT
Start: 2020-01-22 | End: 2020-01-22

## 2020-01-22 RX ORDER — FLUCONAZOLE 100 MG/1
200 TABLET ORAL DAILY
Status: DISCONTINUED | OUTPATIENT
Start: 2020-01-23 | End: 2020-01-22 | Stop reason: HOSPADM

## 2020-01-22 RX ADMIN — GUAIFENESIN 1200 MG: 600 TABLET, EXTENDED RELEASE ORAL at 09:06

## 2020-01-22 RX ADMIN — PANTOPRAZOLE SODIUM 40 MG: 40 TABLET, DELAYED RELEASE ORAL at 05:35

## 2020-01-22 RX ADMIN — LEVALBUTEROL HYDROCHLORIDE 1.25 MG: 1.25 SOLUTION, CONCENTRATE RESPIRATORY (INHALATION) at 07:44

## 2020-01-22 RX ADMIN — ASPIRIN 81 MG: 81 TABLET, COATED ORAL at 09:06

## 2020-01-22 RX ADMIN — BENZONATATE 200 MG: 100 CAPSULE ORAL at 09:07

## 2020-01-22 RX ADMIN — FUROSEMIDE 40 MG: 40 TABLET ORAL at 09:06

## 2020-01-22 RX ADMIN — FLUTICASONE FUROATE AND VILANTEROL TRIFENATATE 1 PUFF: 100; 25 POWDER RESPIRATORY (INHALATION) at 09:07

## 2020-01-22 RX ADMIN — PREDNISONE 40 MG: 20 TABLET ORAL at 09:06

## 2020-01-22 RX ADMIN — LEVOTHYROXINE SODIUM 150 MCG: 75 TABLET ORAL at 05:35

## 2020-01-22 RX ADMIN — FLUCONAZOLE 400 MG: 100 TABLET ORAL at 12:42

## 2020-01-22 RX ADMIN — POTASSIUM CHLORIDE 40 MEQ: 1500 TABLET, EXTENDED RELEASE ORAL at 09:07

## 2020-01-22 RX ADMIN — ISODIUM CHLORIDE 3 ML: 0.03 SOLUTION RESPIRATORY (INHALATION) at 07:44

## 2020-01-22 RX ADMIN — METOPROLOL TARTRATE 25 MG: 25 TABLET ORAL at 09:07

## 2020-01-22 RX ADMIN — ACETAMINOPHEN 650 MG: 325 TABLET ORAL at 03:39

## 2020-01-22 RX ADMIN — MELATONIN 1000 UNITS: at 09:07

## 2020-01-22 NOTE — ASSESSMENT & PLAN NOTE
Biopsy brushings from EGD returned positive for Candida  Received fluconazole 400 mg PO x1  Continue with fluconazole 200 mg daily for 3 weeks

## 2020-01-22 NOTE — ASSESSMENT & PLAN NOTE
Secondary to severe anemia and RSV  Improving  Status post 4 units of RBC transfusion  Hemoglobin 9 7 this morning  Patient is hemodynamically and medically stable for discharge back to [facility]

## 2020-01-22 NOTE — ASSESSMENT & PLAN NOTE
Likely multifactorial due to hematuria and GI bleed while on Xarelto  Stool occult blood positive  Hemoglobin was 3 8 on admission  Received 4 units of RBC total   Hemoglobin 9 7 today  Consulted urology and GI, appreciate input  EGD performed which showed esophagitis and white plaques  Brushings obtained to rule out candidal esophagitis  Continue Protonix 40 mg PO BID; Will need to be on Protonix BID for 12 weeks and then will need to repeat EGD to ensure healing  Continue Xarelto and aspirin

## 2020-01-22 NOTE — ASSESSMENT & PLAN NOTE
Wt Readings from Last 3 Encounters:   01/22/20 133 kg (292 lb 8 8 oz)   06/26/19 (!) 140 kg (309 lb)   06/11/19 (!) 147 kg (325 lb)     2D echo normal EF,G2DD  Continue Lasix 40 mg daily  Continue metoprolol  Continue daily weight and I&Os  Weight improving

## 2020-01-22 NOTE — DISCHARGE SUMMARY
Discharge- Nery Stashaheen 1935, 81 y o  female MRN: [id number]    Unit/Bed#: E4 -01 Encounter: [id number]    Primary Care Provider: Duyen Silva MD   Date and time admitted to hospital: 1/15/2020  2:55 PM        * Syncope  Assessment & Plan  Secondary to severe anemia and RSV  Improving  Status post 4 units of RBC transfusion  Hemoglobin 9 7 this morning  Patient is hemodynamically and medically stable for discharge back to [facility] today  Acute respiratory failure [facility])  Assessment & Plan  Secondary to RSV and anemia  Continue bronchodilators, Mucinex, prednisone and Breo  Incentive spirometer  Patient is on room air, satting low to mid 90s currently  Symptomatic anemia  Assessment & Plan  Likely multifactorial due to hematuria and GI bleed while on Xarelto  Stool occult blood positive  Hemoglobin was 3 8 on admission  Received 4 units of RBC total   Hemoglobin 9 7 today  Consulted urology and GI, appreciate input  EGD performed which showed esophagitis and white plaques  Brushings obtained to rule out candidal esophagitis  Continue Protonix 40 mg PO BID; Will need to be on Protonix BID for 12 weeks and then will need to repeat EGD to ensure healing  Continue Xarelto and aspirin  Acute on chronic diastolic CHF (congestive heart failure) (HCC)  Assessment & Plan  Wt Readings from Last 3 Encounters:   01/22/20 133 kg (292 lb 8 8 oz)   06/26/19 (!) 140 kg (309 lb)   06/11/19 (!) 147 kg (325 lb)     2D echo normal EF,G2DD  Continue Lasix 40 mg daily  Continue metoprolol  Continue daily weight and I&Os  Weight improving  Urinary tract infection  Assessment & Plan  UTI with hematuria  Urine culture prelim report >100,000 cfu/ml Gram Negative Asim  Completed 4 days of IV antibiotic therapy with Rocephin  Antibiotics discontinued  Paroxysmal A-fib [facility])  Assessment & Plan  Continue metoprolol  Xarelto  for anticoagulation    EKG sinus rhythm in ED       Mixed hyperlipidemia  Assessment & Plan  Continue Lipitor    Essential hypertension  Assessment & Plan  BP stable  Continue metoprolol  Monitor    Candidal esophagitis (HCC)  Assessment & Plan  Biopsy brushings from EGD returned positive for Candida  Received fluconazole 400 mg PO x1  Continue with fluconazole 200 mg daily for 3 weeks  Discharging Physician / Practitioner: Juan Blanco MD  PCP: Sera Valentine MD  Admission Date:   Admission Orders (From admission, onward)     Ordered        01/15/20 1801  Inpatient Admission  Once                   Discharge Date: 01/22/20    Resolved Problems  Date Reviewed: 1/22/2020    None          Consultations During Hospital Stay:  · Gastroenterology, Urology  Procedures Performed:   · Portable chest x-ray 01/15/2020, ultrasound of kidney and bladder 01/17/2020, 12 lead EKG 01/15/2020, transthoracic echocardiogram 01/16/2020, EGD 01/20/2020  Significant Findings / Test Results:   · Portable chest x-ray 1/15-no acute cardiopulmonary disease  · Twelve lead EKG 1/15-normal sinus rhythm with low-voltage QRS  · Transthoracic echocardiogram 1/16-normal left ventricular systolic function with EF estimated to be 65%  There were no regional wall motion abnormalities  Grade 2 diastolic dysfunction  Mildly dilated left and right atria  Mild mitral valve regurgitation  Trace tricuspid valve regurgitation  Normal right ventricular size, systolic function, and wall thickness  There was no evidence for aortic valve stenosis or regurgitation  There was no evidence for pulmonic valve stenosis or regurgitation  There was no pericardial effusion  Pericardium was normal in appearance  · Ultrasound of kidney and bladder 1/17-limited study with no hydronephrosis and presence of 1 4 cm nonobstructing left renal calculus suspected    · EGD 1/20-1 cm hiatal hernia-GE junction 38 cm from the incisors, diaphragmatic impression 39 cm from the incisors with LA grade C esophagitis  Whitish plaque in the lower 3rd of the esophagus  Brushings were obtained for evaluation of Candida  Otherwise normal esophagus, stomach, and duodenum  Incidental Findings:   · Portable chest x-ray 1/15-no acute cardiopulmonary disease  · Twelve lead EKG 1/15-normal sinus rhythm with low-voltage QRS  · Transthoracic echocardiogram 1/16-normal left ventricular systolic function with EF estimated to be 65%  There were no regional wall motion abnormalities  Grade 2 diastolic dysfunction  Mildly dilated left and right atria  Mild mitral valve regurgitation  Trace tricuspid valve regurgitation  Normal right ventricular size, systolic function, and wall thickness  There was no evidence for aortic valve stenosis or regurgitation  There was no evidence for pulmonic valve stenosis or regurgitation  There was no pericardial effusion  Pericardium was normal in appearance  · Ultrasound of kidney and bladder 1/17-limited study with no hydronephrosis and presence of 1 4 cm nonobstructing left renal calculus suspected  · EGD 1/20-1 cm hiatal hernia-GE junction 38 cm from the incisors, diaphragmatic impression 39 cm from the incisors with LA grade C esophagitis  Whitish plaque in the lower 3rd of the esophagus  Brushings were obtained for evaluation of Candida  Otherwise normal esophagus, stomach, and duodenum  Test Results Pending at Discharge (will require follow up): · None     Outpatient Tests Requested:  · Repeat EGD in 12 weeks to document healing  Complications:  None    Reason for Admission:  Syncope due to symptomatic anemia and RSV  Hospital Course:     Gabriella Rocha is a 81 y o  female patient who originally presented to the hospital on 1/15/2020 due to cough and shortness of breath  She also had a syncopal episode in bed while speaking to her doctor prior to her admission  She also presented with a fever at the nursing home    In the ED on evaluation she was found to be wheezing and was given nebulized breathing treatments and Lasix  She was found to have RSV infection and was also found to be symptomatic from anemia with hemoglobin of 3 8  She was noted to have hematuria as well  She was admitted to the medical-surgical unit and placed on respiratory isolation  She was transfused with packed red blood cells  GI was consulted  Xarelto and aspirin were held  Urology was also consulted and urinalysis was obtained which showed evidence of urinary tract infection  She was started on IV antibiotics  GI performed EGD which showed presence of LA grade C esophagitis with whitish plaque in the lower 3rd of the esophagus  She tolerated the procedure well and postoperatively had no complications  She was kept on Protonix twice daily  She completed treatment with IV Rocephin after 4 days with urine culture results positive for gram-negative rods  Her Xarelto and aspirin were resumed once her hemoglobin remained stable  She is to stay on twice daily Protonix for 12 weeks and have a repeat EGD afterwards to document healing  The brushings from a EGD came back positive for Candida and she was started on fluconazole which she will continue for 3 weeks total   Upon her admission she was also noted to be in acute on chronic diastolic CHF and was treated with IV Lasix which was eventually transitioned to oral Lasix when she was euvolemic  She also received treatment for RSV infection with bronchodilators, Mucinex, Breo Ellipta, and prednisone  She is currently oxygenating well on room air  She was seen and evaluated by physical therapy and recommendation was made to discharge patient to rehab  The patient is to be discharged to [facility] later today in stable condition  Please see above list of diagnoses and related plan for additional information       Condition at Discharge: stable     Discharge Day Visit / Exam:     Subjective:  Patient has no complaints at this time  Vitals: Blood Pressure: 126/70 (01/22/20 0700)  Pulse: 85 (01/22/20 0700)  Temperature: 98 1 °F (36 7 °C) (01/22/20 0700)  Temp Source: Temporal (01/22/20 0700)  Respirations: 19 (01/22/20 0700)  Height: 5' 3" (160 cm) (01/20/20 1343)  Weight - Scale: 133 kg (292 lb 8 8 oz) (01/22/20 0546)  SpO2: 96 % (01/22/20 0755)  Exam:   Physical Exam   Constitutional: She is oriented to person, place, and time  She appears well-developed and well-nourished  No distress  HENT:   Head: Normocephalic and atraumatic  Eyes: Pupils are equal, round, and reactive to light  EOM are normal  No scleral icterus  Neck: Normal range of motion  Neck supple  Cardiovascular: Normal rate and normal heart sounds  No murmur heard  Irregularly irregular rhythm   Pulmonary/Chest: Effort normal and breath sounds normal  She has no wheezes  She has no rales  Abdominal: Soft  Bowel sounds are normal  She exhibits no distension  There is no tenderness  Musculoskeletal: She exhibits no edema  Neurological: She is alert and oriented to person, place, and time  Skin: Skin is warm and dry  She is not diaphoretic  Psychiatric: She has a normal mood and affect  Her behavior is normal      Discussion with Family:  No    Discharge instructions/Information to patient and family:   See after visit summary for information provided to patient and family  Provisions for Follow-Up Care:  See after visit summary for information related to follow-up care and any pertinent home health orders  Disposition:     Acute Rehab at [place] [address] to [facility]:   · [facility] - Not Applicable to this Patient    Planned Readmission:  None     Discharge Statement:  I spent 35 minutes discharging the patient  This time was spent on the day of discharge  I had direct contact with the patient on the day of discharge   Greater than 50% of the total time was spent examining patient, answering all patient questions, arranging and discussing plan of care with patient as well as directly providing post-discharge instructions  Additional time then spent on discharge activities  Discharge Medications:  See after visit summary for reconciled discharge medications provided to patient and family        ** Please Note: This note has been constructed using a voice recognition system **

## 2020-01-22 NOTE — PLAN OF CARE
Problem: Prexisting or High Potential for Compromised Skin Integrity  Goal: Skin integrity is maintained or improved  Description  INTERVENTIONS:  - Identify patients at risk for skin breakdown  - Assess and monitor skin integrity  - Assess and monitor nutrition and hydration status  - Monitor labs   - Assess for incontinence   - Turn and reposition patient  - Assist with mobility/ambulation  - Relieve pressure over bony prominences  - Avoid friction and shearing  - Provide appropriate hygiene as needed including keeping skin clean and dry  - Evaluate need for skin moisturizer/barrier cream  - Collaborate with interdisciplinary team   - Patient/family teaching  - Consider wound care consult   Outcome: Progressing     Problem: Potential for Falls  Goal: Patient will remain free of falls  Description  INTERVENTIONS:  - Assess patient frequently for physical needs  -  Identify cognitive and physical deficits and behaviors that affect risk of falls    -  [place] fall precautions as indicated by assessment   - Educate patient/family on patient safety including physical limitations  - Instruct patient to call for assistance with activity based on assessment  - Modify environment to reduce risk of injury  - Consider OT/PT consult to assist with strengthening/mobility  Outcome: Progressing     Problem: CARDIOVASCULAR - ADULT  Goal: Maintains optimal cardiac output and hemodynamic stability  Description  INTERVENTIONS:  - Monitor I/O, vital signs and rhythm  - Monitor for S/S and trends of decreased cardiac output  - Administer and titrate ordered vasoactive medications to optimize hemodynamic stability  - Assess quality of pulses, skin color and temperature  - Assess for signs of decreased coronary artery perfusion  - Instruct patient to report change in severity of symptoms  Outcome: Progressing     Problem: HEMATOLOGIC - ADULT  Goal: Maintains hematologic stability  Description  INTERVENTIONS  - Assess for signs and symptoms of bleeding or hemorrhage  - Monitor labs  - Administer supportive blood products/factors as ordered and appropriate  Outcome: Progressing     Problem: DISCHARGE PLANNING  Goal: Discharge to home or other facility with appropriate resources  Description  INTERVENTIONS:  - Identify barriers to discharge w/patient and caregiver  - Arrange for needed discharge resources and transportation as appropriate  - Identify discharge learning needs (meds, wound care, etc )  - Arrange for interpretive services to assist at discharge as needed  - Refer to Case Management Department for coordinating discharge planning if the patient needs post-hospital services based on physician/advanced practitioner order or complex needs related to functional status, cognitive ability, or social support system  Outcome: Progressing     Problem: Knowledge Deficit  Goal: Patient/family/caregiver demonstrates understanding of disease process, treatment plan, medications, and discharge instructions  Description  Complete learning assessment and assess knowledge base    Interventions:  - Provide teaching at level of understanding  - Provide teaching via preferred learning methods  Outcome: Progressing     Problem: GENITOURINARY - ADULT  Goal: Maintains or returns to baseline urinary function  Description  INTERVENTIONS:  - Assess urinary function  - Encourage oral fluids to ensure adequate hydration if ordered  - Administer IV fluids as ordered to ensure adequate hydration  - Administer ordered medications as needed  - Offer frequent toileting  - Follow urinary retention protocol if ordered  Outcome: Progressing     Problem: SKIN/TISSUE INTEGRITY - ADULT  Goal: Skin integrity remains intact  Description  INTERVENTIONS  - Identify patients at risk for skin breakdown  - Assess and monitor skin integrity  - Assess and monitor nutrition and hydration status  - Monitor labs (i e  albumin)  - Assess for incontinence   - Turn and reposition patient  - Assist with mobility/ambulation  - Relieve pressure over bony prominences  - Avoid friction and shearing  - Provide appropriate hygiene as needed including keeping skin clean and dry  - Evaluate need for skin moisturizer/barrier cream  - Collaborate with interdisciplinary team (i e  Nutrition, Rehabilitation, etc )   - Patient/family teaching  Outcome: Progressing     Problem: RESPIRATORY - ADULT  Goal: Achieves optimal ventilation and oxygenation  Description  INTERVENTIONS:  - Assess for changes in respiratory status  - Assess for changes in mentation and behavior  - Position to facilitate oxygenation and minimize respiratory effort  - Oxygen administered by appropriate delivery if ordered  - Initiate smoking cessation education as indicated  - Encourage broncho-pulmonary hygiene including cough, deep breathe, Incentive Spirometry  - Assess the need for suctioning and aspirate as needed  - Assess and instruct to report SOB or any respiratory difficulty  - Respiratory Therapy support as indicated  Outcome: Progressing     Problem: GASTROINTESTINAL - ADULT  Goal: Maintains or returns to baseline bowel function  Description  INTERVENTIONS:  - Assess bowel function  - Encourage oral fluids to ensure adequate hydration  - Administer IV fluids if ordered to ensure adequate hydration  - Administer ordered medications as needed  - Encourage mobilization and activity  - Consider nutritional services referral to assist patient with adequate nutrition and appropriate food choices  - Monitor for bleeding    Outcome: Progressing

## 2020-01-22 NOTE — SOCIAL WORK
[facility] reported Madi Robbins will  pt at 1300 to go back to [facility] today  , MD, RN, and SNF are aware  Left a message with pt's dtr Shaila Marks that pt was returning to [facility] today at 1300

## 2020-01-28 ENCOUNTER — TELEPHONE (OUTPATIENT)
Dept: UROLOGY | Facility: MEDICAL CENTER | Age: 85
End: 2020-01-28

## 2020-01-28 NOTE — TELEPHONE ENCOUNTER
Patient of Dr Katya Zambrano from [facility] called  Patient was in the Emergency Room and was recently discharged  She needs to discuss next steps in care and set up an appointment  Please triage for appointment  Kenya can be reached at [phone]

## 2020-01-28 NOTE — TELEPHONE ENCOUNTER
Called and left message for Kenya at [facility] to give office a call back to schedule appointment for patient

## 2020-01-29 NOTE — TELEPHONE ENCOUNTER
Appointment scheduled with Dr Yessica Robison on 02/21/20  Patient is scheduled for an ultrasound on 01/31/20 pending on those results, patient can be seen sooner if needed

## 2020-01-31 ENCOUNTER — HOSPITAL ENCOUNTER (OUTPATIENT)
Dept: ULTRASOUND IMAGING | Facility: HOSPITAL | Age: 85
Discharge: HOME | End: 2020-01-31
Attending: UROLOGY
Payer: COMMERCIAL

## 2020-01-31 DIAGNOSIS — N20.1 LEFT URETERAL CALCULUS: ICD-10-CM

## 2020-01-31 PROCEDURE — 76770 US EXAM ABDO BACK WALL COMP: CPT

## 2020-02-06 ENCOUNTER — TELEPHONE (OUTPATIENT)
Dept: GASTROENTEROLOGY | Facility: MEDICAL CENTER | Age: 85
End: 2020-02-06

## 2020-02-06 NOTE — TELEPHONE ENCOUNTER
----- Message from Chay Muñiz PA-C sent at 1/20/2020  3:01 PM EST -----  Please schedule patient for repeat EGD in 12 weeks for esophagitis

## 2020-02-06 NOTE — TELEPHONE ENCOUNTER
Pt is scheduled with Dr Katie Pugh at [place]  On 4/16/20 for repeat EGD, pt is on xeralto and a clearance was faxed to [facility] along with hospital packet and procedure instructions

## 2020-02-17 RX ORDER — LIDOCAINE HYDROCHLORIDE 10 MG/ML
INJECTION, SOLUTION INFILTRATION; PERINEURAL
COMMUNITY
Start: 2019-12-10 | End: 2020-08-10 | Stop reason: ALTCHOICE

## 2020-02-17 RX ORDER — SODIUM CHLORIDE 9 MG/ML
INJECTION, SOLUTION INTRAVENOUS
COMMUNITY
Start: 2019-11-20 | End: 2020-08-10 | Stop reason: CLARIF

## 2020-02-17 RX ORDER — CEFTRIAXONE 500 MG/1
INJECTION, POWDER, FOR SOLUTION INTRAMUSCULAR; INTRAVENOUS
COMMUNITY
Start: 2019-12-10 | End: 2020-08-10 | Stop reason: ALTCHOICE

## 2020-02-21 ENCOUNTER — TELEPHONE (OUTPATIENT)
Dept: UROLOGY | Facility: MEDICAL CENTER | Age: 85
End: 2020-02-21

## 2020-02-21 ENCOUNTER — OFFICE VISIT (OUTPATIENT)
Dept: UROLOGY | Facility: MEDICAL CENTER | Age: 85
End: 2020-02-21
Payer: MEDICARE

## 2020-02-21 VITALS — DIASTOLIC BLOOD PRESSURE: 74 MMHG | SYSTOLIC BLOOD PRESSURE: 130 MMHG

## 2020-02-21 DIAGNOSIS — N20.0 BILATERAL NEPHROLITHIASIS: Primary | ICD-10-CM

## 2020-02-21 PROCEDURE — 99213 OFFICE O/P EST LOW 20 MIN: CPT | Performed by: UROLOGY

## 2020-02-21 RX ORDER — CHOLECALCIFEROL (VITAMIN D3) 125 MCG
5 CAPSULE ORAL
COMMUNITY

## 2020-02-21 NOTE — PROGRESS NOTES
[address] [facility]  [facility]  [address] [facility]  [place], [address] [place] [address]  [phone]  www  [facility]  org      NAME: Cale Cisneros  AGE: 81 y o  SEX: female  : 1935   MRN: [id number]    DATE: 2020  TIME: 9:57 AM    Assessment and Plan :    Reason hemorrhagic cystitis due to Morganella urinary tract infection and bilateral nephrolithiasis with a 6 mm stone in each kidney, on Xarelto  She is to see cardiology we soon to discuss the disposition of the Xarelto  I do not think she has an elective surgery candidate at this time  If 1 or both of the stones to drop into the ureters we will obviously treat them  See me in 6 months with a renal ultrasound  Chief Complaint   No chief complaint on file  History of Present Illness   Follow-up hemorrhagic cystitis due to Morganella urinary tract infection  She has a history of nephrolithiasis and ureteral calculi status post cystoscopy, left ureteroscopy laser lithotripsy by me in 2019  She resides in [facility]  Renal ultrasound 2020 shows a 6 mm right renal calculus, and a 6 mm left renal calculus  No other major abnormalities  She is on anticoagulation  Right now she is doing very well  When she was ill with the severe anemia and bleeding, she had spent 10 days in bed prior to being admitted to the hospital   Therefore, if she develops  malaise or mental status changes she will need to have a urine culture checked sooner rather than later  With regards to her stones, I do not think doing a preemptive operation is indicated  I would like to see her get off the Xarelto if possible  She is being followed by infectious disease        The following portions of the patient's history were reviewed and updated as appropriate: allergies, current medications, past family history, past medical history, past social history, past surgical history and problem list   Past Medical History:   Diagnosis Date    Acute kidney failure (HCC)     Anemia     Arthritis     Chafing     folds of skin and back of thighs    Chronic indwelling [place] catheter     removed    Colon polyp     Difficulty walking     Discitis     Discitis     Dysphagia     Dysphagia     Dysuria     Gait abnormality     GERD (gastroesophageal reflux disease)     History of transfusion     Hypothyroidism     Irregular heart beat     Kidney stone     Lymphedema     Major depressive disorder     MDD (major depressive disorder)     [facility] infarction) [facility]     Morbid obesity ([facility] )     Morbid obesity (HCC)     Muscle weakness     Muscle weakness (generalized)     NSTEMI (non-ST elevated myocardial infarction) ([facility])     Osteoporosis     Paroxysmal A-fib (HCC)     Renal disorder     Sleep apnea      Past Surgical History:   Procedure Laterality Date    CHOLECYSTECTOMY      COLONOSCOPY      FL RETROGRADE PYELOGRAM  5/22/2019    HYSTERECTOMY      JOINT REPLACEMENT Bilateral     knee replacment    LAPAROSCOPIC GASTRIC BANDING      SD CYSTO/URETERO W/LITHOTRIPSY &INDWELL STENT INSRT Left 6/26/2019    Procedure: CYSTO, URETEROSCOPY W/HOLMIUM LASER, STENT EXCHANGE;  Surgeon: Florin Calhoun MD;  Location: [facility];  Service: Urology    SD CYSTOURETHROSCOPY,URETER CATHETER Left 5/22/2019    Procedure: CYSTOSCOPY; RIGHT RETROGRADE PYELOGRAM WITH INSERTION STENT URETERAL LEFT;  Surgeon: Florin Calhoun MD;  Location: [facility];  Service: Urology    REMOVAL GASTRIC BAND LAPAROSCOPIC      VENTRAL HERNIA REPAIR      x4     shoulder  Review of Systems   Review of Systems    Active Problem List     Patient Active Problem List   Diagnosis    Ureterolithiasis    Urinary tract infection    Essential hypertension    Morbid obesity ([facility] )    Mixed hyperlipidemia    Paroxysmal A-fib (HCC)    Elevated INR    Thrombocytopenia (HCC)    Acute urinary tract infection    Bacterial infection due to Proteus mirabilis    Left ureteral calculus    Symptomatic anemia    Syncope    Acute respiratory failure (HCC)    Acute on chronic diastolic CHF (congestive heart failure) (HCC)    Candidal esophagitis (HCC)       Objective   /74 (BP Location: Right arm, Patient Position: Sitting, Cuff Size: Adult)     Physical Exam   Constitutional: She is oriented to person, place, and time  She appears well-developed and well-nourished  HENT:   Head: Normocephalic and atraumatic  Eyes: EOM are normal    Neck: Normal range of motion  Pulmonary/Chest: Effort normal    Neurological: She is alert and oriented to person, place, and time  Skin: Skin is warm and dry  Psychiatric: She has a normal mood and affect   Her behavior is normal  Judgment and thought content normal            Current Medications     Current Outpatient Medications:     acetaminophen (TYLENOL) 325 mg tablet, Take 650 mg by mouth every 6 (six) hours as needed for mild pain , Disp: , Rfl:     alendronate (FOSAMAX) 70 mg tablet, Take 70 mg by mouth every 7 days, Disp: , Rfl:     amLODIPine (NORVASC) 10 mg tablet, amlodipine 10 mg tablet, Disp: , Rfl:     aspirin (ECOTRIN LOW STRENGTH) 81 mg EC tablet, Take 81 mg by mouth daily, Disp: , Rfl:     atorvastatin (LIPITOR) 10 mg tablet, Take 10 mg by mouth daily, Disp: , Rfl:     bisacodyl (DULCOLAX) 10 mg suppository, Insert 10 mg into the rectum daily as needed , Disp: , Rfl:     calcium carbonate-vitamin D (OSCAL-D) 500 mg-200 units per tablet, Take 1 tablet by mouth daily with breakfast, Disp: , Rfl:     celecoxib (CeleBREX) 100 mg capsule, Take 100 mg by mouth 2 (two) times a day, Disp: , Rfl:     cholecalciferol (VITAMIN D3) 1,000 units tablet, Take 1,000 Units by mouth daily, Disp: , Rfl:     Coenzyme Q10 (CO Q10) 100 MG CAPS, Take by mouth, Disp: , Rfl:     docusate sodium (COLACE) 100 mg capsule, Take 100 mg by mouth 2 (two) times a day, Disp: , Rfl:     furosemide (LASIX) 20 mg tablet, Take 40 mg by mouth daily , Disp: , Rfl:     levothyroxine 150 mcg tablet, Take 150 mcg by mouth daily, Disp: , Rfl:     lidocaine (XYLOCAINE) 1 %, , Disp: , Rfl:     losartan (COZAAR) 25 mg tablet, Take 25 mg by mouth daily, Disp: , Rfl:     Lutein-Zeaxanthin 6-0 24 MG CAPS, Take 6 mg by mouth daily , Disp: , Rfl:     magnesium hydroxide (MILK OF MAGNESIA) 400 mg/5 mL oral suspension, daily as needed , Disp: , Rfl:     melatonin 1 mg, Take 1 mg by mouth daily at bedtime, Disp: , Rfl:     Methenamine-Sodium Salicylate (CYSTEX PO), Take 15 mL by mouth 2 (two) times a day, Disp: , Rfl:     metoprolol tartrate (LOPRESSOR) 25 mg tablet, Take 25 mg by mouth every 12 (twelve) hours, Disp: , Rfl:     nitroglycerin (NITROSTAT) 0 4 mg SL tablet, Place 0 4 mg under the tongue every 5 (five) minutes as needed for chest pain , Disp: , Rfl:     pantoprazole (PROTONIX) 40 mg tablet, Take 1 tablet (40 mg total) by mouth 2 (two) times a day before meals, Disp: 170 tablet, Rfl: 0    pramipexole (MIRAPEX) 1 mg tablet, Take 0 5 mg by mouth daily at bedtime , Disp: , Rfl:     Skin Protectants, Misc   (DERMACERIN) CREA, Apply topically, Disp: , Rfl:     sodium chloride, , Disp: , Rfl:     talc, Apply topically as needed for irritation, Disp: , Rfl:     cefTRIAXone (ROCEPHIN) 500 mg injection, , Disp: , Rfl:     rivaroxaban (XARELTO) 20 mg tablet, Take 20 mg by mouth every evening , Disp: , Rfl:         Erickson Ceja MD

## 2020-02-21 NOTE — TELEPHONE ENCOUNTER
Patient was seen today and they had a question about patients renal ultrasound  They wanted to know if this can be done in the office or if they have to schedule it  I told them I can transfer them to central scheduling and they said they will take care of it

## 2020-03-10 RX ORDER — FERROUS SULFATE 325(65) MG
325 TABLET ORAL
COMMUNITY
End: 2020-08-10 | Stop reason: ALTCHOICE

## 2020-03-10 RX ORDER — METOLAZONE 2.5 MG/1
2.5 TABLET ORAL DAILY
COMMUNITY
End: 2020-08-10 | Stop reason: ALTCHOICE

## 2020-03-10 RX ORDER — POTASSIUM CHLORIDE 1.5 G/1.77G
20 POWDER, FOR SOLUTION ORAL 2 TIMES DAILY
COMMUNITY
End: 2020-08-10 | Stop reason: CLARIF

## 2020-03-10 NOTE — PRE-PROCEDURE INSTRUCTIONS
Pre-Surgery Instructions:   Medication Instructions    amLODIPine (NORVASC) 10 mg tablet Instructed patient per Anesthesia Guidelines   fluticasone (Veramyst) 27 5 MCG/SPRAY nasal spray Instructed patient per Anesthesia Guidelines   losartan (COZAAR) 25 mg tablet Instructed patient per Anesthesia Guidelines   metoprolol tartrate (LOPRESSOR) 25 mg tablet Instructed patient per Anesthesia Guidelines   pantoprazole (PROTONIX) 40 mg tablet Instructed patient per Anesthesia Guidelines

## 2020-03-11 ENCOUNTER — ANESTHESIA EVENT (OUTPATIENT)
Dept: PERIOP | Facility: HOSPITAL | Age: 85
End: 2020-03-11
Payer: MEDICARE

## 2020-03-11 NOTE — ANESTHESIA PREPROCEDURE EVALUATION
Review of Systems/Medical History  Patient summary reviewed  Chart reviewed      Cardiovascular  Exercise tolerance (METS): <4,  Hyperlipidemia, Hypertension , Past MI > 6 months, CAD (LAD 30% stenosis) , Dysrhythmias (eliquis ) , atrial fibrillation, CHF compensated CHF,    Pulmonary  Sleep apnea (does not use CPAP) ,        GI/Hepatic    GERD well controlled,        Kidney stones,        Endo/Other  History of thyroid disease , hypothyroidism,   Obesity  super morbid obesity   GYN       Hematology  Anemia ,     Musculoskeletal    Comment: Gait disturbanace Arthritis     Neurology  Negative neurology ROS      Psychology   Depression ,              Physical Exam    Airway    Mallampati score: II  TM Distance: >3 FB  Neck ROM: full     Dental       Cardiovascular  Cardiovascular exam normal    Pulmonary  Pulmonary exam normal     Other Findings        Anesthesia Plan  ASA Score- 3     Anesthesia Type- general with ASA Monitors  Additional Monitors:   Airway Plan: LMA  Plan Factors-    Induction-     Postoperative Plan-     Informed Consent- Anesthetic plan and risks discussed with patient  I personally reviewed this patient with the CRNA  Discussed and agreed on the Anesthesia Plan with the CRNA  Kirk Rodriguez

## 2020-03-12 ENCOUNTER — HOSPITAL ENCOUNTER (OUTPATIENT)
Facility: HOSPITAL | Age: 85
Setting detail: OUTPATIENT SURGERY
Discharge: HOME | End: 2020-03-12
Attending: OPHTHALMOLOGY | Admitting: OPHTHALMOLOGY
Payer: MEDICARE

## 2020-03-12 ENCOUNTER — ANESTHESIA (OUTPATIENT)
Dept: PERIOP | Facility: HOSPITAL | Age: 85
End: 2020-03-12
Payer: MEDICARE

## 2020-03-12 VITALS
RESPIRATION RATE: 16 BRPM | WEIGHT: 293 LBS | HEART RATE: 63 BPM | DIASTOLIC BLOOD PRESSURE: 52 MMHG | OXYGEN SATURATION: 93 % | TEMPERATURE: 97.9 F | BODY MASS INDEX: 51.91 KG/M2 | HEIGHT: 63 IN | SYSTOLIC BLOOD PRESSURE: 89 MMHG

## 2020-03-12 PROCEDURE — 94760 N-INVAS EAR/PLS OXIMETRY 1: CPT

## 2020-03-12 PROCEDURE — 94664 DEMO&/EVAL PT USE INHALER: CPT

## 2020-03-12 PROCEDURE — V2632 POST CHMBR INTRAOCULAR LENS: HCPCS | Performed by: OPHTHALMOLOGY

## 2020-03-12 PROCEDURE — 94640 AIRWAY INHALATION TREATMENT: CPT

## 2020-03-12 DEVICE — IOL SN60WF 24.5: Type: IMPLANTABLE DEVICE | Site: POSTERIOR CHAMBER | Status: FUNCTIONAL

## 2020-03-12 RX ORDER — SODIUM CHLORIDE 9 MG/ML
125 INJECTION, SOLUTION INTRAVENOUS CONTINUOUS
Status: DISCONTINUED | OUTPATIENT
Start: 2020-03-12 | End: 2020-03-12 | Stop reason: HOSPADM

## 2020-03-12 RX ORDER — PHENYLEPHRINE HCL 2.5 %
1 DROPS OPHTHALMIC (EYE)
Status: COMPLETED | OUTPATIENT
Start: 2020-03-12 | End: 2020-03-12

## 2020-03-12 RX ORDER — OFLOXACIN 3 MG/ML
1 SOLUTION/ DROPS OPHTHALMIC
Status: COMPLETED | OUTPATIENT
Start: 2020-03-12 | End: 2020-03-12

## 2020-03-12 RX ORDER — MIDAZOLAM HYDROCHLORIDE 2 MG/2ML
INJECTION, SOLUTION INTRAMUSCULAR; INTRAVENOUS AS NEEDED
Status: DISCONTINUED | OUTPATIENT
Start: 2020-03-12 | End: 2020-03-12 | Stop reason: SURG

## 2020-03-12 RX ORDER — NEOMYCIN SULFATE, POLYMYXIN B SULFATE, AND DEXAMETHASONE 3.5; 10000; 1 MG/G; [USP'U]/G; MG/G
OINTMENT OPHTHALMIC AS NEEDED
Status: DISCONTINUED | OUTPATIENT
Start: 2020-03-12 | End: 2020-03-12 | Stop reason: HOSPADM

## 2020-03-12 RX ORDER — LIDOCAINE HYDROCHLORIDE 10 MG/ML
INJECTION, SOLUTION EPIDURAL; INFILTRATION; INTRACAUDAL; PERINEURAL AS NEEDED
Status: DISCONTINUED | OUTPATIENT
Start: 2020-03-12 | End: 2020-03-12 | Stop reason: SURG

## 2020-03-12 RX ORDER — PROPOFOL 10 MG/ML
INJECTION, EMULSION INTRAVENOUS AS NEEDED
Status: DISCONTINUED | OUTPATIENT
Start: 2020-03-12 | End: 2020-03-12 | Stop reason: SURG

## 2020-03-12 RX ORDER — EPHEDRINE SULFATE 50 MG/ML
INJECTION INTRAVENOUS AS NEEDED
Status: DISCONTINUED | OUTPATIENT
Start: 2020-03-12 | End: 2020-03-12 | Stop reason: SURG

## 2020-03-12 RX ORDER — FENTANYL CITRATE 50 UG/ML
INJECTION, SOLUTION INTRAMUSCULAR; INTRAVENOUS AS NEEDED
Status: DISCONTINUED | OUTPATIENT
Start: 2020-03-12 | End: 2020-03-12 | Stop reason: SURG

## 2020-03-12 RX ORDER — BALANCED SALT SOLUTION 6.4; .75; .48; .3; 3.9; 1.7 MG/ML; MG/ML; MG/ML; MG/ML; MG/ML; MG/ML
SOLUTION OPHTHALMIC AS NEEDED
Status: DISCONTINUED | OUTPATIENT
Start: 2020-03-12 | End: 2020-03-12 | Stop reason: HOSPADM

## 2020-03-12 RX ORDER — MAGNESIUM HYDROXIDE 1200 MG/15ML
LIQUID ORAL AS NEEDED
Status: DISCONTINUED | OUTPATIENT
Start: 2020-03-12 | End: 2020-03-12 | Stop reason: HOSPADM

## 2020-03-12 RX ORDER — PILOCARPINE HYDROCHLORIDE 10 MG/ML
SOLUTION/ DROPS OPHTHALMIC AS NEEDED
Status: DISCONTINUED | OUTPATIENT
Start: 2020-03-12 | End: 2020-03-12 | Stop reason: HOSPADM

## 2020-03-12 RX ORDER — TETRACAINE HYDROCHLORIDE 5 MG/ML
1 SOLUTION OPHTHALMIC
Status: COMPLETED | OUTPATIENT
Start: 2020-03-12 | End: 2020-03-12

## 2020-03-12 RX ORDER — ACETAMINOPHEN 325 MG/1
650 TABLET ORAL EVERY 4 HOURS PRN
Status: DISCONTINUED | OUTPATIENT
Start: 2020-03-12 | End: 2020-03-12 | Stop reason: HOSPADM

## 2020-03-12 RX ORDER — ONDANSETRON 8 MG/1
8 TABLET, ORALLY DISINTEGRATING ORAL AS NEEDED
Status: DISCONTINUED | OUTPATIENT
Start: 2020-03-12 | End: 2020-03-12 | Stop reason: HOSPADM

## 2020-03-12 RX ORDER — LIDOCAINE HYDROCHLORIDE 20 MG/ML
JELLY TOPICAL AS NEEDED
Status: DISCONTINUED | OUTPATIENT
Start: 2020-03-12 | End: 2020-03-12 | Stop reason: HOSPADM

## 2020-03-12 RX ORDER — IPRATROPIUM BROMIDE AND ALBUTEROL SULFATE 2.5; .5 MG/3ML; MG/3ML
3 SOLUTION RESPIRATORY (INHALATION)
Status: DISCONTINUED | OUTPATIENT
Start: 2020-03-12 | End: 2020-03-12 | Stop reason: HOSPADM

## 2020-03-12 RX ORDER — TROPICAMIDE 10 MG/ML
1 SOLUTION/ DROPS OPHTHALMIC
Status: COMPLETED | OUTPATIENT
Start: 2020-03-12 | End: 2020-03-12

## 2020-03-12 RX ORDER — HYDROCODONE BITARTRATE AND ACETAMINOPHEN 5; 325 MG/1; MG/1
1 TABLET ORAL EVERY 4 HOURS PRN
Status: DISCONTINUED | OUTPATIENT
Start: 2020-03-12 | End: 2020-03-12 | Stop reason: HOSPADM

## 2020-03-12 RX ADMIN — PHENYLEPHRINE HYDROCHLORIDE 1 DROP: 25 SOLUTION/ DROPS OPHTHALMIC at 11:57

## 2020-03-12 RX ADMIN — IPRATROPIUM BROMIDE AND ALBUTEROL SULFATE 3 ML: 2.5; .5 SOLUTION RESPIRATORY (INHALATION) at 13:01

## 2020-03-12 RX ADMIN — FENTANYL CITRATE 50 MCG: 50 INJECTION INTRAMUSCULAR; INTRAVENOUS at 14:57

## 2020-03-12 RX ADMIN — TROPICAMIDE 1 DROP: 10 SOLUTION/ DROPS OPHTHALMIC at 11:58

## 2020-03-12 RX ADMIN — LIDOCAINE HYDROCHLORIDE 50 MG: 10 INJECTION, SOLUTION EPIDURAL; INFILTRATION; INTRACAUDAL; PERINEURAL at 14:58

## 2020-03-12 RX ADMIN — PHENYLEPHRINE HYDROCHLORIDE 1 DROP: 25 SOLUTION/ DROPS OPHTHALMIC at 12:04

## 2020-03-12 RX ADMIN — TETRACAINE HYDROCHLORIDE 1 DROP: 5 SOLUTION OPHTHALMIC at 11:58

## 2020-03-12 RX ADMIN — TETRACAINE HYDROCHLORIDE 1 DROP: 5 SOLUTION OPHTHALMIC at 12:05

## 2020-03-12 RX ADMIN — MIDAZOLAM HYDROCHLORIDE 1 MG: 1 INJECTION, SOLUTION INTRAMUSCULAR; INTRAVENOUS at 14:56

## 2020-03-12 RX ADMIN — MIDAZOLAM HYDROCHLORIDE 1 MG: 1 INJECTION, SOLUTION INTRAMUSCULAR; INTRAVENOUS at 15:06

## 2020-03-12 RX ADMIN — OFLOXACIN 1 DROP: 3 SOLUTION OPHTHALMIC at 12:10

## 2020-03-12 RX ADMIN — FENTANYL CITRATE 50 MCG: 50 INJECTION INTRAMUSCULAR; INTRAVENOUS at 15:06

## 2020-03-12 RX ADMIN — PROPOFOL 100 MG: 10 INJECTION, EMULSION INTRAVENOUS at 14:58

## 2020-03-12 RX ADMIN — PHENYLEPHRINE HYDROCHLORIDE 1 DROP: 25 SOLUTION/ DROPS OPHTHALMIC at 12:10

## 2020-03-12 RX ADMIN — SODIUM CHLORIDE: 0.9 INJECTION, SOLUTION INTRAVENOUS at 13:48

## 2020-03-12 RX ADMIN — TETRACAINE HYDROCHLORIDE 1 DROP: 5 SOLUTION OPHTHALMIC at 12:10

## 2020-03-12 RX ADMIN — ACETAMINOPHEN 650 MG: 325 TABLET ORAL at 16:45

## 2020-03-12 RX ADMIN — OFLOXACIN 1 DROP: 3 SOLUTION OPHTHALMIC at 12:04

## 2020-03-12 RX ADMIN — TROPICAMIDE 1 DROP: 10 SOLUTION/ DROPS OPHTHALMIC at 12:11

## 2020-03-12 RX ADMIN — TROPICAMIDE 1 DROP: 10 SOLUTION/ DROPS OPHTHALMIC at 12:04

## 2020-03-12 RX ADMIN — EPHEDRINE SULFATE 10 MG: 50 INJECTION, SOLUTION INTRAVENOUS at 15:14

## 2020-03-12 RX ADMIN — OFLOXACIN 1 DROP: 3 SOLUTION OPHTHALMIC at 11:57

## 2020-03-12 NOTE — INTERVAL H&P NOTE
H&P reviewed  After examining the patient I find no changes in the patients condition since the H&P had been written  Nuclear cataract right    Vitals:    03/12/20 1137   BP: 115/68   Pulse: 63   Resp: 20   Temp: (!) 96 6 °F (35 9 °C)   SpO2: 97%

## 2020-03-12 NOTE — NURSING NOTE
Pt is awake,alert,tolerated diet, eye shield in place, report given to [employer] staff nurse

## 2020-03-12 NOTE — DISCHARGE SUMMARY
Discharge Summary - Josef Jaimes 81 y o  female MRN: [id number]    Unit/Bed#: OR POOL Encounter: [id number]    Admission Date: 3/12/2020     Admitting Diagnosis: Age-related nuclear cataract, right eye [H25 11]    HPI:     Procedures Performed: No orders of the defined types were placed in this encounter  Hospital Course: uneventful    Significant Findings, Care, Treatment and Services Provided: Age-related nuclear cataract, right eye [H25 11],No orders of the defined types were placed in this encounter  Complications: None  Discharge Diagnosis:  Age-related nuclear cataract, right eye [H25 11]    Condition at Discharge:good    Discharge instructions/Information to patient and family:   See after visit summary for information provided to patient and family  Provisions for Follow-Up Care:  See after visit summary for information related to follow-up care and any pertinent home health orders  Disposition: Home    Planned Readmission: No    Discharge Statement ; none    Discharge Medications:  See after visit summary for reconciled discharge medications provided to patient and family

## 2020-03-12 NOTE — DISCHARGE INSTRUCTIONS
Leave patch on  Cataract Extraction   WHAT YOU NEED TO KNOW:   Cataract extraction is a procedure to remove a cloudy lens from your eye  An artificial lens called an intraocular lens (IOL) is put in its place  This will improve your vision  DISCHARGE INSTRUCTIONS:   Medicines:   · Eyedrops  contain medicine to help prevent infection and decrease inflammation  Wash your hands before you instill eyedrops  Do not touch the tip of the dropper to your eye  Ask your healthcare provider for more information about how to instill eyedrops  · Take your medicine as directed  Contact your healthcare provider if you think your medicine is not helping or if you have side effects  Tell him or her if you are allergic to any medicine  Keep a list of the medicines, vitamins, and herbs you take  Include the amounts, and when and why you take them  Bring the list or the pill bottles to follow-up visits  Carry your medicine list with you in case of an emergency  Follow up with your ophthalmologist within 24 hours: You will need to have your eye checked  Write down your questions so you remember to ask them during your visits  Eye care:   · Wear your eye shield when you sleep to prevent damage  · Do not rub your eye  · Keep dust, dirt, and water out of your eye to prevent infection  · Do not lift heavy objects or bend over  Both can increase the pressure in your eye  Ask your healthcare provider how much weight is safe for you to lift  You may be told not to lift anything heavier than 25 pounds for 3 weeks after your procedure  · Wear UVB protective sunglasses and a brimmed hat outside to help prevent sun damage to your eyes  · If you smoke, it is never too late to quit  Smoking can damage your eye and prevent it from healing after your procedure  Do not smoke or let anyone smoke around you  Ask your healthcare provider for information if you need help quitting    Contact your healthcare provider or ophthalmologist if:   · You have changes in your vision  · Your eye pain increases  · Your eye is red, swollen, or draining fluid or pus  · You have a headache or nausea, or you are vomiting  · You have questions or concerns about your condition or care  Seek care immediately or call 911 if:   · You suddenly see flashes or floaters, followed by partial vision loss like a curtain covered your eye  · Your eye suddenly goes blind  · You have severe eye pain with eye redness, swelling, new floaters, and more blurry vision  © 2017 [facility] Information is for End User's use only and may not be sold, redistributed or otherwise used for commercial purposes  All illustrations and images included in CareNotes® are the copyrighted property of A D A M [employer] Inc  or Aravind Mendoza  The above information is an  only  It is not intended as medical advice for individual conditions or treatments  Talk to your doctor, nurse or pharmacist before following any medical regimen to see if it is safe and effective for you

## 2020-03-12 NOTE — OP NOTE
OPERATIVE REPORT  PATIENT NAME: Rainer Bar    :  1935  MRN: [id number]  Pt Location:  OR ROOM 01    SURGERY DATE: 3/12/2020    Surgeon(s) and Role:     * Zakia Summers MD - Primary    Preop Diagnosis:  Age-related nuclear cataract, right eye [H25 11]    Post-Op Diagnosis Codes:     * Age-related nuclear cataract, right eye [H25 11]    Procedure(s) (LRB):  CATARACT REMOVAL W/INTRAOCULAR LENS (Right)    Specimen(s):  * No specimens in log *    Estimated Blood Loss:   Minimal    Drains:  * No LDAs found *    Anesthesia Type:   LMA    Operative Indications:  Age-related nuclear cataract, right eye [H25 11]      Operative Findings:  Nuclear cataract right    Complications:   None    Procedure and Technique:   Cataract  Surgery OE=_R  Preop Dx: nuclear cataract OE    Postop Dx: same    Procedure: PE PIOL OE [id number] 5    Operative report    After the patient ,the eye, the lens implant were checked, LMA sedation was administered by the anesthesia department  The eye was prepped/draped using sterile technique, including  eyelid/lash  retraction tegaderm and betadine rinse  Official OR "time out" was performed, the operating microscope brought into the field and lid speculum positioned  The cornea was kept moist with BSS  A side port was fashioned, viscoelastic placed  A biplanar corneal incison was made, capsulorhexis performed,followed by hydrodissection  Phacoemulsification was performed to remove the nucleus  Cortex was removed using irrigation/aspiration  The posterior capsule was intact and was polished  Under provisc the lens implant was postioned and centered in the capsular bag  The viscoelastic was removed  The incision was checked/hydrated and was secure  The drapes/speculum/tegaderm and anesthesia were withdrawn  The eye was patched/shielded  with antibiotic  The patient was brought to the recovery room is stable condition to be followed in the office as an outpatient     I was present for the entire procedure    Patient Disposition:  PACU     SIGNATURE: Chana Varma MD  DATE: March 12, 2020  TIME: 3:27 PM

## 2020-03-12 NOTE — INTERIM OP NOTE
CATARACT REMOVAL W/INTRAOCULAR LENS  Postoperative Note  PATIENT NAME: Cale Cisneros  : 1935  MRN: [id number]  [facility] OR ROOM 01    Surgery Date: 3/12/2020    Preop Diagnosis:  Age-related nuclear cataract, right eye [H25 11]    Post-Op Diagnosis Codes:     * Age-related nuclear cataract, right eye [H25 11]    Procedure(s) (LRB):  CATARACT REMOVAL W/INTRAOCULAR LENS (Right)    Surgeon(s) and Role:     * Sherry Flores MD - Primary    Specimens:  * No specimens in log *    Estimated Blood Loss:   Minimal    Anesthesia Type:   IV Sedation with Anesthesia     Findings:    nuclear cataract right  Complications:   None    SIGNATURE: Sherry Flores MD   DATE: 2020   TIME: 3:26 PM

## 2020-03-27 ENCOUNTER — TELEPHONE (OUTPATIENT)
Dept: GASTROENTEROLOGY | Facility: CLINIC | Age: 85
End: 2020-03-27

## 2020-04-06 ENCOUNTER — TELEPHONE (OUTPATIENT)
Dept: UROLOGY | Facility: MEDICAL CENTER | Age: 85
End: 2020-04-06

## 2020-04-06 DIAGNOSIS — N30.90 CYSTITIS: Primary | ICD-10-CM

## 2020-04-07 RX ORDER — METHENAMINE HIPPURATE 1000 MG/1
1 TABLET ORAL 2 TIMES DAILY WITH MEALS
Qty: 180 TABLET | Refills: 0 | Status: SHIPPED | OUTPATIENT
Start: 2020-04-07 | End: 2020-07-06

## 2020-04-28 ENCOUNTER — HOSPITAL ENCOUNTER (EMERGENCY)
Facility: HOSPITAL | Age: 85
Discharge: HOME | End: 2020-04-28
Attending: EMERGENCY MEDICINE | Admitting: EMERGENCY MEDICINE
Payer: MEDICARE

## 2020-04-28 ENCOUNTER — APPOINTMENT (EMERGENCY)
Dept: RADIOLOGY | Facility: HOSPITAL | Age: 85
End: 2020-04-28
Payer: MEDICARE

## 2020-04-28 VITALS
SYSTOLIC BLOOD PRESSURE: 124 MMHG | RESPIRATION RATE: 14 BRPM | WEIGHT: 291.67 LBS | HEART RATE: 90 BPM | DIASTOLIC BLOOD PRESSURE: 75 MMHG | TEMPERATURE: 98.1 F | BODY MASS INDEX: 51.67 KG/M2 | OXYGEN SATURATION: 97 %

## 2020-04-28 DIAGNOSIS — U07.1 COVID-19 VIRUS INFECTION: ICD-10-CM

## 2020-04-28 DIAGNOSIS — D72.819 LEUKOPENIA: Primary | ICD-10-CM

## 2020-04-28 LAB
ALBUMIN SERPL BCP-MCNC: 2.7 G/DL (ref 3.5–5)
ALP SERPL-CCNC: 51 U/L (ref 46–116)
ALT SERPL W P-5'-P-CCNC: 18 U/L (ref 12–78)
ANION GAP SERPL CALCULATED.3IONS-SCNC: 3 MMOL/L (ref 4–13)
AST SERPL W P-5'-P-CCNC: 70 U/L (ref 5–45)
ATRIAL RATE: 394 BPM
BASOPHILS # BLD MANUAL: 0 THOUSAND/UL (ref 0–0.1)
BASOPHILS NFR MAR MANUAL: 0 % (ref 0–1)
BILIRUB SERPL-MCNC: 0.65 MG/DL (ref 0.2–1)
BUN SERPL-MCNC: 6 MG/DL (ref 5–25)
CALCIUM SERPL-MCNC: 8.3 MG/DL (ref 8.3–10.1)
CHLORIDE SERPL-SCNC: 102 MMOL/L (ref 100–108)
CO2 SERPL-SCNC: 39 MMOL/L (ref 21–32)
CREAT SERPL-MCNC: 0.96 MG/DL (ref 0.6–1.3)
CRP SERPL QL: 11.1 MG/L
EOSINOPHIL # BLD MANUAL: 0 THOUSAND/UL (ref 0–0.4)
EOSINOPHIL NFR BLD MANUAL: 0 % (ref 0–6)
ERYTHROCYTE [DISTWIDTH] IN BLOOD BY AUTOMATED COUNT: 14.6 % (ref 11.6–15.1)
FERRITIN SERPL-MCNC: 61 NG/ML (ref 8–388)
GFR SERPL CREATININE-BSD FRML MDRD: 55 ML/MIN/1.73SQ M
GLUCOSE SERPL-MCNC: 115 MG/DL (ref 65–140)
HCT VFR BLD AUTO: 46.7 % (ref 34.8–46.1)
HGB BLD-MCNC: 14.6 G/DL (ref 11.5–15.4)
LYMPHOCYTES # BLD AUTO: 0.46 THOUSAND/UL (ref 0.6–4.47)
LYMPHOCYTES # BLD AUTO: 23 % (ref 14–44)
MCH RBC QN AUTO: 29.8 PG (ref 26.8–34.3)
MCHC RBC AUTO-ENTMCNC: 31.3 G/DL (ref 31.4–37.4)
MCV RBC AUTO: 95 FL (ref 82–98)
MONOCYTES # BLD AUTO: 0.32 THOUSAND/UL (ref 0–1.22)
MONOCYTES NFR BLD: 16 % (ref 4–12)
NEUTROPHILS # BLD MANUAL: 1.14 THOUSAND/UL (ref 1.85–7.62)
NEUTS BAND NFR BLD MANUAL: 2 % (ref 0–8)
NEUTS SEG NFR BLD AUTO: 55 % (ref 43–75)
NRBC BLD AUTO-RTO: 0 /100 WBCS
PLATELET # BLD AUTO: 187 THOUSANDS/UL (ref 149–390)
PLATELET BLD QL SMEAR: ADEQUATE
PMV BLD AUTO: 9.5 FL (ref 8.9–12.7)
POTASSIUM SERPL-SCNC: 3.8 MMOL/L (ref 3.5–5.3)
PROT SERPL-MCNC: 6.9 G/DL (ref 6.4–8.2)
QRS AXIS: -53 DEGREES
QRSD INTERVAL: 88 MS
QT INTERVAL: 366 MS
QTC INTERVAL: 406 MS
RBC # BLD AUTO: 4.9 MILLION/UL (ref 3.81–5.12)
RBC MORPH BLD: NORMAL
SODIUM SERPL-SCNC: 144 MMOL/L (ref 136–145)
T WAVE AXIS: -73 DEGREES
TOTAL CELLS COUNTED SPEC: 100
TROPONIN I SERPL-MCNC: 0.02 NG/ML
VARIANT LYMPHS # BLD AUTO: 4 %
VENTRICULAR RATE: 74 BPM
WBC # BLD AUTO: 2 THOUSAND/UL (ref 4.31–10.16)

## 2020-04-28 PROCEDURE — 36415 COLL VENOUS BLD VENIPUNCTURE: CPT | Performed by: EMERGENCY MEDICINE

## 2020-04-28 PROCEDURE — 84484 ASSAY OF TROPONIN QUANT: CPT | Performed by: EMERGENCY MEDICINE

## 2020-04-28 PROCEDURE — 80053 COMPREHEN METABOLIC PANEL: CPT | Performed by: EMERGENCY MEDICINE

## 2020-04-28 PROCEDURE — 82728 ASSAY OF FERRITIN: CPT | Performed by: EMERGENCY MEDICINE

## 2020-04-28 PROCEDURE — 93010 ELECTROCARDIOGRAM REPORT: CPT | Performed by: INTERNAL MEDICINE

## 2020-04-28 PROCEDURE — 71045 X-RAY EXAM CHEST 1 VIEW: CPT

## 2020-04-28 PROCEDURE — 86140 C-REACTIVE PROTEIN: CPT | Performed by: EMERGENCY MEDICINE

## 2020-04-28 PROCEDURE — 85007 BL SMEAR W/DIFF WBC COUNT: CPT | Performed by: EMERGENCY MEDICINE

## 2020-04-28 PROCEDURE — 99285 EMERGENCY DEPT VISIT HI MDM: CPT | Performed by: EMERGENCY MEDICINE

## 2020-04-28 PROCEDURE — 93005 ELECTROCARDIOGRAM TRACING: CPT

## 2020-04-28 PROCEDURE — 99285 EMERGENCY DEPT VISIT HI MDM: CPT

## 2020-04-28 PROCEDURE — 85027 COMPLETE CBC AUTOMATED: CPT | Performed by: EMERGENCY MEDICINE

## 2020-04-28 RX ORDER — POLYETHYLENE GLYCOL 3350 17 G/17G
17 POWDER, FOR SOLUTION ORAL DAILY
COMMUNITY
End: 2020-08-10 | Stop reason: CLARIF

## 2020-04-28 RX ORDER — HYDROCODONE/ACETAMINOPHEN 5 MG-500MG
6 TABLET ORAL DAILY
COMMUNITY

## 2020-08-04 ENCOUNTER — HOSPITAL ENCOUNTER (OUTPATIENT)
Dept: ULTRASOUND IMAGING | Facility: HOSPITAL | Age: 85
Discharge: HOME | End: 2020-08-04
Attending: UROLOGY
Payer: MEDICARE

## 2020-08-04 DIAGNOSIS — N20.0 BILATERAL NEPHROLITHIASIS: ICD-10-CM

## 2020-08-04 PROCEDURE — 76770 US EXAM ABDO BACK WALL COMP: CPT

## 2020-08-06 ENCOUNTER — TELEPHONE (OUTPATIENT)
Dept: GASTROENTEROLOGY | Facility: CLINIC | Age: 85
End: 2020-08-06

## 2020-08-06 NOTE — TELEPHONE ENCOUNTER
Spoke with [employer], she stated that the doctor signed the med clearance and they will fax it back today, patient can hold eliquis 2 days prior they are aware

## 2020-08-07 ENCOUNTER — TELEPHONE (OUTPATIENT)
Dept: UROLOGY | Facility: MEDICAL CENTER | Age: 85
End: 2020-08-07

## 2020-08-07 NOTE — TELEPHONE ENCOUNTER
Patient of Dr Bell Gill seen in the [facility] office  Mirian Carrion from Radiology called and advised of significant findings on ultrasound  On call provided was paged

## 2020-08-10 ENCOUNTER — ANESTHESIA (INPATIENT)
Dept: PERIOP | Facility: HOSPITAL | Age: 85
DRG: 853 | End: 2020-08-10
Payer: MEDICARE

## 2020-08-10 ENCOUNTER — APPOINTMENT (INPATIENT)
Dept: RADIOLOGY | Facility: HOSPITAL | Age: 85
DRG: 853 | End: 2020-08-10
Payer: MEDICARE

## 2020-08-10 ENCOUNTER — APPOINTMENT (EMERGENCY)
Dept: CT IMAGING | Facility: HOSPITAL | Age: 85
DRG: 853 | End: 2020-08-10
Payer: MEDICARE

## 2020-08-10 ENCOUNTER — HOSPITAL ENCOUNTER (INPATIENT)
Facility: HOSPITAL | Age: 85
LOS: 7 days | Discharge: NURSING, REHAB OR LONG-TERM CARE | DRG: 853 | End: 2020-08-17
Attending: EMERGENCY MEDICINE | Admitting: FAMILY MEDICINE
Payer: MEDICARE

## 2020-08-10 ENCOUNTER — ANESTHESIA EVENT (INPATIENT)
Dept: PERIOP | Facility: HOSPITAL | Age: 85
DRG: 853 | End: 2020-08-10
Payer: MEDICARE

## 2020-08-10 ENCOUNTER — APPOINTMENT (EMERGENCY)
Dept: RADIOLOGY | Facility: HOSPITAL | Age: 85
DRG: 853 | End: 2020-08-10
Payer: MEDICARE

## 2020-08-10 DIAGNOSIS — R50.9 FEVER: ICD-10-CM

## 2020-08-10 DIAGNOSIS — A41.9 SEPSIS (HCC): ICD-10-CM

## 2020-08-10 DIAGNOSIS — M54.6 THORACIC BACK PAIN: ICD-10-CM

## 2020-08-10 DIAGNOSIS — N17.9 ACUTE KIDNEY INJURY (HCC): ICD-10-CM

## 2020-08-10 DIAGNOSIS — N20.0 BILATERAL NEPHROLITHIASIS: ICD-10-CM

## 2020-08-10 DIAGNOSIS — N13.2 HYDRONEPHROSIS WITH OBSTRUCTING CALCULUS: ICD-10-CM

## 2020-08-10 DIAGNOSIS — R05.9 COUGH: Primary | ICD-10-CM

## 2020-08-10 DIAGNOSIS — R78.81 BACTEREMIA: ICD-10-CM

## 2020-08-10 DIAGNOSIS — E87.6 HYPOKALEMIA: ICD-10-CM

## 2020-08-10 DIAGNOSIS — R09.02 HYPOXIA: ICD-10-CM

## 2020-08-10 PROBLEM — E66.01 MORBID OBESITY WITH BMI OF 50.0-59.9, ADULT (HCC): Status: ACTIVE | Noted: 2020-08-10

## 2020-08-10 PROBLEM — E66.01 MORBID OBESITY (HCC): Status: RESOLVED | Noted: 2019-05-21 | Resolved: 2020-08-10

## 2020-08-10 PROBLEM — A49.8 BACTERIAL INFECTION DUE TO PROTEUS MIRABILIS: Status: RESOLVED | Noted: 2019-05-23 | Resolved: 2020-08-10

## 2020-08-10 PROBLEM — N39.0 ACUTE URINARY TRACT INFECTION: Status: RESOLVED | Noted: 2019-05-21 | Resolved: 2020-08-10

## 2020-08-10 PROBLEM — D64.9 SYMPTOMATIC ANEMIA: Status: RESOLVED | Noted: 2020-01-15 | Resolved: 2020-08-10

## 2020-08-10 PROBLEM — J96.01 ACUTE RESPIRATORY FAILURE WITH HYPOXIA (HCC): Status: ACTIVE | Noted: 2020-01-17

## 2020-08-10 PROBLEM — R79.1 ELEVATED INR: Status: RESOLVED | Noted: 2019-05-21 | Resolved: 2020-08-10

## 2020-08-10 PROBLEM — R55 SYNCOPE: Status: RESOLVED | Noted: 2020-01-17 | Resolved: 2020-08-10

## 2020-08-10 PROBLEM — N20.1 LEFT URETERAL CALCULUS: Status: RESOLVED | Noted: 2019-06-12 | Resolved: 2020-08-10

## 2020-08-10 LAB
ALBUMIN SERPL BCP-MCNC: 2.3 G/DL (ref 3.5–5)
ALP SERPL-CCNC: 56 U/L (ref 46–116)
ALT SERPL W P-5'-P-CCNC: 17 U/L (ref 12–78)
ANION GAP SERPL CALCULATED.3IONS-SCNC: 7 MMOL/L (ref 4–13)
APTT PPP: 39 SECONDS (ref 23–37)
AST SERPL W P-5'-P-CCNC: 27 U/L (ref 5–45)
ATRIAL RATE: 91 BPM
BACTERIA UR QL AUTO: ABNORMAL /HPF
BASOPHILS # BLD AUTO: 0.02 THOUSANDS/ΜL (ref 0–0.1)
BASOPHILS NFR BLD AUTO: 0 % (ref 0–1)
BILIRUB SERPL-MCNC: 0.69 MG/DL (ref 0.2–1)
BILIRUB UR QL STRIP: NEGATIVE
BUN SERPL-MCNC: 15 MG/DL (ref 5–25)
CA-I BLD-SCNC: 1.06 MMOL/L (ref 1.12–1.32)
CALCIUM SERPL-MCNC: 8.3 MG/DL (ref 8.3–10.1)
CHLORIDE SERPL-SCNC: 101 MMOL/L (ref 100–108)
CLARITY UR: ABNORMAL
CO2 SERPL-SCNC: 36 MMOL/L (ref 21–32)
COLOR UR: YELLOW
CREAT SERPL-MCNC: 1.84 MG/DL (ref 0.6–1.3)
EOSINOPHIL # BLD AUTO: 0 THOUSAND/ΜL (ref 0–0.61)
EOSINOPHIL NFR BLD AUTO: 0 % (ref 0–6)
ERYTHROCYTE [DISTWIDTH] IN BLOOD BY AUTOMATED COUNT: 17.1 % (ref 11.6–15.1)
GFR SERPL CREATININE-BSD FRML MDRD: 25 ML/MIN/1.73SQ M
GLUCOSE SERPL-MCNC: 116 MG/DL (ref 65–140)
GLUCOSE UR STRIP-MCNC: NEGATIVE MG/DL
HCT VFR BLD AUTO: 35.1 % (ref 34.8–46.1)
HGB BLD-MCNC: 10.3 G/DL (ref 11.5–15.4)
HGB UR QL STRIP.AUTO: ABNORMAL
IMM GRANULOCYTES # BLD AUTO: 0.07 THOUSAND/UL (ref 0–0.2)
IMM GRANULOCYTES NFR BLD AUTO: 1 % (ref 0–2)
INR PPP: 2.12 (ref 0.84–1.19)
KETONES UR STRIP-MCNC: NEGATIVE MG/DL
LACTATE SERPL-SCNC: 0.8 MMOL/L (ref 0.5–2)
LACTATE SERPL-SCNC: 1.6 MMOL/L (ref 0.5–2)
LEUKOCYTE ESTERASE UR QL STRIP: ABNORMAL
LYMPHOCYTES # BLD AUTO: 0.54 THOUSANDS/ΜL (ref 0.6–4.47)
LYMPHOCYTES NFR BLD AUTO: 5 % (ref 14–44)
MAGNESIUM SERPL-MCNC: 1.7 MG/DL (ref 1.6–2.6)
MCH RBC QN AUTO: 26.1 PG (ref 26.8–34.3)
MCHC RBC AUTO-ENTMCNC: 29.3 G/DL (ref 31.4–37.4)
MCV RBC AUTO: 89 FL (ref 82–98)
MONOCYTES # BLD AUTO: 0.85 THOUSAND/ΜL (ref 0.17–1.22)
MONOCYTES NFR BLD AUTO: 7 % (ref 4–12)
NEUTROPHILS # BLD AUTO: 10.35 THOUSANDS/ΜL (ref 1.85–7.62)
NEUTS SEG NFR BLD AUTO: 87 % (ref 43–75)
NITRITE UR QL STRIP: POSITIVE
NON-SQ EPI CELLS URNS QL MICRO: ABNORMAL /HPF
NRBC BLD AUTO-RTO: 0 /100 WBCS
P AXIS: 56 DEGREES
PH UR STRIP.AUTO: 7.5 [PH]
PLATELET # BLD AUTO: 231 THOUSANDS/UL (ref 149–390)
PMV BLD AUTO: 10.4 FL (ref 8.9–12.7)
POTASSIUM SERPL-SCNC: 2.8 MMOL/L (ref 3.5–5.3)
PR INTERVAL: 178 MS
PROT SERPL-MCNC: 6.8 G/DL (ref 6.4–8.2)
PROT UR STRIP-MCNC: ABNORMAL MG/DL
PROTHROMBIN TIME: 23.3 SECONDS (ref 11.6–14.5)
QRS AXIS: -58 DEGREES
QRSD INTERVAL: 78 MS
QT INTERVAL: 322 MS
QTC INTERVAL: 396 MS
RBC # BLD AUTO: 3.94 MILLION/UL (ref 3.81–5.12)
RBC #/AREA URNS AUTO: ABNORMAL /HPF
SARS-COV-2 RNA RESP QL NAA+PROBE: NEGATIVE
SODIUM SERPL-SCNC: 144 MMOL/L (ref 136–145)
SP GR UR STRIP.AUTO: 1.01 (ref 1–1.03)
T WAVE AXIS: -65 DEGREES
TROPONIN I SERPL-MCNC: 0.04 NG/ML
UROBILINOGEN UR QL STRIP.AUTO: 1 E.U./DL
VENTRICULAR RATE: 91 BPM
WBC # BLD AUTO: 11.83 THOUSAND/UL (ref 4.31–10.16)
WBC #/AREA URNS AUTO: ABNORMAL /HPF

## 2020-08-10 PROCEDURE — 96365 THER/PROPH/DIAG IV INF INIT: CPT

## 2020-08-10 PROCEDURE — 36415 COLL VENOUS BLD VENIPUNCTURE: CPT | Performed by: EMERGENCY MEDICINE

## 2020-08-10 PROCEDURE — C2617 STENT, NON-COR, TEM W/O DEL: HCPCS | Performed by: UROLOGY

## 2020-08-10 PROCEDURE — 93010 ELECTROCARDIOGRAM REPORT: CPT | Performed by: INTERNAL MEDICINE

## 2020-08-10 PROCEDURE — 81001 URINALYSIS AUTO W/SCOPE: CPT | Performed by: EMERGENCY MEDICINE

## 2020-08-10 PROCEDURE — 84484 ASSAY OF TROPONIN QUANT: CPT | Performed by: EMERGENCY MEDICINE

## 2020-08-10 PROCEDURE — 96375 TX/PRO/DX INJ NEW DRUG ADDON: CPT

## 2020-08-10 PROCEDURE — 0T9B70Z DRAINAGE OF BLADDER WITH DRAINAGE DEVICE, VIA NATURAL OR ARTIFICIAL OPENING: ICD-10-PCS | Performed by: UROLOGY

## 2020-08-10 PROCEDURE — 99291 CRITICAL CARE FIRST HOUR: CPT | Performed by: NURSE PRACTITIONER

## 2020-08-10 PROCEDURE — 0T788DZ DILATION OF BILATERAL URETERS WITH INTRALUMINAL DEVICE, VIA NATURAL OR ARTIFICIAL OPENING ENDOSCOPIC: ICD-10-PCS | Performed by: UROLOGY

## 2020-08-10 PROCEDURE — 96366 THER/PROPH/DIAG IV INF ADDON: CPT

## 2020-08-10 PROCEDURE — 87040 BLOOD CULTURE FOR BACTERIA: CPT | Performed by: EMERGENCY MEDICINE

## 2020-08-10 PROCEDURE — 85610 PROTHROMBIN TIME: CPT | Performed by: EMERGENCY MEDICINE

## 2020-08-10 PROCEDURE — 87635 SARS-COV-2 COVID-19 AMP PRB: CPT | Performed by: EMERGENCY MEDICINE

## 2020-08-10 PROCEDURE — 87186 SC STD MICRODIL/AGAR DIL: CPT | Performed by: EMERGENCY MEDICINE

## 2020-08-10 PROCEDURE — 82803 BLOOD GASES ANY COMBINATION: CPT

## 2020-08-10 PROCEDURE — C1769 GUIDE WIRE: HCPCS | Performed by: UROLOGY

## 2020-08-10 PROCEDURE — 99223 1ST HOSP IP/OBS HIGH 75: CPT | Performed by: INTERNAL MEDICINE

## 2020-08-10 PROCEDURE — 93005 ELECTROCARDIOGRAM TRACING: CPT

## 2020-08-10 PROCEDURE — 83735 ASSAY OF MAGNESIUM: CPT | Performed by: NURSE PRACTITIONER

## 2020-08-10 PROCEDURE — 74176 CT ABD & PELVIS W/O CONTRAST: CPT

## 2020-08-10 PROCEDURE — 85025 COMPLETE CBC W/AUTO DIFF WBC: CPT | Performed by: EMERGENCY MEDICINE

## 2020-08-10 PROCEDURE — 99285 EMERGENCY DEPT VISIT HI MDM: CPT

## 2020-08-10 PROCEDURE — G1004 CDSM NDSC: HCPCS

## 2020-08-10 PROCEDURE — 87086 URINE CULTURE/COLONY COUNT: CPT | Performed by: EMERGENCY MEDICINE

## 2020-08-10 PROCEDURE — BT141ZZ FLUOROSCOPY OF KIDNEYS, URETERS AND BLADDER USING LOW OSMOLAR CONTRAST: ICD-10-PCS | Performed by: UROLOGY

## 2020-08-10 PROCEDURE — 87077 CULTURE AEROBIC IDENTIFY: CPT | Performed by: EMERGENCY MEDICINE

## 2020-08-10 PROCEDURE — 83605 ASSAY OF LACTIC ACID: CPT | Performed by: UROLOGY

## 2020-08-10 PROCEDURE — 71250 CT THORAX DX C-: CPT

## 2020-08-10 PROCEDURE — 82947 ASSAY GLUCOSE BLOOD QUANT: CPT

## 2020-08-10 PROCEDURE — 99223 1ST HOSP IP/OBS HIGH 75: CPT | Performed by: UROLOGY

## 2020-08-10 PROCEDURE — 85014 HEMATOCRIT: CPT

## 2020-08-10 PROCEDURE — 82330 ASSAY OF CALCIUM: CPT

## 2020-08-10 PROCEDURE — 85730 THROMBOPLASTIN TIME PARTIAL: CPT | Performed by: EMERGENCY MEDICINE

## 2020-08-10 PROCEDURE — 99285 EMERGENCY DEPT VISIT HI MDM: CPT | Performed by: EMERGENCY MEDICINE

## 2020-08-10 PROCEDURE — 87076 CULTURE ANAEROBE IDENT EACH: CPT | Performed by: EMERGENCY MEDICINE

## 2020-08-10 PROCEDURE — 84132 ASSAY OF SERUM POTASSIUM: CPT

## 2020-08-10 PROCEDURE — 74420 UROGRAPHY RTRGR +-KUB: CPT

## 2020-08-10 PROCEDURE — 94002 VENT MGMT INPAT INIT DAY: CPT

## 2020-08-10 PROCEDURE — 80053 COMPREHEN METABOLIC PANEL: CPT | Performed by: EMERGENCY MEDICINE

## 2020-08-10 PROCEDURE — 82330 ASSAY OF CALCIUM: CPT | Performed by: NURSE PRACTITIONER

## 2020-08-10 PROCEDURE — 84295 ASSAY OF SERUM SODIUM: CPT

## 2020-08-10 PROCEDURE — 83605 ASSAY OF LACTIC ACID: CPT | Performed by: EMERGENCY MEDICINE

## 2020-08-10 PROCEDURE — 52332 CYSTOSCOPY AND TREATMENT: CPT | Performed by: UROLOGY

## 2020-08-10 DEVICE — INLAY OPTIMA URETERAL STENT W/O GUIDEWIRE
Type: IMPLANTABLE DEVICE | Site: URETER | Status: NON-FUNCTIONAL
Brand: BARD® INLAY OPTIMA® URETERAL STENT
Removed: 2020-09-30

## 2020-08-10 RX ORDER — CALCIUM CHLORIDE 100 MG/ML
INJECTION INTRAVENOUS; INTRAVENTRICULAR AS NEEDED
Status: DISCONTINUED | OUTPATIENT
Start: 2020-08-10 | End: 2020-08-10

## 2020-08-10 RX ORDER — PRAMIPEXOLE DIHYDROCHLORIDE 0.25 MG/1
0.5 TABLET ORAL
Status: DISCONTINUED | OUTPATIENT
Start: 2020-08-10 | End: 2020-08-17 | Stop reason: HOSPADM

## 2020-08-10 RX ORDER — MAGNESIUM HYDROXIDE/ALUMINUM HYDROXICE/SIMETHICONE 120; 1200; 1200 MG/30ML; MG/30ML; MG/30ML
30 SUSPENSION ORAL EVERY 6 HOURS PRN
Status: DISCONTINUED | OUTPATIENT
Start: 2020-08-10 | End: 2020-08-17 | Stop reason: HOSPADM

## 2020-08-10 RX ORDER — ROCURONIUM BROMIDE 10 MG/ML
INJECTION, SOLUTION INTRAVENOUS AS NEEDED
Status: DISCONTINUED | OUTPATIENT
Start: 2020-08-10 | End: 2020-08-10

## 2020-08-10 RX ORDER — CALCIUM GLUCONATE 20 MG/ML
1 INJECTION, SOLUTION INTRAVENOUS ONCE
Status: COMPLETED | OUTPATIENT
Start: 2020-08-10 | End: 2020-08-10

## 2020-08-10 RX ORDER — GUAIFENESIN 600 MG
600 TABLET, EXTENDED RELEASE 12 HR ORAL EVERY 12 HOURS SCHEDULED
Status: DISCONTINUED | OUTPATIENT
Start: 2020-08-10 | End: 2020-08-17 | Stop reason: HOSPADM

## 2020-08-10 RX ORDER — LEVOTHYROXINE SODIUM 0.07 MG/1
150 TABLET ORAL
Status: DISCONTINUED | OUTPATIENT
Start: 2020-08-10 | End: 2020-08-17 | Stop reason: HOSPADM

## 2020-08-10 RX ORDER — ONDANSETRON 2 MG/ML
INJECTION INTRAMUSCULAR; INTRAVENOUS AS NEEDED
Status: DISCONTINUED | OUTPATIENT
Start: 2020-08-10 | End: 2020-08-10

## 2020-08-10 RX ORDER — POTASSIUM CHLORIDE 20 MEQ/1
40 TABLET, EXTENDED RELEASE ORAL ONCE
Status: COMPLETED | OUTPATIENT
Start: 2020-08-10 | End: 2020-08-10

## 2020-08-10 RX ORDER — SODIUM CHLORIDE 9 MG/ML
75 INJECTION, SOLUTION INTRAVENOUS CONTINUOUS
Status: DISCONTINUED | OUTPATIENT
Start: 2020-08-10 | End: 2020-08-12

## 2020-08-10 RX ORDER — FENTANYL CITRATE 50 UG/ML
INJECTION, SOLUTION INTRAMUSCULAR; INTRAVENOUS AS NEEDED
Status: DISCONTINUED | OUTPATIENT
Start: 2020-08-10 | End: 2020-08-10

## 2020-08-10 RX ORDER — ATORVASTATIN CALCIUM 10 MG/1
10 TABLET, FILM COATED ORAL
Status: DISCONTINUED | OUTPATIENT
Start: 2020-08-10 | End: 2020-08-17 | Stop reason: HOSPADM

## 2020-08-10 RX ORDER — PROPOFOL 10 MG/ML
INJECTION, EMULSION INTRAVENOUS AS NEEDED
Status: DISCONTINUED | OUTPATIENT
Start: 2020-08-10 | End: 2020-08-10

## 2020-08-10 RX ORDER — ONDANSETRON 2 MG/ML
4 INJECTION INTRAMUSCULAR; INTRAVENOUS EVERY 6 HOURS PRN
Status: DISCONTINUED | OUTPATIENT
Start: 2020-08-10 | End: 2020-08-17 | Stop reason: HOSPADM

## 2020-08-10 RX ORDER — PROPOFOL 10 MG/ML
INJECTION, EMULSION INTRAVENOUS CONTINUOUS PRN
Status: DISCONTINUED | OUTPATIENT
Start: 2020-08-10 | End: 2020-08-10

## 2020-08-10 RX ORDER — MAGNESIUM HYDROXIDE 1200 MG/15ML
LIQUID ORAL AS NEEDED
Status: DISCONTINUED | OUTPATIENT
Start: 2020-08-10 | End: 2020-08-10 | Stop reason: HOSPADM

## 2020-08-10 RX ORDER — LIDOCAINE 50 MG/G
1 PATCH TOPICAL ONCE
Status: COMPLETED | OUTPATIENT
Start: 2020-08-10 | End: 2020-08-10

## 2020-08-10 RX ORDER — SODIUM CHLORIDE 9 MG/ML
100 INJECTION, SOLUTION INTRAVENOUS CONTINUOUS
Status: DISCONTINUED | OUTPATIENT
Start: 2020-08-10 | End: 2020-08-10 | Stop reason: SDUPTHER

## 2020-08-10 RX ORDER — POTASSIUM CHLORIDE 14.9 MG/ML
20 INJECTION INTRAVENOUS ONCE
Status: COMPLETED | OUTPATIENT
Start: 2020-08-10 | End: 2020-08-10

## 2020-08-10 RX ORDER — ACETAMINOPHEN 325 MG/1
650 TABLET ORAL EVERY 6 HOURS PRN
Status: DISCONTINUED | OUTPATIENT
Start: 2020-08-10 | End: 2020-08-17 | Stop reason: HOSPADM

## 2020-08-10 RX ORDER — DEXAMETHASONE SODIUM PHOSPHATE 4 MG/ML
INJECTION, SOLUTION INTRA-ARTICULAR; INTRALESIONAL; INTRAMUSCULAR; INTRAVENOUS; SOFT TISSUE AS NEEDED
Status: DISCONTINUED | OUTPATIENT
Start: 2020-08-10 | End: 2020-08-10

## 2020-08-10 RX ORDER — SUCCINYLCHOLINE/SOD CL,ISO/PF 100 MG/5ML
SYRINGE (ML) INTRAVENOUS AS NEEDED
Status: DISCONTINUED | OUTPATIENT
Start: 2020-08-10 | End: 2020-08-10

## 2020-08-10 RX ORDER — NITROGLYCERIN 0.4 MG/1
0.4 TABLET SUBLINGUAL
Status: DISCONTINUED | OUTPATIENT
Start: 2020-08-10 | End: 2020-08-17 | Stop reason: HOSPADM

## 2020-08-10 RX ORDER — HEPARIN SODIUM 5000 [USP'U]/ML
5000 INJECTION, SOLUTION INTRAVENOUS; SUBCUTANEOUS EVERY 8 HOURS SCHEDULED
Status: DISCONTINUED | OUTPATIENT
Start: 2020-08-10 | End: 2020-08-13

## 2020-08-10 RX ORDER — ASPIRIN 81 MG/1
81 TABLET ORAL DAILY
Status: DISCONTINUED | OUTPATIENT
Start: 2020-08-10 | End: 2020-08-17 | Stop reason: HOSPADM

## 2020-08-10 RX ORDER — GUAIFENESIN 600 MG
600 TABLET, EXTENDED RELEASE 12 HR ORAL EVERY 12 HOURS SCHEDULED
Status: DISCONTINUED | OUTPATIENT
Start: 2020-08-10 | End: 2020-08-10 | Stop reason: SDUPTHER

## 2020-08-10 RX ORDER — AMLODIPINE BESYLATE 10 MG/1
10 TABLET ORAL DAILY
Status: DISCONTINUED | OUTPATIENT
Start: 2020-08-10 | End: 2020-08-12

## 2020-08-10 RX ORDER — OXYCODONE HYDROCHLORIDE 5 MG/1
5 TABLET ORAL EVERY 4 HOURS PRN
Status: DISCONTINUED | OUTPATIENT
Start: 2020-08-10 | End: 2020-08-17 | Stop reason: HOSPADM

## 2020-08-10 RX ORDER — BISACODYL 10 MG
10 SUPPOSITORY, RECTAL RECTAL DAILY PRN
Status: DISCONTINUED | OUTPATIENT
Start: 2020-08-10 | End: 2020-08-17 | Stop reason: HOSPADM

## 2020-08-10 RX ORDER — PANTOPRAZOLE SODIUM 40 MG/1
40 TABLET, DELAYED RELEASE ORAL
Status: DISCONTINUED | OUTPATIENT
Start: 2020-08-10 | End: 2020-08-17 | Stop reason: HOSPADM

## 2020-08-10 RX ORDER — LEVOFLOXACIN 5 MG/ML
500 INJECTION, SOLUTION INTRAVENOUS ONCE
Status: DISCONTINUED | OUTPATIENT
Start: 2020-08-10 | End: 2020-08-10

## 2020-08-10 RX ORDER — ONDANSETRON 2 MG/ML
4 INJECTION INTRAMUSCULAR; INTRAVENOUS ONCE
Status: COMPLETED | OUTPATIENT
Start: 2020-08-10 | End: 2020-08-10

## 2020-08-10 RX ORDER — MELATONIN
2000 DAILY
Status: DISCONTINUED | OUTPATIENT
Start: 2020-08-10 | End: 2020-08-17 | Stop reason: HOSPADM

## 2020-08-10 RX ORDER — ACETAMINOPHEN 650 MG/1
650 SUPPOSITORY RECTAL ONCE
Status: COMPLETED | OUTPATIENT
Start: 2020-08-10 | End: 2020-08-10

## 2020-08-10 RX ORDER — DOCUSATE SODIUM 100 MG/1
100 CAPSULE, LIQUID FILLED ORAL 2 TIMES DAILY
Status: DISCONTINUED | OUTPATIENT
Start: 2020-08-10 | End: 2020-08-17 | Stop reason: HOSPADM

## 2020-08-10 RX ORDER — HYDROMORPHONE HCL/PF 1 MG/ML
0.2 SYRINGE (ML) INJECTION EVERY 4 HOURS PRN
Status: DISCONTINUED | OUTPATIENT
Start: 2020-08-10 | End: 2020-08-17 | Stop reason: HOSPADM

## 2020-08-10 RX ORDER — LANOLIN ALCOHOL/MO/W.PET/CERES
5 CREAM (GRAM) TOPICAL
Status: DISCONTINUED | OUTPATIENT
Start: 2020-08-10 | End: 2020-08-17 | Stop reason: HOSPADM

## 2020-08-10 RX ORDER — MIDAZOLAM HYDROCHLORIDE 2 MG/2ML
INJECTION, SOLUTION INTRAMUSCULAR; INTRAVENOUS AS NEEDED
Status: DISCONTINUED | OUTPATIENT
Start: 2020-08-10 | End: 2020-08-10

## 2020-08-10 RX ORDER — ALBUTEROL SULFATE 90 UG/1
AEROSOL, METERED RESPIRATORY (INHALATION) AS NEEDED
Status: DISCONTINUED | OUTPATIENT
Start: 2020-08-10 | End: 2020-08-10

## 2020-08-10 RX ORDER — MAGNESIUM SULFATE 1 G/100ML
1 INJECTION INTRAVENOUS ONCE
Status: COMPLETED | OUTPATIENT
Start: 2020-08-10 | End: 2020-08-10

## 2020-08-10 RX ORDER — ALBUTEROL SULFATE 2.5 MG/3ML
2.5 SOLUTION RESPIRATORY (INHALATION) EVERY 4 HOURS PRN
Status: DISCONTINUED | OUTPATIENT
Start: 2020-08-10 | End: 2020-08-17 | Stop reason: HOSPADM

## 2020-08-10 RX ORDER — OXYCODONE HYDROCHLORIDE 5 MG/1
2.5 TABLET ORAL EVERY 4 HOURS PRN
Status: CANCELLED | OUTPATIENT
Start: 2020-08-10

## 2020-08-10 RX ORDER — ALBUTEROL SULFATE 2.5 MG/3ML
2.5 SOLUTION RESPIRATORY (INHALATION) EVERY 4 HOURS PRN
Status: DISCONTINUED | OUTPATIENT
Start: 2020-08-10 | End: 2020-08-10

## 2020-08-10 RX ADMIN — MIDAZOLAM 2 MG: 1 INJECTION INTRAMUSCULAR; INTRAVENOUS at 11:57

## 2020-08-10 RX ADMIN — ALBUTEROL SULFATE 4 PUFF: 90 AEROSOL, METERED RESPIRATORY (INHALATION) at 11:54

## 2020-08-10 RX ADMIN — PRAMIPEXOLE DIHYDROCHLORIDE 0.5 MG: 0.5 TABLET ORAL at 22:07

## 2020-08-10 RX ADMIN — ONDANSETRON 4 MG: 2 INJECTION INTRAMUSCULAR; INTRAVENOUS at 11:42

## 2020-08-10 RX ADMIN — GUAIFENESIN 600 MG: 600 TABLET, EXTENDED RELEASE ORAL at 21:34

## 2020-08-10 RX ADMIN — POTASSIUM CHLORIDE 20 MEQ: 14.9 INJECTION, SOLUTION INTRAVENOUS at 03:26

## 2020-08-10 RX ADMIN — CALCIUM GLUCONATE 1 G: 20 INJECTION, SOLUTION INTRAVENOUS at 16:00

## 2020-08-10 RX ADMIN — CEFEPIME HYDROCHLORIDE 1000 MG: 1 INJECTION, POWDER, FOR SOLUTION INTRAMUSCULAR; INTRAVENOUS at 14:46

## 2020-08-10 RX ADMIN — ONDANSETRON 4 MG: 2 INJECTION INTRAMUSCULAR; INTRAVENOUS at 02:06

## 2020-08-10 RX ADMIN — ALBUTEROL SULFATE 2.5 MG: 2.5 SOLUTION RESPIRATORY (INHALATION) at 05:25

## 2020-08-10 RX ADMIN — PROPOFOL 100 MG: 10 INJECTION, EMULSION INTRAVENOUS at 11:10

## 2020-08-10 RX ADMIN — ROCURONIUM BROMIDE 30 MG: 10 INJECTION, SOLUTION INTRAVENOUS at 11:57

## 2020-08-10 RX ADMIN — PROPOFOL 30 MG: 10 INJECTION, EMULSION INTRAVENOUS at 11:34

## 2020-08-10 RX ADMIN — PHENYLEPHRINE HYDROCHLORIDE 150 MCG: 10 INJECTION INTRAVENOUS at 11:13

## 2020-08-10 RX ADMIN — POTASSIUM CHLORIDE 20 MEQ: 14.9 INJECTION, SOLUTION INTRAVENOUS at 14:46

## 2020-08-10 RX ADMIN — ATORVASTATIN CALCIUM 10 MG: 20 TABLET, FILM COATED ORAL at 21:35

## 2020-08-10 RX ADMIN — DEXAMETHASONE SODIUM PHOSPHATE 4 MG: 4 INJECTION, SOLUTION INTRAMUSCULAR; INTRAVENOUS at 11:12

## 2020-08-10 RX ADMIN — SODIUM CHLORIDE 5 MCG/MIN: 0.9 INJECTION, SOLUTION INTRAVENOUS at 11:23

## 2020-08-10 RX ADMIN — ACETAMINOPHEN 650 MG: 650 SUPPOSITORY RECTAL at 05:56

## 2020-08-10 RX ADMIN — FENTANYL CITRATE 25 MCG: 50 INJECTION, SOLUTION INTRAMUSCULAR; INTRAVENOUS at 11:09

## 2020-08-10 RX ADMIN — PROPOFOL 20 MCG/KG/MIN: 10 INJECTION, EMULSION INTRAVENOUS at 12:18

## 2020-08-10 RX ADMIN — LIDOCAINE 1 PATCH: 50 PATCH TOPICAL at 02:04

## 2020-08-10 RX ADMIN — SODIUM CHLORIDE 75 ML/HR: 0.9 INJECTION, SOLUTION INTRAVENOUS at 14:46

## 2020-08-10 RX ADMIN — SODIUM CHLORIDE 75 ML/HR: 0.9 INJECTION, SOLUTION INTRAVENOUS at 10:30

## 2020-08-10 RX ADMIN — SODIUM CHLORIDE: 0.9 INJECTION, SOLUTION INTRAVENOUS at 12:29

## 2020-08-10 RX ADMIN — Medication 100 MG: at 11:10

## 2020-08-10 RX ADMIN — MELATONIN 4.5 MG: 3 TAB ORAL at 21:50

## 2020-08-10 RX ADMIN — LIDOCAINE HYDROCHLORIDE 60 MG: 20 INJECTION, SOLUTION INTRAVENOUS at 11:10

## 2020-08-10 RX ADMIN — POTASSIUM CHLORIDE 40 MEQ: 1500 TABLET, EXTENDED RELEASE ORAL at 03:04

## 2020-08-10 RX ADMIN — SODIUM CHLORIDE: 0.9 INJECTION, SOLUTION INTRAVENOUS at 11:48

## 2020-08-10 RX ADMIN — MAGNESIUM SULFATE HEPTAHYDRATE 1 G: 1 INJECTION, SOLUTION INTRAVENOUS at 14:47

## 2020-08-10 RX ADMIN — CALCIUM CHLORIDE 0.5 G: 100 INJECTION PARENTERAL at 12:32

## 2020-08-10 RX ADMIN — HEPARIN SODIUM 5000 UNITS: 5000 INJECTION INTRAVENOUS; SUBCUTANEOUS at 14:47

## 2020-08-10 RX ADMIN — HEPARIN SODIUM 5000 UNITS: 5000 INJECTION INTRAVENOUS; SUBCUTANEOUS at 21:34

## 2020-08-10 RX ADMIN — SODIUM CHLORIDE 250 ML: 0.9 INJECTION, SOLUTION INTRAVENOUS at 03:01

## 2020-08-10 RX ADMIN — AZTREONAM 2000 MG: 1 INJECTION, POWDER, LYOPHILIZED, FOR SOLUTION INTRAMUSCULAR; INTRAVENOUS at 11:20

## 2020-08-10 NOTE — ASSESSMENT & PLAN NOTE
· Patient's renal ultrasound revealed 1 8 cm proximal left ureteral calculus with mild hydronephrosis  · Large right renal pelvic/proximal ureteral calculus without hydronephrosis  · Additional 1 5 cm nonobstructing left upper pole calculus  · Bilateral stent placement completed 8/10/2020 by urology

## 2020-08-10 NOTE — ASSESSMENT & PLAN NOTE
· Patient reports fever and chills, as per nursing facility was transferred for fever 101 7 and hypoxia  · COVID negative  · Reports cough, add Mucinex b i d   · P r n   Antipyretics  · Blood culture in process

## 2020-08-10 NOTE — ASSESSMENT & PLAN NOTE
· Patient treated in outpatient setting with Eliquis and Lopressor, these are currently on hold secondary to recent OR procedure and sepsis

## 2020-08-10 NOTE — ASSESSMENT & PLAN NOTE
· Bilateral ureterolithiasis status post stent placement bilateral   · Urology on consult  · Monitor urine output

## 2020-08-10 NOTE — ASSESSMENT & PLAN NOTE
Secondary to bilateral ureteral obstruction and sepsis poor oral intake  Creatinine 1 8, baseline is 0 9  Anticipate recovery after fluid resuscitation and bilateral ureteral stenting today

## 2020-08-10 NOTE — ASSESSMENT & PLAN NOTE
Wt Readings from Last 3 Encounters:   08/10/20 131 kg (289 lb 7 4 oz)   04/28/20 132 kg (291 lb 10 7 oz)   03/12/20 (!) 144 kg (318 lb)     · Monitor volume status closely  · Avoid hypertension to avoid pulmonary edema

## 2020-08-10 NOTE — ASSESSMENT & PLAN NOTE
RIGHT: large UPJ stone measuring greater than 2 cm with hydronephrosis  LEFT: 11 mm proximal ureteral calculus and additional cluster of stones in the renal pelvis up to 16 mm    Bilateral hydronephrosis, with sepsis and acute kidney injury, nitrite positive urine  Symptomatic with bilateral flank/back pain, fevers with T-max 102 9°    Plan  NPO  IV antibiotics and fluid resuscitation  To OR this morning for cystoscopy, bilateral retrograde pyelogram and ureteral stent insertions  Future definitive stone treatment with PCNL versus staged ureteroscopies once convalesced from acute infection   Alternatively could consider chronic bilateral stents with exchanges indefinitely

## 2020-08-10 NOTE — ASSESSMENT & PLAN NOTE
· CT report pending  · Patient had renal ultrasound last week which shows a 1 8 cm proximal left ureteral calculus with mild hydronephrosis  Large right renal pelvic/proximal ureteral calculus without hydronephrosis  Additional 1 5 cm nonobstructing left upper pole calculus  · NPO, IV hydration  · Hold Eliquis  · Monitor intake and output  · Urinary retention protocol  · P r n  Analgesics  · P r n   Antiemetics  · Urology consult

## 2020-08-10 NOTE — PROGRESS NOTES
Progress Note - Genevie Baumgarten 1935, 81 y o  female MRN: [id number]    Unit/Bed#: ICU 01 Encounter: [id number]    Primary Care Provider: Sonny Flores MD   Date and time admitted to hospital: 8/10/2020  1:45 AM    * Acute respiratory failure with hypoxia [facility])  Assessment & Plan  · Patient transitioned from OR room to ICU for postoperative management of ventilator  · Will attempt wean and extubation based respiratory mechanics  · Continue aggressive pulmonary toileting  Hydronephrosis with obstructing calculus  Assessment & Plan  · Patient's renal ultrasound revealed 1 8 cm proximal left ureteral calculus with mild hydronephrosis  · Large right renal pelvic/proximal ureteral calculus without hydronephrosis  · Additional 1 5 cm nonobstructing left upper pole calculus  · Bilateral stent placement completed 8/10/2020 by urology  [facility]epsis ([facility] )  Assessment & Plan  · Continue broad-spectrum antibiotics  · Monitor hemodynamics closely  · Initiate pressors hypotension  · Monitor culture data    Acute hypokalemia  Assessment & Plan  · Replete    Acute kidney injury ([facility] )  Assessment & Plan  · Avoid nephrotoxic drugs  · Monitor renal  · Baselin creatinine of 0 8 - 1  · Continue IV fluids  Paroxysmal A-fib [facility])  Assessment & Plan  · Patient treated in outpatient setting with Eliquis and Lopressor, these are currently on hold secondary to recent OR procedure and sepsis      Urinary tract infection  Assessment & Plan  · Continue broad-spectrum antibiotics    Ureterolithiasis  Assessment & Plan  · Bilateral ureterolithiasis status post stent placement bilateral   · Urology on consult  · Monitor urine output    Morbid obesity with BMI of 50 0-59 9, adult [facility])  Assessment & Plan  · May be complicating factor in extubation    Acute on chronic diastolic CHF (congestive heart failure) ([facility])  Assessment & Plan  Wt Readings from Last 3 Encounters:   08/10/20 131 kg (289 lb 7 4 oz)   04/28/20 132 kg (291 lb 10 7 oz)   03/12/20 (!) 144 kg (318 lb)     · Monitor volume status closely  · Avoid hypertension to avoid pulmonary edema  ----------------------------------------------------------------------------------------  HPI/24hr events:  Transferred to the ICU status post bilateral ureterolithiasis with stent placement secondary to hydronephrosis in urinary tract infection  Disposition: Transfer to Critical Care   Code Status: Level 3 - DNAR and DNI  ---------------------------------------------------------------------------------------  SUBJECTIVE  This is an 27-year-old female is a who offers no complaints as she is intubated and sedated  Review of Systems   Unable to perform ROS: Intubated     Review of systems was unable to be performed secondary to Patient is intubated and sedated  ---------------------------------------------------------------------------------------  OBJECTIVE  Is an 27-year-old female who [facility] [place] with complaints of nausea, abdominal pain, back pain, fever, and chills  She reported a 1 week history of abdominal pain and back pain since a renal ultrasound was performed on Tuesday 08/04/2020  She was noted to be hypoxic upon arrival to the hospital however denied shortness of breath or productive cough  She reported a recent 53 lb weight loss  She is admitted to the Internal Medicine service with acute hypoxic respiratory failure, hydronephrosis with obstructing calculus, acute kidney injury, and hypo kalemia  A urology consult was ordered and upon evaluation by urology it was felt that her hydronephrosis with obstructing calculi on both the right and left were in need of cystoscopy, bilateral retrograde pyelogram, and ureteral stent insertion  Post operative procedure the patient was transferred to the ICU ventilator management       Vitals   Vitals:    08/10/20 1255 08/10/20 1316 08/10/20 1321 08/10/20 1352   BP: 137/55      BP Location:       Pulse: 87  84 88   Resp: 16  16 19   Temp: (!) 101 7 °F (38 7 °C) 99 4 °F (37 4 °C)     TempSrc:  Temporal     SpO2: 96%  96% 96%   Weight:         Temp (24hrs), Av 5 °F (38 1 °C), Min:98 5 °F (36 9 °C), Max:102 9 °F (39 4 °C)  Current: Temperature: 99 4 °F (37 4 °C)  Arterial Line BP: 148/48  Arterial Line MAP (mmHg): 74 mmHg    Respiratory:  SpO2: SpO2: 96 %, SpO2 Activity: SpO2 Activity: At Rest, SpO2 Device: O2 Device: Ventilator(500 x 16, 60%, peep 5)       Invasive/non-invasive ventilation settings   Respiratory    Lab Data (Last 4 hours)    None         O2/Vent Data (Last 4 hours)      08/10 1256 08/10 1308 08/10 1349       Vent Mode AC/VC  CPAP/PS Spont     Resp Rate (BPM) (BPM) 16       Vt (mL) (mL) 500       FIO2 (%) (%) 60 50      PEEP (cmH2O) (cmH2O) 5       MV 8 52       FIO2 (%) (%)   40     PEEP (cmH2O) (cmH2O)   5     Pressure Support (cmH2O) (cmH20)   8     MV (Obs)   4 24     RSBI   46                 Physical Exam  Vitals signs and nursing note reviewed  Constitutional:       General: She is not in acute distress  Appearance: She is obese  She is not toxic-appearing  HENT:      Head: Normocephalic and atraumatic  Mouth/Throat:      Mouth: Mucous membranes are moist       Pharynx: No oropharyngeal exudate or posterior oropharyngeal erythema  Eyes:      General: No scleral icterus  Right eye: No discharge  Left eye: No discharge  Extraocular Movements: Extraocular movements intact  Pupils: Pupils are equal, round, and reactive to light  Neck:      Musculoskeletal: Normal range of motion and neck supple  No neck rigidity or muscular tenderness  Vascular: No carotid bruit  Cardiovascular:      Rate and Rhythm: Normal rate and regular rhythm  Pulses: Normal pulses  Heart sounds: Normal heart sounds  No murmur  Pulmonary:      Breath sounds: Normal breath sounds  No rhonchi or rales     Abdominal:      Comments: Obese, soft, nontender, nondistended, positive bowel sounds   Genitourinary:     Comments: Deferred  Musculoskeletal:         General: No tenderness, deformity or signs of injury  Left lower leg: No edema  Lymphadenopathy:      Cervical: No cervical adenopathy  Skin:     General: Skin is warm and dry  Capillary Refill: Capillary refill takes 2 to 3 seconds  Coloration: Skin is not jaundiced or pale  Findings: No bruising or erythema  Neurological:      General: No focal deficit present  Mental Status: She is alert  Comments: Patient is intubated and sedated   Psychiatric:      Comments: Patient is intubated and sedated         Laboratory and Diagnostics:  Results from last 7 days   Lab Units 08/10/20  0152   WBC Thousand/uL 11 83*   HEMOGLOBIN g/dL 10 3*   HEMATOCRIT % 35 1   PLATELETS Thousands/uL 231   NEUTROS PCT % 87*   MONOS PCT % 7     Results from last 7 days   Lab Units 08/10/20  0152   SODIUM mmol/L 144   POTASSIUM mmol/L 2 8*   CHLORIDE mmol/L 101   CO2 mmol/L 36*   ANION GAP mmol/L 7   BUN mg/dL 15   CREATININE mg/dL 1 84*   CALCIUM mg/dL 8 3   GLUCOSE RANDOM mg/dL 116   ALT U/L 17   AST U/L 27   ALK PHOS U/L 56   ALBUMIN g/dL 2 3*   TOTAL BILIRUBIN mg/dL 0 69          Results from last 7 days   Lab Units 08/10/20  0152   INR  2 12*   PTT seconds 39*      Results from last 7 days   Lab Units 08/10/20  0254   TROPONIN I ng/mL 0 04     Results from last 7 days   Lab Units 08/10/20  1220 08/10/20  0152   LACTIC ACID mmol/L 1 6 0 8     ABG:    VBG:          Micro  Results from last 7 days   Lab Units 08/10/20  0158 08/10/20  0152   BLOOD CULTURE  Received in Microbiology Lab  Culture in Progress  Received in Microbiology Lab  Culture in Progress         EK/10/2020 EKG reveals sinus rhythm  Imagin/10/2020 retrograde pyelogram   08/10/2020 CT of abdomen/pelvis without contrast reveals bilateral nephrolithiasis with 25 x 14 mm calculus within the distal aspect of the right extra renal pelvis and extending into the right ureteropelvic junction  There is mild right hydronephrosis and there is stranding of the fat adjacent to the right kidney and tracking caudally adjacent to the proximal right ureter at  There is 11 x 8 mm calculus within the proximal left ureter approximately 2 5 cm P on the left ureteropelvic junction  There are several calculi within the left renal pelvis and extending towards the left ureteropelvic junction, the largest of which measures approximately 16 mm  There is mild-to-moderate left hydronephrosis  Intake and Output  I/O       08/08 0701 - 08/09 0700 08/09 0701 - 08/10 0700 08/10 0701 - 08/11 0700    P  O    0    I V  (mL/kg)   1026 6 (7 8)    IV Piggyback  350     Total Intake(mL/kg)  350 (2 7) 1026 6 (7 8)    Urine (mL/kg/hr)   125 (0 1)    Total Output   125    Net  +350 +901 6                 Height and Weights      IBW: -92 5 kg  Body mass index is 51 28 kg/m²    Weight (last 2 days)     Date/Time   Weight    08/10/20 0141   131 (289 46)                Nutrition   NPO      Active Medications  Scheduled Meds:  Current Facility-Administered Medications   Medication Dose Route Frequency Provider Last Rate    acetaminophen  650 mg Oral Q6H PRN Veena Addison MD      albuterol  2 5 mg Nebulization Q4H PRN Veena Addison MD      aluminum-magnesium hydroxide-simethicone  30 mL Oral Q6H PRN Veena Addison MD      amLODIPine  10 mg Oral Daily Veena Addison MD      aspirin  81 mg Oral Daily Veena Addison MD      atorvastatin  10 mg Oral HS Veena Addison MD      aztreonam  1,000 mg Intravenous Q8H Veena Addison MD      bisacodyl  10 mg Rectal Daily PRN Veena Addison MD      cholecalciferol  2,000 Units Oral Daily Veena Addison MD      docusate sodium  100 mg Oral BID Veena Addison MD      guaiFENesin  600 mg Oral Q12H [address] Veena Addison MD      heparin (porcine)  5,000 Units Subcutaneous [facility] [address] Iesha Stanton Getachew Hansen MD      HYDROmorphone  0 2 mg Intravenous Q4H PRN Greg Cazares MD      levothyroxine  150 mcg Oral Early Morning Greg Cazares MD      melatonin  4 5 mg Oral HS Greg Cazares MD      metoprolol tartrate  25 mg Oral Q12H [facility] Greg Cazares MD      nitroglycerin  0 4 mg Sublingual Q5 Min PRN Greg Cazares MD      ondansetron  4 mg Intravenous Q6H PRN Greg Cazares MD      oxyCODONE  5 mg Oral Q4H PRN Greg Cazares MD      pantoprazole  40 mg Oral Early Morning Greg Cazares MD      potassium chloride  20 mEq Intravenous Once Charles Sonday, CRNP      pramipexole  0 5 mg Oral HS Greg Cazares MD      sodium chloride  75 mL/hr Intravenous Continuous Greg Cazares MD Stopped (08/10/20 1255)     Continuous Infusions:  sodium chloride, 75 mL/hr, Last Rate: Stopped (08/10/20 1255)      PRN Meds:   acetaminophen, 650 mg, Q6H PRN  albuterol, 2 5 mg, Q4H PRN  aluminum-magnesium hydroxide-simethicone, 30 mL, Q6H PRN  bisacodyl, 10 mg, Daily PRN  HYDROmorphone, 0 2 mg, Q4H PRN  nitroglycerin, 0 4 mg, Q5 Min PRN  ondansetron, 4 mg, Q6H PRN  oxyCODONE, 5 mg, Q4H PRN        Invasive Devices Review  Invasive Devices     Peripheral Intravenous Line            Peripheral IV 08/10/20 Right Antecubital less than 1 day    Peripheral IV 08/10/20 Right;Dorsal (posterior) Hand less than 1 day          Arterial Line            Arterial Line 08/10/20 Left Radial less than 1 day          Drain            Ureteral Drain/Stent Left ureter 16 Fr  less than 1 day    Ureteral Drain/Stent Right ureter 16 Fr  less than 1 day    Urethral Catheter Non-latex 16 Fr  less than 1 day          Airway            ETT  Cuffed;Oral;Inflated 7 mm less than 1 day                  Care Time Delivered:   Upon my evaluation, this patient had a high probability of imminent or life-threatening deterioration due to Sepsis, which required my direct attention, intervention, and personal management    I have personally provided Forty minutes (Thirty-eight to Forty-two) of critical care time, exclusive of procedures, teaching, family meetings, and any prior time recorded by providers other than myself  DHRUV Marr      Portions of the record may have been created with voice recognition software  Occasional wrong word or "sound a like" substitutions may have occurred due to the inherent limitations of voice recognition software    Read the chart carefully and recognize, using context, where substitutions have occurred

## 2020-08-10 NOTE — OP NOTE
OPERATIVE REPORT  PATIENT NAME: Raman Kerns    :  1935  MRN: [id number]  Pt Location: [facility] OR ROOM 08    SURGERY DATE: 8/10/2020    Surgeon(s) and Role:     David Jaffe MD - Primary    Preop Diagnosis:  Bilateral nephrolithiasis [N20 0]    Post-Op Diagnosis Codes:     * Bilateral nephrolithiasis [N20 0]    Procedure(s) (LRB):  CYSTOSCOPY RETROGRADE PYELOGRAM WITH INSERTION STENT URETERAL (Bilateral)    Specimen(s):  * No specimens in log *    Estimated Blood Loss:   Minimal    Drains:  Urethral Catheter Non-latex 16 Fr  (Active)   Number of days: 0       Ureteral Drain/Stent Left ureter 16 Fr  (Active)   Number of days: 0       Ureteral Drain/Stent Right ureter 16 Fr  (Active)   Number of days: 0       Anesthesia Type:   General    Operative Indications:  Bilateral nephrolithiasis [N20 0]  Sepsis    Operative Findings:  Bilateral ureteral obstruction  Purulent urine    Complications:   None    Procedure and Technique:  Patient was identified, brought to the operating, placed on table supine position  After induction general anesthesia she was placed in the dorsal lithotomy position and prepped and draped in usual sterile fashion  A formal time-out was performed  The 25 American rigid cystoscope was introduced per urethra bladder cystoscopy is performed  Bladder mucosa was quite friable however there is no abnormality noted  The left ear orifice was cannulated with a 5 American catheter and guidewire  Retrograde pyelogram performed revealing obstruction at the UPJ  The guidewire was replaced and a 24 cm x 6 American double-J stent without string was placed  Immediately, purulent drainage return  I then turned attention to the right ureteral orifice which was cannulated as well  Retrograde pyelogram revealed complete obstruction at the UPJ consistent with large calculus  The wire was able to bypass the obstruction and a 6 [place] by 24 cm double-J stent without string was placed    Once again, purulent drainage return  I placed a 16 [place] catheter  I was present for the entire procedure    Patient Disposition:  During the procedure patient remained febrile and hypotensive  Pressures were started  After discussion, decision was made that patient should remain intubated and be transferred to intensive care unit  I have discussed the case with both the internal medicine physician and critical care physician who has accepted the patient to treat for septic shock      SIGNATURE: Jerrod Irwin MD  DATE: August 10, 2020  TIME: 11:57 AM

## 2020-08-10 NOTE — SEPSIS NOTE
Sepsis Note   Juice Arredondo 81 y o  female MRN: [id number]  Unit/Bed#: ED 31 Encounter: [id number]      qSOFA     Row Name 08/10/20 0540 08/10/20 0528 08/10/20 0330 08/10/20 0253 08/10/20 0138    Altered mental status GCS < 15              Respiratory Rate > / =22  1  1  1  0  0    Systolic BP < / =978    0    0  0    Q Sofa Score  1  1  1  0  0        Initial Sepsis Screening     Row Name 08/10/20 0557                Is the patient's history suggestive of a new or worsening infection? (!) Yes (Proceed)  -SM        Suspected source of infection  pneumonia  -SM        Are two or more of the following signs & symptoms of infection both present and new to the patient? (!) Yes (Proceed) became febrile 4 hours into ER visit when already admitted  -SM        Indicate SIRS criteria  Hyperthemia > 38 3C (100 9F); Tachypnea > 20 resp per min; Tachycardia > 90 bpm  -SM        If the answer is yes to both questions, suspicion of sepsis is present          If severe sepsis is present AND tissue hypoperfusion perists in the hour after fluid resuscitation or lactate > 4, the patient meets criteria for SEPTIC SHOCK          Are any of the following organ dysfunction criteria present within 6 hours of suspected infection and SIRS criteria that are NOT considered to be chronic conditions? (!) Yes  -SM        Organ dysfunction          Date of presentation of severe sepsis          Time of presentation of severe sepsis          Tissue hypoperfusion persists in the hour after crystalloid fluid administration, evidenced, by either:          Was hypotension present within one hour of the conclusion of crystalloid fluid administration?           Date of presentation of septic shock          Time of presentation of septic shock            User Key  (r) = Recorded By, (t) = Taken By, (c) = Cosigned By    Initials Name Provider Type    SM Chris Cortes DO Physician

## 2020-08-10 NOTE — CONSULTS
Consult - Urology   Comfort Moss 1935, 81 y o  female MRN: [id number]    Unit/Bed#: E5 -01 Encounter: [id number]    Hydronephrosis with obstructing calculus  Assessment & Plan  RIGHT: large UPJ stone measuring greater than 2 cm with hydronephrosis  LEFT: 11 mm proximal ureteral calculus and additional cluster of stones in the renal pelvis up to 16 mm    Bilateral hydronephrosis, with sepsis and acute kidney injury, nitrite positive urine  Symptomatic with bilateral flank/back pain, fevers with T-max 102 9°    Plan  NPO  IV antibiotics and fluid resuscitation  To OR this morning for cystoscopy, bilateral retrograde pyelogram and ureteral stent insertions  Future definitive stone treatment with PCNL versus staged ureteroscopies once convalesced from acute infection  Alternatively could consider chronic bilateral stents with exchanges indefinitely    Acute kidney injury [facility])  Assessment & Plan  Secondary to bilateral ureteral obstruction and sepsis poor oral intake  Creatinine 1 8, baseline is 0 9  Anticipate recovery after fluid resuscitation and bilateral ureteral stenting today      Bedside rounds performed with RN  Discussed with Dr Cyndee Mina    I spoke with patient's daughter Christiano Zepeda via telephone with patient's permission regarding updated clinical status and OR plans today  Subjective:   60-year-old female with known history of nephrolithiasis presents with sepsis and bilateral ureteral obstruction  Patient reports bilateral flank and lower back pain for approximately six days, starting just after having had her surveillance renal ultrasound on Tuesday last week  She is also reported nausea with decreased appetite  She developed fevers yesterday and fatigue  She reports no change in her voiding and has not had any gross hematuria  Her back pain continues  Review of Systems   Constitutional: Positive for activity change, appetite change, fatigue and fever   Negative for chills and unexpected weight change  HENT: Negative  Respiratory: Negative  Negative for shortness of breath  Cardiovascular: Negative  Negative for chest pain  Gastrointestinal: Positive for abdominal pain and nausea  Negative for abdominal distention, diarrhea and vomiting  Endocrine: Negative  Genitourinary: Positive for flank pain  Negative for decreased urine volume, difficulty urinating, dysuria, frequency, hematuria and urgency  Musculoskeletal: Negative for back pain and gait problem  Skin: Negative  Allergic/Immunologic: Negative  Neurological: Negative  Hematological: Negative for adenopathy  Does not bruise/bleed easily  Objective:  Vitals: Blood pressure 106/51, pulse 92, temperature 98 5 °F (36 9 °C), temperature source Temporal, resp  rate 18, weight 131 kg (289 lb 7 4 oz), SpO2 99 %, not currently breastfeeding  ,Body mass index is 51 28 kg/m²  Intake/Output Summary (Last 24 hours) at 8/10/2020 0857  Last data filed at 8/10/2020 9731  Gross per 24 hour   Intake 350 ml   Output    Net 350 ml       Invasive Devices     Peripheral Intravenous Line            Peripheral IV 08/10/20 Right Antecubital less than 1 day                Physical Exam  Vitals signs and nursing note reviewed  Constitutional:       General: She is not in acute distress  Appearance: She is well-developed  She is obese  She is not diaphoretic  Comments: Obese white female resting in bed, awoken easily, pleasant cooperative, alert and oriented   HENT:      Head: Normocephalic and atraumatic  Cardiovascular:      Rate and Rhythm: Normal rate and regular rhythm  Pulmonary:      Effort: Pulmonary effort is normal       Breath sounds: Wheezing (few scattered wheezes) present  Abdominal:      General: Bowel sounds are normal  There is no distension  Palpations: Abdomen is soft  Tenderness: There is abdominal tenderness ( bilateral flank and lower back pain)     Skin:     General: Skin is warm  Capillary Refill: Capillary refill takes less than 2 seconds  Neurological:      Mental Status: She is alert and oriented to person, place, and time        Gait: Gait normal    Psychiatric:         Speech: Speech normal          Behavior: Behavior normal          History:    Past Medical History:   Diagnosis Date    [facility] failure [facility])     Anemia     Arthritis     Atrial fibrillation ([facility])     Chafing     folds of skin and back of thighs    Chronic indwelling [place] catheter     removed    Colon polyp     Coronary artery disease     Difficulty walking     Discitis     Discitis     Dysphagia     Dysphagia     Dysuria     Gait abnormality     GERD (gastroesophageal reflux disease)     History of transfusion     Hyperlipidemia     Hypertension     Hypothyroidism     Irregular heart beat     Kidney stone     Lymphedema     Major depressive disorder     MDD (major depressive disorder)     [facility] infarction) [facility] [address] )     Morbid obesity (HCC)     Morbid obesity ([facility]     Muscle weakness     Muscle weakness (generalized)     NSTEMI (non-ST elevated myocardial infarction) ([facility])     Osteoporosis     Paroxysmal A-fib ([facility])     Recurrent UTI     Renal disorder     RLS (restless legs syndrome)     Sleep apnea     Wheelchair bound     unable to bear weight , hx infected prosthesis     Past Surgical History:   Procedure Laterality Date    CHOLECYSTECTOMY      COLONOSCOPY      FL RETROGRADE PYELOGRAM  5/22/2019    HYSTERECTOMY      JOINT REPLACEMENT Bilateral     knee replacment    LAPAROSCOPIC GASTRIC BANDING      FL CYSTO/URETERO W/LITHOTRIPSY &INDWELL STENT INSRT Left 6/26/2019    Procedure: CYSTO, URETEROSCOPY W/HOLMIUM LASER, STENT EXCHANGE;  Surgeon: Samantha Coronado MD;  Location: [facility];  Service: Urology    FL CYSTOURETHROSCOPY,URETER CATHETER Left 5/22/2019    Procedure: CYSTOSCOPY; RIGHT RETROGRADE PYELOGRAM WITH INSERTION STENT URETERAL LEFT; Surgeon: Michele Lowery MD;  Location: [facility];  Service: Urology    IN XCAPSL CTRC RMVL INSJ IO LENS PROSTH W/O ECP Right 3/12/2020    Procedure: CATARACT REMOVAL W/INTRAOCULAR LENS;  Surgeon: Sukumar Ayala MD;  Location: [facility] MAIN OR;  Service: Ophthalmology    REMOVAL GASTRIC BAND LAPAROSCOPIC      VENTRAL HERNIA REPAIR      x4     History reviewed  No pertinent family history  Social History     Socioeconomic History    Marital status:      Spouse name: None    Number of children: None    Years of education: None    Highest education level: None   Occupational History    None   Social Needs    Financial resource strain: None    Food insecurity     Worry: None     Inability: None    Transportation needs     Medical: None     Non-medical: None   Tobacco Use    Smoking status: Former Smoker    Smokeless tobacco: Never Used    Tobacco comment: smoked when was very much younger for one summer   Substance and Sexual Activity    Alcohol use:  Yes     Alcohol/week: 1 0 standard drinks     Types: 1 Glasses of wine per week     Frequency: Monthly or less     Drinks per session: 1 or 2     Comment: socially     Drug use: Never    Sexual activity: None   Lifestyle    Physical activity     Days per week: None     Minutes per session: None    Stress: None   Relationships    Social connections     Talks on phone: None     Gets together: None     Attends Judaism service: None     Active member of club or organization: None     Attends meetings of clubs or organizations: None     Relationship status: None    Intimate partner violence     Fear of current or ex partner: None     Emotionally abused: None     Physically abused: None     Forced sexual activity: None   Other Topics Concern    None   Social History Narrative    None       Imaging:    CT chest abdomen pelvis wo contrast [[id number]]  Collected: 08/10/20 0435    Order Status: Completed  Updated: 08/10/20 0538    Narrative:      CT CHEST, ABDOMEN AND PELVIS WITHOUT IV CONTRAST     INDICATION:   back pain across mid back, cough, hypoxia   Hypoxia   Pain across the mid back since Tuesday   Ongoing cough with clear sputum since having COVID-19 in April 2020   The patient tested negative for COVID-19 on August 10, 2020   Mild nausea  Prior history of tobacco use   History of kidney stones  COMPARISON:  CT of the abdomen and pelvis dated May 21, 2019     TECHNIQUE: CT examination of the chest, abdomen and pelvis was performed without intravenous contrast   Axial, sagittal, and coronal 2D reformatted images were created from the source data and submitted for interpretation  Radiation dose length product (DLP) for this visit:  4475 mGy-cm    This examination, like all CT scans performed in the [facility], was performed utilizing techniques to minimize radiation dose exposure, including the use of iterative   reconstruction and automated exposure control  Enteric contrast was not administered  FINDINGS:     CHEST     LUNGS: Shannen Paris is minimal dependent atelectasis, left greater than right   There is mild tree-in-bud opacity areas scattered bilaterally, most noticeable in the right upper lobe   There is no focal consolidation  PLEURA:  Unremarkable  HEART/GREAT VESSELS: Sairaa Wellington is coronary artery disease  Cassandria Paris is atherosclerosis of the thoracic aorta   There is no significant pericardial effusion  MEDIASTINUM AND OSBALDO:  Unremarkable  CHEST WALL AND LOWER NECK:   Unremarkable  ABDOMEN     LIVER/BILIARY TREE:  Unremarkable  GALLBLADDER: Mary [place] is surgically absent  SPLEEN:  Unremarkable  PANCREAS:  Unremarkable  ADRENAL GLANDS:  Unremarkable  KIDNEYS/URETERS:     RIGHT: There is an extrarenal pelvis on the right  Shannen Paris is a large calculus in the distal aspect of the right extrarenal pelvis and extending into the right ureteropelvic junction, measuring approximately 25 x 14 mm   This is new compared to May 21,   2019  Shannen Paris is mild right hydronephrosis  Shannen Paris is stranding of the fat adjacent to the right kidney   Superimposed infection should be excluded   Urology consultation is recommended  Shannen Paris is a cluster of nonobstructing calculi posteriorly in the   interpolar region right kidney  LEFT: There is a partially extrarenal pelvis on the left   Several calculi are present within the left renal pelvis and extending towards the left ureteropelvic junction   The largest calculus in the left extrarenal pelvis measures approximately 16 mm        Additionally, there is an approximately 11 x 8 mm calculus within the proximal left ureter, approximately 2 5 cm beyond the left ureteropelvic junction   There is mild to moderate left hydronephrosis, similar to slightly worse compared with May 21, 2019      There is, however, more stranding of the fat adjacent to the left kidney and tracking caudally around the proximal left ureter   Superimposed infection should be excluded   Urology consultation is recommended  There are also several nonobstructing calculi in the upper pole and interpolar region left kidney  STOMACH AND BOWEL: Shannen Paris is no evidence of bowel obstruction  Shannen Paris is colonic diverticulosis without evidence of acute diverticulitis  APPENDIX:  No findings to suggest appendicitis  ABDOMINOPELVIC CAVITY:  As described above   No pneumoperitoneum  VESSELS: Shannen Paris is atherosclerosis  Shannen Paris is no abdominal aortic aneurysm  PELVIS     REPRODUCTIVE ORGANS:  Unremarkable for patient's age  URINARY BLADDER:  Unremarkable  ABDOMINAL WALL/INGUINAL REGIONS:  Unremarkable  OSSEOUS STRUCTURES:  No acute fracture or destructive osseous lesion   There are degenerative changes of both shoulders   There is multilevel degenerative change of the spine      Impression:        Bilateral nephrolithiasis        There is an approximately 25 x 14 mm calculus within the distal aspect of the right extrarenal pelvis and extending into the right ureteropelvic junction, new compared with May 21, 2019   There is mild right hydronephrosis and there is stranding of the   fat adjacent to the right kidney and tracking caudally adjacent to the proximal right ureter, also new compared with May 21, 2019  There is an approximately 11 x 8 mm calculus within the proximal left ureter, approximately 2 5 cm beyond the left ureteropelvic junction, similar to May 21, 2019   There are also several calculi within the left renal pelvis and extending towards the   left ureteropelvic junction, the largest of which measures approximately 16 mm   There is mild to moderate left hydronephrosis, similar to slightly worse compared with May 21, 2019   There is, however, more stranding of the fat adjacent to the left   kidney and tracking caudally adjacent to the proximal left ureter  Superimposed infection should be excluded   Correlation with urinalysis and urine culture and sensitivity is recommended   Urology consultation is recommended  Other findings as described above, please see discussion  The images are available for clinical review  Imaging reviewed - both report and images personally reviewed       Labs:  Recent Labs     08/10/20  0152   WBC 11 83*     Recent Labs     08/10/20  0152   HGB 10 3*       Recent Labs     08/10/20  0152   CREATININE 1 84*       Microbiology:  UA nitrite and leukocytes positive  Culture pending  Blood culture x 2 pending  sars-cov-2 negative 8/10/2020    Rossy Kwan PA-C  Date: 8/10/2020 Time: 8:57 AM

## 2020-08-10 NOTE — H&P
H&P- Laytonyoel Montalvo 1935, 81 y o  female MRN: [id number]    Unit/Bed#: ED 31 Encounter: [id number]    Primary Care Provider: Ailyn Newton MD   Date and time admitted to hospital: 8/10/2020  1:45 AM    ADDENDUM: CT resulted and shows B/L hydronephrosis and fat stranding adjacent to the right and left kidney extending to each ureter  · Patient now febrile  Will treat for pyelonephritis  Allergy to penicillin  Will treat with aztreonam   · UA pending  * Acute respiratory failure with hypoxia (HCC)  Assessment & Plan  · O2 sat 89% on arrival, improved to 100% with 2LNC  Tested positive for COVID-19 on April 19  · COVID negative  · Monitor O2 sats  · Reports cough will add Mucinex b i d  · Add p r n  nebs  · Will defer ambulating O2, patient nonambulatory, uses mobilized chair  · Decreased lung sounds with poor inspiratory effort  Provide patient with incentive spirometer    Hydronephrosis with obstructing calculus  Assessment & Plan  · CT report pending  · Patient had renal ultrasound last week which shows a 1 8 cm proximal left ureteral calculus with mild hydronephrosis  Large right renal pelvic/proximal ureteral calculus without hydronephrosis  Additional 1 5 cm nonobstructing left upper pole calculus  · NPO, IV hydration  · Hold Eliquis  · Monitor intake and output  · Urinary retention protocol  · P r n  Analgesics  · P r n   Antiemetics  · Urology consult      Acute kidney injury [facility])  Assessment & Plan  · Creatinine 1 8, baseline 0 8-1 0; likely post renal obstructive uropathy  · IV hydration x1L  · Hold furosemide and ARB  · Avoid nephrotoxins  · Monitor serum creatinine    Acute hypokalemia  Assessment & Plan  K 2 8, replete, monitor serum K    Paroxysmal A-fib (HCC)  Assessment & Plan  AFib treated with metoprolol and Eliquis    Morbid obesity (HCC)  Assessment & Plan  Dietary & lifestyle modifications      VTE Prophylaxis: Enoxaparin (Lovenox)  / sequential compression device   Code Status: DNR/I  POLST: POLST is not applicable to this patient  Discussion with family:     Anticipated Length of Stay:  Patient will be admitted on an Inpatient basis with an anticipated length of stay of  > 2 midnights  Justification for Hospital Stay:  Acute respiratory failure with hypoxia, fever of unknown origin    Total Time for Visit, including Counseling / Coordination of Care: 1 hour  Greater than 50% of this total time spent on direct patient counseling and coordination of care  Chief Complaint:   Nausea, fever and chills    History of Present Illness:    Adriana Day is a 81 y o  female who [facility] with c/o nausea, abdominal pain, back pain, fever and chills  Patient reports nausea for 1 week and abdominal pain and back pain since renal ultrasound last Tuesday  Hypoxic on arrival, denies shortness of breath, reports cough productive of clear sputum since diagnosed with COVID in April States since her Covid diagnosis she has decreased appetite and 53lb weight loss  Denies emesis, constipation, diarrhea or dysuria  Reports chronic and intermittent episodes of hematuria  Denies urinary retention  · As per facility record patient transferred due to "fever 101 7, hypoxia O2 sat 81% on room air, improved to 95% with 2 LN/C, decreased lung sounds, nausea minimal relief with Zofran and bilateral flank pain "    Review of Systems:    Review of Systems   Constitutional: Positive for appetite change, chills, fever and unexpected weight change  Loss of appetite, weight loss   Respiratory: Positive for cough  Negative for shortness of breath  Cardiovascular: Negative  Gastrointestinal: Positive for abdominal pain and nausea  Negative for constipation, diarrhea and vomiting  Genitourinary: Positive for hematuria  Negative for dysuria  Chronic intermittent hematuria   Musculoskeletal: Positive for back pain  Neurological: Negative          Past Medical and Surgical History:     Past Medical History:   Diagnosis Date    Acute kidney failure (HCC)     Anemia     Arthritis     Atrial fibrillation (HCC)     Chafing     folds of skin and back of thighs    Chronic indwelling [place] catheter     removed    Colon polyp     Coronary artery disease     Difficulty walking     Discitis     Discitis     Dysphagia     Dysphagia     Dysuria     Gait abnormality     GERD (gastroesophageal reflux disease)     History of transfusion     Hyperlipidemia     Hypertension     Hypothyroidism     Irregular heart beat     Kidney stone     Lymphedema     Major depressive disorder     MDD (major depressive disorder)     [facility]     Morbid obesity [facility]     [facility]     Muscle weakness     Muscle weakness (generalized)     NSTEMI (non-ST elevated myocardial infarction) ([facility]     Osteoporosis     Paroxysmal A-fib (HCC)     Recurrent UTI     Renal disorder     RLS (restless legs syndrome)     Sleep apnea     Wheelchair bound     unable to bear weight , hx infected prosthesis       Past Surgical History:   Procedure Laterality Date    CHOLECYSTECTOMY      COLONOSCOPY      FL RETROGRADE PYELOGRAM  5/22/2019    HYSTERECTOMY      JOINT REPLACEMENT Bilateral     knee replacment    LAPAROSCOPIC GASTRIC BANDING      SD CYSTO/URETERO W/LITHOTRIPSY &INDWELL STENT INSRT Left 6/26/2019    Procedure: CYSTO, URETEROSCOPY W/HOLMIUM LASER, STENT EXCHANGE;  Surgeon: Florencio Adrian MD;  Location: [facility];  Service: Urology    SD CYSTOURETHROSCOPY,URETER CATHETER Left 5/22/2019    Procedure: CYSTOSCOPY; RIGHT RETROGRADE PYELOGRAM WITH INSERTION STENT URETERAL LEFT;  Surgeon: Florencio Adrian MD;  Location: [facility];  Service: Urology    SD XCAPSL CTRC RMVL INSJ IO LENS PROSTH W/O ECP Right 3/12/2020    Procedure: CATARACT REMOVAL W/INTRAOCULAR LENS;  Surgeon: Sherlyn Valladares MD;  Location: [address] [place] [address] MAIN OR;  Service: Ophthalmology  REMOVAL GASTRIC BAND LAPAROSCOPIC      VENTRAL HERNIA REPAIR      x4       Meds/Allergies:    Prior to Admission medications    Medication Sig Start Date End Date Taking?  Authorizing Provider   acetaminophen (TYLENOL) 325 mg tablet Take 650 mg by mouth every 6 (six) hours as needed for mild pain    Yes Historical Provider, MD   alendronate (FOSAMAX) 70 mg tablet Take 70 mg by mouth see administration instructions Give once a day on friday   Yes Historical Provider, MD   amLODIPine (NORVASC) 10 mg tablet 10 mg    Yes Historical Provider, MD   apixaban (Eliquis) 5 mg Take 5 mg by mouth 2 (two) times a day   Yes Historical Provider, MD   aspirin (ECOTRIN LOW STRENGTH) 81 mg EC tablet Take 81 mg by mouth daily   Yes Historical Provider, MD   atorvastatin (LIPITOR) 10 mg tablet Take 10 mg by mouth daily   Yes Historical Provider, MD   bisacodyl (DULCOLAX) 10 mg suppository Insert 10 mg into the rectum as needed    Yes Historical Provider, MD   calcium carbonate-vitamin D (OSCAL-D) 500 mg-200 units per tablet Take 1 tablet by mouth daily with breakfast   Yes Historical Provider, MD   cholecalciferol (VITAMIN D3) 1,000 units tablet Take 2,000 Units by mouth daily    Yes Historical Provider, MD   losartan (COZAAR) 25 mg tablet Take 25 mg by mouth daily   Yes Historical Provider, MD   magnesium hydroxide (MILK OF MAGNESIA) 400 mg/5 mL oral suspension daily as needed    Yes Historical Provider, MD   cefTRIAXone (ROCEPHIN) 500 mg injection  12/10/19   Historical Provider, MD   celecoxib (CeleBREX) 100 mg capsule Take 100 mg by mouth 2 (two) times a day    Historical Provider, MD   Coenzyme Q10 (CO Q10) 100 MG CAPS Take by mouth    Historical Provider, MD   docusate sodium (COLACE) 100 mg capsule Take 100 mg by mouth 2 (two) times a day    Historical Provider, MD   ferrous sulfate 325 (65 Fe) mg tablet Take 325 mg by mouth daily with breakfast    Historical Provider, MD   fluticasone (Veramyst) 27 5 MCG/SPRAY nasal spray 2 sprays into each nostril daily    Historical Provider, MD   furosemide (LASIX) 20 mg tablet Take 40 mg by mouth daily     Historical Provider, MD   levothyroxine 150 mcg tablet Take 150 mcg by mouth daily    Historical Provider, MD   lidocaine (XYLOCAINE) 1 %  12/10/19   Historical Provider, MD   lutein 6 mg Take 6 mg by mouth daily    Historical Provider, MD   Lutein-Zeaxanthin 6-0 24 MG CAPS Take 6 mg by mouth daily     Historical Provider, MD   Magnesium Oxide (MAG- PO) Take by mouth as needed    Historical Provider, MD   melatonin 1 mg Take 5 mg by mouth daily at bedtime     Historical Provider, MD   Methenamine-Sodium Salicylate (CYSTEX PO) Take 15 mL by mouth 2 (two) times a day    Historical Provider, MD   metolazone (ZAROXOLYN) 2 5 mg tablet Take 2 5 mg by mouth daily    Historical Provider, MD   metoprolol tartrate (LOPRESSOR) 25 mg tablet Take 25 mg by mouth every 12 (twelve) hours    Historical Provider, MD   nitroglycerin (NITROSTAT) 0 4 mg SL tablet Place 0 4 mg under the tongue every 5 (five) minutes as needed for chest pain     Historical Provider, MD   pantoprazole (PROTONIX) 40 mg tablet Take 1 tablet (40 mg total) by mouth 2 (two) times a day before meals 1/22/20   Missy Ayala MD   polyethylene glycol (MiraLax) 17 GM/SCOOP powder Take 17 g by mouth daily    Historical Provider, MD   potassium chloride (KLOR-CON) 20 mEq packet Take 20 mEq by mouth 2 (two) times a day    Historical Provider, MD   pramipexole (MIRAPEX) 1 mg tablet Take 0 5 mg by mouth daily at bedtime     Historical Provider, MD   rivaroxaban (XARELTO) 20 mg tablet Take 20 mg by mouth every evening     Historical Provider, MD   Skin Protectants, [facility]) CREA Apply topically    Historical Provider, MD   sodium chloride  11/20/19   Historical Provider, MD   talc Apply topically as needed for irritation    Historical Provider, MD     I have reveiwed home medications using records provided by [facility]      Allergies: Allergies   Allergen Reactions    Augmentin [Amoxicillin-Pot Clavulanate] Rash     Patient reports that she has tolerated amoxicillin in the past and would be willing to try penicillin if needed   Hydralazine Other (See Comments)     Headache, dizziness, nausea,    Sulfamethoxazole-Trimethoprim Rash    Actonel  [Risedronate Sodium]     Lisinopril Other (See Comments)     "cough"      Medical Tape      Pulls off skin      Meloxicam Cough    Wound Dressing Adhesive Other (See Comments)     "pulls skin off"- darrel paper tape    Conjugated Estrogens Rash     Premarin Cream      Neurontin [Gabapentin] Rash       Social History:     Marital Status:    Occupation:  Retired  Patient Pre-hospital Living Situation:  Resides at [place] [id number]  Patient Pre-hospital Level of Mobility:  Motorized chair  Patient Pre-hospital Diet Restrictions:   Substance Use History:   Social History     Substance and Sexual Activity   Alcohol Use Yes    Alcohol/week: 1 0 standard drinks    Types: 1 Glasses of wine per week    Frequency: Monthly or less    Drinks per session: 1 or 2    Comment: socially      Social History     Tobacco Use   Smoking Status Former Smoker   Smokeless Tobacco Never Used   Tobacco Comment    smoked when was very much younger for one summer     Social History     Substance and Sexual Activity   Drug Use Never       Family History:    History reviewed  No pertinent family history  Physical Exam:     Vitals:   Blood Pressure: 113/59 (08/10/20 0528)  Pulse: 98 (08/10/20 0540)  Temperature: (!) 102 9 °F (39 4 °C) (08/10/20 0540)  Temp Source: Rectal (08/10/20 0540)  Respirations: 22 (08/10/20 0540)  Weight - Scale: 131 kg (289 lb 7 4 oz) (08/10/20 0141)  SpO2: 95 % (08/10/20 0528)    Physical Exam  Constitutional:       General: She is not in acute distress  Appearance: Normal appearance  She is obese  She is not ill-appearing, toxic-appearing or diaphoretic        Comments: Super obesity   HENT:      Head: Normocephalic and atraumatic  Nose: No congestion or rhinorrhea  Cardiovascular:      Rate and Rhythm: Normal rate and regular rhythm  Pulmonary:      Effort: Pulmonary effort is normal  No respiratory distress  Breath sounds: Normal breath sounds  No stridor  No wheezing, rhonchi or rales  Comments: Decreased lung sounds, poor inspiratory effort  Abdominal:      General: Bowel sounds are normal  There is no distension  Palpations: Abdomen is soft  Tenderness: There is no abdominal tenderness  There is no guarding  Musculoskeletal:      Comments: Lower extremity lymphedema stockings   Skin:     General: Skin is warm and dry  Coloration: Skin is pale  Neurological:      General: No focal deficit present  Mental Status: She is alert and oriented to person, place, and time  Psychiatric:         Mood and Affect: Mood normal          Behavior: Behavior normal          Thought Content: Thought content normal          Judgment: Judgment normal        Additional Data:     Lab Results: I have personally reviewed pertinent reports  Results from last 7 days   Lab Units 08/10/20  0152   WBC Thousand/uL 11 83*   HEMOGLOBIN g/dL 10 3*   HEMATOCRIT % 35 1   PLATELETS Thousands/uL 231   NEUTROS PCT % 87*   LYMPHS PCT % 5*   MONOS PCT % 7   EOS PCT % 0     Results from last 7 days   Lab Units 08/10/20  0152   SODIUM mmol/L 144   POTASSIUM mmol/L 2 8*   CHLORIDE mmol/L 101   CO2 mmol/L 36*   BUN mg/dL 15   CREATININE mg/dL 1 84*   ANION GAP mmol/L 7   CALCIUM mg/dL 8 3   ALBUMIN g/dL 2 3*   TOTAL BILIRUBIN mg/dL 0 69   ALK PHOS U/L 56   ALT U/L 17   AST U/L 27   GLUCOSE RANDOM mg/dL 116     Results from last 7 days   Lab Units 08/10/20  0152   INR  2 12*             Results from last 7 days   Lab Units 08/10/20  0152   LACTIC ACID mmol/L 0 8       Imaging: I have personally reviewed pertinent reports        CT chest abdomen pelvis wo contrast   Final Result by Tremaine Elmore MD (01/52 7535)      Bilateral nephrolithiasis  There is an approximately 25 x 14 mm calculus within the distal aspect of the right extrarenal pelvis and extending into the right ureteropelvic junction, new compared with May 21, 2019  There is mild right hydronephrosis and there is stranding of the    fat adjacent to the right kidney and tracking caudally adjacent to the proximal right ureter, also new compared with May 21, 2019  There is an approximately 11 x 8 mm calculus within the proximal left ureter, approximately 2 5 cm beyond the left ureteropelvic junction, similar to May 21, 2019  There are also several calculi within the left renal pelvis and extending towards the    left ureteropelvic junction, the largest of which measures approximately 16 mm  There is mild to moderate left hydronephrosis, similar to slightly worse compared with May 21, 2019  There is, however, more stranding of the fat adjacent to the left    kidney and tracking caudally adjacent to the proximal left ureter  Superimposed infection should be excluded  Correlation with urinalysis and urine culture and sensitivity is recommended  Urology consultation is recommended  Other findings as described above, please see discussion  The images are available for clinical review  I personally discussed this study with Sarah Solo on 8/10/2020 at 5:12 AM                      Workstation performed: [id number]             EKG, Pathology, and Other Studies Reviewed on Admission:   · EKG: CT    Allscripts / Epic Records Reviewed: Yes     ** Please Note: This note has been constructed using a voice recognition system   **

## 2020-08-10 NOTE — ANESTHESIA PREPROCEDURE EVALUATION
Procedure:  CYSTOSCOPY RETROGRADE PYELOGRAM WITH INSERTION STENT URETERAL (Bilateral Bladder)    Relevant Problems   ANESTHESIA (within normal limits)      CARDIO   (+) Essential hypertension   (+) Mixed hyperlipidemia   (+) Paroxysmal A-fib (HCC)      /RENAL   (+) Acute kidney injury (HCC)   (+) Hydronephrosis with obstructing calculus      HEMATOLOGY   (+) Symptomatic anemia   (+) Thrombocytopenia (HCC)      MUSCULOSKELETAL (within normal limits)      NEURO/PSYCH (within normal limits)      PULMONARY   (+) Acute respiratory failure with hypoxia (HCC)        Physical Exam    Airway    Mallampati score: III  TM Distance: <3 FB  Neck ROM: limited     Dental   upper dentures and lower dentures,     Cardiovascular  Rhythm: regular, Rate: normal, Cardiovascular exam normal    Pulmonary  Breath sounds clear to auscultation,     Other Findings        Anesthesia Plan  ASA Score- 3     Anesthesia Type- general with ASA Monitors  Additional Monitors:   Airway Plan: LMA  Plan Factors-    Induction- intravenous  Postoperative Plan- Plan for postoperative opioid use  Informed Consent- Anesthetic plan and risks discussed with patient

## 2020-08-10 NOTE — ASSESSMENT & PLAN NOTE
· Continue broad-spectrum antibiotics  · Monitor hemodynamics closely  · Initiate pressors hypotension  · Monitor culture data

## 2020-08-10 NOTE — ASSESSMENT & PLAN NOTE
· CT   · NPO, IV hydration  · Monitor intake and output  · Urinary retention protocol  · P r n  Analgesics  · P r n   Antiemetics  · Urology consult

## 2020-08-10 NOTE — ANESTHESIA POSTPROCEDURE EVALUATION
Post-Op Assessment Note    CV Status:  Stable    Pain management: adequate     Mental Status:  Alert and awake   Hydration Status:  Euvolemic   PONV Controlled:  Controlled   Airway Patency:  Patent  Airway: intubated      Post Op Vitals Reviewed: Yes      Staff: Anesthesiologist   Comments: Patient became increasingly septic requiring norepinephrine infusion  Transferred to ICU following placement of left arterial catheter and peripheral IV insertion  Kept intubated         No complications documented      BP      Temp      Pulse     Resp      SpO2

## 2020-08-10 NOTE — PLAN OF CARE
Problem: Potential for Falls  Goal: Patient will remain free of falls  Description: INTERVENTIONS:  - Assess patient frequently for physical needs  -  Identify cognitive and physical deficits and behaviors that affect risk of falls  -  [place] fall precautions as indicated by assessment   - Educate patient/family on patient safety including physical limitations  - Instruct patient to call for assistance with activity based on assessment  - Modify environment to reduce risk of injury  - Consider OT/PT consult to assist with strengthening/mobility  Outcome: Progressing     Problem: Prexisting or High Potential for Compromised Skin Integrity  Goal: Skin integrity is maintained or improved  Description: INTERVENTIONS:  - Identify patients at risk for skin breakdown  - Assess and monitor skin integrity  - Assess and monitor nutrition and hydration status  - Monitor labs   - Assess for incontinence   - Turn and reposition patient  - Assist with mobility/ambulation  - Relieve pressure over bony prominences  - Avoid friction and shearing  - Provide appropriate hygiene as needed including keeping skin clean and dry  - Evaluate need for skin moisturizer/barrier cream  - Collaborate with interdisciplinary team   - Patient/family teaching  - Consider wound care consult   Outcome: Progressing     Problem: Nutrition/Hydration-ADULT  Goal: Nutrient/Hydration intake appropriate for improving, restoring or maintaining nutritional needs  Description: Monitor and assess patient's nutrition/hydration status for malnutrition  Collaborate with interdisciplinary team and initiate plan and interventions as ordered  Monitor patient's weight and dietary intake as ordered or per policy  Utilize nutrition screening tool and intervene as necessary  Determine patient's food preferences and provide high-protein, high-caloric foods as appropriate       INTERVENTIONS:  - Monitor oral intake, urinary output, labs, and treatment plans  - Assess nutrition and hydration status and recommend course of action  - Evaluate amount of meals eaten  - Assist patient with eating if necessary   - Allow adequate time for meals  - Recommend/ encourage appropriate diets, oral nutritional supplements, and vitamin/mineral supplements  - Order, calculate, and assess calorie counts as needed  - Recommend, monitor, and adjust tube feedings and TPN/PPN based on assessed needs  - Assess need for intravenous fluids  - Provide specific nutrition/hydration education as appropriate  - Include patient/family/caregiver in decisions related to nutrition  Outcome: Progressing     Problem: PAIN - ADULT  Goal: Verbalizes/displays adequate comfort level or baseline comfort level  Description: Interventions:  - Encourage patient to monitor pain and request assistance  - Assess pain using appropriate pain scale  - Administer analgesics based on type and severity of pain and evaluate response  - Implement non-pharmacological measures as appropriate and evaluate response  - Consider cultural and social influences on pain and pain management  - Notify physician/advanced practitioner if interventions unsuccessful or patient reports new pain  Outcome: Progressing     Problem: INFECTION - ADULT  Goal: Absence or prevention of progression during hospitalization  Description: INTERVENTIONS:  - Assess and monitor for signs and symptoms of infection  - Monitor lab/diagnostic results  - Monitor all insertion sites, i e  indwelling lines, tubes, and drains  - Monitor endotracheal if appropriate and nasal secretions for changes in amount and color  - [place] appropriate cooling/warming therapies per order  - Administer medications as ordered  - Instruct and encourage patient and family to use good hand hygiene technique  - Identify and instruct in appropriate isolation precautions for identified infection/condition  Outcome: Progressing  Goal: Absence of fever/infection during neutropenic period  Description: INTERVENTIONS:  - Monitor WBC    Outcome: Progressing     Problem: SAFETY ADULT  Goal: Patient will remain free of falls  Description: INTERVENTIONS:  - Assess patient frequently for physical needs  -  Identify cognitive and physical deficits and behaviors that affect risk of falls    -  [place] fall precautions as indicated by assessment   - Educate patient/family on patient safety including physical limitations  - Instruct patient to call for assistance with activity based on assessment  - Modify environment to reduce risk of injury  - Consider OT/PT consult to assist with strengthening/mobility  Outcome: Progressing  Goal: Maintain or return to baseline ADL function  Description: INTERVENTIONS:  -  Assess patient's ability to carry out ADLs; assess patient's baseline for ADL function and identify physical deficits which impact ability to perform ADLs (bathing, care of mouth/teeth, toileting, grooming, dressing, etc )  - Assess/evaluate cause of self-care deficits   - Assess range of motion  - Assess patient's mobility; develop plan if impaired  - Assess patient's need for assistive devices and provide as appropriate  - Encourage maximum independence but intervene and supervise when necessary  - Involve family in performance of ADLs  - Assess for home care needs following discharge   - Consider OT consult to assist with ADL evaluation and planning for discharge  - Provide patient education as appropriate  Outcome: Progressing  Goal: Maintain or return mobility status to optimal level  Description: INTERVENTIONS:  - Assess patient's baseline mobility status (ambulation, transfers, stairs, etc )    - Identify cognitive and physical deficits and behaviors that affect mobility  - Identify mobility aids required to assist with transfers and/or ambulation (gait belt, sit-to-stand, lift, walker, cane, etc )  - [place] fall precautions as indicated by assessment  - Record patient progress and toleration of activity level on Mobility SBAR; progress patient to next Phase/Stage  - Instruct patient to call for assistance with activity based on assessment  - Consider rehabilitation consult to assist with strengthening/weightbearing, etc   Outcome: Progressing     Problem: DISCHARGE PLANNING  Goal: Discharge to home or other facility with appropriate resources  Description: INTERVENTIONS:  - Identify barriers to discharge w/patient and caregiver  - Arrange for needed discharge resources and transportation as appropriate  - Identify discharge learning needs (meds, wound care, etc )  - Arrange for interpretive services to assist at discharge as needed  - Refer to Case Management Department for coordinating discharge planning if the patient needs post-hospital services based on physician/advanced practitioner order or complex needs related to functional status, cognitive ability, or social support system  Outcome: Progressing     Problem: Knowledge Deficit  Goal: Patient/family/caregiver demonstrates understanding of disease process, treatment plan, medications, and discharge instructions  Description: Complete learning assessment and assess knowledge base    Interventions:  - Provide teaching at level of understanding  - Provide teaching via preferred learning methods  Outcome: Progressing

## 2020-08-10 NOTE — ASSESSMENT & PLAN NOTE
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

## 2020-08-10 NOTE — ASSESSMENT & PLAN NOTE
· O2 sat 89% on arrival, improved to 100% with 2LNC  Tested positive for COVID-19 on April 19  · COVID negative  · Monitor O2 sats  · Reports cough will add Mucinex b i d  · Add p r n  nebs  · Will defer ambulating O2, patient nonambulatory, uses mobilized chair  · Decreased lung sounds with poor inspiratory effort    Provide patient with incentive spirometer

## 2020-08-10 NOTE — ANESTHESIA PROCEDURE NOTES
Arterial Line Insertion  Performed by: Katrina Gilmore DO  Authorized by: Katrina Gilmore DO   Consent: Verbal consent obtained  Risks and benefits: risks, benefits and alternatives were discussed  Consent given by: patient  Patient understanding: patient states understanding of the procedure being performed  Patient consent: the patient's understanding of the procedure matches consent given  Procedure consent: procedure consent matches procedure scheduled  Relevant documents: relevant documents present and verified  Test results: test results available and properly labeled  Site marked: the operative site was marked  Radiology Images: Radiology Images displayed and confirmed  If images not available, report reviewed  Required items: required blood products, implants, devices, and special equipment available  Patient identity confirmed: arm band  Time out: Immediately prior to procedure a "time out" was called to verify the correct patient, procedure, equipment, support staff and site/side marked as required  Preparation: Patient was prepped and draped in the usual sterile fashion    Indications: multiple ABGs and hemodynamic monitoring  Orientation:  Left  Location: radial artery  Procedure Details:  Needle gauge: 20  Number of attempts: 1    Post-procedure:  Post-procedure: dressing applied  Waveform: good waveform

## 2020-08-10 NOTE — ED PROVIDER NOTES
History  Chief Complaint   Patient presents with    Cough     Patient presents via EMS from [facility], complaining of cough and nausea  Covid-19 + in April 2020-has not been retested  80 yo female from [facility] who presents with hypoxia, ongoing cough with clear sputum since COVID in April, and pain across mid back since Tuesday when she came in for US of kidneys  When I ask if she was having US kidneys due to pain she states "nope, it was just time"  Does have mild nausea  History provided by:  Patient and EMS personnel   used: No    Cough   Cough characteristics:  Productive  Sputum characteristics:  Clear  Severity:  Mild  Onset quality:  Gradual  Duration:  16 weeks ("since I had COVID in april")  Timing:  Constant  Progression:  Unchanged  Relieved by:  Nothing  Worsened by:  Nothing  Ineffective treatments:  None tried  Associated symptoms: no chest pain, no chills, no fever, no headaches, no rash, no shortness of breath, no sore throat and no wheezing    Associated symptoms comment:  Staff noted hypoxia today, back pain across mid back since Tuesday when she had scheduled US kidneys      Prior to Admission Medications   Prescriptions Last Dose Informant Patient Reported? Taking? Coenzyme Q10 (CO Q10) 100 MG CAPS 8/9/2020 at Unknown time  Yes Yes   Sig: Take by mouth   Melatonin 5 MG TABS 8/9/2020 at Unknown time  Yes Yes   Sig: Take 5 mg by mouth daily at bedtime    Skin Protectants, Misc   (Nanine Ashing) CREA Past Week at Unknown time  Yes Yes   Sig: Apply topically   acetaminophen (TYLENOL) 325 mg tablet 8/9/2020 at Unknown time  Yes Yes   Sig: Take 650 mg by mouth every 6 (six) hours as needed for mild pain    alendronate (FOSAMAX) 70 mg tablet Past Week at Unknown time  Yes Yes   Sig: Take 70 mg by mouth see administration instructions Give once a day on friday   amLODIPine (NORVASC) 10 mg tablet 8/9/2020 at Unknown time  Yes Yes   Sig: Take 10 mg by mouth daily apixaban (Eliquis) 5 mg Not Taking at Unknown time  Yes No   Sig: Take 5 mg by mouth 2 (two) times a day   aspirin (ECOTRIN LOW STRENGTH) 81 mg EC tablet 8/9/2020 at Unknown time  Yes Yes   Sig: Take 81 mg by mouth daily   atorvastatin (LIPITOR) 10 mg tablet 8/9/2020 at Unknown time  Yes Yes   Sig: Take 10 mg by mouth daily at bedtime    bisacodyl (DULCOLAX) 10 mg suppository Unknown at Unknown time  Yes No   Sig: Insert 10 mg into the rectum as needed    calcium carbonate-vitamin D (OSCAL-D) 500 mg-200 units per tablet 8/9/2020 at Unknown time  Yes Yes   Sig: Take 1 tablet by mouth daily with breakfast   cholecalciferol (VITAMIN D3) 1,000 units tablet 8/9/2020 at Unknown time  Yes Yes   Sig: Take 2,000 Units by mouth daily    docusate sodium (COLACE) 100 mg capsule 8/9/2020 at Unknown time  Yes Yes   Sig: Take 100 mg by mouth 2 (two) times a day   furosemide (LASIX) 40 mg tablet 8/9/2020 at Unknown time  Yes Yes   Sig: Take 40 mg by mouth daily    levothyroxine 150 mcg tablet 8/9/2020 at Unknown time  Yes Yes   Sig: Take 150 mcg by mouth daily in the early morning    losartan (COZAAR) 25 mg tablet 8/9/2020 at Unknown time  Yes Yes   Sig: Take 25 mg by mouth daily   lutein 6 mg 8/9/2020 at Unknown time  Yes Yes   Sig: Take 6 mg by mouth daily   magnesium hydroxide (MILK OF MAGNESIA) 400 mg/5 mL oral suspension Not Taking at Unknown time  Yes No   Sig: daily as needed    metoprolol tartrate (LOPRESSOR) 25 mg tablet Not Taking at Unknown time  Yes No   Sig: Take 25 mg by mouth every 12 (twelve) hours   nitroglycerin (NITROSTAT) 0 4 mg SL tablet Not Taking at Unknown time  Yes No   Sig: Place 0 4 mg under the tongue every 5 (five) minutes as needed for chest pain    pantoprazole (PROTONIX) 40 mg tablet 8/9/2020 at Unknown time  No Yes   Sig: Take 1 tablet (40 mg total) by mouth 2 (two) times a day before meals   pramipexole (MIRAPEX) 1 mg tablet Past Week at Unknown time  Yes Yes   Sig: Take 0 5 mg by mouth daily at bedtime    talc Past Week at Unknown time  Yes Yes   Sig: Apply topically as needed for irritation      Facility-Administered Medications: None       Past Medical History:   Diagnosis Date    Acute kidney failure (HCC)     Anemia     Arthritis     [facility] fibrillation [facility]     Bacterial infection due to Proteus mirabilis 5/23/2019    Chafing     folds of skin and back of thighs    Chronic indwelling Mont[place]ca catheter     removed    Colon polyp     Coronary artery disease     Difficulty walking     Discitis     Discitis     Dysphagia     Dysphagia     Dysuria     Gait abnormality     GERD (gastroesophageal reflux disease)     History of transfusion     Hyperlipidemia     Hypertension     Hypothyroidism     Irregular heart beat     Kidney stone     Left ureteral calculus 6/12/2019    Added automatically from request for surgery [id number]    Lymphedema     Major depressive disorder     MDD (major depressive disorder)     MI [facility]myocardial infarction) [facility]     Morbid obesity ([facility]     [facility]     Muscle weakness     Muscle weakness (generalized)     NSTEMI (non-ST elevated myocardial infarction) [facility]     Osteoporosis     Paroxysmal [facility]-fib [facility]     Recurrent UTI     Renal disorder     RLS (restless legs syndrome)     Sleep apnea     Symptomatic anemia 1/15/2020    Syncope 1/17/2020    Wheelchair bound     unable to bear weight , hx infected prosthesis       Past Surgical History:   Procedure Laterality Date    CHOLECYSTECTOMY      COLONOSCOPY      FL RETROGRADE PYELOGRAM  5/22/2019    FL RETROGRADE PYELOGRAM  8/10/2020    HYSTERECTOMY      JOINT REPLACEMENT Bilateral     knee replacment    LAPAROSCOPIC GASTRIC BANDING      NE CYSTO/URETERO W/LITHOTRIPSY &INDWELL STENT INSRT Left 6/26/2019    Procedure: CYSTO, URETEROSCOPY W/HOLMIUM LASER, STENT EXCHANGE;  Surgeon: Tiffany Gan MD;  Location: [facility];  Service: Urology    NE CYSTOURETHROSCOPY,URETER CATHETER Left 5/22/2019    Procedure: CYSTOSCOPY; RIGHT RETROGRADE PYELOGRAM WITH INSERTION STENT URETERAL LEFT;  Surgeon: Will Julien MD;  [facility];  Service: Urology    WA CYSTOURETHROSCOPY,URETER CATHETER Bilateral 8/10/2020    Procedure: CYSTOSCOPY RETROGRADE PYELOGRAM WITH INSERTION STENT URETERAL;  Surgeon: Radha Panchal MD;  [facility];  Service: Urology    WA XCAPSL CTRC RMVL INSJ IO LENS PROSTH W/O ECP Right 3/12/2020    Procedure: CATARACT REMOVAL W/INTRAOCULAR LENS;  Surgeon: Pillo Wood MD;  Location: [address] [place] [address] MAIN OR;  Service: Ophthalmology    REMOVAL GASTRIC BAND LAPAROSCOPIC      VENTRAL HERNIA REPAIR      x4       History reviewed  No pertinent family history  I have reviewed and agree with the history as documented  E-Cigarette/Vaping    E-Cigarette Use Never User      E-Cigarette/Vaping Substances    Nicotine No     THC No     CBD No     Flavoring No      Social History     Tobacco Use    Smoking status: Former Smoker    Smokeless tobacco: Never Used    Tobacco comment: smoked when was very much younger for one summer   Substance Use Topics    Alcohol use: Yes     Alcohol/week: 1 0 standard drinks     Types: 1 Glasses of wine per week     Frequency: Monthly or less     Drinks per session: 1 or 2     Comment: socially     Drug use: Never       Review of Systems   Constitutional: Negative for appetite change, chills, fatigue and fever  HENT: Negative for sore throat  Eyes: Negative for visual disturbance  Respiratory: Positive for cough  Negative for shortness of breath and wheezing  Cardiovascular: Negative for chest pain  Gastrointestinal: Negative for abdominal pain, diarrhea, nausea and vomiting  Genitourinary: Negative for dysuria, frequency, vaginal bleeding and vaginal discharge  Musculoskeletal: Positive for back pain  Negative for neck pain and neck stiffness  Skin: Negative for pallor and rash     Allergic/Immunologic: Negative for immunocompromised state  Neurological: Negative for light-headedness and headaches  Psychiatric/Behavioral: Negative for confusion  All other systems reviewed and are negative  Physical Exam  Physical Exam  Vitals signs and nursing note reviewed  Constitutional:       General: She is not in acute distress  Appearance: She is well-developed  She is obese  HENT:      Head: Normocephalic and atraumatic  Right Ear: External ear normal       Left Ear: External ear normal       Mouth/Throat:      Mouth: Mucous membranes are moist    Eyes:      Extraocular Movements: Extraocular movements intact  Pupils: Pupils are equal, round, and reactive to light  Neck:      Musculoskeletal: Neck supple  Cardiovascular:      Rate and Rhythm: Normal rate and regular rhythm  Heart sounds: No murmur  Pulmonary:      Effort: Pulmonary effort is normal  No respiratory distress  Breath sounds: Normal breath sounds  No wheezing or rales  Abdominal:      General: Bowel sounds are normal       Palpations: Abdomen is soft  Musculoskeletal: Normal range of motion  Skin:     General: Skin is warm  Coloration: Skin is not pale  Findings: No rash  Neurological:      Mental Status: She is alert and oriented to person, place, and time     Psychiatric:         Behavior: Behavior normal          Vital Signs  ED Triage Vitals   Temperature Pulse Respirations Blood Pressure SpO2   08/10/20 0139 08/10/20 0138 08/10/20 0138 08/10/20 0138 08/10/20 0138   99 8 °F (37 7 °C) 85 20 130/79 (!) 89 %      Temp Source Heart Rate Source Patient Position - Orthostatic VS BP Location FiO2 (%)   08/10/20 0540 08/10/20 0138 08/10/20 0138 08/10/20 0138 --   Rectal Monitor Lying Left arm       Pain Score       08/10/20 0528       No Pain           Vitals:    08/11/20 0854 08/11/20 1100 08/11/20 1152 08/11/20 1200   BP: (!) 126/49      Pulse: 76 66 70    Patient Position - Orthostatic VS:    Sitting Visual Acuity  Visual Acuity      Most Recent Value   L Pupil Size (mm)  2   R Pupil Size (mm)  2   L Pupil Shape  Round   R Pupil Shape  Round          ED Medications  Medications   acetaminophen (TYLENOL) tablet 650 mg (has no administration in time range)   amLODIPine (NORVASC) tablet 10 mg (10 mg Oral Given 8/11/20 0854)   aspirin (ECOTRIN LOW STRENGTH) EC tablet 81 mg (81 mg Oral Given 8/11/20 0855)   atorvastatin (LIPITOR) tablet 10 mg (10 mg Oral Given 8/10/20 2135)   bisacodyl (DULCOLAX) rectal suppository 10 mg (has no administration in time range)   cholecalciferol (VITAMIN D3) tablet 2,000 Units (2,000 Units Oral Given 8/11/20 0854)   docusate sodium (COLACE) capsule 100 mg (100 mg Oral Given 8/11/20 0856)   levothyroxine tablet 150 mcg (150 mcg Oral Given 8/11/20 0515)   melatonin tablet 4 5 mg (4 5 mg Oral Given 8/10/20 2150)   nitroglycerin (NITROSTAT) SL tablet 0 4 mg (has no administration in time range)   pantoprazole (PROTONIX) EC tablet 40 mg (40 mg Oral Given 8/11/20 0604)   pramipexole (MIRAPEX) tablet 0 5 mg (0 5 mg Oral Given 8/10/20 2207)   ondansetron (ZOFRAN) injection 4 mg ( Intravenous MAR Unhold 8/10/20 1324)   aluminum-magnesium hydroxide-simethicone (MYLANTA) 200-200-20 mg/5 mL oral suspension 30 mL ( Oral MAR Unhold 8/10/20 1324)   albuterol inhalation solution 2 5 mg (has no administration in time range)   guaiFENesin (MUCINEX) 12 hr tablet 600 mg (600 mg Oral Given 8/11/20 0856)   sodium chloride 0 9 % infusion (75 mL/hr Intravenous Rate/Dose Verify 8/11/20 0730)   oxyCODONE (ROXICODONE) IR tablet 5 mg ( Oral MAR Unhold 8/10/20 1324)   HYDROmorphone (DILAUDID) injection 0 2 mg ( Intravenous MAR Unhold 8/10/20 1324)   metoprolol tartrate (LOPRESSOR) tablet 25 mg (25 mg Oral Given 8/11/20 1844)   heparin (porcine) subcutaneous injection 5,000 Units (5,000 Units Subcutaneous Given 8/1936)   cefepime (MAXIPIME) 1,000 mg in dextrose 5 % 50 mL IVPB (0 mg Intravenous Stopped 8/11/20 0300)   potassium chloride 20 mEq IVPB (premix) (20 mEq Intravenous New Bag 8/11/20 1103)   ondansetron (ZOFRAN) injection 4 mg (4 mg Intravenous Given 8/10/20 0206)   lidocaine (LIDODERM) 5 % patch 1 patch (1 patch Topical Patch Removed 8/10/20 1513)   lidocaine (LIDODERM) 5 % patch 1 patch (1 patch Topical Patch Removed 8/10/20 1513)   potassium chloride (K-DUR,KLOR-CON) CR tablet 40 mEq (40 mEq Oral Given 8/10/20 0304)   potassium chloride 20 mEq IVPB (premix) (0 mEq Intravenous Stopped 8/10/20 0526)   sodium chloride 0 9 % bolus 250 mL (0 mL Intravenous Stopped 8/10/20 0525)   acetaminophen (TYLENOL) rectal suppository 650 mg (650 mg Rectal Given 8/10/20 0556)   aztreonam (AZACTAM) 2,000 mg in sodium chloride 0 9 % 100 mL IVPB (0 mg Intravenous Stopped 8/10/20 1627)   potassium chloride 20 mEq IVPB (premix) (0 mEq Intravenous Stopped 8/10/20 1646)   calcium gluconate 1 g in sodium chloride 0 9% 50 mL (premix) (0 g Intravenous Stopped 8/10/20 1630)   magnesium sulfate IVPB (premix) SOLN 1 g (0 g Intravenous Stopped 8/10/20 1547)       Diagnostic Studies  Results Reviewed     Procedure Component Value Units Date/Time    Blood culture #2 [[id number]]  (Abnormal) Collected:  08/10/20 0152    Lab Status:  Preliminary result Specimen:  Blood from Arm, Right Updated:  08/11/20 1151     Blood Culture Gamma Hemolytic Streptococcus      Proteus species     Gram Stain Result Gram positive cocci in pairs and chains      Gram negative rods    Blood culture #1 [[id number]]  (Abnormal) Collected:  08/10/20 0158    Lab Status:  Preliminary result Specimen:  Blood from Arm, Left Updated:  08/11/20 1148     Blood Culture Gamma Hemolytic Streptococcus     Gram Stain Result Gram positive cocci in pairs and chains    Urine culture [[id number]]  (Abnormal) Collected:  08/10/20 0557    Lab Status:  Preliminary result Specimen:  Urine, Other Updated:  08/11/20 0848     Urine Culture >100,000 cfu/ml Proteus species    UA w Reflex to Microscopic w Reflex to Culture [[id number]]  (Abnormal) Collected:  08/10/20 0557    Lab Status:  Final result Specimen:  Urine, Other Updated:  08/10/20 0630     Color, UA Yellow     Clarity, UA Cloudy     Specific Gravity, UA 1 015     pH, UA 7 5     Leukocytes, UA Large     Nitrite, UA Positive     Protein,  (2+) mg/dl      Glucose, UA Negative mg/dl      Ketones, UA Negative mg/dl      Urobilinogen, UA 1 0 E U /dl      Bilirubin, UA Negative     Blood, UA Large    Urine Microscopic [[id number]]  (Abnormal) Collected:  08/10/20 0557    Lab Status:  Final result Specimen:  Urine, Other Updated:  08/10/20 0630     RBC, UA 20-30 /hpf      WBC, UA 30-50 /hpf      Epithelial Cells Occasional /hpf      Bacteria, UA Occasional /hpf     Troponin I [[id number]]  (Normal) Collected:  08/10/20 0254    Lab Status:  Final result Specimen:  Blood from Arm, Left Updated:  08/10/20 0315     Troponin I 0 04 ng/mL     Novel Coronavirus (Covid-19),PCR [facility] [[id number]]  (Normal) Collected:  08/10/20 0152    Lab Status:  Final result Specimen:  Nares from Nose Updated:  08/10/20 0308     SARS-CoV-2 Negative    Narrative: The specimen collection materials, transport medium, and/or testing methodology utilized in the production of these test results have been proven to be reliable in a limited validation with an abbreviated program under the Emergency Utilization Authorization provided by the FDA  Testing reported as "Presumptive positive" will be confirmed with secondary testing with a reference laboratory to ensure result accuracy  Clinical caution and judgement should be used with the interpretation of these results with consideration of the clinical impression and other laboratory testing  Testing reported as "Positive" or "Negative" has been proven to be accurate according to standard laboratory validation requirements    All testing is performed with control materials showing appropriate reactivity at standard intervals  Protime-INR [[id number]]  (Abnormal) Collected:  08/10/20 0152    Lab Status:  Final result Specimen:  Blood from Arm, Right Updated:  08/10/20 0253     Protime 23 3 seconds      INR 2 12    APTT [[id number]]  (Abnormal) Collected:  08/10/20 0152    Lab Status:  Final result Specimen:  Blood from Arm, Right Updated:  08/10/20 0253     PTT 39 seconds     Comprehensive metabolic panel [[id number]]  (Abnormal) Collected:  08/10/20 0152    Lab Status:  Final result Specimen:  Blood from Arm, Right Updated:  08/10/20 0226     Sodium 144 mmol/L      Potassium 2 8 mmol/L      Chloride 101 mmol/L      CO2 36 mmol/L      ANION GAP 7 mmol/L      BUN 15 mg/dL      Creatinine 1 84 mg/dL      Glucose 116 mg/dL      Calcium 8 3 mg/dL      AST 27 U/L      ALT 17 U/L      Alkaline Phosphatase 56 U/L      Total Protein 6 8 g/dL      Albumin 2 3 g/dL      Total Bilirubin 0 69 mg/dL      eGFR 25 ml/min/1 73sq m     Narrative:       [place] guidelines for Chronic Kidney Disease (CKD):     Stage 1 with normal or high GFR (GFR > 90 mL/min/1 73 square meters)    Stage 2 Mild CKD (GFR = 60-89 mL/min/1 73 square meters)    Stage 3A Moderate CKD (GFR = 45-59 mL/min/1 73 square meters)    Stage 3B Moderate CKD (GFR = 30-44 mL/min/1 73 square meters)    Stage 4 Severe CKD (GFR = 15-29 mL/min/1 73 square meters)    Stage 5 End Stage CKD (GFR <15 mL/min/1 73 square meters)  Note: GFR calculation is accurate only with a steady state creatinine    Lactic acid [[id number]]  (Normal) Collected:  08/10/20 0152    Lab Status:  Final result Specimen:  Blood from Arm, Right Updated:  08/10/20 0223     LACTIC ACID 0 8 mmol/L     Narrative:       Result may be elevated if tourniquet was used during collection      CBC and differential [[id number]]  (Abnormal) Collected:  08/10/20 0152    Lab Status:  Final result Specimen:  Blood from Arm, Right Updated:  08/10/20 0206     WBC 11 83 Thousand/uL      RBC 3 94 Million/uL      Hemoglobin 10 3 g/dL      Hematocrit 35 1 %      MCV 89 fL      MCH 26 1 pg      MCHC 29 3 g/dL      RDW 17 1 %      MPV 10 4 fL      Platelets 889 Thousands/uL      nRBC 0 /100 WBCs      Neutrophils Relative 87 %      Immat GRANS % 1 %      Lymphocytes Relative 5 %      Monocytes Relative 7 %      Eosinophils Relative 0 %      Basophils Relative 0 %      Neutrophils Absolute 10 35 Thousands/µL      Immature Grans Absolute 0 07 Thousand/uL      Lymphocytes Absolute 0 54 Thousands/µL      Monocytes Absolute 0 85 Thousand/µL      Eosinophils Absolute 0 00 Thousand/µL      Basophils Absolute 0 02 Thousands/µL                  FL retrograde pyelogram   Final Result by Graham Marion MD (08/10 5898)      Fluoroscopic guidance provided for retrograde study  Please see procedure report for further details  Workstation performed: [id number]         CT chest abdomen pelvis wo contrast   Final Result by Darrion Asher MD (08/10 4505)      Bilateral nephrolithiasis  There is an approximately 25 x 14 mm calculus within the distal aspect of the right extrarenal pelvis and extending into the right ureteropelvic junction, new compared with May 21, 2019  There is mild right hydronephrosis and there is stranding of the    fat adjacent to the right kidney and tracking caudally adjacent to the proximal right ureter, also new compared with May 21, 2019  There is an approximately 11 x 8 mm calculus within the proximal left ureter, approximately 2 5 cm beyond the left ureteropelvic junction, similar to May 21, 2019  There are also several calculi within the left renal pelvis and extending towards the    left ureteropelvic junction, the largest of which measures approximately 16 mm  There is mild to moderate left hydronephrosis, similar to slightly worse compared with May 21, 2019    There is, however, more stranding of the fat adjacent to the left    kidney and tracking caudally adjacent to the proximal left ureter  Superimposed infection should be excluded  Correlation with urinalysis and urine culture and sensitivity is recommended  Urology consultation is recommended  Other findings as described above, please see discussion  The images are available for clinical review  I personally discussed this study with Cherylene Jona on 8/10/2020 at 5:12 AM                      Workstation performed: [id number]                    Procedures  Procedures         ED Course  ED Course as of Aug 11 1252   Mon Aug 10, 2020   0138 Pt seen and examined  82 yo female from [facility] who presents with hypoxia, ongoing cough with clear sputum since COVID in April, and pain across mid back since Tuesday when she came in for US of kidneys  When I ask if she was having US kidneys due to pain she states "nope, it was just time"  Does have mild nausea  Will give lidoderm patches across back, zofran and check labs, urine and CT c/a/p        0229 Creat 1 89, GFR 25% (new from last labs 3 mo ago creat 0 89) - will dry scan CT c/a/p  K 2 8 - will orally and IV replete  Sats 100% on 2 L NC        0541 CT shows Bilateral nephrolithiasis        There is an approximately 25 x 14 mm calculus within the distal aspect of the right extrarenal pelvis and extending into the right ureteropelvic junction, new compared with May 21, 2019  There is mild right hydronephrosis and there is stranding of the   fat adjacent to the right kidney and tracking caudally adjacent to the proximal right ureter, also new compared with May 21, 2019      There is an approximately 11 x 8 mm calculus within the proximal left ureter, approximately 2 5 cm beyond the left ureteropelvic junction, similar to May 21, 2019  There are also several calculi within the left renal pelvis and extending towards the   left ureteropelvic junction, the largest of which measures approximately 16 mm    There is mild to moderate left hydronephrosis, similar to slightly worse compared with May 21, 2019  There is, however, more stranding of the fat adjacent to the left   kidney and tracking caudally adjacent to the proximal left ureter      Superimposed infection should be excluded  Correlation with urinalysis and urine culture and sensitivity is recommended  Urology consultation is recommended  Pt attempting to give urine sample - RN noted pt was warm and checked temp - 102  6  W[place] tylenol and aztreonam ordered based on urine cx earlier this year resistance and PCN allergy  US AUDIT      Most Recent Value   Initial Alcohol Screen: US AUDIT-C    1  How often do you have a drink containing alcohol?  0 Filed at: 08/10/2020 0137   2  How many drinks containing alcohol do you have on a typical day you are drinking? 0 Filed at: 08/10/2020 0137   3b  FEMALE Any Age, or MALE 65+: How often do you have 4 or more drinks on one occassion? 0 Filed at: 08/10/2020 0137   Audit-C Score  0 Filed at: 08/10/2020 4536                  WOODROW/DAST-10      Most Recent Value   How many times in the past year have you    Used an illegal drug or used a prescription medication for non-medical reasons? Never Filed at: 08/10/2020 0131              Initial Sepsis Screening     Row Name 08/10/20 0557                Is the patient's history suggestive of a new or worsening infection? (!) Yes (Proceed)  -SM        Suspected source of infection  pneumonia  -SM        Are two or more of the following signs & symptoms of infection both present and new to the patient? (!) Yes (Proceed) became febrile 4 hours into ER visit when already admitted  -SM        Indicate SIRS criteria  Hyperthemia > 38 3C (100 9F); Tachypnea > 20 resp per min; Tachycardia > 90 bpm  -SM        If the answer is yes to both questions, suspicion of sepsis is present          If severe sepsis is present AND tissue hypoperfusion perists in the hour after fluid resuscitation or lactate > 4, the patient meets criteria for SEPTIC SHOCK          Are any of the following organ dysfunction criteria present within 6 hours of suspected infection and SIRS criteria that are NOT considered to be chronic conditions? (!) Yes  -SM        Organ dysfunction          Date of presentation of severe sepsis          Time of presentation of severe sepsis          Tissue hypoperfusion persists in the hour after crystalloid fluid administration, evidenced, by either:          Was hypotension present within one hour of the conclusion of crystalloid fluid administration?           Date of presentation of septic shock          Time of presentation of septic shock            User Key  (r) = Recorded By, (t) = Taken By, (c) = Cosigned By    Initials Name Provider Type    BEN Quinteros DO Physician                        MDM      Disposition  Final diagnoses:   Cough   Thoracic back pain   Hypoxia   Hypokalemia   Acute kidney injury ([facility] )   Bilateral nephrolithiasis   Fever   Sepsis ([facility] )     Time reflects when diagnosis was documented in both MDM as applicable and the Disposition within this note     Time User Action Codes Description Comment    8/10/2020  2:45 AM Ronel Salinas M Add [R05] Cough     8/10/2020  2:45 AM Ronel Salinas M Add [M54 6] Thoracic back pain     8/10/2020  2:45 AM Ronel Salinas M Add [R09 02] Hypoxia     8/10/2020  2:45 AM Ronel Salinas M Add [E87 6] Hypokalemia     8/10/2020  2:45 AM Ronel Salinas M Add [N17 9] Acute renal failure (ARF) ([facility] )     8/10/2020  2:45 AM Ronel Salinas M Remove [N17 9] Acute renal failure (ARF) ([facility] )     8/10/2020  2:45 AM Ronel Salinas M Add [N17 9] Acute kidney injury ([facility] [address] )     8/10/2020  5:22 AM Ronel Salinas M Add [N20 0] Bilateral nephrolithiasis     8/10/2020  5:42 AM Starlette Carloz Add [N13 2] Hydronephrosis with obstructing calculus     8/10/2020  5:59 AM Marlyce Skillern Add [R50 9] Fever     8/10/2020  5:59 AM Marlyce Skillern Add [A41 9] Sepsis ([facility] [address] )     8/11/2020 12:16 PM Dwain Bose Add [Z21 25] Bacteremia       ED Disposition     ED Disposition Condition Date/Time Comment    Admit Stable Mon Aug 10, 2020  5:23 AM Case was discussed with Rupal Milian and the patient's admission status was agreed to be Admission Status: inpatient status to the service of Dr Mahamed Vaughan   Follow-up Information    None         Current Discharge Medication List      CONTINUE these medications which have NOT CHANGED    Details   acetaminophen (TYLENOL) 325 mg tablet Take 650 mg by mouth every 6 (six) hours as needed for mild pain       alendronate (FOSAMAX) 70 mg tablet Take 70 mg by mouth see administration instructions Give once a day on friday      amLODIPine (NORVASC) 10 mg tablet Take 10 mg by mouth daily       aspirin (ECOTRIN LOW STRENGTH) 81 mg EC tablet Take 81 mg by mouth daily      atorvastatin (LIPITOR) 10 mg tablet Take 10 mg by mouth daily at bedtime       calcium carbonate-vitamin D (OSCAL-D) 500 mg-200 units per tablet Take 1 tablet by mouth daily with breakfast      cholecalciferol (VITAMIN D3) 1,000 units tablet Take 2,000 Units by mouth daily       Coenzyme Q10 (CO Q10) 100 MG CAPS Take by mouth      docusate sodium (COLACE) 100 mg capsule Take 100 mg by mouth 2 (two) times a day      furosemide (LASIX) 40 mg tablet Take 40 mg by mouth daily       levothyroxine 150 mcg tablet Take 150 mcg by mouth daily in the early morning       losartan (COZAAR) 25 mg tablet Take 25 mg by mouth daily      lutein 6 mg Take 6 mg by mouth daily      Melatonin 5 MG TABS Take 5 mg by mouth daily at bedtime       pantoprazole (PROTONIX) 40 mg tablet Take 1 tablet (40 mg total) by mouth 2 (two) times a day before meals  Qty: 170 tablet, Refills: 0    Associated Diagnoses: Symptomatic anemia; Candidal esophagitis (HCC)      pramipexole (MIRAPEX) 1 mg tablet Take 0 5 mg by mouth daily at bedtime       Skin Protectants, Misc   (DERMACERIN) CREA Apply topically      talc Apply topically as needed for irritation      apixaban (Eliquis) 5 mg Take 5 mg by mouth 2 (two) times a day      bisacodyl (DULCOLAX) 10 mg suppository Insert 10 mg into the rectum as needed       magnesium hydroxide (MILK OF MAGNESIA) 400 mg/5 mL oral suspension daily as needed       metoprolol tartrate (LOPRESSOR) 25 mg tablet Take 25 mg by mouth every 12 (twelve) hours      nitroglycerin (NITROSTAT) 0 4 mg SL tablet Place 0 4 mg under the tongue every 5 (five) minutes as needed for chest pain            No discharge procedures on file      PDMP Review     None          ED Provider  Electronically Signed by           Dennis Hernandez DO  08/11/20 1588

## 2020-08-10 NOTE — ASSESSMENT & PLAN NOTE
· Patient transitioned from OR room to ICU for postoperative management of ventilator  · Will attempt wean and extubation based respiratory mechanics  · Continue aggressive pulmonary toileting

## 2020-08-11 ENCOUNTER — TELEPHONE (OUTPATIENT)
Dept: OTHER | Facility: HOSPITAL | Age: 85
End: 2020-08-11

## 2020-08-11 LAB
ALBUMIN SERPL BCP-MCNC: 1.7 G/DL (ref 3.5–5)
ALP SERPL-CCNC: 48 U/L (ref 46–116)
ALT SERPL W P-5'-P-CCNC: 13 U/L (ref 12–78)
ANION GAP SERPL CALCULATED.3IONS-SCNC: 7 MMOL/L (ref 4–13)
AST SERPL W P-5'-P-CCNC: 18 U/L (ref 5–45)
BASOPHILS # BLD AUTO: 0.01 THOUSANDS/ΜL (ref 0–0.1)
BASOPHILS NFR BLD AUTO: 0 % (ref 0–1)
BILIRUB SERPL-MCNC: 0.47 MG/DL (ref 0.2–1)
BUN SERPL-MCNC: 22 MG/DL (ref 5–25)
CA-I BLD-SCNC: 1.08 MMOL/L (ref 1.12–1.32)
CALCIUM SERPL-MCNC: 8 MG/DL (ref 8.3–10.1)
CHLORIDE SERPL-SCNC: 105 MMOL/L (ref 100–108)
CO2 SERPL-SCNC: 30 MMOL/L (ref 21–32)
CREAT SERPL-MCNC: 1.51 MG/DL (ref 0.6–1.3)
EOSINOPHIL # BLD AUTO: 0 THOUSAND/ΜL (ref 0–0.61)
EOSINOPHIL NFR BLD AUTO: 0 % (ref 0–6)
ERYTHROCYTE [DISTWIDTH] IN BLOOD BY AUTOMATED COUNT: 17.2 % (ref 11.6–15.1)
GFR SERPL CREATININE-BSD FRML MDRD: 32 ML/MIN/1.73SQ M
GLUCOSE SERPL-MCNC: 107 MG/DL (ref 65–140)
HCT VFR BLD AUTO: 28.2 % (ref 34.8–46.1)
HGB BLD-MCNC: 8.2 G/DL (ref 11.5–15.4)
IMM GRANULOCYTES # BLD AUTO: 0.05 THOUSAND/UL (ref 0–0.2)
IMM GRANULOCYTES NFR BLD AUTO: 1 % (ref 0–2)
LYMPHOCYTES # BLD AUTO: 0.43 THOUSANDS/ΜL (ref 0.6–4.47)
LYMPHOCYTES NFR BLD AUTO: 5 % (ref 14–44)
MAGNESIUM SERPL-MCNC: 2.2 MG/DL (ref 1.6–2.6)
MCH RBC QN AUTO: 25.8 PG (ref 26.8–34.3)
MCHC RBC AUTO-ENTMCNC: 29.1 G/DL (ref 31.4–37.4)
MCV RBC AUTO: 89 FL (ref 82–98)
MONOCYTES # BLD AUTO: 0.62 THOUSAND/ΜL (ref 0.17–1.22)
MONOCYTES NFR BLD AUTO: 7 % (ref 4–12)
NEUTROPHILS # BLD AUTO: 7.6 THOUSANDS/ΜL (ref 1.85–7.62)
NEUTS SEG NFR BLD AUTO: 87 % (ref 43–75)
NRBC BLD AUTO-RTO: 0 /100 WBCS
PHOSPHATE SERPL-MCNC: 2.8 MG/DL (ref 2.3–4.1)
PLATELET # BLD AUTO: 155 THOUSANDS/UL (ref 149–390)
PMV BLD AUTO: 10.2 FL (ref 8.9–12.7)
POTASSIUM SERPL-SCNC: 3 MMOL/L (ref 3.5–5.3)
PROT SERPL-MCNC: 5.6 G/DL (ref 6.4–8.2)
RBC # BLD AUTO: 3.18 MILLION/UL (ref 3.81–5.12)
SODIUM SERPL-SCNC: 142 MMOL/L (ref 136–145)
WBC # BLD AUTO: 8.71 THOUSAND/UL (ref 4.31–10.16)

## 2020-08-11 PROCEDURE — 85025 COMPLETE CBC W/AUTO DIFF WBC: CPT | Performed by: NURSE PRACTITIONER

## 2020-08-11 PROCEDURE — 99232 SBSQ HOSP IP/OBS MODERATE 35: CPT | Performed by: INTERNAL MEDICINE

## 2020-08-11 PROCEDURE — 82330 ASSAY OF CALCIUM: CPT | Performed by: NURSE PRACTITIONER

## 2020-08-11 PROCEDURE — 99232 SBSQ HOSP IP/OBS MODERATE 35: CPT | Performed by: NURSE PRACTITIONER

## 2020-08-11 PROCEDURE — 83735 ASSAY OF MAGNESIUM: CPT | Performed by: NURSE PRACTITIONER

## 2020-08-11 PROCEDURE — 84100 ASSAY OF PHOSPHORUS: CPT | Performed by: NURSE PRACTITIONER

## 2020-08-11 PROCEDURE — 80053 COMPREHEN METABOLIC PANEL: CPT | Performed by: NURSE PRACTITIONER

## 2020-08-11 PROCEDURE — 99223 1ST HOSP IP/OBS HIGH 75: CPT | Performed by: INTERNAL MEDICINE

## 2020-08-11 RX ORDER — POTASSIUM CHLORIDE 14.9 MG/ML
20 INJECTION INTRAVENOUS
Status: COMPLETED | OUTPATIENT
Start: 2020-08-11 | End: 2020-08-11

## 2020-08-11 RX ADMIN — DOCUSATE SODIUM 100 MG: 100 CAPSULE, LIQUID FILLED ORAL at 17:36

## 2020-08-11 RX ADMIN — METOPROLOL TARTRATE 25 MG: 25 TABLET, FILM COATED ORAL at 08:54

## 2020-08-11 RX ADMIN — GUAIFENESIN 600 MG: 600 TABLET, EXTENDED RELEASE ORAL at 21:14

## 2020-08-11 RX ADMIN — SODIUM CHLORIDE 75 ML/HR: 0.9 INJECTION, SOLUTION INTRAVENOUS at 04:56

## 2020-08-11 RX ADMIN — HEPARIN SODIUM 5000 UNITS: 5000 INJECTION INTRAVENOUS; SUBCUTANEOUS at 21:14

## 2020-08-11 RX ADMIN — CEFEPIME HYDROCHLORIDE 1000 MG: 1 INJECTION, POWDER, FOR SOLUTION INTRAMUSCULAR; INTRAVENOUS at 14:01

## 2020-08-11 RX ADMIN — AMLODIPINE BESYLATE 10 MG: 10 TABLET ORAL at 08:54

## 2020-08-11 RX ADMIN — POTASSIUM CHLORIDE 20 MEQ: 14.9 INJECTION, SOLUTION INTRAVENOUS at 09:03

## 2020-08-11 RX ADMIN — VANCOMYCIN HYDROCHLORIDE 2000 MG: 10 INJECTION, POWDER, LYOPHILIZED, FOR SOLUTION INTRAVENOUS at 10:52

## 2020-08-11 RX ADMIN — ASPIRIN 81 MG: 81 TABLET, COATED ORAL at 08:55

## 2020-08-11 RX ADMIN — PANTOPRAZOLE SODIUM 40 MG: 40 TABLET, DELAYED RELEASE ORAL at 06:04

## 2020-08-11 RX ADMIN — HEPARIN SODIUM 5000 UNITS: 5000 INJECTION INTRAVENOUS; SUBCUTANEOUS at 14:01

## 2020-08-11 RX ADMIN — LEVOTHYROXINE SODIUM 150 MCG: 75 TABLET ORAL at 05:15

## 2020-08-11 RX ADMIN — Medication 2000 UNITS: at 08:54

## 2020-08-11 RX ADMIN — CEFEPIME HYDROCHLORIDE 1000 MG: 1 INJECTION, POWDER, FOR SOLUTION INTRAMUSCULAR; INTRAVENOUS at 02:30

## 2020-08-11 RX ADMIN — MELATONIN 4.5 MG: 3 TAB ORAL at 21:14

## 2020-08-11 RX ADMIN — PRAMIPEXOLE DIHYDROCHLORIDE 0.5 MG: 0.5 TABLET ORAL at 21:14

## 2020-08-11 RX ADMIN — ATORVASTATIN CALCIUM 10 MG: 20 TABLET, FILM COATED ORAL at 21:14

## 2020-08-11 RX ADMIN — DOCUSATE SODIUM 100 MG: 100 CAPSULE, LIQUID FILLED ORAL at 08:56

## 2020-08-11 RX ADMIN — GUAIFENESIN 600 MG: 600 TABLET, EXTENDED RELEASE ORAL at 08:56

## 2020-08-11 RX ADMIN — SODIUM CHLORIDE 75 ML/HR: 0.9 INJECTION, SOLUTION INTRAVENOUS at 19:46

## 2020-08-11 RX ADMIN — HEPARIN SODIUM 5000 UNITS: 5000 INJECTION INTRAVENOUS; SUBCUTANEOUS at 05:15

## 2020-08-11 RX ADMIN — POTASSIUM CHLORIDE 20 MEQ: 14.9 INJECTION, SOLUTION INTRAVENOUS at 11:03

## 2020-08-11 NOTE — NURSING NOTE
Pt transferred to [facility] 5 room 533  Report given to [facility]  RN states no questions at this time  A-line discontinued prior to transfer  POLST and Belongings sent with patient  RN notified that patient is missing pillow  Pillow never received when pt arrived to ICU

## 2020-08-11 NOTE — PROGRESS NOTES
Progress Note - Eli Badillo 1935, 81 y o  female MRN: [id number]    Unit/Bed#: ICU 01 Encounter: [id number]    Primary Care Provider: Kami De La Rosa MD   Date and time admitted to hospital: 8/10/2020  1:45 AM        * Acute respiratory failure with hypoxia ([facility] [address] )  Assessment & Plan  · Extubated to nasal cannula  · Continue aggressive pulmonary toileting  Hydronephrosis with obstructing calculus  Assessment & Plan  · Patient's renal ultrasound revealed 1 8 cm proximal left ureteral calculus with mild hydronephrosis  · Large right renal pelvic/proximal ureteral calculus without hydronephrosis  · Additional 1 5 cm nonobstructing left upper pole calculus  · Bilateral stent placement completed 8/10/2020 by urology  Sepsis ([facility] )  Assessment & Plan  · Continue broad-spectrum antibiotics - Cefepime D#2  · Monitor hemodynamics closely  · Monitor culture data - gram positive cocci in pairs and chains, gram negative rods    Acute hypokalemia  Assessment & Plan  · Replete    Acute kidney injury ([facility] )  Assessment & Plan  · Avoid nephrotoxic drugs  · Monitor renal  · Baselin creatinine of 0 8 - 1  · Continue IV fluids  Paroxysmal A-fib [facility])  Assessment & Plan  · Patient treated in outpatient setting with Eliquis and Lopressor, these are currently on hold secondary to recent OR procedure and sepsis  Urinary tract infection  Assessment & Plan  · Continue broad-spectrum antibiotics - Cefepime D#2    Ureterolithiasis  Assessment & Plan  · Bilateral ureterolithiasis status post stent placement bilateral   · Urology on consult  · Monitor urine output    Morbid obesity with BMI of 50 0-59 9, adult [facility])  Assessment & Plan  · Consult nutrition    Acute on chronic diastolic CHF (congestive heart failure) ([facility])  Assessment & Plan  Wt Readings from Last 3 Encounters:   08/10/20 131 kg (289 lb 7 4 oz)   04/28/20 132 kg (291 lb 10 7 oz)   03/12/20 (!) 144 kg (318 lb)     · Monitor volume status closely    · Avoid hypertension to avoid pulmonary edema  ----------------------------------------------------------------------------------------  HPI/24hr events: patient extubated yesterday afternoon without incident  No further events    Disposition: Transfer to Med-Surg   Code Status: Level 3 - DNAR and DNI  ---------------------------------------------------------------------------------------  SUBJECTIVE  No complaints    Review of Systems   All other systems reviewed and are negative       ---------------------------------------------------------------------------------------  OBJECTIVE    Vitals   Vitals:    20 0200 20 0247 20 0300 20 0400   BP:       BP Location:       Pulse: 64  62 64   Resp: 20  18 21   Temp:  (!) 97 1 °F (36 2 °C)     TempSrc:  Temporal     SpO2: 97%  97% 98%   Weight:         Temp (24hrs), Av 5 °F (37 5 °C), Min:96 9 °F (36 1 °C), Max:102 9 °F (39 4 °C)  Current: Temperature: (!) 97 1 °F (36 2 °C)  Arterial Line BP: 114/50  Arterial Line MAP (mmHg): 72 mmHg    Respiratory:  SpO2: SpO2: 98 %       Invasive/non-invasive ventilation settings   Respiratory    Lab Data (Last 4 hours)    None         O2/Vent Data (Last 4 hours)    None                Physical Exam  Vitals signs reviewed  Constitutional:       Appearance: Normal appearance  HENT:      Head: Normocephalic and atraumatic  Mouth/Throat:      Mouth: Mucous membranes are moist    Eyes:      Extraocular Movements: Extraocular movements intact  Pupils: Pupils are equal, round, and reactive to light  Neck:      Musculoskeletal: Normal range of motion and neck supple  Cardiovascular:      Rate and Rhythm: Normal rate and regular rhythm  Pulses: Normal pulses  Heart sounds: Normal heart sounds  Pulmonary:      Effort: Pulmonary effort is normal       Breath sounds: Normal breath sounds  Abdominal:      General: Bowel sounds are normal  There is no distension        Palpations: Abdomen is soft  Tenderness: There is no abdominal tenderness  Musculoskeletal: Normal range of motion  Skin:     General: Skin is warm and dry  Neurological:      General: No focal deficit present  Mental Status: She is alert  Laboratory and Diagnostics:  Results from last 7 days   Lab Units 08/10/20  0152   WBC Thousand/uL 11 83*   HEMOGLOBIN g/dL 10 3*   HEMATOCRIT % 35 1   PLATELETS Thousands/uL 231   NEUTROS PCT % 87*   MONOS PCT % 7     Results from last 7 days   Lab Units 08/10/20  0152   SODIUM mmol/L 144   POTASSIUM mmol/L 2 8*   CHLORIDE mmol/L 101   CO2 mmol/L 36*   ANION GAP mmol/L 7   BUN mg/dL 15   CREATININE mg/dL 1 84*   CALCIUM mg/dL 8 3   GLUCOSE RANDOM mg/dL 116   ALT U/L 17   AST U/L 27   ALK PHOS U/L 56   ALBUMIN g/dL 2 3*   TOTAL BILIRUBIN mg/dL 0 69     Results from last 7 days   Lab Units 08/10/20  1404   MAGNESIUM mg/dL 1 7      Results from last 7 days   Lab Units 08/10/20  0152   INR  2 12*   PTT seconds 39*      Results from last 7 days   Lab Units 08/10/20  0254   TROPONIN I ng/mL 0 04     Results from last 7 days   Lab Units 08/10/20  1220 08/10/20  0152   LACTIC ACID mmol/L 1 6 0 8     ABG:    VBG:          Micro  Results from last 7 days   Lab Units 08/10/20  0158 08/10/20  0152   GRAM STAIN RESULT  Gram positive cocci in pairs and chains* Gram positive cocci in pairs and chains*  Gram negative rods*       EKG: SR  Imaging: I have personally reviewed pertinent reports  Intake and Output  I/O       08/09 0701 - 08/10 0700 08/10 0701 - 08/11 0700    P  O   120    I V  (mL/kg)  2061 6 (15 7)    IV Piggyback 350 450    Total Intake(mL/kg) 350 (2 7) 2631 6 (20 1)    Urine (mL/kg/hr)  1145 (0 4)    Total Output  1145    Net +350 +1486 6                Height and Weights      IBW: -92 5 kg  Body mass index is 51 28 kg/m²    Weight (last 2 days)     Date/Time   Weight    08/10/20 0141   131 (289 46)                Nutrition        Active Medications  Scheduled Meds:  Current Facility-Administered Medications   Medication Dose Route Frequency Provider Last Rate    acetaminophen  650 mg Oral Q6H PRN Sara Moody MD      albuterol  2 5 mg Nebulization Q4H PRN Sara Moody MD      aluminum-magnesium hydroxide-simethicone  30 mL Oral Q6H PRN Sara Moody MD      amLODIPine  10 mg Oral Daily Sara Moody MD      aspirin  81 mg Oral Daily Sara Moody MD      atorvastatin  10 mg Oral HS Sara Moody MD      bisacodyl  10 mg Rectal Daily PRN Sara Moody MD      cefepime  1,000 mg Intravenous Q12H LOUIS AguirreVIPUL 1,000 mg (08/11/20 0230)    cholecalciferol  2,000 Units Oral Daily Sara Moody MD      docusate sodium  100 mg Oral BID Sara Moody MD      guaiFENesin  600 mg Oral Q12H [address] Sara Moody MD      heparin (porcine)  5,000 Units Subcutaneous [facility] Sara Moody MD      HYDROmorphone  0 2 mg Intravenous Q4H PRN Sara Moody MD      levothyroxine  150 mcg Oral Early Morning Sara Moody MD      melatonin  4 5 mg Oral HS Sara Moody MD      metoprolol tartrate  25 mg Oral Q12H [address] Sara Moody MD      nitroglycerin  0 4 mg Sublingual Q5 Min PRN Sara Moody MD      ondansetron  4 mg Intravenous Q6H PRN Sara Moody MD      oxyCODONE  5 mg Oral Q4H PRN Sara Moody MD      pantoprazole  40 mg Oral Early Morning Sara Moody MD      pramipexole  0 5 mg Oral HS Sara Moody MD      sodium chloride  75 mL/hr Intravenous Continuous Sara Moody MD 75 mL/hr (08/11/20 0456)     Continuous Infusions:  sodium chloride, 75 mL/hr, Last Rate: 75 mL/hr (08/11/20 0456)      PRN Meds:   acetaminophen, 650 mg, Q6H PRN  albuterol, 2 5 mg, Q4H PRN  aluminum-magnesium hydroxide-simethicone, 30 mL, Q6H PRN  bisacodyl, 10 mg, Daily PRN  HYDROmorphone, 0 2 mg, Q4H PRN  nitroglycerin, 0 4 mg, Q5 Min PRN  ondansetron, 4 mg, Q6H PRN  oxyCODONE, 5 mg, Q4H PRN        Invasive Devices Review  Invasive Devices     Peripheral Intravenous Line            Peripheral IV 08/10/20 Right Antecubital 1 day    Peripheral IV 08/10/20 Right;Dorsal (posterior) Hand less than 1 day          Arterial Line            Arterial Line 08/10/20 Left Radial less than 1 day          Drain            Ureteral Drain/Stent Left ureter 16 Fr  less than 1 day    Ureteral Drain/Stent Right ureter 16 Fr  less than 1 day    Urethral Catheter Non-latex 16 Fr  less than 1 day                Rationale for remaining devices:   ---------------------------------------------------------------------------------------  Advance Directive and Living Will: Yes    Power of :    POLST:    ---------------------------------------------------------------------------------------  Care Time Delivered:   No Critical Care time spent       DHRUV Bhatti      Portions of the record may have been created with voice recognition software  Occasional wrong word or "sound a like" substitutions may have occurred due to the inherent limitations of voice recognition software    Read the chart carefully and recognize, using context, where substitutions have occurred

## 2020-08-11 NOTE — ASSESSMENT & PLAN NOTE
· Continue broad-spectrum antibiotics - Cefepime D#2  · Monitor hemodynamics closely  · Monitor culture data - gram positive cocci in pairs and chains, gram negative rods

## 2020-08-11 NOTE — PROGRESS NOTES
Vancomycin Assessment    [place] is a 81 y o  female who is currently receiving vancomycin 2,000 mg x 1 for bacteremia  Pharmacy is re-consulted secondary to a change in the speciation of the gram positive organism of the blood cultures  Relevant clinical data and objective history reviewed:  Creatinine   Date Value Ref Range Status   08/11/2020 1 51 (H) 0 60 - 1 30 mg/dL Final     Comment:     Standardized to IDMS reference method   08/10/2020 1 84 (H) 0 60 - 1 30 mg/dL Final     Comment:     Standardized to IDMS reference method   04/28/2020 0 96 0 60 - 1 30 mg/dL Final     Comment:     Standardized to IDMS reference method   10/10/2014 0 63 0 60 - 1 30 mg/dL Final     Comment:     Standardized to IDMS reference method   10/09/2014 0 70 0 60 - 1 30 mg/dL Final     Comment:     Standardized to IDMS reference method   10/08/2014 0 98 0 5 - 1 5 mg/dL Final     Comment:     Standardized to IDMS reference method     BP (!) 126/49   Pulse 62   Temp 97 6 °F (36 4 °C) (Temporal)   Resp 20   Ht 5' 2" (1 575 m)   Wt 132 kg (290 lb)   SpO2 99%   BMI 53 04 kg/m²   I/O last 3 completed shifts:   In: 3501 6 [P O :480; I V :2221 6; IV Piggyback:800]  Out: 3548 [Urine:1445]  Lab Results   Component Value Date/Time    BUN 22 08/11/2020 04:43 AM    BUN 11 10/10/2014 04:42 AM    WBC 8 71 08/11/2020 04:43 AM    WBC 5 96 10/09/2014 04:35 AM    HGB 8 2 (L) 08/11/2020 04:43 AM    HGB 13 5 10/09/2014 04:35 AM    HCT 28 2 (L) 08/11/2020 04:43 AM    HCT 41 2 10/09/2014 04:35 AM    MCV 89 08/11/2020 04:43 AM    MCV 93 10/09/2014 04:35 AM     08/11/2020 04:43 AM     10/09/2014 04:35 AM     Temp Readings from Last 3 Encounters:   08/11/20 97 6 °F (36 4 °C) (Temporal)   04/28/20 98 1 °F (36 7 °C) (Oral)   03/12/20 97 9 °F (36 6 °C) (Temporal)     Vancomycin Assessment:  Indication: Enterococcal bacteremia  Status: stable  Cultures:  8/10 blood cultures x2: 1/2 sets (+) Enterococcus faecalis; 1/2 sets (+) Enterococcus faecalis and Proteus mirabilis  8/10 > 100K Proteus mirabilis  Renal Function: Patient presents with UNRULY that is resolving; baseline is 0 8-0 9 with a creatinine of 1 51 this AM; UOP in the last 24 hours at 0 5 ml/kg/hr  Potential Nephrotoxicity Factors:  Medications: none  Patient Factors: advanced age, renal dysfunction  Days of Therapy: 1  Current Dose: Received a loading dose of 2,000 mg x 1 (25 mg/kg)  Goal Trough: 15-20     Vancomycin Plan:  Dosing: Will continue with a maintenance dose of 1,250 mg q24h  Next Level: Will obtain a steady state trough on 8/14 @ 1030  Renal Function Monitoring: Will monitor BMP and UOP daily     Pharmacy will continue to follow closely for s/sx of nephrotoxicity, infusion reactions and appropriateness of therapy  BMP and CBC will be ordered per protocol  We will continue to follow the patients culture results and clinical progress daily      Daryl Dawn, PharmD, [address] [facility] Internal Medicine Clinical Pharmacist  [phone] or via [facility]

## 2020-08-11 NOTE — TELEPHONE ENCOUNTER
Isabella Hickman is an 71-year-old female seen in consultation [facility] for septic stone status post stent  Will require staged ureteroscopy at an interval   Please contact patient/caregiver with hospital follow-up preoperative evaluation and/or scheduling of ureteroscopy within 4 weeks  Thank you

## 2020-08-11 NOTE — PLAN OF CARE
Problem: Potential for Falls  Goal: Patient will remain free of falls  Description: INTERVENTIONS:  - Assess patient frequently for physical needs  -  Identify cognitive and physical deficits and behaviors that affect risk of falls  -  [place] fall precautions as indicated by assessment   - Educate patient/family on patient safety including physical limitations  - Instruct patient to call for assistance with activity based on assessment  - Modify environment to reduce risk of injury  - Consider OT/PT consult to assist with strengthening/mobility  Outcome: Progressing     Problem: Prexisting or High Potential for Compromised Skin Integrity  Goal: Skin integrity is maintained or improved  Description: INTERVENTIONS:  - Identify patients at risk for skin breakdown  - Assess and monitor skin integrity  - Assess and monitor nutrition and hydration status  - Monitor labs   - Assess for incontinence   - Turn and reposition patient  - Assist with mobility/ambulation  - Relieve pressure over bony prominences  - Avoid friction and shearing  - Provide appropriate hygiene as needed including keeping skin clean and dry  - Evaluate need for skin moisturizer/barrier cream  - Collaborate with interdisciplinary team   - Patient/family teaching  - Consider wound care consult   Outcome: Progressing     Problem: Nutrition/Hydration-ADULT  Goal: Nutrient/Hydration intake appropriate for improving, restoring or maintaining nutritional needs  Description: Monitor and assess patient's nutrition/hydration status for malnutrition  Collaborate with interdisciplinary team and initiate plan and interventions as ordered  Monitor patient's weight and dietary intake as ordered or per policy  Utilize nutrition screening tool and intervene as necessary  Determine patient's food preferences and provide high-protein, high-caloric foods as appropriate       INTERVENTIONS:  - Monitor oral intake, urinary output, labs, and treatment plans  - Assess nutrition and hydration status and recommend course of action  - Evaluate amount of meals eaten  - Assist patient with eating if necessary   - Allow adequate time for meals  - Recommend/ encourage appropriate diets, oral nutritional supplements, and vitamin/mineral supplements  - Order, calculate, and assess calorie counts as needed  - Recommend, monitor, and adjust tube feedings and TPN/PPN based on assessed needs  - Assess need for intravenous fluids  - Provide specific nutrition/hydration education as appropriate  - Include patient/family/caregiver in decisions related to nutrition  Outcome: Progressing     Problem: PAIN - ADULT  Goal: Verbalizes/displays adequate comfort level or baseline comfort level  Description: Interventions:  - Encourage patient to monitor pain and request assistance  - Assess pain using appropriate pain scale  - Administer analgesics based on type and severity of pain and evaluate response  - Implement non-pharmacological measures as appropriate and evaluate response  - Consider cultural and social influences on pain and pain management  - Notify physician/advanced practitioner if interventions unsuccessful or patient reports new pain  Outcome: Progressing     Problem: INFECTION - ADULT  Goal: Absence or prevention of progression during hospitalization  Description: INTERVENTIONS:  - Assess and monitor for signs and symptoms of infection  - Monitor lab/diagnostic results  - Monitor all insertion sites, i e  indwelling lines, tubes, and drains  - Monitor endotracheal if appropriate and nasal secretions for changes in amount and color  - [place] appropriate cooling/warming therapies per order  - Administer medications as ordered  - Instruct and encourage patient and family to use good hand hygiene technique  - Identify and instruct in appropriate isolation precautions for identified infection/condition  Outcome: Progressing  Goal: Absence of fever/infection during neutropenic period  Description: INTERVENTIONS:  - Monitor WBC    Outcome: Progressing     Problem: SAFETY ADULT  Goal: Patient will remain free of falls  Description: INTERVENTIONS:  - Assess patient frequently for physical needs  -  Identify cognitive and physical deficits and behaviors that affect risk of falls    -  [place] fall precautions as indicated by assessment   - Educate patient/family on patient safety including physical limitations  - Instruct patient to call for assistance with activity based on assessment  - Modify environment to reduce risk of injury  - Consider OT/PT consult to assist with strengthening/mobility  Outcome: Progressing  Goal: Maintain or return to baseline ADL function  Description: INTERVENTIONS:  -  Assess patient's ability to carry out ADLs; assess patient's baseline for ADL function and identify physical deficits which impact ability to perform ADLs (bathing, care of mouth/teeth, toileting, grooming, dressing, etc )  - Assess/evaluate cause of self-care deficits   - Assess range of motion  - Assess patient's mobility; develop plan if impaired  - Assess patient's need for assistive devices and provide as appropriate  - Encourage maximum independence but intervene and supervise when necessary  - Involve family in performance of ADLs  - Assess for home care needs following discharge   - Consider OT consult to assist with ADL evaluation and planning for discharge  - Provide patient education as appropriate  Outcome: Progressing  Goal: Maintain or return mobility status to optimal level  Description: INTERVENTIONS:  - Assess patient's baseline mobility status (ambulation, transfers, stairs, etc )    - Identify cognitive and physical deficits and behaviors that affect mobility  - Identify mobility aids required to assist with transfers and/or ambulation (gait belt, sit-to-stand, lift, walker, cane, etc )  - [place] fall precautions as indicated by assessment  - Record patient progress and toleration of activity level on Mobility SBAR; progress patient to next Phase/Stage  - Instruct patient to call for assistance with activity based on assessment  - Consider rehabilitation consult to assist with strengthening/weightbearing, etc   Outcome: Progressing     Problem: DISCHARGE PLANNING  Goal: Discharge to home or other facility with appropriate resources  Description: INTERVENTIONS:  - Identify barriers to discharge w/patient and caregiver  - Arrange for needed discharge resources and transportation as appropriate  - Identify discharge learning needs (meds, wound care, etc )  - Arrange for interpretive services to assist at discharge as needed  - Refer to Case Management Department for coordinating discharge planning if the patient needs post-hospital services based on physician/advanced practitioner order or complex needs related to functional status, cognitive ability, or social support system  Outcome: Progressing     Problem: Knowledge Deficit  Goal: Patient/family/caregiver demonstrates understanding of disease process, treatment plan, medications, and discharge instructions  Description: Complete learning assessment and assess knowledge base    Interventions:  - Provide teaching at level of understanding  - Provide teaching via preferred learning methods  Outcome: Progressing

## 2020-08-11 NOTE — PROGRESS NOTES
Pastoral Care Progress Note    2020  Patient: Eusebia Montes De Oca : 1935  Admission Date & Time: 8/10/2020 0145  MRN: [id number] [facility]: [id number]                     Chaplaincy Interventions Utilized:   Empowerment: Clarified, confirmed, or reviewed information from treatment team     Exploration: Explored hope, Explored emotional needs & resources, Explored relational needs & resources and Explored spiritual needs & resources    Collaboration: Encouraged adherence to treatment plan    Relationship Building: Cultivated a relationship of care and support and Listened empathically    Spiritual Coping Strategies Utilized:   Spiritual gratitude from daughter

## 2020-08-11 NOTE — CONSULTS
Vancomycin Assessment    Adam Virgen is a 81 y o  female who is currently receiving vancomycin 2,000 mg x 1 for bacteremia     Relevant clinical data and objective history reviewed:  Creatinine   Date Value Ref Range Status   08/11/2020 1 51 (H) 0 60 - 1 30 mg/dL Final     Comment:     Standardized to IDMS reference method   08/10/2020 1 84 (H) 0 60 - 1 30 mg/dL Final     Comment:     Standardized to IDMS reference method   04/28/2020 0 96 0 60 - 1 30 mg/dL Final     Comment:     Standardized to IDMS reference method   10/10/2014 0 63 0 60 - 1 30 mg/dL Final     Comment:     Standardized to IDMS reference method   10/09/2014 0 70 0 60 - 1 30 mg/dL Final     Comment:     Standardized to IDMS reference method   10/08/2014 0 98 0 5 - 1 5 mg/dL Final     Comment:     Standardized to IDMS reference method     BP (!) 126/49   Pulse 70   Temp 97 6 °F (36 4 °C) (Temporal)   Resp (!) 32   Wt 131 kg (289 lb 7 4 oz)   SpO2 99%   BMI 51 28 kg/m²   I/O last 3 completed shifts: In: 3501 6 [P O :480; I V :2221 6; IV Piggyback:800]  Out: 8892 [Urine:1445]  Lab Results   Component Value Date/Time    BUN 22 08/11/2020 04:43 AM    BUN 11 10/10/2014 04:42 AM    WBC 8 71 08/11/2020 04:43 AM    WBC 5 96 10/09/2014 04:35 AM    HGB 8 2 (L) 08/11/2020 04:43 AM    HGB 13 5 10/09/2014 04:35 AM    HCT 28 2 (L) 08/11/2020 04:43 AM    HCT 41 2 10/09/2014 04:35 AM    MCV 89 08/11/2020 04:43 AM    MCV 93 10/09/2014 04:35 AM     08/11/2020 04:43 AM     10/09/2014 04:35 AM     Temp Readings from Last 3 Encounters:   08/11/20 97 6 °F (36 4 °C) (Temporal)   04/28/20 98 1 °F (36 7 °C) (Oral)   03/12/20 97 9 °F (36 6 °C) (Temporal)     Blood cultures have now been identified as gamma hemolytic Streptococcus  The ICU team has decided not to continue with vancomycin therapy  Pharmacy will sign off  Thank you for this consult  Please do not hesitate to call us with questions or re-consult us if the need arises       Zuri Moody, PharmD, [facility] Clinical Pharmacist  [phone] or via [place]

## 2020-08-11 NOTE — PROGRESS NOTES
08/11/20 1200   Clinical Encounter Type   Visited With Patient and family together   Routine Visit Introduction; Follow-up   Continue Visiting Yes   Referral From    Referral To

## 2020-08-11 NOTE — PROGRESS NOTES
UROLOGY PROGRESS NOTE   Patient Identifiers: Bennie Campbell (MRN [id number])  Date of Service: 8/11/2020    Subjective:     24 HR EVENTS:   no significant events  52-year-old female with a history of nephrolithiasis presenting to the emergency department with sepsis and bilateral ureteral obstructions  CT of the abdomen pelvis performed 08/10/2020 reveals a large UPJ stone on the right and an 11 mm proximal ureteral calculi with an additional cluster of calculi in the renal pelvis up to 16 mm on the left  She is status post cystoscopy, retrograde pyelogram and insertion of bilateral ureteral stents 8/10/2020  This morning on evaluation patient OOB to chair with Montes De Oca catheter in place draining punch colored urine  No clots noted in Montes De Oca catheter  She denies nausea vomiting  She denies pain  She is delighted to be able to tolerate clear liquids once again with no difficulty      Assessment/Plan:     Hydronephrosis (bilateral)  ·  Secondary to large UPJ stone on the right and 11 mm proximal ureteral calculi with additional cluster of calculi in the renal pelvis up to 16 mm on the left  · Status post cystoscopy, retrograde pyelogram and insertion of bilateral ureteral stents 08/10/2020  · Montes De Oca catheter draining punch colored urine, no clots noted    Nephrolithiasis  · Large UPJ stone noted on the right and an 11 mm proximal ureteral calculi with additional cluster of calculi in renal pelvis up to 16 mm on the left  · Future definitive stone treatment with PCNL versus staged ureteroscopy versus chronic bilateral ureteral stents with exchanges indefinitely to be determined outpatient upon resolution of acute urinary tract infection    Urinary tract infection/ sepsis  · Urine culture performed 08/10/2020 positive for Proteus  · Blood culture #1 and #2 positive for gram positive cocci in pairs and chains, #2 also positive for gram-negative rods performed 08/10/2020  · Continue with IV antibiotics- cefepime 1 g IV every 12 hours for critical care  · No leukocytosis noted-WBC count 8 71  · Afebrile, VSS    Acute kidney injury  · Resolving  · Creatinine 1 51 today; baseline 0 9  · Continue IV hydration, managed by critical care  · Continue to trend renal function      Objective:     VITALS:    Vitals:    08/11/20 0854   BP: (!) 126/49   Pulse: 76   Resp:    Temp:    SpO2:        INS & OUTS:  I/O last 24 hours:   In: 3264 1 [P O :480; I V :2334 1; IV Piggyback:450]  Out: 4798 [Urine:1595]    LABS:  Lab Results   Component Value Date    HGB 8 2 (L) 08/11/2020    HCT 28 2 (L) 08/11/2020    WBC 8 71 08/11/2020     08/11/2020       Lab Results   Component Value Date     10/10/2014    K 3 0 (L) 08/11/2020     08/11/2020    CO2 30 08/11/2020    BUN 22 08/11/2020    CREATININE 1 51 (H) 08/11/2020    CALCIUM 8 0 (L) 08/11/2020    GLUCOSE 97 10/10/2014       INPATIENT MEDS:    Current Facility-Administered Medications:     acetaminophen (TYLENOL) tablet 650 mg, 650 mg, Oral, Q6H PRN, Tima Fraire MD    albuterol inhalation solution 2 5 mg, 2 5 mg, Nebulization, Q4H PRN, Tima Fraire MD    aluminum-magnesium hydroxide-simethicone (MYLANTA) 200-200-20 mg/5 mL oral suspension 30 mL, 30 mL, Oral, Q6H PRN, Tima Fraire MD    amLODIPine (NORVASC) tablet 10 mg, 10 mg, Oral, Daily, Tima Fraire MD, 10 mg at 08/11/20 0854    aspirin (ECOTRIN LOW STRENGTH) EC tablet 81 mg, 81 mg, Oral, Daily, Tima Fraire MD, 81 mg at 08/11/20 0855    atorvastatin (LIPITOR) tablet 10 mg, 10 mg, Oral, HS, Tima Fraire MD, 10 mg at 08/10/20 2135    bisacodyl (DULCOLAX) rectal suppository 10 mg, 10 mg, Rectal, Daily PRN, Tima Fraire MD    cefepime (MAXIPIME) 1,000 mg in dextrose 5 % 50 mL IVPB, 1,000 mg, Intravenous, Q12H, DHRUV Aguirre, Stopped at 08/11/20 0300    cholecalciferol (VITAMIN D3) tablet 2,000 Units, 2,000 Units, Oral, Daily, Tima Fraire MD, 2,000 Units at 08/11/20 0854   docusate sodium (COLACE) capsule 100 mg, 100 mg, Oral, BID, Valeria Mcdonough MD, 100 mg at 08/11/20 0856    guaiFENesin (MUCINEX) 12 hr tablet 600 mg, 600 mg, Oral, Q12H [address], Valeria Mcdonough MD, 600 mg at 08/11/20 0856    heparin (porcine) subcutaneous injection 5,000 Units, 5,000 Units, Subcutaneous, Q8H [address], Valeria Mcdonough MD, 5,000 Units at 08/11/20 0515    HYDROmorphone (DILAUDID) injection 0 2 mg, 0 2 mg, Intravenous, Q4H PRN, Valeria Mcdonough MD    levothyroxine tablet 150 mcg, 150 mcg, Oral, Early Morning, Valeria Mcdonough MD, 150 mcg at 08/11/20 0515    melatonin tablet 4 5 mg, 4 5 mg, Oral, HS, Valeria Mcdonough MD, 4 5 mg at 08/10/20 2150    metoprolol tartrate (LOPRESSOR) tablet 25 mg, 25 mg, Oral, Q12H [address], Vlaeria Mcdonough MD, 25 mg at 08/11/20 0854    nitroglycerin (NITROSTAT) SL tablet 0 4 mg, 0 4 mg, Sublingual, Q5 Min PRN, Valeria Mcdonough MD    ondansetron [facility]) injection 4 mg, 4 mg, Intravenous, Q6H PRN, Valeria Mcdonough MD    oxyCODONE (ROXICODONE) IR tablet 5 mg, 5 mg, Oral, Q4H PRN, Valeria Mcdonough MD    pantoprazole (PROTONIX) EC tablet 40 mg, 40 mg, Oral, Early Morning, Valeria Mcdonough MD, 40 mg at 08/11/20 0604    potassium chloride 20 mEq IVPB (premix), 20 mEq, Intravenous, Q2H, DHRUV Aguirre, Last Rate: 50 mL/hr at 08/11/20 0903, 20 mEq at 08/11/20 7602    pramipexole (MIRAPEX) tablet 0 5 mg, 0 5 mg, Oral, HS, Valeria Mcdonough MD, 0 5 mg at 08/10/20 2207    sodium chloride 0 9 % infusion, 75 mL/hr, Intravenous, Continuous, Valeria Mcdonough MD, Last Rate: 75 mL/hr at 08/11/20 0730, 75 mL/hr at 08/11/20 0730    vancomycin (VANCOCIN) 2,000 mg in sodium chloride 0 9 % 500 mL IVPB, 25 mg/kg (Adjusted), Intravenous, Once, Main Maciel MD      Physical Exam:     GEN: alert and oriented x 3    RESP: breathing comfortably with no accessory muscle use    CARDIO: Regular rate and rhythm  ABD: Obese and soft, non-tender, non-distended    EXT: no significant peripheral edema   RICKETTS: In place draining punch colored urine  no clots      DHRUV Dukes

## 2020-08-11 NOTE — CONSULTS
Consultation - Infectious Disease   Deniz Chapman 81 y o  female MRN: [id number]  Unit/Bed#: ICU 01 Encounter: [id number]    IMPRESSION & RECOMMENDATIONS:   1  Sepsis  Secondary to polymicrobial bacteremia from urinary source  Patient's urine culture is preliminarily showing Proteus  Her blood cultures are showing Enterococcus in both sets and Proteus in one set  Patient with bilateral obstructive renal calculi and evidence of fat stranding concerning for pyelonephritis in both kidneys  Patient did experience hypoxia and hypotension after surgery and temporarily required mechanical ventilation and pressor support  Fortunately she has clinically improved  Her O2 saturation remained stable on 2 L nasal cannula today  BP is stable without pressor support  Patient's fever curve has trended down  Her WBC count has normalized  -antibiotics as below  -check CBC and BMP tomorrow  -follow-up final blood cultures  -recheck blood cultures tomorrow am  -monitor vitals  -supportive care    2  Polymicrobial bacteremia  Patient is preliminarily showing Enterococcus faecalis in both sets of her initial blood cultures as well as Proteus mirabilis in one set of her initial blood cultures  Suspect this is all secondary to  source  Urine culture is preliminarily showing Proteus mirabilis  Patient is currently receiving IV cefepime and vancomycin  Recommend continuing these for now while we await final culture updates  Patient will need repeat blood cultures tomorrow morning and a TTE   -continue IV cefepime  -continue IV vancomycin  -continue pharmacy consult for guidance on vancomycin dosage  -check CBCD and BMP tomorrow  -follow-up final blood cultures  -recheck blood cultures tomorrow am  -check TTE  -monitor vitals    3  Bilateral pyelonephritis secondary to obstructive ureterolithiasis    I personally reviewed patient's CT of the abdomen/pelvis which showed bilateral hydronephrosis and fat stranding adjacent to the kidneys  Patient has multiple renal calculi with obstruction  Concern patient's stones are harboring bacteria as she has previously had urine cultures positive for Proteus and Enterococcus  New urine cultures preliminarily showing Proteus  Patient is now status post bilateral ureteral stenting  Montes De Oca catheter remains intact  She has no burning or pain in her bladder/suprapubic region  She continues to follow closely with Urology   -antibiotic as above  -check CBC and BMP tomorrow  -follow-up urine culture  -serial exams and care Montes De Oca catheter  -continue follow-up with urology    4  Acute kidney injury  Likely secondary to obstruction from renal calculi  Also consider secondary to sepsis  For patient's past medical records it appears her baseline creatinine is approximately 0 8-1  Creatinine was elevated at 1 84 upon admission  It has improved to 1 51 today   -check BMP tomorrow  -dose adjust antibiotics for renal function as needed  -avoid nephrotoxins    5  Acute hypoxic respiratory failure  Patient O2 saturation was 89% on room air upon admission  She did test positive for COVID in April 2020 but repeat testing was negative  Suspect respiratory issues may have been initially related to her sepsis  Respiratory status did worsen after surgery, consider Pickwickian syndrome  She did require temporary mechanical ventilation but has been extubated and her O2 saturation now remains stable on 2 L nasal cannula  I personally reviewed her chest CT which showed no ground-glass opacities or focal consolidations  -monitor vitals  -monitor respiratory status  -O2 support per primary service    6  Morbid obesity  BMI = 53 04     7  Multiple antibiotic allergies  Patient reports a rash with Augmentin and Bactrim  She reports she has tolerated amoxicillin in the past and would be willing to try penicillin if needed    She is currently receiving cefepime and vancomycin above which she is tolerating without difficulty  Will continue these for now and keep patient's allergies in mind as we move for with antibiotic treatment   -monitor patient for adverse medication reaction    Thank you for the consult  We will continue to follow along  Above plan was discussed in detail with patient at the bedside  Above plan was discussed in detail with [facility] attending, Dr Mohinder Bolton  HISTORY OF PRESENT ILLNESS:  Reason for Consult:  Bacteremia  HPI: Raman Kerns is a 81y o  year old female who presented to the [facility] Emergency Department on 08/10/2020 with cough and nausea  Patient also reporting pain across her mid back which started six days prior  Patient presented from [facility] who reported she did have a recent ultrasound of her kidneys because she has chronic kidney stones  Patient did previously test positive for COVID-19 in April 2020  Upon arrival to the ED the patient's temperature was 99 8° but she quickly became febrile with a temperature of 102 9°  Her heart rate was 85  Her respiratory rate was 20  Her O2 saturation was 89% on room air  Patient's WBC count was 11 83  Lactic acid was 0 8  Her creatinine was 1 84 with a GFR of 25  A COVID-19 PCR was performed and was negative  Urinalysis showed large leukocytes, positive nitrates, positive protein, large blood, 20-30 RBC, 30-50 WBC, and occasional epithelial cells and bacteria  The urine was sent for culture  Patient was taken for CT of the abdomen/pelvis which showed right kidney with large calculus in the right extrarenal pelvis extending into the right UPJ  Patient has right hydronephrosis and fat stranding adjacent to the kidney  Patient's left kidney also to have several calculi extending towards the left UPJ  Is also a stone in the proximal left ureter  Patient has moderate left hydronephrosis which is slightly worse when compared to previous imaging    There is fat stranding adjacent to the left kidney tracking caudally around the proximal left ureter  The patient was given aztreonam and cefepime  She was admitted for additional medical management  After her admission the patient was assessed by Urology  It was recommended she go to the OR immediately for bilateral ureteral stent placement  Patient unable to wean off vent after surgery and was therefore kept on mechanical ventilation in the ICU  She developed hypotension temporarily requiring pressor support  The patient was extubated later in the day  Today her blood pressure is stable without pressor support  Patient is now showing Enterococcus faecalis in both sets of her blood cultures and Proteus in one set of her blood cultures  Urine culture also grew Proteus  Patient's antibiotic regimen was transitioned to cefepime and vancomycin  We have been asked for formal consult for bacteremia  The patient has morbid obesity, osteoporosis, dysphagia, GERD, ambulatory dysfunction, CAD, MDD, lymphedema, muscle weakness, AFib, restless leg syndrome, obstructive sleep apnea, syncope, arthritis, colon polyps, diskitis, hyperlipidemia, hypothyroidism, hypertension, irregular heart rhythm, and chronic kidney stones  She has history of MI, anemia, chronic Montes De Oca catheter, kidney failure, ventral hernia repair, laparoscopic gastric banding with eventual removal, hysterectomy, cholecystectomy, bilateral knee replacements, and multiple cysto ureteroscopy and lithotripsy  She is a former smoker  Patient has allergies to Augmentin, hydralazine, Bactrim, Actonel, lisinopril, medical tape, meloxicam, wound dressing adhesive, Premarin cream, and Neurontin  She did test positive for COVID-19 virus on 04/19/2020, results are in [facility]  REVIEW OF SYSTEMS:  Patient reports she is feeling much better today  Tells me that her back pain is almost completely resolved  She denies dysuria and pain/pressure in the bladder    She denies lower abdominal and suprapubic pain   Reports she is very hungry as she has not eaten in days  She has no nausea or vomiting  No diarrhea  No concerns with Montes De Oca  She denies cough, chest pain, shortness of breath  She denies headaches and dizziness  A complete 12 point system-based review of systems is otherwise negative      PAST MEDICAL HISTORY:  Past Medical History:   Diagnosis Date    Acute kidney failure (HCC)     Anemia     Arthritis     Atrial fibrillation ([address] )     Bacterial infection due to Proteus mirabilis 5/23/2019    Chafing     folds of skin and back of thighs    Chronic indwelling Montes De Oca catheter     removed    Colon polyp     Coronary artery disease     Difficulty walking     Discitis     Discitis     Dysphagia     Dysphagia     Dysuria     Gait abnormality     GERD (gastroesophageal reflux disease)     History of transfusion     Hyperlipidemia     Hypertension     Hypothyroidism     Irregular heart beat     Kidney stone     Left ureteral calculus 6/12/2019    Added automatically from request for surgery [id number]    Lymphedema     Major depressive disorder     MDD (major depressive disorder)     [facility]     Morbid obesity ([facility]     Morbid obesity ([facility]     Muscle weakness     Muscle weakness (generalized)     NSTEMI (non-ST elevated myocardial infarction) ([facility] )     Osteoporosis     Paroxysmal A-fib (HCC)     Recurrent UTI     Renal disorder     RLS (restless legs syndrome)     Sleep apnea     Symptomatic anemia 1/15/2020    Syncope 1/17/2020    Wheelchair bound     unable to bear weight , hx infected prosthesis     Past Surgical History:   Procedure Laterality Date    CHOLECYSTECTOMY      COLONOSCOPY      FL RETROGRADE PYELOGRAM  5/22/2019    FL RETROGRADE PYELOGRAM  8/10/2020    HYSTERECTOMY      JOINT REPLACEMENT Bilateral     knee replacment    LAPAROSCOPIC GASTRIC BANDING      NE CYSTO/URETERO W/LITHOTRIPSY &INDWELL STENT INSRT Left 6/26/2019 Procedure: CYSTO, URETEROSCOPY W/HOLMIUM LASER, STENT EXCHANGE;  Surgeon: Patti Varela MD;  [facility];  Service: Urology    IA CYSTOURETHROSCOPY,URETER CATHETER Left 5/22/2019    Procedure: CYSTOSCOPY; RIGHT RETROGRADE PYELOGRAM WITH INSERTION STENT URETERAL LEFT;  Surgeon: Patti Varela MD;  [facility];  Service: Urology    IA CYSTOURETHROSCOPY,URETER CATHETER Bilateral 8/10/2020    Procedure: CYSTOSCOPY RETROGRADE PYELOGRAM WITH INSERTION STENT URETERAL;  Surgeon: Reva Christina MD;  [facility];  Service: Urology    IA XCAPSL CTRC RMVL INSJ IO LENS PROSTH W/O ECP Right 3/12/2020    Procedure: CATARACT REMOVAL W/INTRAOCULAR LENS;  Surgeon: Joaquín Wooten MD;  Location: [address] [place] [address] MAIN OR;  Service: Ophthalmology    REMOVAL GASTRIC BAND LAPAROSCOPIC      VENTRAL HERNIA REPAIR      x4     FAMILY HISTORY:  Non-contributory    SOCIAL HISTORY:  Social History   Social History     Substance and Sexual Activity   Alcohol Use Yes    Alcohol/week: 1 0 standard drinks    Types: 1 Glasses of wine per week    Frequency: Monthly or less    Drinks per session: 1 or 2    Comment: socially      Social History     Substance and Sexual Activity   Drug Use Never     Social History     Tobacco Use   Smoking Status Former Smoker   Smokeless Tobacco Never Used   Tobacco Comment    smoked when was very much younger for one summer     ALLERGIES:  Allergies   Allergen Reactions    Augmentin [Amoxicillin-Pot Clavulanate] Rash     Patient reports that she has tolerated amoxicillin in the past and would be willing to try penicillin if needed      Hydralazine Other (See Comments)     Headache, dizziness, nausea,    Sulfamethoxazole-Trimethoprim Rash    Actonel  [Risedronate Sodium]     Lisinopril Other (See Comments)     "cough"      Medical Tape      Pulls off skin      Meloxicam Cough    Wound Dressing Adhesive Other (See Comments)     "pulls skin off"- darrel paper tape    Conjugated Estrogens Rash Premarin Cream      Neurontin [Gabapentin] Rash     MEDICATIONS:  All current active medications have been reviewed  ANTIBIOTICS:  Cefepime 2  Vancomycin 1  Antibiotics 2    PHYSICAL EXAM:  Temp:  [96 9 °F (36 1 °C)-99 1 °F (37 3 °C)] 97 6 °F (36 4 °C)  HR:  [62-92] 66  Resp:  [11-46] 23  BP: (106-126)/(48-49) 126/49  SpO2:  [88 %-100 %] 99 %  Temp (24hrs), Av 8 °F (36 6 °C), Min:96 9 °F (36 1 °C), Max:99 1 °F (37 3 °C)  Current: Temperature: 97 6 °F (36 4 °C)    Intake/Output Summary (Last 24 hours) at 2020 1428  Last data filed at 2020 1352  Gross per 24 hour   Intake 3275 ml   Output 1820 ml   Net 1455 ml     General Appearance:  Appearing well, nontoxic, and in no distress  Appears comfortable sitting out of bed in the chair having her lunch  She appears chronically debilitated  Head:  Normocephalic, without obvious abnormality, atraumatic   Eyes:  Conjunctiva pink and sclera anicteric, both eyes   Nose: Nares normal, mucosa normal, no drainage   Throat: Oropharynx moist without lesions   Neck: Supple, symmetrical, no adenopathy, no tenderness/mass/nodules   Back:   No CVA tenderness   Lungs:   Decreased but clear to auscultation bilaterally, respirations unlabored; patient is on 2 L nasal cannula O2   Chest Wall:  No tenderness or deformity   Heart:  RRR; no murmur, rub or gallop   Abdomen:   Soft, morbidly obese, non-tender, non-distended, positive bowel sounds throughout   Extremities: No cyanosis or clubbing; bilateral lower extremity lymphedema   Skin: No rashes or lesions  No draining wounds noted on exposed skin  Difficult to perform full skin assessment as patient body habitus limits her movement in her chair  Genitourinary:  Montes De Oca catheter intact   Lymph nodes: Cervical, supraclavicular nodes normal   Neurologic: Alert and oriented times 3, follows commands, symmetric facial movement     LABS, IMAGING, & OTHER STUDIES:  Lab Results:  I have personally reviewed pertinent labs   Results from last 7 days   Lab Units 08/11/20  0443 08/10/20  0152   WBC Thousand/uL 8 71 11 83*   HEMOGLOBIN g/dL 8 2* 10 3*   PLATELETS Thousands/uL 155 231     Results from last 7 days   Lab Units 08/11/20  0443 08/10/20  0152   POTASSIUM mmol/L 3 0* 2 8*   CHLORIDE mmol/L 105 101   CO2 mmol/L 30 36*   BUN mg/dL 22 15   CREATININE mg/dL 1 51* 1 84*   EGFR ml/min/1 73sq m 32 25   CALCIUM mg/dL 8 0* 8 3   AST U/L 18 27   ALT U/L 13 17   ALK PHOS U/L 48 56     Results from last 7 days   Lab Units 08/10/20  0557 08/10/20  0158 08/10/20  0152   BLOOD CULTURE   --  Enterococcus faecalis* Enterococcus faecalis*  Proteus mirabilis*   GRAM STAIN RESULT   --  Gram positive cocci in pairs and chains* Gram positive cocci in pairs and chains*  Gram negative rods*   URINE CULTURE  >100,000 cfu/ml Proteus mirabilis*  --   --      Imaging Studies:   I have personally reviewed pertinent imaging study reports and images in PACS  CT CHEST ABDOMEN PELVIS WO CONTRAST 8/10/2020 - I personally reviewed this study which showed an unremarkable spleen, liver, pancreas, and appendix  Patient's gallbladder is surgically absent  Patient's right kidney demonstrates a large calculus in the right extrarenal pelvis extending into the right UPJ  Patient has right hydronephrosis and fat stranding adjacent to the kidney  Patient's left kidney also to have several calculi extending towards the left UPJ  Is also a stone in the proximal left ureter  Patient has moderate left hydronephrosis which is slightly worse when compared to previous imaging  There is fat stranding adjacent to the left kidney tracking caudally around the proximal left ureter  Patient's stomach appears unremarkable  He has colonic diverticulosis without evidence of acute diverticulitis  Bladder appears unremarkable      Other Studies:   I have personally reviewed pertinent reports:    08/10/2020 0152  08/10/2020 0308  Novel Coronavirus (Covid-19),PCR [facility] [[id number]]     Nares from Nose     Final result  Component  Value    EXT SARS-COV-2  Negative

## 2020-08-12 ENCOUNTER — APPOINTMENT (INPATIENT)
Dept: NON INVASIVE DIAGNOSTICS | Facility: HOSPITAL | Age: 85
DRG: 853 | End: 2020-08-12
Payer: MEDICARE

## 2020-08-12 PROBLEM — I50.32 CHRONIC DIASTOLIC (CONGESTIVE) HEART FAILURE (HCC): Status: ACTIVE | Noted: 2020-01-17

## 2020-08-12 LAB
ANION GAP SERPL CALCULATED.3IONS-SCNC: 8 MMOL/L (ref 4–13)
BACTERIA BLD CULT: ABNORMAL
BACTERIA BLD CULT: ABNORMAL
BACTERIA UR CULT: ABNORMAL
BASOPHILS # BLD AUTO: 0.01 THOUSANDS/ΜL (ref 0–0.1)
BASOPHILS NFR BLD AUTO: 0 % (ref 0–1)
BUN SERPL-MCNC: 20 MG/DL (ref 5–25)
CALCIUM SERPL-MCNC: 8.3 MG/DL (ref 8.3–10.1)
CHLORIDE SERPL-SCNC: 106 MMOL/L (ref 100–108)
CO2 SERPL-SCNC: 29 MMOL/L (ref 21–32)
CREAT SERPL-MCNC: 1.13 MG/DL (ref 0.6–1.3)
EOSINOPHIL # BLD AUTO: 0.01 THOUSAND/ΜL (ref 0–0.61)
EOSINOPHIL NFR BLD AUTO: 0 % (ref 0–6)
ERYTHROCYTE [DISTWIDTH] IN BLOOD BY AUTOMATED COUNT: 17.1 % (ref 11.6–15.1)
GFR SERPL CREATININE-BSD FRML MDRD: 45 ML/MIN/1.73SQ M
GLUCOSE SERPL-MCNC: 100 MG/DL (ref 65–140)
GRAM STN SPEC: ABNORMAL
HCT VFR BLD AUTO: 28.1 % (ref 34.8–46.1)
HGB BLD-MCNC: 8.1 G/DL (ref 11.5–15.4)
IMM GRANULOCYTES # BLD AUTO: 0.04 THOUSAND/UL (ref 0–0.2)
IMM GRANULOCYTES NFR BLD AUTO: 1 % (ref 0–2)
LYMPHOCYTES # BLD AUTO: 0.55 THOUSANDS/ΜL (ref 0.6–4.47)
LYMPHOCYTES NFR BLD AUTO: 8 % (ref 14–44)
MAGNESIUM SERPL-MCNC: 2 MG/DL (ref 1.6–2.6)
MCH RBC QN AUTO: 25.6 PG (ref 26.8–34.3)
MCHC RBC AUTO-ENTMCNC: 28.8 G/DL (ref 31.4–37.4)
MCV RBC AUTO: 89 FL (ref 82–98)
MONOCYTES # BLD AUTO: 0.51 THOUSAND/ΜL (ref 0.17–1.22)
MONOCYTES NFR BLD AUTO: 7 % (ref 4–12)
NEUTROPHILS # BLD AUTO: 5.83 THOUSANDS/ΜL (ref 1.85–7.62)
NEUTS SEG NFR BLD AUTO: 84 % (ref 43–75)
NRBC BLD AUTO-RTO: 0 /100 WBCS
PHOSPHATE SERPL-MCNC: 1.5 MG/DL (ref 2.3–4.1)
PLATELET # BLD AUTO: 176 THOUSANDS/UL (ref 149–390)
PMV BLD AUTO: 10.9 FL (ref 8.9–12.7)
POTASSIUM SERPL-SCNC: 3 MMOL/L (ref 3.5–5.3)
RBC # BLD AUTO: 3.17 MILLION/UL (ref 3.81–5.12)
SODIUM SERPL-SCNC: 143 MMOL/L (ref 136–145)
WBC # BLD AUTO: 6.95 THOUSAND/UL (ref 4.31–10.16)

## 2020-08-12 PROCEDURE — C8929 TTE W OR WO FOL WCON,DOPPLER: HCPCS

## 2020-08-12 PROCEDURE — 80048 BASIC METABOLIC PNL TOTAL CA: CPT | Performed by: NURSE PRACTITIONER

## 2020-08-12 PROCEDURE — 93306 TTE W/DOPPLER COMPLETE: CPT | Performed by: INTERNAL MEDICINE

## 2020-08-12 PROCEDURE — 85025 COMPLETE CBC W/AUTO DIFF WBC: CPT | Performed by: NURSE PRACTITIONER

## 2020-08-12 PROCEDURE — 84100 ASSAY OF PHOSPHORUS: CPT | Performed by: NURSE PRACTITIONER

## 2020-08-12 PROCEDURE — 99232 SBSQ HOSP IP/OBS MODERATE 35: CPT | Performed by: INTERNAL MEDICINE

## 2020-08-12 PROCEDURE — 87040 BLOOD CULTURE FOR BACTERIA: CPT | Performed by: NURSE PRACTITIONER

## 2020-08-12 PROCEDURE — 83735 ASSAY OF MAGNESIUM: CPT | Performed by: NURSE PRACTITIONER

## 2020-08-12 PROCEDURE — 99233 SBSQ HOSP IP/OBS HIGH 50: CPT | Performed by: INTERNAL MEDICINE

## 2020-08-12 RX ORDER — FUROSEMIDE 40 MG/1
40 TABLET ORAL DAILY
Status: DISCONTINUED | OUTPATIENT
Start: 2020-08-13 | End: 2020-08-17 | Stop reason: HOSPADM

## 2020-08-12 RX ORDER — METRONIDAZOLE 500 MG/1
500 TABLET ORAL EVERY 8 HOURS SCHEDULED
Status: DISCONTINUED | OUTPATIENT
Start: 2020-08-12 | End: 2020-08-17 | Stop reason: HOSPADM

## 2020-08-12 RX ORDER — CEPHALEXIN 500 MG/1
500 CAPSULE ORAL EVERY 6 HOURS SCHEDULED
Status: DISCONTINUED | OUTPATIENT
Start: 2020-08-12 | End: 2020-08-17 | Stop reason: HOSPADM

## 2020-08-12 RX ADMIN — ATORVASTATIN CALCIUM 10 MG: 20 TABLET, FILM COATED ORAL at 21:38

## 2020-08-12 RX ADMIN — VANCOMYCIN HYDROCHLORIDE 750 MG: 750 INJECTION, SOLUTION INTRAVENOUS at 22:37

## 2020-08-12 RX ADMIN — PANTOPRAZOLE SODIUM 40 MG: 40 TABLET, DELAYED RELEASE ORAL at 05:01

## 2020-08-12 RX ADMIN — GUAIFENESIN 600 MG: 600 TABLET, EXTENDED RELEASE ORAL at 21:38

## 2020-08-12 RX ADMIN — HEPARIN SODIUM 5000 UNITS: 5000 INJECTION INTRAVENOUS; SUBCUTANEOUS at 05:01

## 2020-08-12 RX ADMIN — ASPIRIN 81 MG: 81 TABLET, COATED ORAL at 08:29

## 2020-08-12 RX ADMIN — CEPHALEXIN 500 MG: 500 CAPSULE ORAL at 18:30

## 2020-08-12 RX ADMIN — GUAIFENESIN 600 MG: 600 TABLET, EXTENDED RELEASE ORAL at 08:29

## 2020-08-12 RX ADMIN — DOCUSATE SODIUM 100 MG: 100 CAPSULE, LIQUID FILLED ORAL at 08:29

## 2020-08-12 RX ADMIN — POTASSIUM PHOSPHATE, MONOBASIC POTASSIUM PHOSPHATE, DIBASIC 21 MMOL: 224; 236 INJECTION, SOLUTION, CONCENTRATE INTRAVENOUS at 23:02

## 2020-08-12 RX ADMIN — METRONIDAZOLE 500 MG: 500 TABLET ORAL at 21:37

## 2020-08-12 RX ADMIN — HEPARIN SODIUM 5000 UNITS: 5000 INJECTION INTRAVENOUS; SUBCUTANEOUS at 21:38

## 2020-08-12 RX ADMIN — PERFLUTREN 0.6 ML/MIN: 6.52 INJECTION, SUSPENSION INTRAVENOUS at 14:22

## 2020-08-12 RX ADMIN — LEVOTHYROXINE SODIUM 150 MCG: 75 TABLET ORAL at 05:01

## 2020-08-12 RX ADMIN — DOCUSATE SODIUM 100 MG: 100 CAPSULE, LIQUID FILLED ORAL at 16:35

## 2020-08-12 RX ADMIN — Medication 2000 UNITS: at 08:29

## 2020-08-12 RX ADMIN — PRAMIPEXOLE DIHYDROCHLORIDE 0.5 MG: 0.5 TABLET ORAL at 21:37

## 2020-08-12 RX ADMIN — METRONIDAZOLE 500 MG: 500 TABLET ORAL at 16:35

## 2020-08-12 RX ADMIN — METOPROLOL TARTRATE 25 MG: 25 TABLET, FILM COATED ORAL at 08:29

## 2020-08-12 RX ADMIN — CEFEPIME HYDROCHLORIDE 1000 MG: 1 INJECTION, POWDER, FOR SOLUTION INTRAMUSCULAR; INTRAVENOUS at 01:46

## 2020-08-12 RX ADMIN — MELATONIN 4.5 MG: 3 TAB ORAL at 21:37

## 2020-08-12 RX ADMIN — AMLODIPINE BESYLATE 10 MG: 10 TABLET ORAL at 08:29

## 2020-08-12 RX ADMIN — HEPARIN SODIUM 5000 UNITS: 5000 INJECTION INTRAVENOUS; SUBCUTANEOUS at 14:54

## 2020-08-12 RX ADMIN — CEFEPIME HYDROCHLORIDE 1000 MG: 1 INJECTION, POWDER, FOR SOLUTION INTRAMUSCULAR; INTRAVENOUS at 14:53

## 2020-08-12 RX ADMIN — VANCOMYCIN HYDROCHLORIDE 750 MG: 750 INJECTION, SOLUTION INTRAVENOUS at 09:15

## 2020-08-12 NOTE — ASSESSMENT & PLAN NOTE
· Sepsis secondary to polymicrobial bacteremia likely urinary source including bacteroides proteus and enterococcus  · ID following, currently on vancomycin keflex and metronidazole

## 2020-08-12 NOTE — NURSING NOTE
Montes De Oca present- secured  Dark christiano hazy urine present in drainage bag  Pt A/o x4  No complaints of pain  SCDS on  Repositions self frequently  Call bell in reach  IVF infusing  MD made aware of lab results from AM  New orders obtained

## 2020-08-12 NOTE — PROGRESS NOTES
Progress Note - Infectious Disease   Comfort Moss 81 y o  female MRN: [id number]  Unit/Bed#: E5 -01 Encounter: [id number]    Impression/Plan:  1  Sepsis  Secondary to polymicrobial bacteremia from urinary source  Her blood cultures are preliminarily showing Enterococcus in both sets and Proteus in one set  Patient with bilateral obstructive renal calculi and evidence of fat stranding concerning for pyelonephritis in both kidneys  Patient did experience hypoxia and hypotension after surgery and temporarily required mechanical ventilation and pressor support  Fortunately she has clinically improved  Her O2 saturation remained stable on 2 L nasal cannula today  BP is stable without pressor support  Patient's fever curve has trended down  Her WBC count has normalized  Repeat blood cultures are pending   -antibiotics as below  -monitor CBC and BMP  -follow-up final blood cultures  -follow up repeat blood cultures  -monitor vitals  -supportive care     2  Polymicrobial bacteremia  Patient is preliminarily showing Enterococcus faecalis in both sets of her initial blood cultures as well as Proteus mirabilis in one set of her initial blood cultures  Suspect this is all secondary to  source  Urine culture is preliminarily showing Proteus mirabilis  Patient is currently receiving IV cefepime and vancomycin  Recommend continuing these for now while we await final culture updates  Repeat blood cultures are pending  She will need to complete a TTE   -continue IV cefepime  -continue IV vancomycin  -continue pharmacy consult for guidance on vancomycin dosage  -monitor CBC and BMP  -follow-up final blood cultures  -follow-up repeat blood cultures  -check TTE  -monitor vitals     3  Bilateral pyelonephritis secondary to obstructive ureterolithiasis  CT of the abdomen/pelvis showed bilateral hydronephrosis and fat stranding adjacent to the kidneys  Patient has multiple renal calculi with obstruction   Concern patient's stones are harboring bacteria as she has previously had urine cultures positive for Proteus and Enterococcus  New urine culture is preliminarily showing Proteus  Patient is now status post bilateral ureteral stenting  Montes De Oca catheter remains intact  She has no burning or pain in her bladder/suprapubic region  She continues to follow closely with Urology   -antibiotic as above  -monitor CBC and BMP  -follow-up final urine culture  -serial exams and care Montes De Oca catheter  -continue follow-up with urology     4  Acute kidney injury  Likely secondary to obstruction from renal calculi  Also consider secondary to sepsis  For patient's past medical records it appears her baseline creatinine is approximately 0 8-1  Creatinine was elevated at 1 84 upon admission  It has improved to 1 13 today   -monitor BMP  -dose adjust antibiotics for renal function as needed  -avoid nephrotoxins     5  Acute hypoxic respiratory failure  Patient O2 saturation was 89% on room air upon admission  She did test positive for COVID in April 2020 but repeat testing was negative  Suspect respiratory issues may have been initially related to her sepsis  Respiratory status did worsen after surgery, consider Pickwickian syndrome  She did require temporary mechanical ventilation but has been extubated and her O2 saturation now remains stable on 2 L nasal cannula  Chest CT showed no ground-glass opacities or focal consolidations  -monitor vitals  -monitor respiratory status  -O2 support per primary service     6  Morbid obesity  BMI = 53 04      7  Multiple antibiotic allergies  Patient reports a rash with Augmentin and Bactrim  She reports she has tolerated amoxicillin in the past and would be willing to try penicillin if needed  She is currently receiving cefepime and vancomycin above which she is tolerating without difficulty    Will continue these for now and keep patient's allergies in mind as we move for with antibiotic treatment   -monitor patient for adverse medication reaction     Above plan was discussed in detail with patient at the bedside  Antibiotics:  Cefepime 3  Vancomycin 2  Antibiotics 3    Subjective:  Patient reports she is feeling much better today  She has no pain in her lower back, flank regions, lower abdomen, or suprapubic region  No concerns with her Montes De Oca  She has no fever, chills, sweats, shakes; no nausea, vomiting, or diarrhea; no cough, shortness of breath, or chest pain  Feels that her appetite is back to normal and she is enjoying her meals  No new symptoms, concerns, or complaints to report  Objective:  Vitals:  Temp:  [97 1 °F (36 2 °C)-98 3 °F (36 8 °C)] 97 1 °F (36 2 °C)  HR:  [62-99] 99  Resp:  [20-32] 20  BP: ()/(49-68) 114/68  SpO2:  [88 %-100 %] 98 %  Temp (24hrs), Av 7 °F (36 5 °C), Min:97 1 °F (36 2 °C), Max:98 3 °F (36 8 °C)  Current: Temperature: (!) 97 1 °F (36 2 °C)    Physical Exam:   General Appearance:  Alert, interactive, nontoxic, in no acute distress  Appears comfortable sitting up having breakfast    Throat: Oropharynx moist without lesions  Lungs:   Decreased but clear to auscultation bilaterally; no wheezes, rhonchi or rales; respirations unlabored; patient is on 2 L nasal cannula O2   Heart:  Tachycardic; no murmur, rub or gallop   Abdomen:   Soft, morbidly obese, non-tender, non-distended, positive bowel sounds  Extremities: No clubbing or cyanosis; stable bilateral lower extremity lymphedema without overlying erythema or open wounds   Genitourinary: Montes De Oca catheter intact   Skin: No new rashes or lesions noted on exposed skin  Difficult to perform full skin assessment due to patient body habitus limiting her movement in bed       Labs, Imaging, & Other studies:   All pertinent labs and imaging studies were personally reviewed  Results from last 7 days   Lab Units 20  0634 20  0443 08/10/20  0152   WBC Thousand/uL 6 95 8 71 11 83* HEMOGLOBIN g/dL 8 1* 8 2* 10 3*   PLATELETS Thousands/uL 176 155 231     Results from last 7 days   Lab Units 08/12/20  0634 08/11/20  0443 08/10/20  0152   POTASSIUM mmol/L 3 0* 3 0* 2 8*   CHLORIDE mmol/L 106 105 101   CO2 mmol/L 29 30 36*   BUN mg/dL 20 22 15   CREATININE mg/dL 1 13 1 51* 1 84*   EGFR ml/min/1 73sq m 45 32 25   CALCIUM mg/dL 8 3 8 0* 8 3   AST U/L  --  18 27   ALT U/L  --  13 17   ALK PHOS U/L  --  48 56     Results from last 7 days   Lab Units 08/10/20  0557 08/10/20  0158 08/10/20  0152   BLOOD CULTURE   --  Enterococcus faecalis*  Anaerobic Gram Negative Bacilli* Enterococcus faecalis*  Proteus mirabilis*  Anaerobic Gram Negative Bacilli*   GRAM STAIN RESULT   --  Gram positive cocci in pairs and chains* Gram positive cocci in pairs and chains*  Gram negative rods*   URINE CULTURE  >100,000 cfu/ml Proteus mirabilis*  --   --

## 2020-08-12 NOTE — ASSESSMENT & PLAN NOTE
· Acute respiratory failure with hypoxia with history of COVID-19 in April  · Continue supplemental oxygen

## 2020-08-12 NOTE — ASSESSMENT & PLAN NOTE
· Hypokalemia and hypophosphatemia will replete with potassium phosphorus    Results from last 7 days   Lab Units 08/12/20  0634 08/11/20  0443   POTASSIUM mmol/L 3 0* 3 0*   MAGNESIUM mg/dL 2 0 2 2   PHOSPHORUS mg/dL 1 5* 2 8

## 2020-08-12 NOTE — PLAN OF CARE
Problem: Potential for Falls  Goal: Patient will remain free of falls  Description: INTERVENTIONS:  - Assess patient frequently for physical needs  -  Identify cognitive and physical deficits and behaviors that affect risk of falls  -  [place] fall precautions as indicated by assessment   - Educate patient/family on patient safety including physical limitations  - Instruct patient to call for assistance with activity based on assessment  - Modify environment to reduce risk of injury  - Consider OT/PT consult to assist with strengthening/mobility  Outcome: Progressing     Problem: Prexisting or High Potential for Compromised Skin Integrity  Goal: Skin integrity is maintained or improved  Description: INTERVENTIONS:  - Identify patients at risk for skin breakdown  - Assess and monitor skin integrity  - Assess and monitor nutrition and hydration status  - Monitor labs   - Assess for incontinence   - Turn and reposition patient  - Assist with mobility/ambulation  - Relieve pressure over bony prominences  - Avoid friction and shearing  - Provide appropriate hygiene as needed including keeping skin clean and dry  - Evaluate need for skin moisturizer/barrier cream  - Collaborate with interdisciplinary team   - Patient/family teaching  - Consider wound care consult   Outcome: Progressing     Problem: Nutrition/Hydration-ADULT  Goal: Nutrient/Hydration intake appropriate for improving, restoring or maintaining nutritional needs  Description: Monitor and assess patient's nutrition/hydration status for malnutrition  Collaborate with interdisciplinary team and initiate plan and interventions as ordered  Monitor patient's weight and dietary intake as ordered or per policy  Utilize nutrition screening tool and intervene as necessary  Determine patient's food preferences and provide high-protein, high-caloric foods as appropriate       INTERVENTIONS:  - Monitor oral intake, urinary output, labs, and treatment plans  - Assess nutrition and hydration status and recommend course of action  - Evaluate amount of meals eaten  - Assist patient with eating if necessary   - Allow adequate time for meals  - Recommend/ encourage appropriate diets, oral nutritional supplements, and vitamin/mineral supplements  - Order, calculate, and assess calorie counts as needed  - Recommend, monitor, and adjust tube feedings and TPN/PPN based on assessed needs  - Assess need for intravenous fluids  - Provide specific nutrition/hydration education as appropriate  - Include patient/family/caregiver in decisions related to nutrition  Outcome: Progressing     Problem: PAIN - ADULT  Goal: Verbalizes/displays adequate comfort level or baseline comfort level  Description: Interventions:  - Encourage patient to monitor pain and request assistance  - Assess pain using appropriate pain scale  - Administer analgesics based on type and severity of pain and evaluate response  - Implement non-pharmacological measures as appropriate and evaluate response  - Consider cultural and social influences on pain and pain management  - Notify physician/advanced practitioner if interventions unsuccessful or patient reports new pain  Outcome: Progressing     Problem: INFECTION - ADULT  Goal: Absence or prevention of progression during hospitalization  Description: INTERVENTIONS:  - Assess and monitor for signs and symptoms of infection  - Monitor lab/diagnostic results  - Monitor all insertion sites, i e  indwelling lines, tubes, and drains  - Monitor endotracheal if appropriate and nasal secretions for changes in amount and color  - [place] appropriate cooling/warming therapies per order  - Administer medications as ordered  - Instruct and encourage patient and family to use good hand hygiene technique  - Identify and instruct in appropriate isolation precautions for identified infection/condition  Outcome: Progressing  Goal: Absence of fever/infection during neutropenic period  Description: INTERVENTIONS:  - Monitor WBC    Outcome: Progressing     Problem: SAFETY ADULT  Goal: Patient will remain free of falls  Description: INTERVENTIONS:  - Assess patient frequently for physical needs  -  Identify cognitive and physical deficits and behaviors that affect risk of falls    -  [place] fall precautions as indicated by assessment   - Educate patient/family on patient safety including physical limitations  - Instruct patient to call for assistance with activity based on assessment  - Modify environment to reduce risk of injury  - Consider OT/PT consult to assist with strengthening/mobility  Outcome: Progressing  Goal: Maintain or return to baseline ADL function  Description: INTERVENTIONS:  -  Assess patient's ability to carry out ADLs; assess patient's baseline for ADL function and identify physical deficits which impact ability to perform ADLs (bathing, care of mouth/teeth, toileting, grooming, dressing, etc )  - Assess/evaluate cause of self-care deficits   - Assess range of motion  - Assess patient's mobility; develop plan if impaired  - Assess patient's need for assistive devices and provide as appropriate  - Encourage maximum independence but intervene and supervise when necessary  - Involve family in performance of ADLs  - Assess for home care needs following discharge   - Consider OT consult to assist with ADL evaluation and planning for discharge  - Provide patient education as appropriate  Outcome: Progressing  Goal: Maintain or return mobility status to optimal level  Description: INTERVENTIONS:  - Assess patient's baseline mobility status (ambulation, transfers, stairs, etc )    - Identify cognitive and physical deficits and behaviors that affect mobility  - Identify mobility aids required to assist with transfers and/or ambulation (gait belt, sit-to-stand, lift, walker, cane, etc )  - [place] fall precautions as indicated by assessment  - Record patient progress and toleration of activity level on Mobility SBAR; progress patient to next Phase/Stage  - Instruct patient to call for assistance with activity based on assessment  - Consider rehabilitation consult to assist with strengthening/weightbearing, etc   Outcome: Progressing     Problem: DISCHARGE PLANNING  Goal: Discharge to home or other facility with appropriate resources  Description: INTERVENTIONS:  - Identify barriers to discharge w/patient and caregiver  - Arrange for needed discharge resources and transportation as appropriate  - Identify discharge learning needs (meds, wound care, etc )  - Arrange for interpretive services to assist at discharge as needed  - Refer to Case Management Department for coordinating discharge planning if the patient needs post-hospital services based on physician/advanced practitioner order or complex needs related to functional status, cognitive ability, or social support system  Outcome: Progressing     Problem: Knowledge Deficit  Goal: Patient/family/caregiver demonstrates understanding of disease process, treatment plan, medications, and discharge instructions  Description: Complete learning assessment and assess knowledge base    Interventions:  - Provide teaching at level of understanding  - Provide teaching via preferred learning methods  Outcome: Progressing

## 2020-08-12 NOTE — ASSESSMENT & PLAN NOTE
· UNRULY on CKD 3 secondary to UTI/sepsis  · Resolved    Results from last 7 days   Lab Units 08/12/20  0634 08/11/20  0443 08/10/20  0152   BUN mg/dL 20 22 15   CREATININE mg/dL 1 13 1 51* 1 84*

## 2020-08-12 NOTE — ASSESSMENT & PLAN NOTE
Wt Readings from Last 3 Encounters:   08/11/20 132 kg (290 lb)   04/28/20 132 kg (291 lb 10 7 oz)   03/12/20 (!) 144 kg (318 lb)     · Chronic diastolic CHF  Given resolution of sepsis and kidney injury will discontinue IVF and restart furosemide in a m

## 2020-08-12 NOTE — PROGRESS NOTES
Vancomycin Assessment    Sabine Panchal is a 81 y o  female who is currently receiving vancomycin  for bacteremia    Relevant clinical data and objective history reviewed:  Creatinine   Date Value Ref Range Status   08/12/2020 1 13 0 60 - 1 30 mg/dL Final     Comment:     Standardized to IDMS reference method   08/11/2020 1 51 (H) 0 60 - 1 30 mg/dL Final     Comment:     Standardized to IDMS reference method   08/10/2020 1 84 (H) 0 60 - 1 30 mg/dL Final     Comment:     Standardized to IDMS reference method   10/10/2014 0 63 0 60 - 1 30 mg/dL Final     Comment:     Standardized to IDMS reference method   10/09/2014 0 70 0 60 - 1 30 mg/dL Final     Comment:     Standardized to IDMS reference method   10/08/2014 0 98 0 5 - 1 5 mg/dL Final     Comment:     Standardized to IDMS reference method     /60 (BP Location: Right arm)   Pulse 82   Temp (!) 96 6 °F (35 9 °C) (Temporal)   Resp 18   Ht 5' 2" (1 575 m)   Wt 132 kg (290 lb)   SpO2 97%   BMI 53 04 kg/m²   I/O last 3 completed shifts: In: 3107 5 [P O :480; I V :1877 5; IV Piggyback:750]  Out: 2053 [Urine:1825]  Lab Results   Component Value Date/Time    BUN 20 08/12/2020 06:34 AM    BUN 11 10/10/2014 04:42 AM    WBC 6 95 08/12/2020 06:34 AM    WBC 5 96 10/09/2014 04:35 AM    HGB 8 1 (L) 08/12/2020 06:34 AM    HGB 13 5 10/09/2014 04:35 AM    HCT 28 1 (L) 08/12/2020 06:34 AM    HCT 41 2 10/09/2014 04:35 AM    MCV 89 08/12/2020 06:34 AM    MCV 93 10/09/2014 04:35 AM     08/12/2020 06:34 AM     10/09/2014 04:35 AM     Temp Readings from Last 3 Encounters:   08/12/20 (!) 96 6 °F (35 9 °C) (Temporal)   04/28/20 98 1 °F (36 7 °C) (Oral)   03/12/20 97 9 °F (36 6 °C) (Temporal)     Vancomycin Days of Therapy: 2    Assessment/Plan  The patient is currently on vancomycin utilizing scheduled dosing  The patient is ordered vancomycin 1250mg IV every 24 hours after clinical evaluation will be changed to vancomycin 750mg IV every 12 hours    Pharmacy will continue to follow closely for s/sx of nephrotoxicity, infusion reactions, and appropriateness of therapy  BMP and CBC will be ordered per protocol  Plan for trough as patient approaches steady state, prior to the 5th dose at approximately 2045 on 8/13/20  Pharmacy will continue to follow the patients culture results and clinical progress daily      Porsha Beatty, Pharmacist

## 2020-08-12 NOTE — PROGRESS NOTES
Progress Note - Katelynn Colon 1935, 81 y o  female MRN: [id number]    Unit/Bed#: E5 -01 Encounter: [id number]    Primary Care Provider: Jose Maria Abraham MD   Date and time admitted to hospital: 8/10/2020  1:45 AM        * Sepsis ([place] [address] )  Assessment & Plan  · Sepsis secondary to polymicrobial bacteremia likely urinary source including bacteroides proteus and enterococcus  · ID following, currently on vancomycin keflex and metronidazole    Acute respiratory failure with hypoxia (HCC)  Assessment & Plan  · Acute respiratory failure with hypoxia with history of COVID-19 in April  · Continue supplemental oxygen    Morbid obesity with BMI of 50 0-59 9, adult ([place] [address] )  Assessment & Plan  · Body mass index is 53 04 kg/m²  Acute hypokalemia  Assessment & Plan  · Hypokalemia and hypophosphatemia will replete with potassium phosphorus    Results from last 7 days   Lab Units 08/12/20  0634 08/11/20  0443   POTASSIUM mmol/L 3 0* 3 0*   MAGNESIUM mg/dL 2 0 2 2   PHOSPHORUS mg/dL 1 5* 2 8         Chronic diastolic (congestive) heart failure (HCC)  Assessment & Plan  Wt Readings from Last 3 Encounters:   08/11/20 132 kg (290 lb)   04/28/20 132 kg (291 lb 10 7 oz)   03/12/20 (!) 144 kg (318 lb)     · Chronic diastolic CHF  Given resolution of sepsis and kidney injury will discontinue IVF and restart furosemide in a m  Hydronephrosis with obstructing calculus  Assessment & Plan  · Bilateral hydronephrosis with ureteral obstruction status post bilateral stenting    Acute kidney injury ([place] [address] )  Assessment & Plan  · UNRULY on CKD 3 secondary to UTI/sepsis  · Resolved    Results from last 7 days   Lab Units 08/12/20  0634 08/11/20  0443 08/10/20  0152   BUN mg/dL 20 22 15   CREATININE mg/dL 1 13 1 51* 1 84*       Paroxysmal A-fib (HCC)  Assessment & Plan  · Paroxysmal atrial fibrillation on metoprolol    · Will restart Eliquis      VTE Pharmacologic Prophylaxis: Heparin    Patient Centered Rounds: I have performed bedside rounds with nursing staff today  Discussions with Specialists or Other Care Team Provider:  Case management  Education and Discussions with Family / Patient:     Time Spent for Care: 25 mins  More than 50% of total time spent on counseling and coordination of care as described above  Current Length of Stay: 2 day(s)  Current Patient Status: Inpatient     Certification Statement: The patient will continue to require additional inpatient hospital stay due to Acute respiratory failure with hypoxia [facility])  Discharge Plan / Estimated Discharge Date:  When cleared by Infectious Disease and Neurology    Code Status: Level 3 - DNAR and DNI  ______________________________________________________________________________    Subjective:   Patient seen and examined  No new complaints  Is having left flank discomfort    Objective:   Vitals: Blood pressure 94/54, pulse 70, temperature 97 6 °F (36 4 °C), temperature source Temporal, resp  rate 18, height 5' 2" (1 575 m), weight 132 kg (290 lb), SpO2 97 %, not currently breastfeeding      Physical Exam:   General appearance: alert, appears stated age and cooperative  Head: Normocephalic, without obvious abnormality, atraumatic  Lungs: diminished breath sounds  Heart: regular rate and rhythm  Abdomen: soft, non-tender, positive bowel sounds   Back: negative, range of motion normal  Extremities: edema 1+ lower extremities bilaterally  Neurologic: Grossly normal    Additional Data:   Labs:  Results from last 7 days   Lab Units 08/12/20 0634 08/11/20  0443 08/10/20  0152   WBC Thousand/uL 6 95 8 71 11 83*   HEMOGLOBIN g/dL 8 1* 8 2* 10 3*   HEMATOCRIT % 28 1* 28 2* 35 1   MCV fL 89 89 89   PLATELETS Thousands/uL 176 155 231   INR   --   --  2 12*     Results from last 7 days   Lab Units 08/12/20  0634 08/11/20 0443 08/10/20  0152   SODIUM mmol/L 143 142 144   POTASSIUM mmol/L 3 0* 3 0* 2 8*   CHLORIDE mmol/L 106 105 101   CO2 mmol/L 29 30 36*   ANION GAP mmol/L 8 7 7   BUN mg/dL 20 22 15   CREATININE mg/dL 1 13 1 51* 1 84*   CALCIUM mg/dL 8 3 8 0* 8 3   ALBUMIN g/dL  --  1 7* 2 3*   TOTAL BILIRUBIN mg/dL  --  0 47 0 69   ALK PHOS U/L  --  48 56   ALT U/L  --  13 17   AST U/L  --  18 27   EGFR ml/min/1 73sq m 45 32 25   GLUCOSE RANDOM mg/dL 100 107 116     Results from last 7 days   Lab Units 08/12/20  0634 08/11/20  0443 08/10/20  1404   MAGNESIUM mg/dL 2 0 2 2 1 7   PHOSPHORUS mg/dL 1 5* 2 8  --      Results from last 7 days   Lab Units 08/10/20  0254   TROPONIN I ng/mL 0 04          Results from last 7 days   Lab Units 08/10/20  1220 08/10/20  0152   LACTIC ACID mmol/L 1 6 0 8                 * I Have Reviewed All Lab Data Listed Above  Cultures:   Results from last 7 days   Lab Units 08/12/20  0633 08/12/20  0624 08/10/20  0557 08/10/20  0158 08/10/20  0152   BLOOD CULTURE  Received in Microbiology Lab  Culture in Progress  Received in Microbiology Lab  Culture in Progress  --  Enterococcus faecalis*  Bacteroides fragilis* Enterococcus faecalis*  Proteus mirabilis*  Bacteroides fragilis*   GRAM STAIN RESULT   --   --   --  Gram positive cocci in pairs and chains* Gram positive cocci in pairs and chains*  Gram negative rods*   URINE CULTURE   --   --  >100,000 cfu/ml Proteus mirabilis*  30,000-39,000 cfu/ml Gram Negative Asim Enteric Like*  30,000-39,000 cfu/ml Streptococcus species*  --   --              Imaging:  Imaging Reports Reviewed Today Include:   Ct Chest Abdomen Pelvis Wo Contrast    Result Date: 8/10/2020  Impression: Bilateral nephrolithiasis  There is an approximately 25 x 14 mm calculus within the distal aspect of the right extrarenal pelvis and extending into the right ureteropelvic junction, new compared with May 21, 2019  There is mild right hydronephrosis and there is stranding of the fat adjacent to the right kidney and tracking caudally adjacent to the proximal right ureter, also new compared with May 21, 2019   There is an approximately 11 x 8 mm calculus within the proximal left ureter, approximately 2 5 cm beyond the left ureteropelvic junction, similar to May 21, 2019  There are also several calculi within the left renal pelvis and extending towards the left ureteropelvic junction, the largest of which measures approximately 16 mm  There is mild to moderate left hydronephrosis, similar to slightly worse compared with May 21, 2019  There is, however, more stranding of the fat adjacent to the left kidney and tracking caudally adjacent to the proximal left ureter  Superimposed infection should be excluded  Correlation with urinalysis and urine culture and sensitivity is recommended  Urology consultation is recommended  Other findings as described above, please see discussion  The images are available for clinical review  I personally discussed this study with Leif Glass on 8/10/2020 at 5:12 AM  Workstation performed: [id number]     Fl Retrograde Pyelogram    Result Date: 8/10/2020  Impression: Fluoroscopic guidance provided for retrograde study  Please see procedure report for further details   Workstation performed: [id number]     Scheduled Meds:  Current Facility-Administered Medications   Medication Dose Route Frequency Provider Last Rate    acetaminophen  650 mg Oral Q6H PRN Cristal Darron, CRNP      albuterol  2 5 mg Nebulization Q4H PRN Fate Darron, CRNP      aluminum-magnesium hydroxide-simethicone  30 mL Oral Q6H PRN Cristal Darron, CRNP      amLODIPine  10 mg Oral Daily Fate Darron, CRNP      aspirin  81 mg Oral Daily Cristal Darron, CRNP      atorvastatin  10 mg Oral HS Cristal Darron, CRNP      bisacodyl  10 mg Rectal Daily PRN Cristal Darron, CRNP      cephalexin  500 mg Oral Q6H [address] Mariela Flannery MD      cholecalciferol  2,000 Units Oral Daily Fate Darron, CRNP      docusate sodium  100 mg Oral BID Fate Darron, CRVIPUL      guaiFENesin  600 mg Oral Q12H [address]      heparin (porcine)  5,000 Units Subcutaneous [facility] Cristal Darron, DHRUV      HYDROmorphone  0 2 mg Intravenous Q4H PRN Roya Meraz, CRNP      levothyroxine  150 mcg Oral Early Morning Roya Meraz, LOUISNP      melatonin  4 5 mg Oral HS Roya Meraz, LOUISNP      metoprolol tartrate  25 mg Oral Q12H [facility] Roya Meraz, DHRUV      metroNIDAZOLE  500 mg Oral [facility] Nely Lee MD      nitroglycerin  0 4 mg Sublingual Q5 Min PRN Roya Meraz, DHRUV      ondansetron  4 mg Intravenous Q6H PRN Roya Meraz, CRNP      oxyCODONE  5 mg Oral Q4H PRN Roya Meraz, CRNP      pantoprazole  40 mg Oral Early Morning Roya Meraz, DHRUV      pramipexole  0 5 mg Oral HS Roya Meraz, LOUISNP      sodium chloride  75 mL/hr Intravenous Continuous Roya Meraz, LOUISNP 75 mL/hr (08/11/20 1946)    vancomycin  10 mg/kg (Adjusted) Intravenous Q12H LOUIS ChavezNP 750 mg (08/12/20 0915)       José An DO  [facility] Internal Medicine  Hospitalist    ** Please Note: This note has been constructed using a voice recognition system   **

## 2020-08-13 PROBLEM — J96.21 ACUTE ON CHRONIC RESPIRATORY FAILURE WITH HYPOXIA (HCC): Status: ACTIVE | Noted: 2020-01-17

## 2020-08-13 PROBLEM — D64.9 ANEMIA: Status: ACTIVE | Noted: 2020-08-13

## 2020-08-13 LAB
ALBUMIN SERPL BCP-MCNC: 1.6 G/DL (ref 3.5–5)
ALP SERPL-CCNC: 45 U/L (ref 46–116)
ALT SERPL W P-5'-P-CCNC: 10 U/L (ref 12–78)
ANION GAP SERPL CALCULATED.3IONS-SCNC: 9 MMOL/L (ref 4–13)
AST SERPL W P-5'-P-CCNC: 23 U/L (ref 5–45)
BILIRUB SERPL-MCNC: 0.24 MG/DL (ref 0.2–1)
BUN SERPL-MCNC: 12 MG/DL (ref 5–25)
CALCIUM SERPL-MCNC: 7.6 MG/DL (ref 8.3–10.1)
CHLORIDE SERPL-SCNC: 109 MMOL/L (ref 100–108)
CO2 SERPL-SCNC: 26 MMOL/L (ref 21–32)
CREAT SERPL-MCNC: 0.92 MG/DL (ref 0.6–1.3)
ERYTHROCYTE [DISTWIDTH] IN BLOOD BY AUTOMATED COUNT: 17.2 % (ref 11.6–15.1)
GFR SERPL CREATININE-BSD FRML MDRD: 58 ML/MIN/1.73SQ M
GLUCOSE SERPL-MCNC: 125 MG/DL (ref 65–140)
HCT VFR BLD AUTO: 27.7 % (ref 34.8–46.1)
HGB BLD-MCNC: 7.9 G/DL (ref 11.5–15.4)
MCH RBC QN AUTO: 25.5 PG (ref 26.8–34.3)
MCHC RBC AUTO-ENTMCNC: 28.5 G/DL (ref 31.4–37.4)
MCV RBC AUTO: 89 FL (ref 82–98)
PHOSPHATE SERPL-MCNC: 2.7 MG/DL (ref 2.3–4.1)
PLATELET # BLD AUTO: 173 THOUSANDS/UL (ref 149–390)
PMV BLD AUTO: 10.7 FL (ref 8.9–12.7)
POTASSIUM SERPL-SCNC: 2.8 MMOL/L (ref 3.5–5.3)
PROT SERPL-MCNC: 5.5 G/DL (ref 6.4–8.2)
RBC # BLD AUTO: 3.1 MILLION/UL (ref 3.81–5.12)
SODIUM SERPL-SCNC: 144 MMOL/L (ref 136–145)
VANCOMYCIN TROUGH SERPL-MCNC: 17.1 UG/ML (ref 10–20)
WBC # BLD AUTO: 4.35 THOUSAND/UL (ref 4.31–10.16)

## 2020-08-13 PROCEDURE — 84100 ASSAY OF PHOSPHORUS: CPT | Performed by: INTERNAL MEDICINE

## 2020-08-13 PROCEDURE — 80053 COMPREHEN METABOLIC PANEL: CPT | Performed by: INTERNAL MEDICINE

## 2020-08-13 PROCEDURE — 99232 SBSQ HOSP IP/OBS MODERATE 35: CPT | Performed by: INTERNAL MEDICINE

## 2020-08-13 PROCEDURE — 80202 ASSAY OF VANCOMYCIN: CPT | Performed by: NURSE PRACTITIONER

## 2020-08-13 PROCEDURE — 85027 COMPLETE CBC AUTOMATED: CPT | Performed by: INTERNAL MEDICINE

## 2020-08-13 PROCEDURE — 99233 SBSQ HOSP IP/OBS HIGH 50: CPT | Performed by: INTERNAL MEDICINE

## 2020-08-13 RX ORDER — POTASSIUM CHLORIDE 20 MEQ/1
40 TABLET, EXTENDED RELEASE ORAL
Status: COMPLETED | OUTPATIENT
Start: 2020-08-13 | End: 2020-08-13

## 2020-08-13 RX ADMIN — CEPHALEXIN 500 MG: 500 CAPSULE ORAL at 17:02

## 2020-08-13 RX ADMIN — POTASSIUM CHLORIDE 40 MEQ: 1500 TABLET, EXTENDED RELEASE ORAL at 09:07

## 2020-08-13 RX ADMIN — ACETAMINOPHEN 650 MG: 325 TABLET ORAL at 06:18

## 2020-08-13 RX ADMIN — METRONIDAZOLE 500 MG: 500 TABLET ORAL at 14:23

## 2020-08-13 RX ADMIN — CEPHALEXIN 500 MG: 500 CAPSULE ORAL at 06:18

## 2020-08-13 RX ADMIN — FUROSEMIDE 40 MG: 40 TABLET ORAL at 09:07

## 2020-08-13 RX ADMIN — DOCUSATE SODIUM 100 MG: 100 CAPSULE, LIQUID FILLED ORAL at 17:02

## 2020-08-13 RX ADMIN — PANTOPRAZOLE SODIUM 40 MG: 40 TABLET, DELAYED RELEASE ORAL at 06:18

## 2020-08-13 RX ADMIN — POTASSIUM CHLORIDE 40 MEQ: 1500 TABLET, EXTENDED RELEASE ORAL at 11:51

## 2020-08-13 RX ADMIN — METOPROLOL TARTRATE 25 MG: 25 TABLET, FILM COATED ORAL at 09:07

## 2020-08-13 RX ADMIN — APIXABAN 5 MG: 5 TABLET, FILM COATED ORAL at 18:32

## 2020-08-13 RX ADMIN — VANCOMYCIN HYDROCHLORIDE 750 MG: 750 INJECTION, SOLUTION INTRAVENOUS at 09:10

## 2020-08-13 RX ADMIN — GUAIFENESIN 600 MG: 600 TABLET, EXTENDED RELEASE ORAL at 21:46

## 2020-08-13 RX ADMIN — VANCOMYCIN HYDROCHLORIDE 750 MG: 750 INJECTION, SOLUTION INTRAVENOUS at 21:23

## 2020-08-13 RX ADMIN — POTASSIUM CHLORIDE 40 MEQ: 1500 TABLET, EXTENDED RELEASE ORAL at 17:02

## 2020-08-13 RX ADMIN — CEPHALEXIN 500 MG: 500 CAPSULE ORAL at 11:51

## 2020-08-13 RX ADMIN — PRAMIPEXOLE DIHYDROCHLORIDE 0.5 MG: 0.5 TABLET ORAL at 21:47

## 2020-08-13 RX ADMIN — ATORVASTATIN CALCIUM 10 MG: 20 TABLET, FILM COATED ORAL at 21:47

## 2020-08-13 RX ADMIN — Medication 2000 UNITS: at 09:07

## 2020-08-13 RX ADMIN — LEVOTHYROXINE SODIUM 150 MCG: 75 TABLET ORAL at 06:17

## 2020-08-13 RX ADMIN — GUAIFENESIN 600 MG: 600 TABLET, EXTENDED RELEASE ORAL at 09:07

## 2020-08-13 RX ADMIN — CEPHALEXIN 500 MG: 500 CAPSULE ORAL at 01:10

## 2020-08-13 RX ADMIN — MELATONIN 4.5 MG: 3 TAB ORAL at 21:48

## 2020-08-13 RX ADMIN — DOCUSATE SODIUM 100 MG: 100 CAPSULE, LIQUID FILLED ORAL at 09:07

## 2020-08-13 RX ADMIN — HEPARIN SODIUM 5000 UNITS: 5000 INJECTION INTRAVENOUS; SUBCUTANEOUS at 06:18

## 2020-08-13 RX ADMIN — HEPARIN SODIUM 5000 UNITS: 5000 INJECTION INTRAVENOUS; SUBCUTANEOUS at 14:22

## 2020-08-13 RX ADMIN — ASPIRIN 81 MG: 81 TABLET, COATED ORAL at 09:07

## 2020-08-13 RX ADMIN — METRONIDAZOLE 500 MG: 500 TABLET ORAL at 06:17

## 2020-08-13 RX ADMIN — METOPROLOL TARTRATE 25 MG: 25 TABLET, FILM COATED ORAL at 21:46

## 2020-08-13 RX ADMIN — CEPHALEXIN 500 MG: 500 CAPSULE ORAL at 23:35

## 2020-08-13 RX ADMIN — METRONIDAZOLE 500 MG: 500 TABLET ORAL at 21:47

## 2020-08-13 NOTE — ASSESSMENT & PLAN NOTE
· Hypokalemia continue to replete  · Hypophosphatemia has been repleted with potassium phosphorus    Results from last 7 days   Lab Units 08/13/20  0520 08/12/20  0634 08/11/20  0443   POTASSIUM mmol/L 2 8* 3 0* 3 0*   MAGNESIUM mg/dL  --  2 0 2 2   PHOSPHORUS mg/dL 2 7 1 5* 2 8

## 2020-08-13 NOTE — ASSESSMENT & PLAN NOTE
· Worsening anemia postprocedure may be dilutional  · Continue to trend    Results from last 7 days   Lab Units 08/13/20  0520 08/12/20  0634 08/11/20  0443 08/10/20  0152   HEMOGLOBIN g/dL 7 9* 8 1* 8 2* 10 3*

## 2020-08-13 NOTE — PROGRESS NOTES
Pastoral Care Progress Note    2020  Patient: Lev Santiago : 1935  Admission Date & Time: 8/10/2020 0145  MRN: [id number] [facility]: [id number]                     Chaplaincy Interventions Utilized:   Empowerment: Encouraged focus on present and Encouraged self-care    Exploration: Explored emotional needs & resources and Explored spiritual needs & resources        Relationship Building: Cultivated a relationship of care and support and Listened empathically      Chaplaincy Outcomes Achieved:  Expressed gratitude and Expressed humor    Spiritual Coping Strategies Utilized:   Spiritual comfort

## 2020-08-13 NOTE — PROGRESS NOTES
Progress Note - Infectious Disease   Yanely Police 81 y o  female MRN: [id number]  Unit/Bed#: E5 -01 Encounter: [id number]     Impression/Plan:  1  Sepsis   Secondary to polymicrobial bacteremia from urinary source   Blood cultures grew Enterococcus faecalis and Bacteroides fragilis in both sets, with Proteus mirabilis showing in one set  Patient with bilateral obstructive renal calculi and evidence of fat stranding concerning for pyelonephritis in both kidneys   Patient did experience hypoxia and hypotension after surgery and temporarily required mechanical ventilation and pressor support   Fortunately she has clinically improved   Her O2 saturation remained stable on 2 L nasal cannula today   BP is stable without pressor support   Patient's fever curve has trended down  Her WBC count has normalized  Repeat blood cultures are negative after 24 hours   -antibiotics as below  -monitor CBC and BMP  -follow up repeat blood cultures  -monitor vitals  -supportive care     2  Polymicrobial bacteremia  Blood cultures grew Enterococcus faecalis and Bacteroides fragilis in both sets, with Proteus mirabilis showing in one set   Suspect this is all secondary to  source   Urine culture grew Proteus mirabilis, gram-negative enteric like collette, and a Streptococcus species   Fortunately patient's repeat blood cultures are negative after 24 hours  Her TTE was negative  Patient is currently receiving IV vancomycin, oral Keflex, and oral Flagyl, and tolerating antibiotic treatment without difficulty    -continue IV vancomycin through 08/25/2020 to complete 14 days of treatment  -continue pharmacy consult for guidance on vancomycin dosage  -continue oral Keflex through 08/19/2020 to complete 10 days of treatment  -continue oral Flagyl through 08/21/2020 to complete 10 days of treatment  -follow-up repeat blood cultures  -wait to place PICC line until repeat blood cultures are negative >48 hours  -PICC will need to be removed after patient's final dose of IV antibiotics on 08/25/2020  -she will require weekly CBCD, BMP, and vancomycin trough while on IV antibiotics  -monitor vitals     3  Bilateral pyelonephritis secondary to obstructive ureterolithiasis  CT of the abdomen/pelvis showed bilateral hydronephrosis and fat stranding adjacent to the kidneys   Patient has multiple renal calculi with obstruction  Concern patient's stones are harboring bacteria as she has previously had urine cultures positive for Proteus and Enterococcus   New urine culture grew Proteus mirabilis, a Gram-negative enteric like collette, and a Streptococcus species   Patient is now status post bilateral ureteral stenting  Montes De Oca catheter remains intact   She has no burning or pain in her bladder/suprapubic region   She continues to follow closely with Urology   -antibiotics as above  -monitor CBC and BMP  -serial exams and care Montes De Oca catheter  -continue follow-up with urology     4  Acute kidney injury   Likely secondary to obstruction from renal calculi   Also consider secondary to sepsis   For patient's past medical records it appears her baseline creatinine is approximately 0 8-1   Creatinine was elevated at 1 84 upon admission  Charis Boots has improved to 0 92 today   -monitor BMP  -dose adjust antibiotics for renal function as needed  -avoid nephrotoxins     5  Acute hypoxic respiratory failure   Patient O2 saturation was 89% on room air upon admission   She did test positive for COVID in April 2020 but repeat testing was negative  Suspect respiratory issues may have been initially related to her sepsis   Respiratory status did worsen after surgery, consider Pickwickian syndrome   She did require temporary mechanical ventilation but has been extubated and her O2 saturation now remains stable on 2 L nasal cannula  Chest CT showed no ground-glass opacities or focal consolidations    -monitor vitals  -monitor respiratory status  -O2 support per primary service     6  Morbid obesity  BMI = 53 04      7  Multiple antibiotic allergies   Patient reports a rash with Augmentin and Bactrim   She reports she has tolerated amoxicillin in the past and would be willing to try penicillin if needed   She is currently receiving vancomycin, Keflex, and Flagyl which she is tolerating without difficulty   Will continue these for now and keep patient's allergies in mind as we move for with antibiotic treatment   -monitor patient for adverse medication reaction    Above plan was discussed in detail with patient at the bedside  Above plan was discussed in detail with [facility] attending, Dr Marilin Ulrich  Antibiotics:  Keflex 2  Flagyl 2  Vancomycin 3  Antibiotics 4    Subjective:  Patient reports she is feeling well today  She has no new symptoms, concerns, or complaints to report  She has an occasional pain in her left lower quadrant but she feels that this is "gas pain" because when she has flatus the pain goes away  She denies pain in her lower back or flank regions  No concerns with her Montes De Oca, no pain or pressure over her bladder  She has no fever, chills, sweats, shakes; no nausea or vomiting; no cough, shortness of breath, or chest pain  No new symptoms  Objective:  Vitals:  Temp:  [97 6 °F (36 4 °C)-98 5 °F (36 9 °C)] 97 7 °F (36 5 °C)  HR:  [68-72] 71  Resp:  [18] 18  BP: ()/(53-63) 105/63  SpO2:  [97 %-98 %] 97 %  Temp (24hrs), Av 9 °F (36 6 °C), Min:97 6 °F (36 4 °C), Max:98 5 °F (36 9 °C)  Current: Temperature: 97 7 °F (36 5 °C)    Physical Exam:   General Appearance:  Alert, interactive, nontoxic, no acute distress  Appears comfortable sitting up in bed  Throat: Oropharynx moist without lesions  Lungs:   Clear to auscultation bilaterally; no wheezes, rhonchi or rales; respirations unlabored; patient is on nasal cannula O2   Heart:  RRR; no murmur, rub or gallop   Abdomen:   Soft, morbidly obese, non-tender, non-distended, positive bowel sounds       Extremities: No clubbing or cyanosis; stable bilateral lower extremity lymphedema without overlying erythema or open wounds   Genitourinary: Montes De Oca catheter in place, urine is dark red   Skin: No new rashes, lesions, or draining wounds noted on exposed skin  Labs, Imaging, & Other studies:   All pertinent labs and imaging studies were personally reviewed  Results from last 7 days   Lab Units 08/13/20  0520 08/12/20  0634 08/11/20  0443   WBC Thousand/uL 4 35 6 95 8 71   HEMOGLOBIN g/dL 7 9* 8 1* 8 2*   PLATELETS Thousands/uL 173 176 155     Results from last 7 days   Lab Units 08/13/20  0520  08/11/20  0443 08/10/20  0152   POTASSIUM mmol/L 2 8*   < > 3 0* 2 8*   CHLORIDE mmol/L 109*   < > 105 101   CO2 mmol/L 26   < > 30 36*   BUN mg/dL 12   < > 22 15   CREATININE mg/dL 0 92   < > 1 51* 1 84*   EGFR ml/min/1 73sq m 58   < > 32 25   CALCIUM mg/dL 7 6*   < > 8 0* 8 3   AST U/L 23  --  18 27   ALT U/L 10*  --  13 17   ALK PHOS U/L 45*  --  48 56    < > = values in this interval not displayed  Results from last 7 days   Lab Units 08/12/20  0633 08/12/20  0624 08/10/20  0557 08/10/20  0158 08/10/20  0152   BLOOD CULTURE  No Growth at 24 hrs   No Growth at 24 hrs   --  Enterococcus faecalis*  Bacteroides fragilis* Enterococcus faecalis*  Proteus mirabilis*  Bacteroides fragilis*   GRAM STAIN RESULT   --   --   --  Gram positive cocci in pairs and chains* Gram positive cocci in pairs and chains*  Gram negative rods*   URINE CULTURE   --   --  >100,000 cfu/ml Proteus mirabilis*  30,000-39,000 cfu/ml Gram Negative Asim Enteric Like*  30,000-39,000 cfu/ml Streptococcus species*  --   --

## 2020-08-13 NOTE — ASSESSMENT & PLAN NOTE
Wt Readings from Last 3 Encounters:   08/11/20 132 kg (290 lb)   04/28/20 132 kg (291 lb 10 7 oz)   03/12/20 (!) 144 kg (318 lb)     · Chronic diastolic CHF  Was on IV fluids but has been discontinued    · Continue furosemide 40 mg daily

## 2020-08-13 NOTE — ASSESSMENT & PLAN NOTE
· Sepsis secondary to polymicrobial bacteremia likely urinary source including bacteroides proteus and enterococcus  · Patient had bilateral ureteral obstruction due to large calculi status post bilateral stenting  · ID following, currently on vancomycin keflex and metronidazole  · Will place PICC for outpatient administration

## 2020-08-13 NOTE — ASSESSMENT & PLAN NOTE
· UNRULY on CKD 3 secondary to UTI/sepsis  · Resolved    Results from last 7 days   Lab Units 08/13/20  0520 08/12/20  0634 08/11/20  0443 08/10/20  0152   BUN mg/dL 12 20 22 15   CREATININE mg/dL 0 92 1 13 1 51* 1 84*

## 2020-08-13 NOTE — PROGRESS NOTES
Progress Note - Ysabel Dwyer 1935, 81 y o  female MRN: [id number]    Unit/Bed#: E5 -01 Encounter: [id number]    Primary Care Provider: Coy Oglesby MD   Date and time admitted to hospital: 8/10/2020  1:45 AM        * Sepsis [facility])  Assessment & Plan  · Sepsis secondary to polymicrobial bacteremia likely urinary source including bacteroides proteus and enterococcus  · Patient had bilateral ureteral obstruction due to large calculi status post bilateral stenting  · ID following, currently on vancomycin keflex and metronidazole  · Will place PICC for outpatient administration    Acute on chronic respiratory failure with hypoxia ([facility] [address] )  Assessment & Plan  · Acute respiratory failure with hypoxia with history of COVID-19 in April  · Continue supplemental oxygen    Anemia  Assessment & Plan  · Worsening anemia postprocedure may be dilutional  · Continue to trend    Results from last 7 days   Lab Units 08/13/20  0520 08/12/20  0634 08/11/20  0443 08/10/20  0152   HEMOGLOBIN g/dL 7 9* 8 1* 8 2* 10 3*       Morbid obesity with BMI of 50 0-59 9, adult (HCC)  Assessment & Plan  · Body mass index is 53 04 kg/m²  Acute hypokalemia  Assessment & Plan  · Hypokalemia continue to replete  · Hypophosphatemia has been repleted with potassium phosphorus    Results from last 7 days   Lab Units 08/13/20  0520 08/12/20  0634 08/11/20  0443   POTASSIUM mmol/L 2 8* 3 0* 3 0*   MAGNESIUM mg/dL  --  2 0 2 2   PHOSPHORUS mg/dL 2 7 1 5* 2 8       Chronic diastolic (congestive) heart failure (HCC)  Assessment & Plan  Wt Readings from Last 3 Encounters:   08/11/20 132 kg (290 lb)   04/28/20 132 kg (291 lb 10 7 oz)   03/12/20 (!) 144 kg (318 lb)     · Chronic diastolic CHF  Was on IV fluids but has been discontinued    · Continue furosemide 40 mg daily     Hydronephrosis with obstructing calculus  Assessment & Plan  · Bilateral hydronephrosis with ureteral obstruction status post bilateral stenting    Acute kidney injury [facility])  Assessment & Plan  · UNRULY on CKD 3 secondary to UTI/sepsis  · Resolved    Results from last 7 days   Lab Units 08/13/20  0520 08/12/20  0634 08/11/20  0443 08/10/20  0152   BUN mg/dL 12 20 22 15   CREATININE mg/dL 0 92 1 13 1 51* 1 84*       Paroxysmal A-fib (HCC)  Assessment & Plan  · Paroxysmal atrial fibrillation on metoprolol  · Will restart Eliquis      VTE Pharmacologic Prophylaxis: Restart Eliquis    Patient Centered Rounds: I have performed bedside rounds with nursing staff today  Discussions with Specialists or Other Care Team Provider:  Case management and Infectious Disease  Education and Discussions with Family / Patient:  Daughter    Time Spent for Care: 25 mins  More than 50% of total time spent on counseling and coordination of care as described above  Current Length of Stay: 3 day(s)  Current Patient Status: Inpatient     Certification Statement: The patient will continue to require additional inpatient hospital stay due to Sepsis [facility])  Discharge Plan / Estimated Discharge Date:  Hopefully to [facility] soon    Code Status: Level 3 - DNAR and DNI  ______________________________________________________________________________    Subjective:   Patient seen and examined  No new complaints  Feeling okay    Objective:   Vitals: Blood pressure 112/65, pulse 80, temperature 98 2 °F (36 8 °C), temperature source Temporal, resp  rate 18, height 5' 2" (1 575 m), weight 132 kg (290 lb), SpO2 97 %, not currently breastfeeding      Physical Exam:   General appearance: alert, appears stated age and cooperative  Head: Normocephalic, without obvious abnormality, atraumatic  Lungs: diminished breath sounds  Heart: regular rate and rhythm  Abdomen: Obese and soft, non-tender, positive bowel sounds   Back: negative  Extremities: extremities atraumatic, no cyanosis or edema  Neurologic: Grossly normal    Additional Data:   Labs:  Results from last 7 days   Lab Units 08/13/20  0520 08/12/20  1968 08/11/20  0443 08/10/20  0152   WBC Thousand/uL 4 35 6 95 8 71 11 83*   HEMOGLOBIN g/dL 7 9* 8 1* 8 2* 10 3*   HEMATOCRIT % 27 7* 28 1* 28 2* 35 1   MCV fL 89 89 89 89   PLATELETS Thousands/uL 173 176 155 231   INR   --   --   --  2 12*     Results from last 7 days   Lab Units 08/13/20  0520 08/12/20  0634 08/11/20  0443 08/10/20  0152   SODIUM mmol/L 144 143 142 144   POTASSIUM mmol/L 2 8* 3 0* 3 0* 2 8*   CHLORIDE mmol/L 109* 106 105 101   CO2 mmol/L 26 29 30 36*   ANION GAP mmol/L 9 8 7 7   BUN mg/dL 12 20 22 15   CREATININE mg/dL 0 92 1 13 1 51* 1 84*   CALCIUM mg/dL 7 6* 8 3 8 0* 8 3   ALBUMIN g/dL 1 6*  --  1 7* 2 3*   TOTAL BILIRUBIN mg/dL 0 24  --  0 47 0 69   ALK PHOS U/L 45*  --  48 56   ALT U/L 10*  --  13 17   AST U/L 23  --  18 27   EGFR ml/min/1 73sq m 58 45 32 25   GLUCOSE RANDOM mg/dL 125 100 107 116     Results from last 7 days   Lab Units 08/13/20  0520 08/12/20  0634 08/11/20  0443 08/10/20  1404   MAGNESIUM mg/dL  --  2 0 2 2 1 7   PHOSPHORUS mg/dL 2 7 1 5* 2 8  --      Results from last 7 days   Lab Units 08/10/20  0254   TROPONIN I ng/mL 0 04          Results from last 7 days   Lab Units 08/10/20  1220 08/10/20  0152   LACTIC ACID mmol/L 1 6 0 8                 * I Have Reviewed All Lab Data Listed Above  Cultures:   Results from last 7 days   Lab Units 08/12/20  0633 08/12/20  0624 08/10/20  0557 08/10/20  0158 08/10/20  0152   BLOOD CULTURE  No Growth at 24 hrs   No Growth at 24 hrs   --  Enterococcus faecalis*  Bacteroides fragilis* Enterococcus faecalis*  Proteus mirabilis*  Bacteroides fragilis*   GRAM STAIN RESULT   --   --   --  Gram positive cocci in pairs and chains* Gram positive cocci in pairs and chains*  Gram negative rods*   URINE CULTURE   --   --  >100,000 cfu/ml Proteus mirabilis*  30,000-39,000 cfu/ml Gram Negative Asim Enteric Like*  30,000-39,000 cfu/ml Streptococcus species*  --   --              Imaging:  Imaging Reports Reviewed Today Include:   Ct Chest Abdomen Pelvis Wo Contrast    Result Date: 8/10/2020  Impression: Bilateral nephrolithiasis  There is an approximately 25 x 14 mm calculus within the distal aspect of the right extrarenal pelvis and extending into the right ureteropelvic junction, new compared with May 21, 2019  There is mild right hydronephrosis and there is stranding of the fat adjacent to the right kidney and tracking caudally adjacent to the proximal right ureter, also new compared with May 21, 2019  There is an approximately 11 x 8 mm calculus within the proximal left ureter, approximately 2 5 cm beyond the left ureteropelvic junction, similar to May 21, 2019  There are also several calculi within the left renal pelvis and extending towards the left ureteropelvic junction, the largest of which measures approximately 16 mm  There is mild to moderate left hydronephrosis, similar to slightly worse compared with May 21, 2019  There is, however, more stranding of the fat adjacent to the left kidney and tracking caudally adjacent to the proximal left ureter  Superimposed infection should be excluded  Correlation with urinalysis and urine culture and sensitivity is recommended  Urology consultation is recommended  Other findings as described above, please see discussion  The images are available for clinical review  I personally discussed this study with Angela Reyes on 8/10/2020 at 5:12 AM  Workstation performed: [id number]     Fl Retrograde Pyelogram    Result Date: 8/10/2020  Impression: Fluoroscopic guidance provided for retrograde study  Please see procedure report for further details   Workstation performed: [id number]     Scheduled Meds:  Current Facility-Administered Medications   Medication Dose Route Frequency Provider Last Rate    acetaminophen  650 mg Oral Q6H PRN DHRUV Cochran      albuterol  2 5 mg Nebulization Q4H PRN DHRUV Cochran      aluminum-magnesium hydroxide-simethicone  30 mL Oral Q6H PRN DHRUV Cochran      aspirin  81 mg Oral Daily Elwyn [place], CRNP      atorvastatin  10 mg Oral HS Elwyn [place], CRNP      bisacodyl  10 mg Rectal Daily PRN Elwyn [place], CRNP      cephalexin  500 mg Oral Q6H [address] Denny Lucero MD      cholecalciferol  2,000 Units Oral Daily Elwyn [place], CRNP      docusate sodium  100 mg Oral BID Elwyn [place], CRNP      furosemide  40 mg Oral Daily Laura Mohr DO      guaiFENesin  600 mg Oral Q12H [id number], DHRUV      heparin (porcine)  5,000 Units Subcutaneous [facility] Elwyn [place], CRNP      HYDROmorphone  0 2 mg Intravenous Q4H PRN Elwyn [place], CRNP      levothyroxine  150 mcg Oral Early Morning Elwyn [place], CRNP      melatonin  4 5 mg Oral HS Elwyn [place], CRNP      metoprolol tartrate  25 mg Oral Q12H [address] Elwyn [place], CRNP      metroNIDAZOLE  500 mg Oral [facility] Denny Lucero MD      nitroglycerin  0 4 mg Sublingual Q5 Min PRN Elwyn [place], CRNP      ondansetron  4 mg Intravenous Q6H PRN Elwyn [place], CRNP      oxyCODONE  5 mg Oral Q4H PRN Elwyn [place], CRNP      pantoprazole  40 mg Oral Early Morning Elwyn [place], CRNP      pramipexole  0 5 mg Oral HS Elwyn [place], CRNP      vancomycin  10 mg/kg (Adjusted) Intravenous [address], CRNP 750 mg (08/13/20 0910)       Laura Mohr DO  [facility] Internal Medicine  Hospitalist    ** Please Note: This note has been constructed using a voice recognition system   **

## 2020-08-14 ENCOUNTER — PREP FOR PROCEDURE (OUTPATIENT)
Dept: UROLOGY | Facility: AMBULATORY SURGERY CENTER | Age: 85
End: 2020-08-14

## 2020-08-14 DIAGNOSIS — N20.1 URETERAL CALCULUS: Primary | ICD-10-CM

## 2020-08-14 LAB
ANION GAP SERPL CALCULATED.3IONS-SCNC: 8 MMOL/L (ref 4–13)
BACTERIA BLD CULT: ABNORMAL
BUN SERPL-MCNC: 8 MG/DL (ref 5–25)
CALCIUM SERPL-MCNC: 8.1 MG/DL (ref 8.3–10.1)
CHLORIDE SERPL-SCNC: 109 MMOL/L (ref 100–108)
CO2 SERPL-SCNC: 30 MMOL/L (ref 21–32)
CREAT SERPL-MCNC: 0.91 MG/DL (ref 0.6–1.3)
ERYTHROCYTE [DISTWIDTH] IN BLOOD BY AUTOMATED COUNT: 17.4 % (ref 11.6–15.1)
GFR SERPL CREATININE-BSD FRML MDRD: 59 ML/MIN/1.73SQ M
GLUCOSE SERPL-MCNC: 114 MG/DL (ref 65–140)
GRAM STN SPEC: ABNORMAL
GRAM STN SPEC: ABNORMAL
HCT VFR BLD AUTO: 31.9 % (ref 34.8–46.1)
HGB BLD-MCNC: 9 G/DL (ref 11.5–15.4)
MCH RBC QN AUTO: 25.5 PG (ref 26.8–34.3)
MCHC RBC AUTO-ENTMCNC: 28.2 G/DL (ref 31.4–37.4)
MCV RBC AUTO: 90 FL (ref 82–98)
PLATELET # BLD AUTO: 227 THOUSANDS/UL (ref 149–390)
PMV BLD AUTO: 10.4 FL (ref 8.9–12.7)
POTASSIUM SERPL-SCNC: 3.8 MMOL/L (ref 3.5–5.3)
RBC # BLD AUTO: 3.53 MILLION/UL (ref 3.81–5.12)
SODIUM SERPL-SCNC: 147 MMOL/L (ref 136–145)
WBC # BLD AUTO: 5.51 THOUSAND/UL (ref 4.31–10.16)

## 2020-08-14 PROCEDURE — 02HV33Z INSERTION OF INFUSION DEVICE INTO SUPERIOR VENA CAVA, PERCUTANEOUS APPROACH: ICD-10-PCS | Performed by: INTERNAL MEDICINE

## 2020-08-14 PROCEDURE — C1751 CATH, INF, PER/CENT/MIDLINE: HCPCS

## 2020-08-14 PROCEDURE — 80048 BASIC METABOLIC PNL TOTAL CA: CPT | Performed by: INTERNAL MEDICINE

## 2020-08-14 PROCEDURE — 99233 SBSQ HOSP IP/OBS HIGH 50: CPT | Performed by: INTERNAL MEDICINE

## 2020-08-14 PROCEDURE — 36569 INSJ PICC 5 YR+ W/O IMAGING: CPT

## 2020-08-14 PROCEDURE — 85027 COMPLETE CBC AUTOMATED: CPT | Performed by: INTERNAL MEDICINE

## 2020-08-14 PROCEDURE — 99232 SBSQ HOSP IP/OBS MODERATE 35: CPT | Performed by: INTERNAL MEDICINE

## 2020-08-14 RX ORDER — CEFAZOLIN SODIUM 2 G/50ML
2000 SOLUTION INTRAVENOUS ONCE
Status: CANCELLED | OUTPATIENT
Start: 2020-08-14 | End: 2020-08-14

## 2020-08-14 RX ADMIN — CEPHALEXIN 500 MG: 500 CAPSULE ORAL at 11:13

## 2020-08-14 RX ADMIN — VANCOMYCIN HYDROCHLORIDE 750 MG: 750 INJECTION, SOLUTION INTRAVENOUS at 21:50

## 2020-08-14 RX ADMIN — METRONIDAZOLE 500 MG: 500 TABLET ORAL at 21:06

## 2020-08-14 RX ADMIN — GUAIFENESIN 600 MG: 600 TABLET, EXTENDED RELEASE ORAL at 21:06

## 2020-08-14 RX ADMIN — APIXABAN 5 MG: 5 TABLET, FILM COATED ORAL at 17:34

## 2020-08-14 RX ADMIN — FUROSEMIDE 40 MG: 40 TABLET ORAL at 09:52

## 2020-08-14 RX ADMIN — ASPIRIN 81 MG: 81 TABLET, COATED ORAL at 09:52

## 2020-08-14 RX ADMIN — CEPHALEXIN 500 MG: 500 CAPSULE ORAL at 05:34

## 2020-08-14 RX ADMIN — GUAIFENESIN 600 MG: 600 TABLET, EXTENDED RELEASE ORAL at 09:52

## 2020-08-14 RX ADMIN — ATORVASTATIN CALCIUM 10 MG: 20 TABLET, FILM COATED ORAL at 21:06

## 2020-08-14 RX ADMIN — VANCOMYCIN HYDROCHLORIDE 750 MG: 750 INJECTION, SOLUTION INTRAVENOUS at 09:55

## 2020-08-14 RX ADMIN — METOPROLOL TARTRATE 25 MG: 25 TABLET, FILM COATED ORAL at 21:49

## 2020-08-14 RX ADMIN — LEVOTHYROXINE SODIUM 150 MCG: 75 TABLET ORAL at 05:33

## 2020-08-14 RX ADMIN — CEPHALEXIN 500 MG: 500 CAPSULE ORAL at 17:34

## 2020-08-14 RX ADMIN — CEPHALEXIN 500 MG: 500 CAPSULE ORAL at 23:22

## 2020-08-14 RX ADMIN — PANTOPRAZOLE SODIUM 40 MG: 40 TABLET, DELAYED RELEASE ORAL at 05:35

## 2020-08-14 RX ADMIN — DOCUSATE SODIUM 100 MG: 100 CAPSULE, LIQUID FILLED ORAL at 17:34

## 2020-08-14 RX ADMIN — PRAMIPEXOLE DIHYDROCHLORIDE 0.5 MG: 0.5 TABLET ORAL at 21:05

## 2020-08-14 RX ADMIN — METRONIDAZOLE 500 MG: 500 TABLET ORAL at 15:35

## 2020-08-14 RX ADMIN — APIXABAN 5 MG: 5 TABLET, FILM COATED ORAL at 09:52

## 2020-08-14 RX ADMIN — DOCUSATE SODIUM 100 MG: 100 CAPSULE, LIQUID FILLED ORAL at 09:52

## 2020-08-14 RX ADMIN — METRONIDAZOLE 500 MG: 500 TABLET ORAL at 05:33

## 2020-08-14 RX ADMIN — MELATONIN 4.5 MG: 3 TAB ORAL at 21:44

## 2020-08-14 RX ADMIN — METOPROLOL TARTRATE 25 MG: 25 TABLET, FILM COATED ORAL at 09:52

## 2020-08-14 RX ADMIN — Medication 2000 UNITS: at 09:52

## 2020-08-14 NOTE — NURSING NOTE
RUE reddened swollen area from previous infiltrate has resolved  Pt now has a PICC line  Both purple and red lines flush without difficulty  Dsg d/c/I  Urine in yeager bag is clearing up from this morning assessment  Urine is primarily yellow with streaks of red  Pt denies pain or discomfort  Call bell in reach  Will continue to monitor

## 2020-08-14 NOTE — PROGRESS NOTES
Progress Note - Infectious Disease   Sacha Greco 81 y o  female MRN: [id number]  Unit/Bed#: E5 -01 Encounter: [id number]    Impression/Plan:  1  Sepsis   Secondary to polymicrobial bacteremia from urinary source   Blood cultures grew Enterococcus faecalis and Bacteroides fragilis in both sets, with Proteus mirabilis showing in one set  Patient with bilateral obstructive renal calculi and evidence of fat stranding concerning for pyelonephritis in both kidneys   Patient did experience hypoxia and hypotension after surgery and temporarily required mechanical ventilation and pressor support   Fortunately she has clinically improved   Her O2 saturation remains stable on 1L nasal cannula today   BP is stable without pressor support   Patient's fever curve has trended down  Her WBC count has normalized   Repeat blood cultures are negative after 48 hours   -antibiotics as below  -monitor CBC and BMP  -follow up repeat blood cultures  -monitor vitals  -supportive care     2  Polymicrobial bacteremia  Blood cultures grew Enterococcus faecalis and Bacteroides fragilis in both sets, with Proteus mirabilis showing in one set   Suspect this is all secondary to  source   Urine culture grew Proteus mirabilis, gram-negative enteric like collette, and a Streptococcus species   Fortunately patient's repeat blood cultures are negative after 48 hours  Her TTE was negative  Patient is currently receiving IV vancomycin, oral Keflex, and oral Flagyl, and tolerating antibiotic treatment without difficulty    She will need PICC line placed to complete a 14 day course of IV antibiotic treatment   -continue IV vancomycin through 08/25/2020 to complete 14 days of treatment  -continue pharmacy consult for guidance on vancomycin dosage  -continue oral Keflex through 08/19/2020 to complete 10 days of treatment  -continue oral Flagyl through 08/21/2020 to complete 10 days of treatment  -follow-up repeat blood cultures  -okay to place PICC line  -PICC will need to be removed after patient's final dose of IV antibiotics on 08/25/2020  -she will require weekly CBCD, BMP, and vancomycin trough while on IV antibiotics  -monitor vitals     3  Bilateral pyelonephritis secondary to obstructive ureterolithiasis  CT of the abdomen/pelvis showed bilateral hydronephrosis and fat stranding adjacent to the kidneys   Patient has multiple renal calculi with obstruction  Concern patient's stones are harboring bacteria as she has previously had urine cultures positive for Proteus and Enterococcus   New urine culture grew Proteus mirabilis, a Gram-negative enteric like collette, and a Streptococcus species   Patient is now status post bilateral ureteral stenting  Montes De Oca catheter remains intact   She has no burning or pain in her bladder/suprapubic region   She continues to follow closely with Urology   -antibiotics as above  -monitor CBC and BMP  -serial exams and care Montes De Oca catheter  -continue follow-up with urology     4  Acute kidney injury   Likely secondary to obstruction from renal calculi   Also consider secondary to sepsis   For patient's past medical records it appears her baseline creatinine is approximately 0 8-1   Creatinine was elevated at 1 84 upon admission  Britton Ceron has improved to 0 91 today   -monitor BMP  -dose adjust antibiotics for renal function as needed  -avoid nephrotoxins     5  Acute hypoxic respiratory failure   Patient O2 saturation was 89% on room air upon admission   She did test positive for COVID in April 2020 but repeat testing was negative  Suspect respiratory issues may have been initially related to her sepsis   Respiratory status did worsen after surgery, consider Pickwickian syndrome   She did require temporary mechanical ventilation but has been extubated and her O2 saturation now remains stable on 1L nasal cannula  Chest CT showed no ground-glass opacities or focal consolidations    -monitor vitals  -monitor respiratory status  -O2 support per primary service     6  Morbid obesity  BMI = 53 04      7  Multiple antibiotic allergies   Patient reports a rash with Augmentin and Bactrim   She reports she has tolerated amoxicillin in the past and would be willing to try penicillin if needed   She is currently receiving vancomycin, Keflex, and Flagyl which she is tolerating without difficulty   Will continue these for now and keep patient's allergies in mind as we move for with antibiotic treatment   -monitor patient for adverse medication reaction    Patient is stable for discharge once PICC line has been placed and facility antibiotics have been coordinated  Above plan was discussed in detail with patient at the bedside  Above plan was discussed in detail with SLWARREN attending, Dr Jordan Silver  Antibiotics:  Keflex 3  Flagyl 3  Vancomycin 4  Antibiotics 5    Subjective:  Patient reports she's feeling fine today  Reports she's developed multiple bruises on her hands and arms, reports she always has a "delayed reaction" to have venous sticks and IVs  She tells me they don't hurt  She has no concerns with having PICC placed  She denies lower back and flank pain  She has no fever, chills, sweats, shakes; no nausea, vomiting, abdominal pain; reports a large BM this morning; no concerns with her yeager catheter; no cough, shortness of breath, or chest pain  No new symptoms  Objective:  Vitals:  Temp:  [97 6 °F (36 4 °C)-98 2 °F (36 8 °C)] 97 6 °F (36 4 °C)  HR:  [71-82] 82  Resp:  [18-20] 20  BP: (105-142)/(54-76) 142/76  SpO2:  [96 %-97 %] 96 %  Temp (24hrs), Av 9 °F (36 6 °C), Min:97 6 °F (36 4 °C), Max:98 2 °F (36 8 °C)  Current: Temperature: 97 6 °F (36 4 °C)    Physical Exam:   General Appearance:  Alert, interactive, nontoxic, no acute distress  Patient appears comfortable sitting up in bed  Throat: Oropharynx moist without lesions      Lungs:   Clear to auscultation bilaterally; no wheezes, rhonchi or rales; respirations unlabored; patient is on nasal cannula O2   Heart:  RRR; no murmur, rub or gallop   Abdomen:   Soft, morbidly obese; non-tender, non-distended, positive bowel sounds  Extremities: No clubbing or cyanosis; stable lower extremity lymphedema, no opens sores/lesions   Genitourinary: Montes De Oca intact   Skin: No new rashes, lesions, or draining wounds noted on exposed skin  Labs, Imaging, & Other studies:   All pertinent labs and imaging studies were personally reviewed  Results from last 7 days   Lab Units 08/14/20  0510 08/13/20  0520 08/12/20  0634   WBC Thousand/uL 5 51 4 35 6 95   HEMOGLOBIN g/dL 9 0* 7 9* 8 1*   PLATELETS Thousands/uL 227 173 176     Results from last 7 days   Lab Units 08/14/20  0510 08/13/20  0520  08/11/20  0443 08/10/20  0152   POTASSIUM mmol/L 3 8 2 8*   < > 3 0* 2 8*   CHLORIDE mmol/L 109* 109*   < > 105 101   CO2 mmol/L 30 26   < > 30 36*   BUN mg/dL 8 12   < > 22 15   CREATININE mg/dL 0 91 0 92   < > 1 51* 1 84*   EGFR ml/min/1 73sq m 59 58   < > 32 25   CALCIUM mg/dL 8 1* 7 6*   < > 8 0* 8 3   AST U/L  --  23  --  18 27   ALT U/L  --  10*  --  13 17   ALK PHOS U/L  --  45*  --  48 56    < > = values in this interval not displayed  Results from last 7 days   Lab Units 08/12/20  0633 08/12/20  0624 08/10/20  0557 08/10/20  0158 08/10/20  0152   BLOOD CULTURE  No Growth at 24 hrs   No Growth at 24 hrs   --  Enterococcus faecalis*  Bacteroides fragilis* Enterococcus faecalis*  Proteus mirabilis*  Bacteroides fragilis*   GRAM STAIN RESULT   --   --   --  Gram positive cocci in pairs and chains* Gram positive cocci in pairs and chains*  Gram negative rods*   URINE CULTURE   --   --  >100,000 cfu/ml Proteus mirabilis*  30,000-39,000 cfu/ml Gram Negative Asim Enteric Like*  30,000-39,000 cfu/ml Streptococcus species*  --   --

## 2020-08-14 NOTE — ASSESSMENT & PLAN NOTE
· Bilateral hydronephrosis with ureteral obstruction status post bilateral stenting  · Is having some hematuria today    Discussed with urology for re-evaluation

## 2020-08-14 NOTE — NURSING NOTE
Pt right AC PIV infiltrated while Vancomycin was infusing  IVF stopped  Infiltration- caught almost immediately  Small area around insertion site is red, warm and swollen  Pt states area feels "tight" and "itchy"  Ice applied  Right arm elevated  MD made aware  Pharmacy called- no special concerns involved with Vancomycin infiltrations  PICC nurse to place PICC soon- this nurse will not replace PIV  Pt yeager patent with minor leaking around site noted this AM  Precare and yeager care given  Urine is red and clear  Malodorous  No visible clots  MD made aware of urine assessment  Urology to assess  Call bell in reach  Will continue to monitor

## 2020-08-14 NOTE — SOCIAL WORK
LOS:  4 days  BUNDLE: No  UNPLANNED READMISSION SCORE 24  30 DAY READMISSION: No    CM s/w the pt at bedside to complete an assessment  LIVES W/:  Pt is a resident at [address] [place] [address]: [address] [place] [id number]  ADLs: Moderate Assistance  MEALS:  Meals prepared by facility  DME: power chair  VNA: no   STR: no  INSURANCE:  MEDICARE  [facility] [address] AFFORDING MEDS: No  PCP: Kiesha Eduardo MD  D&A: Denies  MH: Hx of depression, pt reported that it is untreated and is under control  INCOME SOURCE: Pension, SSI  :  Daughter, Nick Henry ([phone])  POA/LW/AD: Daughter Ihor Oppenheim is POA  REGULAR TRANSPORTATION: Pt does not drive  TRANSPORTATION AT D/C: [facility]    The pt is a permanent resident from [facility] and is currently on a 15-day bed hold  The pt will return to [facility] at d/c with IV-ABX  A referral was placed and [facility] is ready to accept when the pt is medically cleared  CM will continue to follow

## 2020-08-14 NOTE — ASSESSMENT & PLAN NOTE
· Hypokalemia continue to replete  · Hypophosphatemia has been repleted with potassium phosphorus    Results from last 7 days   Lab Units 08/14/20  0510 08/13/20  0520 08/12/20  0634 08/11/20  0443   POTASSIUM mmol/L 3 8 2 8* 3 0* 3 0*   MAGNESIUM mg/dL  --   --  2 0 2 2   PHOSPHORUS mg/dL  --  2 7 1 5* 2 8

## 2020-08-14 NOTE — PROGRESS NOTES
Vancomycin IV Pharmacy-to-Dose Consultation    Lacey Veronica is a 81 y o  female who is currently receiving Vancomycin IV with management by the Pharmacy Consult service  Assessment/Plan:  The patient was reviewed  Renal function is stable and no signs or symptoms of nephrotoxicity and/or infusion reactions were documented in the chart  The most recent vancomycin level is 17 1, therapeutic (goal trough 15-20)  Based on todays assessment, continue current vancomycin (day # 3) dosing of 750mg IV every 12 hours, with a plan for trough to repeat trough in 3 days on 8/16/20 at 0845    We will continue to follow the patients culture results and clinical progress daily      Claudean Charnley, Pharmacist

## 2020-08-14 NOTE — ASSESSMENT & PLAN NOTE
· Sepsis secondary to polymicrobial bacteremia likely urinary source including bacteroides proteus and enterococcus  · Patient had bilateral ureteral obstruction due to large calculi status post bilateral stenting  · ID following, currently on vancomycin keflex and metronidazole  · PICC ordered for outpatient administration

## 2020-08-14 NOTE — PROCEDURES
Insert PICC line    Date/Time: 8/14/2020 3:14 PM  Performed by: Joanne Whyte RN  Authorized by: Marilu Cutler DO     Patient location:  Bedside  Consent:     Consent obtained:  Written    Consent given by:  Patient    Procedural risks discussed: consent obtained by physician  [place] protocol:     Procedure explained and questions answered to patient or proxy's satisfaction: yes      Relevant documents present and verified: yes      Test results available and properly labeled: yes      Radiology Images displayed and confirmed  If images not available, report reviewed: yes      Required blood products, implants, devices, and special equipment available: yes      Site/side marked: yes      Immediately prior to procedure, a time out was called: yes      Patient identity confirmed:  Verbally with patient, arm band, provided demographic data and hospital-assigned identification number  Pre-procedure details:     Hand hygiene: Hand hygiene performed prior to insertion      Sterile barrier technique: All elements of maximal sterile technique followed      Skin preparation:  ChloraPrep    Skin preparation agent: Skin preparation agent completely dried prior to procedure    Indications:     PICC line indications: long term antibiotics    Anesthesia (see MAR for exact dosages):      Anesthesia method:  Local infiltration (3ml)    Local anesthetic:  Lidocaine 1% w/o epi  Procedure details:     Location:  Cephalic    Vessel type: vein      Laterality:  Right    Approach: percutaneous technique used      Patient position:  Flat    Procedural supplies:  Double lumen    Catheter size:  5 Fr    Landmarks identified: yes      Ultrasound guidance: yes      Sterile ultrasound techniques: Sterile gel and sterile probe covers were used      Number of attempts:  1    Successful placement: yes      Vessel of catheter tip end:  Sherlock 3CG confirmed (sherlock 3cg procedure record confirmed placement sent to medical records) Total catheter length (cm):  44    Catheter out on skin (cm):  0    Max flow rate:  999ml/hr    Arm circumference:  51cm  Post-procedure details:     Post-procedure:  Dressing applied and securement device placed    Assessment:  Blood return through all ports and free fluid flow (sherlock 3cg confirmed placement)    Post-procedure complications: none      Patient tolerance of procedure:   Tolerated well, no immediate complications  Comments:      BC negative 48 hours, ID confirmed okay for picc placement  Lot#[id number] 2021-08-31

## 2020-08-14 NOTE — PROGRESS NOTES
Progress Note - Ana Davey 1935, 81 y o  female MRN: [id number]    Unit/Bed#: E5 -01 Encounter: [id number]    Primary Care Provider: Fartun Villeda MD   Date and time admitted to hospital: 8/10/2020  1:45 AM        * Sepsis [facility])  Assessment & Plan  · Sepsis secondary to polymicrobial bacteremia likely urinary source including bacteroides proteus and enterococcus  · Patient had bilateral ureteral obstruction due to large calculi status post bilateral stenting  · ID following, currently on vancomycin keflex and metronidazole  · PICC ordered for outpatient administration    Acute on chronic respiratory failure with hypoxia ([facility] [address] )  Assessment & Plan  · Acute respiratory failure with hypoxia with history of COVID-19 in April  · Continue supplemental oxygen    Anemia  Assessment & Plan  · Worsening anemia postprocedure may be dilutional  · Continue to trend    Results from last 7 days   Lab Units 08/14/20  0510 08/13/20  0520 08/12/20  0634 08/11/20  0443 08/10/20  0152   HEMOGLOBIN g/dL 9 0* 7 9* 8 1* 8 2* 10 3*       Morbid obesity with BMI of 50 0-59 9, adult (HCC)  Assessment & Plan  · Body mass index is 53 04 kg/m²  Acute hypokalemia  Assessment & Plan  · Hypokalemia continue to replete  · Hypophosphatemia has been repleted with potassium phosphorus    Results from last 7 days   Lab Units 08/14/20  0510 08/13/20  0520 08/12/20  0634 08/11/20  0443   POTASSIUM mmol/L 3 8 2 8* 3 0* 3 0*   MAGNESIUM mg/dL  --   --  2 0 2 2   PHOSPHORUS mg/dL  --  2 7 1 5* 2 8       Chronic diastolic (congestive) heart failure (HCC)  Assessment & Plan  Wt Readings from Last 3 Encounters:   08/11/20 132 kg (290 lb)   04/28/20 132 kg (291 lb 10 7 oz)   03/12/20 (!) 144 kg (318 lb)     · Chronic diastolic CHF  Was on IV fluids but has been discontinued    · Continue furosemide 40 mg daily     Hydronephrosis with obstructing calculus  Assessment & Plan  · Bilateral hydronephrosis with ureteral obstruction status post bilateral stenting  · Is having some hematuria today  Discussed with urology for re-evaluation    Acute kidney injury [facility])  Assessment & Plan  · UNRULY on CKD 3 secondary to UTI/sepsis  · Resolved    Results from last 7 days   Lab Units 08/14/20  0510 08/13/20  0520 08/12/20  0634 08/11/20  0443 08/10/20  0152   BUN mg/dL 8 12 20 22 15   CREATININE mg/dL 0 91 0 92 1 13 1 51* 1 84*       Paroxysmal A-fib (HCC)  Assessment & Plan  · Paroxysmal atrial fibrillation on metoprolol  · Restarted eliquis      VTE Pharmacologic Prophylaxis: Apixaban (Eliquis)    Patient Centered Rounds: I have performed bedside rounds with nursing staff today  Discussions with Specialists or Other Care Team Provider:  Case management  Education and Discussions with Family / Patient:     Time Spent for Care: 25 mins  More than 50% of total time spent on counseling and coordination of care as described above  Current Length of Stay: 4 day(s)  Current Patient Status: Inpatient     Certification Statement: The patient will continue to require additional inpatient hospital stay due to Sepsis [facility])  Discharge Plan / Estimated Discharge Date:  Hopefully next 24-48 hours    Code Status: Level 3 - DNAR and DNI  ______________________________________________________________________________    Subjective:   Patient seen and examined  Nursing noted hematuria in catheter  Patient does have pain from IV site    Objective:   Vitals: Blood pressure 142/76, pulse 82, temperature 97 6 °F (36 4 °C), temperature source Temporal, resp  rate 20, height 5' 2" (1 575 m), weight 132 kg (290 lb), SpO2 96 %, not currently breastfeeding      Physical Exam:   General appearance: alert, appears stated age and cooperative  Head: Normocephalic, without obvious abnormality, atraumatic  Lungs: clear to auscultation bilaterally  Heart: regular rate and rhythm  Abdomen: soft, non-tender, positive bowel sounds   Back: negative, range of motion normal  Extremities: edema lower extremities bilaterally; infiltration of IV right upper extremity  Neurologic: Grossly normal    Additional Data:   Labs:  Results from last 7 days   Lab Units 08/14/20  0510 08/13/20  0520 08/12/20  0634 08/11/20  0443 08/10/20  0152   WBC Thousand/uL 5 51 4 35 6 95 8 71 11 83*   HEMOGLOBIN g/dL 9 0* 7 9* 8 1* 8 2* 10 3*   HEMATOCRIT % 31 9* 27 7* 28 1* 28 2* 35 1   MCV fL 90 89 89 89 89   PLATELETS Thousands/uL 227 173 176 155 231   INR   --   --   --   --  2 12*     Results from last 7 days   Lab Units 08/14/20  0510 08/13/20  0520 08/12/20  0634 08/11/20  0443 08/10/20  0152   SODIUM mmol/L 147* 144 143 142 144   POTASSIUM mmol/L 3 8 2 8* 3 0* 3 0* 2 8*   CHLORIDE mmol/L 109* 109* 106 105 101   CO2 mmol/L 30 26 29 30 36*   ANION GAP mmol/L 8 9 8 7 7   BUN mg/dL 8 12 20 22 15   CREATININE mg/dL 0 91 0 92 1 13 1 51* 1 84*   CALCIUM mg/dL 8 1* 7 6* 8 3 8 0* 8 3   ALBUMIN g/dL  --  1 6*  --  1 7* 2 3*   TOTAL BILIRUBIN mg/dL  --  0 24  --  0 47 0 69   ALK PHOS U/L  --  45*  --  48 56   ALT U/L  --  10*  --  13 17   AST U/L  --  23  --  18 27   EGFR ml/min/1 73sq m 59 58 45 32 25   GLUCOSE RANDOM mg/dL 114 125 100 107 116     Results from last 7 days   Lab Units 08/13/20  0520 08/12/20  0634 08/11/20  0443 08/10/20  1404   MAGNESIUM mg/dL  --  2 0 2 2 1 7   PHOSPHORUS mg/dL 2 7 1 5* 2 8  --      Results from last 7 days   Lab Units 08/10/20  0254   TROPONIN I ng/mL 0 04          Results from last 7 days   Lab Units 08/10/20  1220 08/10/20  0152   LACTIC ACID mmol/L 1 6 0 8                 * I Have Reviewed All Lab Data Listed Above  Cultures:   Results from last 7 days   Lab Units 08/12/20  0633 08/12/20  0624 08/10/20  0557 08/10/20  0158 08/10/20  0152   BLOOD CULTURE  No Growth at 48 hrs   No Growth at 48 hrs   --  Enterococcus faecalis*  Bacteroides fragilis* Enterococcus faecalis*  Proteus mirabilis*  Bacteroides fragilis*   GRAM STAIN RESULT   --   --   --  Gram positive cocci in pairs and chains* Gram positive cocci in pairs and chains*  Gram negative rods*   URINE CULTURE   --   --  >100,000 cfu/ml Proteus mirabilis*  30,000-39,000 cfu/ml Gram Negative Asim Enteric Like*  30,000-39,000 cfu/ml Streptococcus species*  --   --              Imaging:  Imaging Reports Reviewed Today Include:   Ct Chest Abdomen Pelvis Wo Contrast    Result Date: 8/10/2020  Impression: Bilateral nephrolithiasis  There is an approximately 25 x 14 mm calculus within the distal aspect of the right extrarenal pelvis and extending into the right ureteropelvic junction, new compared with May 21, 2019  There is mild right hydronephrosis and there is stranding of the fat adjacent to the right kidney and tracking caudally adjacent to the proximal right ureter, also new compared with May 21, 2019  There is an approximately 11 x 8 mm calculus within the proximal left ureter, approximately 2 5 cm beyond the left ureteropelvic junction, similar to May 21, 2019  There are also several calculi within the left renal pelvis and extending towards the left ureteropelvic junction, the largest of which measures approximately 16 mm  There is mild to moderate left hydronephrosis, similar to slightly worse compared with May 21, 2019  There is, however, more stranding of the fat adjacent to the left kidney and tracking caudally adjacent to the proximal left ureter  Superimposed infection should be excluded  Correlation with urinalysis and urine culture and sensitivity is recommended  Urology consultation is recommended  Other findings as described above, please see discussion  The images are available for clinical review  I personally discussed this study with Yefri Noel on 8/10/2020 at 5:12 AM  Workstation performed: [id number]     Fl Retrograde Pyelogram    Result Date: 8/10/2020  Impression: Fluoroscopic guidance provided for retrograde study  Please see procedure report for further details   Workstation performed: [id number] Scheduled Meds:  Current Facility-Administered Medications   Medication Dose Route Frequency Provider Last Rate    acetaminophen  650 mg Oral Q6H PRN Cruzito Spearing, CRNP      albuterol  2 5 mg Nebulization Q4H PRN Cruzito Spearing, CRNP      aluminum-magnesium hydroxide-simethicone  30 mL Oral Q6H PRN Cruzito Spearing, CRNP      apixaban  5 mg Oral BID Taras Trotter, DO      aspirin  81 mg Oral Daily Cruzito Spearing, CRNP      atorvastatin  10 mg Oral HS Cruzito Spearing, CRNP      bisacodyl  10 mg Rectal Daily PRN Cruzito Spearing, CRNP      cephalexin  500 mg Oral Q6H [facility] Rosalio Kocher, MD      cholecalciferol  2,000 Units Oral Daily Cruzito Spearing, CRNP      docusate sodium  100 mg Oral BID Cruzito Spearing, CRNP      furosemide  40 mg Oral Daily Taras Trotter, DO      guaiFENesin  600 mg Oral Q12H [facility] Cruzito Spearing, CRNP      HYDROmorphone  0 2 mg Intravenous Q4H PRN Cruzito Spearing, CRNP      levothyroxine  150 mcg Oral Early Morning Cruzito Spearing, CRNP      melatonin  4 5 mg Oral HS Cruzito Spearing, CRNP      metoprolol tartrate  25 mg Oral Q12H [facility] Cruzito Spearing, CRNP      metroNIDAZOLE  500 mg Oral [facility] Rosalio Kocher, MD      nitroglycerin  0 4 mg Sublingual Q5 Min PRN Cruzito Spearing, CRNP      ondansetron  4 mg Intravenous Q6H PRN Cruzito Spearing, CRNP      oxyCODONE  5 mg Oral Q4H PRN Cruzito Spearing, CRNP      pantoprazole  40 mg Oral Early Morning Cruzito Spearing, CRNP      pramipexole  0 5 mg Oral HS Cruzito Spearing, CRNP      vancomycin  10 mg/kg (Adjusted) Intravenous [address], CRNP 750 mg (08/14/20 0955)       Taras Trotter DO  [facility] Internal Medicine  Hospitalist    ** Please Note: This note has been constructed using a voice recognition system   **

## 2020-08-14 NOTE — DISCHARGE INSTRUCTIONS
REMOVE PICC AFTER PATIENT'S FINAL DOSE OF IV ANTIBIOTICS ON 08/25/2020    Weekly CBCD, BMP, and vancomycin trough while on IV antibiotics   ---------------------------------------------------------------------------------------------

## 2020-08-14 NOTE — PROGRESS NOTES
Vancomycin IV Pharmacy-to-Dose Consultation    Sarita Patrick is a 81 y o  female who is currently receiving Vancomycin IV with management by the Pharmacy Consult service  Assessment/Plan:  The patient was reviewed  Renal function is stable and no signs or symptoms of nephrotoxicity and/or infusion reactions were documented in the chart  Based on todays assessment, continue current vancomycin (day # 4) dosing of 750mg IV every 12 hrs, with a plan for trough to be drawn at [address] on 8/16/20  We will continue to follow the patients culture results and clinical progress daily      Rod Mo, Pharmacist

## 2020-08-14 NOTE — ASSESSMENT & PLAN NOTE
· UNRULY on CKD 3 secondary to UTI/sepsis  · Resolved    Results from last 7 days   Lab Units 08/14/20  0510 08/13/20  0520 08/12/20  0634 08/11/20  0443 08/10/20  0152   BUN mg/dL 8 12 20 22 15   CREATININE mg/dL 0 91 0 92 1 13 1 51* 1 84*

## 2020-08-14 NOTE — CASE MANAGEMENT
This case management assistant (Marlee Wilson) met with the patient and reviewed her medicare rights with her  She stated that she couldn't sign because of her IV in her arm  She verbally consented to understanding her medicare rights and it was documented appropriately

## 2020-08-14 NOTE — ASSESSMENT & PLAN NOTE
· Worsening anemia postprocedure may be dilutional  · Continue to trend    Results from last 7 days   Lab Units 08/14/20  0510 08/13/20  0520 08/12/20  0634 08/11/20  0443 08/10/20  0152   HEMOGLOBIN g/dL 9 0* 7 9* 8 1* 8 2* 10 3*

## 2020-08-15 PROCEDURE — 99233 SBSQ HOSP IP/OBS HIGH 50: CPT | Performed by: INTERNAL MEDICINE

## 2020-08-15 PROCEDURE — 99232 SBSQ HOSP IP/OBS MODERATE 35: CPT | Performed by: INTERNAL MEDICINE

## 2020-08-15 RX ADMIN — METRONIDAZOLE 500 MG: 500 TABLET ORAL at 13:22

## 2020-08-15 RX ADMIN — MELATONIN 4.5 MG: 3 TAB ORAL at 21:31

## 2020-08-15 RX ADMIN — APIXABAN 5 MG: 5 TABLET, FILM COATED ORAL at 17:22

## 2020-08-15 RX ADMIN — METRONIDAZOLE 500 MG: 500 TABLET ORAL at 21:31

## 2020-08-15 RX ADMIN — APIXABAN 5 MG: 5 TABLET, FILM COATED ORAL at 09:07

## 2020-08-15 RX ADMIN — CEPHALEXIN 500 MG: 500 CAPSULE ORAL at 17:22

## 2020-08-15 RX ADMIN — DOCUSATE SODIUM 100 MG: 100 CAPSULE, LIQUID FILLED ORAL at 17:22

## 2020-08-15 RX ADMIN — FUROSEMIDE 40 MG: 40 TABLET ORAL at 09:07

## 2020-08-15 RX ADMIN — CEPHALEXIN 500 MG: 500 CAPSULE ORAL at 12:38

## 2020-08-15 RX ADMIN — METRONIDAZOLE 500 MG: 500 TABLET ORAL at 06:02

## 2020-08-15 RX ADMIN — VANCOMYCIN HYDROCHLORIDE 750 MG: 750 INJECTION, SOLUTION INTRAVENOUS at 09:07

## 2020-08-15 RX ADMIN — ASPIRIN 81 MG: 81 TABLET, COATED ORAL at 09:07

## 2020-08-15 RX ADMIN — GUAIFENESIN 600 MG: 600 TABLET, EXTENDED RELEASE ORAL at 21:32

## 2020-08-15 RX ADMIN — ATORVASTATIN CALCIUM 10 MG: 20 TABLET, FILM COATED ORAL at 21:32

## 2020-08-15 RX ADMIN — DOCUSATE SODIUM 100 MG: 100 CAPSULE, LIQUID FILLED ORAL at 09:07

## 2020-08-15 RX ADMIN — PRAMIPEXOLE DIHYDROCHLORIDE 0.5 MG: 0.5 TABLET ORAL at 21:31

## 2020-08-15 RX ADMIN — CEPHALEXIN 500 MG: 500 CAPSULE ORAL at 06:02

## 2020-08-15 RX ADMIN — VANCOMYCIN HYDROCHLORIDE 750 MG: 750 INJECTION, SOLUTION INTRAVENOUS at 21:37

## 2020-08-15 RX ADMIN — METOPROLOL TARTRATE 25 MG: 25 TABLET, FILM COATED ORAL at 21:37

## 2020-08-15 RX ADMIN — METOPROLOL TARTRATE 25 MG: 25 TABLET, FILM COATED ORAL at 09:07

## 2020-08-15 RX ADMIN — Medication 2000 UNITS: at 09:07

## 2020-08-15 RX ADMIN — LEVOTHYROXINE SODIUM 150 MCG: 75 TABLET ORAL at 06:03

## 2020-08-15 RX ADMIN — PANTOPRAZOLE SODIUM 40 MG: 40 TABLET, DELAYED RELEASE ORAL at 06:03

## 2020-08-15 RX ADMIN — GUAIFENESIN 600 MG: 600 TABLET, EXTENDED RELEASE ORAL at 09:07

## 2020-08-15 NOTE — PLAN OF CARE
Problem: Potential for Falls  Goal: Patient will remain free of falls  Description: INTERVENTIONS:  - Assess patient frequently for physical needs  -  Identify cognitive and physical deficits and behaviors that affect risk of falls  -  [place] fall precautions as indicated by assessment   - Educate patient/family on patient safety including physical limitations  - Instruct patient to call for assistance with activity based on assessment  - Modify environment to reduce risk of injury  - Consider OT/PT consult to assist with strengthening/mobility  Outcome: Progressing     Problem: Prexisting or High Potential for Compromised Skin Integrity  Goal: Skin integrity is maintained or improved  Description: INTERVENTIONS:  - Identify patients at risk for skin breakdown  - Assess and monitor skin integrity  - Assess and monitor nutrition and hydration status  - Monitor labs   - Assess for incontinence   - Turn and reposition patient  - Assist with mobility/ambulation  - Relieve pressure over bony prominences  - Avoid friction and shearing  - Provide appropriate hygiene as needed including keeping skin clean and dry  - Evaluate need for skin moisturizer/barrier cream  - Collaborate with interdisciplinary team   - Patient/family teaching  - Consider wound care consult   Outcome: Progressing     Problem: Nutrition/Hydration-ADULT  Goal: Nutrient/Hydration intake appropriate for improving, restoring or maintaining nutritional needs  Description: Monitor and assess patient's nutrition/hydration status for malnutrition  Collaborate with interdisciplinary team and initiate plan and interventions as ordered  Monitor patient's weight and dietary intake as ordered or per policy  Utilize nutrition screening tool and intervene as necessary  Determine patient's food preferences and provide high-protein, high-caloric foods as appropriate       INTERVENTIONS:  - Monitor oral intake, urinary output, labs, and treatment plans  - Assess nutrition and hydration status and recommend course of action  - Evaluate amount of meals eaten  - Assist patient with eating if necessary   - Allow adequate time for meals  - Recommend/ encourage appropriate diets, oral nutritional supplements, and vitamin/mineral supplements  - Order, calculate, and assess calorie counts as needed  - Recommend, monitor, and adjust tube feedings and TPN/PPN based on assessed needs  - Assess need for intravenous fluids  - Provide specific nutrition/hydration education as appropriate  - Include patient/family/caregiver in decisions related to nutrition  Outcome: Progressing     Problem: PAIN - ADULT  Goal: Verbalizes/displays adequate comfort level or baseline comfort level  Description: Interventions:  - Encourage patient to monitor pain and request assistance  - Assess pain using appropriate pain scale  - Administer analgesics based on type and severity of pain and evaluate response  - Implement non-pharmacological measures as appropriate and evaluate response  - Consider cultural and social influences on pain and pain management  - Notify physician/advanced practitioner if interventions unsuccessful or patient reports new pain  Outcome: Progressing     Problem: INFECTION - ADULT  Goal: Absence or prevention of progression during hospitalization  Description: INTERVENTIONS:  - Assess and monitor for signs and symptoms of infection  - Monitor lab/diagnostic results  - Monitor all insertion sites, i e  indwelling lines, tubes, and drains  - Monitor endotracheal if appropriate and nasal secretions for changes in amount and color  - [place] appropriate cooling/warming therapies per order  - Administer medications as ordered  - Instruct and encourage patient and family to use good hand hygiene technique  - Identify and instruct in appropriate isolation precautions for identified infection/condition  Outcome: Progressing  Goal: Absence of fever/infection during neutropenic period  Description: INTERVENTIONS:  - Monitor WBC    Outcome: Progressing     Problem: SAFETY ADULT  Goal: Patient will remain free of falls  Description: INTERVENTIONS:  - Assess patient frequently for physical needs  -  Identify cognitive and physical deficits and behaviors that affect risk of falls    -  [place] fall precautions as indicated by assessment   - Educate patient/family on patient safety including physical limitations  - Instruct patient to call for assistance with activity based on assessment  - Modify environment to reduce risk of injury  - Consider OT/PT consult to assist with strengthening/mobility  Outcome: Progressing  Goal: Maintain or return to baseline ADL function  Description: INTERVENTIONS:  -  Assess patient's ability to carry out ADLs; assess patient's baseline for ADL function and identify physical deficits which impact ability to perform ADLs (bathing, care of mouth/teeth, toileting, grooming, dressing, etc )  - Assess/evaluate cause of self-care deficits   - Assess range of motion  - Assess patient's mobility; develop plan if impaired  - Assess patient's need for assistive devices and provide as appropriate  - Encourage maximum independence but intervene and supervise when necessary  - Involve family in performance of ADLs  - Assess for home care needs following discharge   - Consider OT consult to assist with ADL evaluation and planning for discharge  - Provide patient education as appropriate  Outcome: Progressing  Goal: Maintain or return mobility status to optimal level  Description: INTERVENTIONS:  - Assess patient's baseline mobility status (ambulation, transfers, stairs, etc )    - Identify cognitive and physical deficits and behaviors that affect mobility  - Identify mobility aids required to assist with transfers and/or ambulation (gait belt, sit-to-stand, lift, walker, cane, etc )  - [place] fall precautions as indicated by assessment  - Record patient progress and toleration of activity level on Mobility SBAR; progress patient to next Phase/Stage  - Instruct patient to call for assistance with activity based on assessment  - Consider rehabilitation consult to assist with strengthening/weightbearing, etc   Outcome: Progressing     Problem: DISCHARGE PLANNING  Goal: Discharge to home or other facility with appropriate resources  Description: INTERVENTIONS:  - Identify barriers to discharge w/patient and caregiver  - Arrange for needed discharge resources and transportation as appropriate  - Identify discharge learning needs (meds, wound care, etc )  - Arrange for interpretive services to assist at discharge as needed  - Refer to Case Management Department for coordinating discharge planning if the patient needs post-hospital services based on physician/advanced practitioner order or complex needs related to functional status, cognitive ability, or social support system  Outcome: Progressing     Problem: Knowledge Deficit  Goal: Patient/family/caregiver demonstrates understanding of disease process, treatment plan, medications, and discharge instructions  Description: Complete learning assessment and assess knowledge base    Interventions:  - Provide teaching at level of understanding  - Provide teaching via preferred learning methods  Outcome: Progressing

## 2020-08-15 NOTE — ASSESSMENT & PLAN NOTE
· Worsening anemia postprocedure may be dilutional  · Hemoglobin stable    Results from last 7 days   Lab Units 08/14/20  0510 08/13/20  0520 08/12/20  0634 08/11/20  0443 08/10/20  0152   HEMOGLOBIN g/dL 9 0* 7 9* 8 1* 8 2* 10 3*

## 2020-08-15 NOTE — PROGRESS NOTES
Progress Note - Flora Lechuga 1935, 81 y o  female MRN: [id number]    Unit/Bed#: E5 -01 Encounter: [id number]    Primary Care Provider: Zenon Stearns MD   Date and time admitted to hospital: 8/10/2020  1:45 AM        * Sepsis [facility])  Assessment & Plan  · Sepsis secondary to polymicrobial bacteremia likely urinary source including bacteroides proteus and enterococcus  · Patient had bilateral ureteral obstruction due to large calculi status post bilateral stenting  · PICC placed for long-term vancomycin administration  · Per ID, continue on the current antibiotics for 14 days:  vancomycin (8/25) keflex (8/19) and metronidazole (8/21)    Acute on chronic respiratory failure with hypoxia (HCC)  Assessment & Plan  · Acute respiratory failure with hypoxia with history of COVID-19 in April  · Continue supplemental oxygen    Anemia  Assessment & Plan  · Worsening anemia postprocedure may be dilutional  · Hemoglobin stable    Results from last 7 days   Lab Units 08/14/20  0510 08/13/20  0520 08/12/20  0634 08/11/20  0443 08/10/20  0152   HEMOGLOBIN g/dL 9 0* 7 9* 8 1* 8 2* 10 3*       Morbid obesity with BMI of 50 0-59 9, adult ([facility])  Assessment & Plan  · Body mass index is 53 04 kg/m²      Acute hypokalemia  Assessment & Plan  · Hypokalemia repleted  · Hypophosphatemia has been repleted with potassium phosphorus    Results from last 7 days   Lab Units 08/14/20  0510 08/13/20  0520 08/12/20  0634 08/11/20  0443   POTASSIUM mmol/L 3 8 2 8* 3 0* 3 0*   MAGNESIUM mg/dL  --   --  2 0 2 2   PHOSPHORUS mg/dL  --  2 7 1 5* 2 8       Hydronephrosis with obstructing calculus  Assessment & Plan  · Bilateral hydronephrosis with ureteral obstruction status post bilateral stenting  · Was having some hematuria today in the setting of stenting however clearing up    Acute kidney injury [facility])  Assessment & Plan  · UNRULY on CKD 3 secondary to UTI/sepsis  · Resolved    Results from last 7 days   Lab Units 08/14/20  0510 08/13/20  0520 08/12/20  0634 08/11/20  0443 08/10/20  0152   BUN mg/dL 8 12 20 22 15   CREATININE mg/dL 0 91 0 92 1 13 1 51* 1 84*       Paroxysmal A-fib (HCC)  Assessment & Plan  · Paroxysmal atrial fibrillation on metoprolol  · Restarted eliquis      VTE Pharmacologic Prophylaxis: Apixaban (Eliquis)    Patient Centered Rounds: I have performed bedside rounds with nursing staff today  Discussions with Specialists or Other Care Team Provider:   Education and Discussions with Family / Patient:  Left voicemail with patient's daughter Liyah Culver    Time Spent for Care: 25 mins  More than 50% of total time spent on counseling and coordination of care as described above  Current Length of Stay: 5 day(s)  Current Patient Status: Inpatient     Certification Statement: The patient will continue to require additional inpatient hospital stay due to Sepsis [facility])  Discharge Plan / Estimated Discharge Date:  Hopefully back to nursing facility Monday    Code Status: Level 3 - DNAR and DNI  ______________________________________________________________________________    Subjective:   Patient seen and examined  Hematuria improving denies any shortness of breath    Objective:   Vitals: Blood pressure 109/53, pulse 80, temperature (!) 97 2 °F (36 2 °C), temperature source Temporal, resp  rate 18, height 5' 2" (1 575 m), weight 132 kg (290 lb), SpO2 94 %, not currently breastfeeding  Physical Exam  Vitals signs reviewed  HENT:      Head: Atraumatic  Eyes:      General: No scleral icterus  Extraocular Movements: Extraocular movements intact  Conjunctiva/sclera: Conjunctivae normal    Cardiovascular:      Rate and Rhythm: Regular rhythm  Pulmonary:      Breath sounds: Normal breath sounds  No wheezing or rhonchi  Abdominal:      General: There is no distension  Palpations: Abdomen is soft  Tenderness: There is no abdominal tenderness  Comments: Morbidly obese   Skin:     General: Skin is warm  Coloration: Skin is not jaundiced  Neurological:      General: No focal deficit present  Mental Status: She is alert and oriented to person, place, and time  Psychiatric:         Mood and Affect: Mood normal          Additional Data:   Labs:  Results from last 7 days   Lab Units 08/14/20  0510 08/13/20  0520 08/12/20  0634 08/11/20  0443 08/10/20  0152   WBC Thousand/uL 5 51 4 35 6 95 8 71 11 83*   HEMOGLOBIN g/dL 9 0* 7 9* 8 1* 8 2* 10 3*   HEMATOCRIT % 31 9* 27 7* 28 1* 28 2* 35 1   MCV fL 90 89 89 89 89   PLATELETS Thousands/uL 227 173 176 155 231   INR   --   --   --   --  2 12*     Results from last 7 days   Lab Units 08/14/20  0510 08/13/20  0520 08/12/20  0634 08/11/20  0443 08/10/20  0152   SODIUM mmol/L 147* 144 143 142 144   POTASSIUM mmol/L 3 8 2 8* 3 0* 3 0* 2 8*   CHLORIDE mmol/L 109* 109* 106 105 101   CO2 mmol/L 30 26 29 30 36*   ANION GAP mmol/L 8 9 8 7 7   BUN mg/dL 8 12 20 22 15   CREATININE mg/dL 0 91 0 92 1 13 1 51* 1 84*   CALCIUM mg/dL 8 1* 7 6* 8 3 8 0* 8 3   ALBUMIN g/dL  --  1 6*  --  1 7* 2 3*   TOTAL BILIRUBIN mg/dL  --  0 24  --  0 47 0 69   ALK PHOS U/L  --  45*  --  48 56   ALT U/L  --  10*  --  13 17   AST U/L  --  23  --  18 27   EGFR ml/min/1 73sq m 59 58 45 32 25   GLUCOSE RANDOM mg/dL 114 125 100 107 116     Results from last 7 days   Lab Units 08/13/20  0520 08/12/20  0634 08/11/20  0443 08/10/20  1404   MAGNESIUM mg/dL  --  2 0 2 2 1 7   PHOSPHORUS mg/dL 2 7 1 5* 2 8  --      Results from last 7 days   Lab Units 08/10/20  0254   TROPONIN I ng/mL 0 04          Results from last 7 days   Lab Units 08/10/20  1220 08/10/20  0152   LACTIC ACID mmol/L 1 6 0 8                 * I Have Reviewed All Lab Data Listed Above  Cultures:   Results from last 7 days   Lab Units 08/12/20  0633 08/12/20  0624 08/10/20  0557 08/10/20  0158 08/10/20  0152   BLOOD CULTURE  No Growth at 72 hrs   No Growth at 72 hrs   --  Enterococcus faecalis*  Bacteroides fragilis* Enterococcus faecalis* Proteus mirabilis*  Bacteroides fragilis*   GRAM STAIN RESULT   --   --   --  Gram positive cocci in pairs and chains* Gram positive cocci in pairs and chains*  Gram negative rods*   URINE CULTURE   --   --  >100,000 cfu/ml Proteus mirabilis*  30,000-39,000 cfu/ml Gram Negative Asim Enteric Like*  30,000-39,000 cfu/ml Streptococcus species*  --   --              Imaging:  Imaging Reports Reviewed Today Include:   Ct Chest Abdomen Pelvis Wo Contrast    Result Date: 8/10/2020  Impression: Bilateral nephrolithiasis  There is an approximately 25 x 14 mm calculus within the distal aspect of the right extrarenal pelvis and extending into the right ureteropelvic junction, new compared with May 21, 2019  There is mild right hydronephrosis and there is stranding of the fat adjacent to the right kidney and tracking caudally adjacent to the proximal right ureter, also new compared with May 21, 2019  There is an approximately 11 x 8 mm calculus within the proximal left ureter, approximately 2 5 cm beyond the left ureteropelvic junction, similar to May 21, 2019  There are also several calculi within the left renal pelvis and extending towards the left ureteropelvic junction, the largest of which measures approximately 16 mm  There is mild to moderate left hydronephrosis, similar to slightly worse compared with May 21, 2019  There is, however, more stranding of the fat adjacent to the left kidney and tracking caudally adjacent to the proximal left ureter  Superimposed infection should be excluded  Correlation with urinalysis and urine culture and sensitivity is recommended  Urology consultation is recommended  Other findings as described above, please see discussion  The images are available for clinical review    I personally discussed this study with Sánchez Smith on 8/10/2020 at 5:12 AM  Workstation performed: [id number]     Fl Retrograde Pyelogram    Result Date: 8/10/2020  Impression: Fluoroscopic guidance provided for retrograde study  Please see procedure report for further details  Workstation performed: [id number]     Scheduled Meds:  Current Facility-Administered Medications   Medication Dose Route Frequency Provider Last Rate    acetaminophen  650 mg Oral Q6H PRN Arlon Constable, CRNP      albuterol  2 5 mg Nebulization Q4H PRN Arlon Constable, CRNP      aluminum-magnesium hydroxide-simethicone  30 mL Oral Q6H PRN Arlon Constable, CRNP      apixaban  5 mg Oral BID [facility], DO      aspirin  81 mg Oral Daily Arlon Constable, CRNP      atorvastatin  10 mg Oral HS Arlon Constable, CRNP      bisacodyl  10 mg Rectal Daily PRN Arlon Constable, CRNP      cephalexin  500 mg Oral Q6H [address] You Nicolas MD      cholecalciferol  2,000 Units Oral Daily Arlon Constable, CRNP      docusate sodium  100 mg Oral BID Arlon Constable, CRNP      furosemide  40 mg Oral Daily [facility],       guaiFENesin  600 mg Oral Q12H [address] Arlon Constable, CRNP      HYDROmorphone  0 2 mg Intravenous Q4H PRN Arlon Constable, CRNP      levothyroxine  150 mcg Oral Early Morning Arlon Constable, CRNP      melatonin  4 5 mg Oral HS Arlon Constable, CRNP      metoprolol tartrate  25 mg Oral Q12H [address] Arlon Constable, CRNP      metroNIDAZOLE  500 mg Oral [facility] You Nicolas MD      nitroglycerin  0 4 mg Sublingual Q5 Min PRN Arlon Constable, CRNP      ondansetron  4 mg Intravenous Q6H PRN Arlon Constable, CRNP      oxyCODONE  5 mg Oral Q4H PRN Arlon Constable, CRNP      pantoprazole  40 mg Oral Early Morning Arlon Constable, CRNP      pramipexole  0 5 mg Oral HS Arlon Constable, CRNP      vancomycin  10 mg/kg (Adjusted) Intravenous [address], CRNP 750 mg (08/15/20 8906)       Melanie Trent, DO  [facility]  Hospitalist    ** Please Note: This note has been constructed using a voice recognition system   **

## 2020-08-15 NOTE — ASSESSMENT & PLAN NOTE
· Hypokalemia repleted  · Hypophosphatemia has been repleted with potassium phosphorus    Results from last 7 days   Lab Units 08/14/20  0510 08/13/20  0520 08/12/20  0634 08/11/20  0443   POTASSIUM mmol/L 3 8 2 8* 3 0* 3 0*   MAGNESIUM mg/dL  --   --  2 0 2 2   PHOSPHORUS mg/dL  --  2 7 1 5* 2 8

## 2020-08-15 NOTE — ASSESSMENT & PLAN NOTE
· Bilateral hydronephrosis with ureteral obstruction status post bilateral stenting  · Was having some hematuria today in the setting of stenting however clearing up

## 2020-08-15 NOTE — PROGRESS NOTES
Vancomycin IV Pharmacy-to-Dose Consultation    Ita Kumar is a 81 y o  female who is currently receiving Vancomycin IV with management by the Pharmacy Consult service  Assessment/Plan:  The patient was reviewed  Based on todays assessment, continue current vancomycin (day # 5) dosing of 750 mg IV q12h, with a plan for trough to be drawn at [address] on 8/16/20       We will continue to follow the patients culture results and clinical progress daily      Melanie Sandoval, Pharmacist

## 2020-08-15 NOTE — PROGRESS NOTES
Progress Note - Infectious Disease   Lacey Albino 81 y o  female MRN: [id number]  Unit/Bed#: E5 -01 Encounter: [id number]      Impression/Recommendations:  1  Sepsis   Source is most likely obstructive pyelonephritis with secondary bacteremia  Patient is clinically improved  Fever has resolved  WBC has normalized  She has remained hemodynamically stable   -antibiotics as below  -monitor CBC and BMP  -follow up repeat blood cultures  -monitor vitals  -supportive care     2  Polymicrobial bacteremia  Blood cultures grew Enterococcus faecalis and Bacteroides fragilis in both sets, with Proteus mirabilis showing in one set   Suspect this is all secondary to  source   Urine culture grew Proteus mirabilis, gram-negative enteric like collette, and a Streptococcus species   Fortunately patient's repeat blood cultures are negative   Her TTE was negative   Patient is currently receiving IV vancomycin, oral Keflex, and oral Flagyl, and tolerating antibiotic treatment without difficulty  She will need PICC line placed to complete a 14 day course of IV antibiotic treatment   -continue IV vancomycin through 08/25/2020 to complete 14 days of treatment  -continue pharmacy consult for guidance on vancomycin dosage  -continue oral Keflex through 08/19/2020 to complete 10 days of treatment  -continue oral Flagyl through 08/21/2020 to complete 10 days of treatment  -follow-up repeat blood cultures  -okay to place PICC line  -PICC will need to be removed after patient's final dose of IV antibiotics on 08/25/2020  -she will require weekly CBCD, BMP, and vancomycin trough while on IV antibiotics  -monitor vitals     3  Bilateral pyelonephritis secondary to obstructive ureterolithiasis   CT of the abdomen/pelvis showed bilateral hydronephrosis and fat stranding adjacent to the kidneys   Patient has multiple renal calculi with obstruction  Concern patient's stones are harboring bacteria as she has previously had urine cultures positive for Proteus and Enterococcus   New urine culture grew Proteus mirabilis, a Gram-negative enteric like collette, and a Streptococcus species   Patient is now status post bilateral ureteral stenting  Montes De Oca catheter remains intact   She has no further abdominal/flank pain or urinary symptoms   -antibiotics as above  -monitor CBC and BMP  -serial exams and care Montes De Oca catheter  -continue follow-up with urology     4  Acute kidney injury   Likely secondary to obstruction from renal calculi   Also consider secondary to sepsis   For patient's past medical records it appears her baseline creatinine is approximately 0 8-1   Creatinine was elevated at 1 84 upon admission but has now normalized  -monitor BMP  -dose adjust antibiotics for renal function as needed  -avoid nephrotoxins     5  Acute hypoxic respiratory failure   Patient O2 saturation was 89% on room air upon admission   She did test positive for COVID in April 2020 but repeat testing was negative  Suspect respiratory issues may have been initially related to her sepsis   Respiratory status did worsen after surgery, consider Pickwickian syndrome   She did require temporary mechanical ventilation but has been extubated and her O2 saturation now remains stable on 1L nasal cannula  Chest CT showed no ground-glass opacities or focal consolidations  -monitor vitals  -monitor respiratory status  -O2 support per primary service     6  Morbid obesity   BMI = 53 04      7  Multiple antibiotic allergies   Patient reports a rash with Augmentin and Bactrim   She reports she has tolerated amoxicillin in the past and would be willing to try penicillin if needed   She is currently receiving vancomycin, Keflex, and Flagyl which she is tolerating without difficulty   Will continue these for now and keep patient's allergies in mind as we move for with antibiotic treatment   -monitor patient for adverse medication reaction     Discussed with patient in detail regarding the above plan   Okay for discharge from ID viewpoint      Antibiotics:  Keflex 4  Flagyl 4  Vancomycin 5  Antibiotics 6     Subjective:  Patient  feels well  No specific complaints  Temperature stays down  No chills  She is tolerating antibiotics well  No nausea, vomiting or diarrhea  No dizziness or hearing loss  Objective:  Vitals:  Temp:  [96 8 °F (36 °C)-97 2 °F (36 2 °C)] 97 2 °F (36 2 °C)  HR:  [80-82] 80  Resp:  [18] 18  BP: (109-133)/(53-77) 109/53  SpO2:  [94 %-96 %] 94 %  Temp (24hrs), Av °F (36 1 °C), Min:96 8 °F (36 °C), Max:97 2 °F (36 2 °C)  Current: Temperature: (!) 97 2 °F (36 2 °C)    Physical Exam:     General: Awake, alert, cooperative, no distress  Neck:  Supple  No mass  No lymphadenopathy  Lungs: Expansion symmetric, no rales, no wheezing, respirations unlabored  Heart:  Regular rate and rhythm, S1 and S2 normal, no murmur  Abdomen: Soft, nondistended, non-tender, bowel sounds active all four quadrants, no masses, no organomegaly  Extremities: Stable leg edema  No erythema/warmth  No ulcer  Nontender to palpation  Skin:  No rash  Neuro: Moves all extremities  Invasive Devices     Peripherally Inserted Central Catheter Line            PICC Line 38/32/25 Right Cephalic 1 day          Drain            Ureteral Drain/Stent Left ureter 16 Fr  5 days    Ureteral Drain/Stent Right ureter 16 Fr  5 days    Urethral Catheter Non-latex 16 Fr  5 days                Labs studies:   I have personally reviewed pertinent labs    Results from last 7 days   Lab Units 20  0510 20  0520 20  0634 20  0443 08/10/20  0152   POTASSIUM mmol/L 3 8 2 8* 3 0* 3 0* 2 8*   CHLORIDE mmol/L 109* 109* 106 105 101   CO2 mmol/L 30 26 29 30 36*   BUN mg/dL 8 12 20 22 15   CREATININE mg/dL 0 91 0 92 1 13 1 51* 1 84*   EGFR ml/min/1 73sq m 59 58 45 32 25   CALCIUM mg/dL 8 1* 7 6* 8 3 8 0* 8 3   AST U/L  --  23  --  18 27   ALT U/L  --  10*  --  13 17   ALK PHOS U/L  --  45*  --  48 56 Results from last 7 days   Lab Units 08/14/20  0510 08/13/20  0520 08/12/20  0634   WBC Thousand/uL 5 51 4 35 6 95   HEMOGLOBIN g/dL 9 0* 7 9* 8 1*   PLATELETS Thousands/uL 227 173 176     Results from last 7 days   Lab Units 08/12/20  0633 08/12/20  0624 08/10/20  0557 08/10/20  0158 08/10/20  0152   BLOOD CULTURE  No Growth at 72 hrs  No Growth at 72 hrs   --  Enterococcus faecalis*  Bacteroides fragilis* Enterococcus faecalis*  Proteus mirabilis*  Bacteroides fragilis*   GRAM STAIN RESULT   --   --   --  Gram positive cocci in pairs and chains* Gram positive cocci in pairs and chains*  Gram negative rods*   URINE CULTURE   --   --  >100,000 cfu/ml Proteus mirabilis*  30,000-39,000 cfu/ml Gram Negative Asim Enteric Like*  30,000-39,000 cfu/ml Streptococcus species*  --   --        Imaging Studies:   I have personally reviewed pertinent imaging study reports and images in PACS  EKG, Pathology, and Other Studies:   I have personally reviewed pertinent reports

## 2020-08-15 NOTE — ASSESSMENT & PLAN NOTE
· Sepsis secondary to polymicrobial bacteremia likely urinary source including bacteroides proteus and enterococcus  · Patient had bilateral ureteral obstruction due to large calculi status post bilateral stenting  · PICC placed for long-term vancomycin administration  · Per ID, continue on the current antibiotics for 14 days:  vancomycin (8/25) keflex (8/19) and metronidazole (8/21)

## 2020-08-16 LAB
ALBUMIN SERPL BCP-MCNC: 1.9 G/DL (ref 3.5–5)
ALP SERPL-CCNC: 42 U/L (ref 46–116)
ALT SERPL W P-5'-P-CCNC: 32 U/L (ref 12–78)
ANION GAP SERPL CALCULATED.3IONS-SCNC: 5 MMOL/L (ref 4–13)
AST SERPL W P-5'-P-CCNC: 54 U/L (ref 5–45)
BILIRUB SERPL-MCNC: 0.25 MG/DL (ref 0.2–1)
BUN SERPL-MCNC: 9 MG/DL (ref 5–25)
CALCIUM SERPL-MCNC: 8.1 MG/DL (ref 8.3–10.1)
CHLORIDE SERPL-SCNC: 107 MMOL/L (ref 100–108)
CO2 SERPL-SCNC: 33 MMOL/L (ref 21–32)
CREAT SERPL-MCNC: 0.81 MG/DL (ref 0.6–1.3)
ERYTHROCYTE [DISTWIDTH] IN BLOOD BY AUTOMATED COUNT: 18.3 % (ref 11.6–15.1)
GFR SERPL CREATININE-BSD FRML MDRD: 67 ML/MIN/1.73SQ M
GLUCOSE SERPL-MCNC: 135 MG/DL (ref 65–140)
HCT VFR BLD AUTO: 30.9 % (ref 34.8–46.1)
HGB BLD-MCNC: 8.9 G/DL (ref 11.5–15.4)
MCH RBC QN AUTO: 25.4 PG (ref 26.8–34.3)
MCHC RBC AUTO-ENTMCNC: 28.8 G/DL (ref 31.4–37.4)
MCV RBC AUTO: 88 FL (ref 82–98)
PLATELET # BLD AUTO: 267 THOUSANDS/UL (ref 149–390)
PMV BLD AUTO: 10 FL (ref 8.9–12.7)
POTASSIUM SERPL-SCNC: 3.3 MMOL/L (ref 3.5–5.3)
PROT SERPL-MCNC: 5.9 G/DL (ref 6.4–8.2)
RBC # BLD AUTO: 3.5 MILLION/UL (ref 3.81–5.12)
SODIUM SERPL-SCNC: 145 MMOL/L (ref 136–145)
VANCOMYCIN TROUGH SERPL-MCNC: 13.8 UG/ML (ref 10–20)
WBC # BLD AUTO: 6.62 THOUSAND/UL (ref 4.31–10.16)

## 2020-08-16 PROCEDURE — 80053 COMPREHEN METABOLIC PANEL: CPT | Performed by: INTERNAL MEDICINE

## 2020-08-16 PROCEDURE — 99232 SBSQ HOSP IP/OBS MODERATE 35: CPT | Performed by: INTERNAL MEDICINE

## 2020-08-16 PROCEDURE — 99233 SBSQ HOSP IP/OBS HIGH 50: CPT | Performed by: INTERNAL MEDICINE

## 2020-08-16 PROCEDURE — 85027 COMPLETE CBC AUTOMATED: CPT | Performed by: INTERNAL MEDICINE

## 2020-08-16 PROCEDURE — 80202 ASSAY OF VANCOMYCIN: CPT | Performed by: INTERNAL MEDICINE

## 2020-08-16 RX ORDER — POTASSIUM CHLORIDE 20 MEQ/1
20 TABLET, EXTENDED RELEASE ORAL DAILY
Status: DISCONTINUED | OUTPATIENT
Start: 2020-08-17 | End: 2020-08-17 | Stop reason: HOSPADM

## 2020-08-16 RX ORDER — VANCOMYCIN HYDROCHLORIDE 1 G/200ML
1000 INJECTION, SOLUTION INTRAVENOUS EVERY 12 HOURS
Status: DISCONTINUED | OUTPATIENT
Start: 2020-08-16 | End: 2020-08-17 | Stop reason: HOSPADM

## 2020-08-16 RX ORDER — POTASSIUM CHLORIDE 20 MEQ/1
40 TABLET, EXTENDED RELEASE ORAL ONCE
Status: COMPLETED | OUTPATIENT
Start: 2020-08-16 | End: 2020-08-16

## 2020-08-16 RX ADMIN — GUAIFENESIN 600 MG: 600 TABLET, EXTENDED RELEASE ORAL at 08:22

## 2020-08-16 RX ADMIN — Medication 2000 UNITS: at 08:22

## 2020-08-16 RX ADMIN — METOPROLOL TARTRATE 25 MG: 25 TABLET, FILM COATED ORAL at 22:24

## 2020-08-16 RX ADMIN — ASPIRIN 81 MG: 81 TABLET, COATED ORAL at 08:22

## 2020-08-16 RX ADMIN — DOCUSATE SODIUM 100 MG: 100 CAPSULE, LIQUID FILLED ORAL at 08:22

## 2020-08-16 RX ADMIN — LEVOTHYROXINE SODIUM 150 MCG: 75 TABLET ORAL at 06:02

## 2020-08-16 RX ADMIN — PANTOPRAZOLE SODIUM 40 MG: 40 TABLET, DELAYED RELEASE ORAL at 06:02

## 2020-08-16 RX ADMIN — METRONIDAZOLE 500 MG: 500 TABLET ORAL at 06:02

## 2020-08-16 RX ADMIN — PRAMIPEXOLE DIHYDROCHLORIDE 0.5 MG: 0.5 TABLET ORAL at 22:01

## 2020-08-16 RX ADMIN — METRONIDAZOLE 500 MG: 500 TABLET ORAL at 22:01

## 2020-08-16 RX ADMIN — MELATONIN 4.5 MG: 3 TAB ORAL at 22:02

## 2020-08-16 RX ADMIN — VANCOMYCIN HYDROCHLORIDE 750 MG: 750 INJECTION, SOLUTION INTRAVENOUS at 09:58

## 2020-08-16 RX ADMIN — POTASSIUM CHLORIDE 40 MEQ: 1500 TABLET, EXTENDED RELEASE ORAL at 18:48

## 2020-08-16 RX ADMIN — FUROSEMIDE 40 MG: 40 TABLET ORAL at 08:22

## 2020-08-16 RX ADMIN — CEPHALEXIN 500 MG: 500 CAPSULE ORAL at 23:54

## 2020-08-16 RX ADMIN — CEPHALEXIN 500 MG: 500 CAPSULE ORAL at 12:26

## 2020-08-16 RX ADMIN — ATORVASTATIN CALCIUM 10 MG: 20 TABLET, FILM COATED ORAL at 22:01

## 2020-08-16 RX ADMIN — APIXABAN 5 MG: 5 TABLET, FILM COATED ORAL at 08:23

## 2020-08-16 RX ADMIN — APIXABAN 5 MG: 5 TABLET, FILM COATED ORAL at 17:20

## 2020-08-16 RX ADMIN — CEPHALEXIN 500 MG: 500 CAPSULE ORAL at 17:20

## 2020-08-16 RX ADMIN — METOPROLOL TARTRATE 25 MG: 25 TABLET, FILM COATED ORAL at 08:22

## 2020-08-16 RX ADMIN — METRONIDAZOLE 500 MG: 500 TABLET ORAL at 14:48

## 2020-08-16 RX ADMIN — GUAIFENESIN 600 MG: 600 TABLET, EXTENDED RELEASE ORAL at 22:02

## 2020-08-16 RX ADMIN — VANCOMYCIN HYDROCHLORIDE 1000 MG: 1 INJECTION, SOLUTION INTRAVENOUS at 22:00

## 2020-08-16 RX ADMIN — DOCUSATE SODIUM 100 MG: 100 CAPSULE, LIQUID FILLED ORAL at 17:20

## 2020-08-16 RX ADMIN — CEPHALEXIN 500 MG: 500 CAPSULE ORAL at 00:48

## 2020-08-16 RX ADMIN — CEPHALEXIN 500 MG: 500 CAPSULE ORAL at 06:02

## 2020-08-16 NOTE — PROGRESS NOTES
Progress Note - Infectious Disease   Katelynn Colon 81 y o  female MRN: [id number]  Unit/Bed#: E5 -01 Encounter: [id number]      Impression/Recommendations:  1  Sepsis   Source is most likely obstructive pyelonephritis with secondary bacteremia  Patient is clinically improved  Fever has resolved  WBC has normalized  She has remained hemodynamically stable   -antibiotics as below  -monitor CBC and BMP  -follow up repeat blood cultures  -monitor vitals  -supportive care     2  Polymicrobial bacteremia  Blood cultures grew Enterococcus faecalis and Bacteroides fragilis in both sets, with Proteus mirabilis showing in one set   Suspect this is all secondary to  source   Urine culture grew Proteus mirabilis, gram-negative enteric like collette, and a Streptococcus species   Fortunately patient's repeat blood cultures are negative   Her TTE was negative   Patient is currently receiving IV vancomycin, oral Keflex, and oral Flagyl, and tolerating antibiotic treatment without difficulty    -continue IV vancomycin through 08/25/2020 to complete 14 days of treatment  -continue pharmacy consult for guidance on vancomycin dosage  -continue oral Keflex through 08/19/2020 to complete 10 days of treatment  -continue oral Flagyl through 08/21/2020 to complete 10 days of treatment  -follow-up repeat blood cultures  -PICC will need to be removed after patient's final dose of IV antibiotics on 08/25/2020  -she will require weekly CBCD, BMP, and vancomycin trough while on IV antibiotics  -monitor vitals     3  Bilateral pyelonephritis secondary to obstructive ureterolithiasis   CT of the abdomen/pelvis showed bilateral hydronephrosis and fat stranding adjacent to the kidneys   Patient has multiple renal calculi with obstruction  Concern patient's stones are harboring bacteria as she has previously had urine cultures positive for Proteus and Enterococcus   New urine culture grew Proteus mirabilis, a Gram-negative enteric like collette, and a Streptococcus species   Patient is now status post bilateral ureteral stenting  Montes De Oca catheter remains intact   She has no further abdominal/flank pain or urinary symptoms   -antibiotics as above  -monitor CBC and BMP  -serial exams and care Montes De Oca catheter  -continue follow-up with urology     4  Acute kidney injury   Likely secondary to obstruction from renal calculi   Also consider secondary to sepsis   For patient's past medical records it appears her baseline creatinine is approximately 0 8-1   Creatinine was elevated at 1 84 upon admission but has now normalized  -monitor BMP  -dose adjust antibiotics for renal function as needed  -avoid nephrotoxins     5  Acute hypoxic respiratory failure   Patient O2 saturation was 89% on room air upon admission   She did test positive for COVID in April 2020 but repeat testing was negative  Suspect respiratory issues may have been initially related to her sepsis   Respiratory status did worsen after surgery, consider Pickwickian syndrome   She did require temporary mechanical ventilation but has been extubated and her O2 saturation now remains stable on 1L nasal cannula  Chest CT showed no ground-glass opacities or focal consolidations  -monitor vitals  -monitor respiratory status  -O2 support per primary service     6  Morbid obesity  BMI = 53 04      7  Multiple antibiotic allergies   Patient reports a rash with Augmentin and Bactrim   She reports she has tolerated amoxicillin in the past and would be willing to try penicillin if needed   She is currently receiving vancomycin, Keflex, and Flagyl which she is tolerating without difficulty   Will continue these for now and keep patient's allergies in mind as we move for with antibiotic treatment   -monitor patient for adverse medication reaction     Discussed with patient in detail regarding the above plan    Okay for discharge from ID viewpoint      Antibiotics:  Atul Cabrera  Vancomycin 6  Antibiotics 7     Subjective:  Patient  feels well  No specific complaints  Temperature stays down  No chills  She is tolerating antibiotics well  No nausea, vomiting or diarrhea  No dizziness or hearing loss  Objective:  Vitals:  Temp:  [97 1 °F (36 2 °C)-98 °F (36 7 °C)] 98 °F (36 7 °C)  HR:  [76-83] 76  Resp:  [18-19] 18  BP: (119-136)/(56-76) 119/76  SpO2:  [95 %-96 %] 96 %  Temp (24hrs), Av 4 °F (36 3 °C), Min:97 1 °F (36 2 °C), Max:98 °F (36 7 °C)  Current: Temperature: 98 °F (36 7 °C)    Physical Exam:     General: Awake, alert, cooperative, no distress  Neck:  Supple  No mass  No lymphadenopathy  Lungs: Expansion symmetric, no rales, no wheezing, respirations unlabored  Heart:  Regular rate and rhythm, S1 and S2 normal, no murmur  Abdomen: Soft, nondistended, non-tender, bowel sounds active all four quadrants, no masses, no organomegaly  Extremities: Stable leg edema  No erythema/warmth  No ulcer  Nontender to palpation  Skin:  No rash  Neuro: Moves all extremities  Invasive Devices     Peripherally Inserted Central Catheter Line            PICC Line  Right Cephalic 2 days          Drain            Ureteral Drain/Stent Left ureter 16 Fr  6 days    Ureteral Drain/Stent Right ureter 16 Fr  6 days    Urethral Catheter Non-latex 16 Fr  6 days                Labs studies:   I have personally reviewed pertinent labs    Results from last 7 days   Lab Units 20  0618 20  0510 20  0520  20  0443   POTASSIUM mmol/L 3 3* 3 8 2 8*   < > 3 0*   CHLORIDE mmol/L 107 109* 109*   < > 105   CO2 mmol/L 33* 30 26   < > 30   BUN mg/dL 9 8 12   < > 22   CREATININE mg/dL 0 81 0 91 0 92   < > 1 51*   EGFR ml/min/1 73sq m 67 59 58   < > 32   CALCIUM mg/dL 8 1* 8 1* 7 6*   < > 8 0*   AST U/L 54*  --  23  --  18   ALT U/L 32  --  10*  --  13   ALK PHOS U/L 42*  --  45*  --  48    < > = values in this interval not displayed  Results from last 7 days   Lab Units 08/16/20  0618 08/14/20  0510 08/13/20  0520   WBC Thousand/uL 6 62 5 51 4 35   HEMOGLOBIN g/dL 8 9* 9 0* 7 9*   PLATELETS Thousands/uL 267 227 173     Results from last 7 days   Lab Units 08/12/20  0633 08/12/20  0624 08/10/20  0557 08/10/20  0158 08/10/20  0152   BLOOD CULTURE  No Growth After 4 Days  No Growth After 4 Days  --  Enterococcus faecalis*  Bacteroides fragilis* Enterococcus faecalis*  Proteus mirabilis*  Bacteroides fragilis*   GRAM STAIN RESULT   --   --   --  Gram positive cocci in pairs and chains* Gram positive cocci in pairs and chains*  Gram negative rods*   URINE CULTURE   --   --  >100,000 cfu/ml Proteus mirabilis*  30,000-39,000 cfu/ml Gram Negative Asim Enteric Like*  30,000-39,000 cfu/ml Streptococcus species*  --   --        Imaging Studies:   I have personally reviewed pertinent imaging study reports and images in PACS  EKG, Pathology, and Other Studies:   I have personally reviewed pertinent reports

## 2020-08-16 NOTE — PROGRESS NOTES
Vancomycin trough was 13 8 ug/mL this morning  We will increase the dose of vancomycin to 1000 mg IV q12h since we are targeting a trough of 15-20 ug/mL

## 2020-08-16 NOTE — ASSESSMENT & PLAN NOTE
· UNRULY on CKD 3 secondary to UTI/sepsis  · Resolved    Results from last 7 days   Lab Units 08/16/20  0618 08/14/20  0510 08/13/20  0520 08/12/20  0634 08/11/20  0443 08/10/20  0152   BUN mg/dL 9 8 12 20 22 15   CREATININE mg/dL 0 81 0 91 0 92 1 13 1 51* 1 84*

## 2020-08-16 NOTE — PROGRESS NOTES
Progress Note - Adriana Day 1935, 81 y o  female MRN: [id number]    Unit/Bed#: E5 -01 Encounter: [id number]    Primary Care Provider: Marian Melton MD   Date and time admitted to hospital: 8/10/2020  1:45 AM        * Sepsis [facility])  Assessment & Plan  · Sepsis secondary to polymicrobial bacteremia likely urinary source including bacteroides proteus and enterococcus  · Patient had bilateral ureteral obstruction due to large calculi status post bilateral stenting  · PICC placed for long-term vancomycin administration  · Per ID, continue on the current antibiotics for 14 days:  vancomycin (8/25) keflex (8/19) and metronidazole (8/21)    Acute on chronic respiratory failure with hypoxia (HCC)  Assessment & Plan  · Acute respiratory failure with hypoxia with history of COVID-19 in April  · Continue supplemental oxygen    Anemia  Assessment & Plan  · Worsening anemia postprocedure may be dilutional  · Hemoglobin stable    Results from last 7 days   Lab Units 08/16/20  0618 08/14/20  0510 08/13/20  0520 08/12/20  0634 08/11/20  0443 08/10/20  0152   HEMOGLOBIN g/dL 8 9* 9 0* 7 9* 8 1* 8 2* 10 3*       Morbid obesity with BMI of 50 0-59 9, adult (HCC)  Assessment & Plan  · Body mass index is 53 04 kg/m²  Acute hypokalemia  Assessment & Plan  · Hypokalemia continue to replete  · Hypophosphatemia has been repleted with potassium phosphorus    Results from last 7 days   Lab Units 08/16/20  0618  08/13/20  0520 08/12/20  0634 08/11/20  0443   POTASSIUM mmol/L 3 3*   < > 2 8* 3 0* 3 0*   MAGNESIUM mg/dL  --   --   --  2 0 2 2   PHOSPHORUS mg/dL  --   --  2 7 1 5* 2 8    < > = values in this interval not displayed  Chronic diastolic (congestive) heart failure [facility])  Assessment & Plan  Wt Readings from Last 3 Encounters:   08/11/20 132 kg (290 lb)   04/28/20 132 kg (291 lb 10 7 oz)   03/12/20 (!) 144 kg (318 lb)     · Chronic diastolic CHF  Was on IV fluids but has been discontinued    · Continue furosemide 40 mg daily     Hydronephrosis with obstructing calculus  Assessment & Plan  · Bilateral hydronephrosis with ureteral obstruction status post bilateral stenting  · Had some hematuria in the setting of stenting however clearing up    Acute kidney injury [facility])  Assessment & Plan  · UNRULY on CKD 3 secondary to UTI/sepsis  · Resolved    Results from last 7 days   Lab Units 08/16/20  0618 08/14/20  0510 08/13/20  0520 08/12/20  0634 08/11/20  0443 08/10/20  0152   BUN mg/dL 9 8 12 20 22 15   CREATININE mg/dL 0 81 0 91 0 92 1 13 1 51* 1 84*       Paroxysmal A-fib (HCC)  Assessment & Plan  · Paroxysmal atrial fibrillation on metoprolol  · Restarted eliquis      VTE Pharmacologic Prophylaxis: Apixaban (Eliquis)    Patient Centered Rounds: I have performed bedside rounds with nursing staff today  Discussions with Specialists or Other Care Team Provider:  Case management  Education and Discussions with Family / Patient:  Left voicemail with daughter yesterday    Time Spent for Care: 25 mins  More than 50% of total time spent on counseling and coordination of care as described above  Current Length of Stay: 6 day(s)  Current Patient Status: Inpatient     Certification Statement: The patient will continue to require additional inpatient hospital stay due to Sepsis [facility])  Discharge Plan / Estimated Discharge Date:  Hopefully back to [facility] tomorrow    Code Status: Level 3 - DNAR and DNI  ______________________________________________________________________________    Subjective:   Patient seen and examined  No new complaints  Feeling okay  Objective:   Vitals: Blood pressure 119/76, pulse 76, temperature 98 °F (36 7 °C), temperature source Temporal, resp  rate 18, height 5' 2" (1 575 m), weight 132 kg (290 lb), SpO2 96 %, not currently breastfeeding  Physical Exam  Vitals signs reviewed  HENT:      Head: Atraumatic  Eyes:      General: No scleral icterus       Extraocular Movements: Extraocular movements intact  Conjunctiva/sclera: Conjunctivae normal    Cardiovascular:      Rate and Rhythm: Regular rhythm  Pulmonary:      Effort: Pulmonary effort is normal       Breath sounds: No wheezing or rhonchi  Comments: Decreased breath sounds bilaterally  Abdominal:      General: There is no distension  Palpations: Abdomen is soft  Tenderness: There is no abdominal tenderness  Musculoskeletal:      Comments: +1-2 edema lower extremities bilaterally   Skin:     General: Skin is warm  Coloration: Skin is not jaundiced  Neurological:      General: No focal deficit present  Mental Status: She is alert and oriented to person, place, and time     Psychiatric:         Mood and Affect: Mood normal          Additional Data:   Labs:  Results from last 7 days   Lab Units 08/16/20  0618 08/14/20  0510 08/13/20  0520 08/12/20  0634 08/11/20  0443 08/10/20  0152   WBC Thousand/uL 6 62 5 51 4 35 6 95 8 71 11 83*   HEMOGLOBIN g/dL 8 9* 9 0* 7 9* 8 1* 8 2* 10 3*   HEMATOCRIT % 30 9* 31 9* 27 7* 28 1* 28 2* 35 1   MCV fL 88 90 89 89 89 89   PLATELETS Thousands/uL 267 227 173 176 155 231   INR   --   --   --   --   --  2 12*     Results from last 7 days   Lab Units 08/16/20  0618 08/14/20  0510 08/13/20  0520 08/12/20  0634 08/11/20  0443 08/10/20  0152   SODIUM mmol/L 145 147* 144 143 142 144   POTASSIUM mmol/L 3 3* 3 8 2 8* 3 0* 3 0* 2 8*   CHLORIDE mmol/L 107 109* 109* 106 105 101   CO2 mmol/L 33* 30 26 29 30 36*   ANION GAP mmol/L 5 8 9 8 7 7   BUN mg/dL 9 8 12 20 22 15   CREATININE mg/dL 0 81 0 91 0 92 1 13 1 51* 1 84*   CALCIUM mg/dL 8 1* 8 1* 7 6* 8 3 8 0* 8 3   ALBUMIN g/dL 1 9*  --  1 6*  --  1 7* 2 3*   TOTAL BILIRUBIN mg/dL 0 25  --  0 24  --  0 47 0 69   ALK PHOS U/L 42*  --  45*  --  48 56   ALT U/L 32  --  10*  --  13 17   AST U/L 54*  --  23  --  18 27   EGFR ml/min/1 73sq m 67 59 58 45 32 25   GLUCOSE RANDOM mg/dL 135 114 125 100 107 116     Results from last 7 days   Lab Units 08/13/20  0520 08/12/20  0089 08/11/20  0443 08/10/20  1404   MAGNESIUM mg/dL  --  2 0 2 2 1 7   PHOSPHORUS mg/dL 2 7 1 5* 2 8  --      Results from last 7 days   Lab Units 08/10/20  0254   TROPONIN I ng/mL 0 04          Results from last 7 days   Lab Units 08/10/20  1220 08/10/20  0152   LACTIC ACID mmol/L 1 6 0 8                 * I Have Reviewed All Lab Data Listed Above  Cultures:   Results from last 7 days   Lab Units 08/12/20  0633 08/12/20  0624 08/10/20  0557 08/10/20  0158 08/10/20  0152   BLOOD CULTURE  No Growth After 4 Days  No Growth After 4 Days  --  Enterococcus faecalis*  Bacteroides fragilis* Enterococcus faecalis*  Proteus mirabilis*  Bacteroides fragilis*   GRAM STAIN RESULT   --   --   --  Gram positive cocci in pairs and chains* Gram positive cocci in pairs and chains*  Gram negative rods*   URINE CULTURE   --   --  >100,000 cfu/ml Proteus mirabilis*  30,000-39,000 cfu/ml Gram Negative Asim Enteric Like*  30,000-39,000 cfu/ml Streptococcus species*  --   --              Imaging:  Imaging Reports Reviewed Today Include:   Ct Chest Abdomen Pelvis Wo Contrast    Result Date: 8/10/2020  Impression: Bilateral nephrolithiasis  There is an approximately 25 x 14 mm calculus within the distal aspect of the right extrarenal pelvis and extending into the right ureteropelvic junction, new compared with May 21, 2019  There is mild right hydronephrosis and there is stranding of the fat adjacent to the right kidney and tracking caudally adjacent to the proximal right ureter, also new compared with May 21, 2019  There is an approximately 11 x 8 mm calculus within the proximal left ureter, approximately 2 5 cm beyond the left ureteropelvic junction, similar to May 21, 2019  There are also several calculi within the left renal pelvis and extending towards the left ureteropelvic junction, the largest of which measures approximately 16 mm    There is mild to moderate left hydronephrosis, similar to slightly worse compared with May 21, 2019  There is, however, more stranding of the fat adjacent to the left kidney and tracking caudally adjacent to the proximal left ureter  Superimposed infection should be excluded  Correlation with urinalysis and urine culture and sensitivity is recommended  Urology consultation is recommended  Other findings as described above, please see discussion  The images are available for clinical review  I personally discussed this study with Angela Reyes on 8/10/2020 at 5:12 AM  Workstation performed: [id number]     Fl Retrograde Pyelogram    Result Date: 8/10/2020  Impression: Fluoroscopic guidance provided for retrograde study  Please see procedure report for further details   Workstation performed: [id number]     Scheduled Meds:  Current Facility-Administered Medications   Medication Dose Route Frequency Provider Last Rate    acetaminophen  650 mg Oral Q6H PRN Haworth Portage, CRNP      albuterol  2 5 mg Nebulization Q4H PRN Beverly Cynthia, CRNP      aluminum-magnesium hydroxide-simethicone  30 mL Oral Q6H PRN Beverly Portage, CRNP      apixaban  5 mg Oral BID Amrit Catching, DO      aspirin  81 mg Oral Daily Beverly Portage, CRNP      atorvastatin  10 mg Oral HS Haworth Portage, CRNP      bisacodyl  10 mg Rectal Daily PRN Haworth Cynthia, CRNP      cephalexin  500 mg Oral Q6H [address] Tammy Loredo MD      cholecalciferol  2,000 Units Oral Daily Haworth Cynthia, CRNP      docusate sodium  100 mg Oral BID Haworth Portage, CRNP      furosemide  40 mg Oral Daily Amrit Catching, DO      guaiFENesin  600 mg Oral Q12H [address] Beverly Cynthia, CRNP      HYDROmorphone  0 2 mg Intravenous Q4H PRN Haworth Cynthia, CRNP      levothyroxine  150 mcg Oral Early Morning Beverly Portage, CRNP      melatonin  4 5 mg Oral HS Haworth Portage, CRNP      metoprolol tartrate  25 mg Oral Q12H [address] Beverly Portage, CRNP      metroNIDAZOLE  500 mg Oral [facility] Tammy Loredo MD      nitroglycerin  0 4 mg Sublingual Q5 Min PRN Beverly Marie, LOUISNP      ondansetron  4 mg Intravenous Q6H PRN Cruzito Spearing, CRNP      oxyCODONE  5 mg Oral Q4H PRN Cruzito Spearing, CRNP      pantoprazole  40 mg Oral Early Morning Cruzito Spearing, CRNP      pramipexole  0 5 mg Oral HS Cruzito Spearing, CRNP      vancomycin  1,000 mg Intravenous Q12H Taras Trotter, DO Taras Trotter, DO [facility]  Hospitalist    ** Please Note: This note has been constructed using a voice recognition system   **

## 2020-08-16 NOTE — SOCIAL WORK
Notified of pt's anticipated d/c for Monday to return to [facility]  Facility made aware of same with need for Vanco 1 5gm Q12H via PICC thru 8/25/2020  BLS transport will be needed- SW to follow in a m to assist with dc poc

## 2020-08-16 NOTE — PLAN OF CARE
Problem: Potential for Falls  Goal: Patient will remain free of falls  Description: INTERVENTIONS:  - Assess patient frequently for physical needs  -  Identify cognitive and physical deficits and behaviors that affect risk of falls  -  [place] fall precautions as indicated by assessment   - Educate patient/family on patient safety including physical limitations  - Instruct patient to call for assistance with activity based on assessment  - Modify environment to reduce risk of injury  - Consider OT/PT consult to assist with strengthening/mobility  Outcome: Progressing     Problem: Prexisting or High Potential for Compromised Skin Integrity  Goal: Skin integrity is maintained or improved  Description: INTERVENTIONS:  - Identify patients at risk for skin breakdown  - Assess and monitor skin integrity  - Assess and monitor nutrition and hydration status  - Monitor labs   - Assess for incontinence   - Turn and reposition patient  - Assist with mobility/ambulation  - Relieve pressure over bony prominences  - Avoid friction and shearing  - Provide appropriate hygiene as needed including keeping skin clean and dry  - Evaluate need for skin moisturizer/barrier cream  - Collaborate with interdisciplinary team   - Patient/family teaching  - Consider wound care consult   Outcome: Progressing     Problem: Nutrition/Hydration-ADULT  Goal: Nutrient/Hydration intake appropriate for improving, restoring or maintaining nutritional needs  Description: Monitor and assess patient's nutrition/hydration status for malnutrition  Collaborate with interdisciplinary team and initiate plan and interventions as ordered  Monitor patient's weight and dietary intake as ordered or per policy  Utilize nutrition screening tool and intervene as necessary  Determine patient's food preferences and provide high-protein, high-caloric foods as appropriate       INTERVENTIONS:  - Monitor oral intake, urinary output, labs, and treatment plans  - Assess nutrition and hydration status and recommend course of action  - Evaluate amount of meals eaten  - Assist patient with eating if necessary   - Allow adequate time for meals  - Recommend/ encourage appropriate diets, oral nutritional supplements, and vitamin/mineral supplements  - Order, calculate, and assess calorie counts as needed  - Recommend, monitor, and adjust tube feedings and TPN/PPN based on assessed needs  - Assess need for intravenous fluids  - Provide specific nutrition/hydration education as appropriate  - Include patient/family/caregiver in decisions related to nutrition  Outcome: Progressing     Problem: PAIN - ADULT  Goal: Verbalizes/displays adequate comfort level or baseline comfort level  Description: Interventions:  - Encourage patient to monitor pain and request assistance  - Assess pain using appropriate pain scale  - Administer analgesics based on type and severity of pain and evaluate response  - Implement non-pharmacological measures as appropriate and evaluate response  - Consider cultural and social influences on pain and pain management  - Notify physician/advanced practitioner if interventions unsuccessful or patient reports new pain  Outcome: Progressing     Problem: INFECTION - ADULT  Goal: Absence or prevention of progression during hospitalization  Description: INTERVENTIONS:  - Assess and monitor for signs and symptoms of infection  - Monitor lab/diagnostic results  - Monitor all insertion sites, i e  indwelling lines, tubes, and drains  - Monitor endotracheal if appropriate and nasal secretions for changes in amount and color  - [place] appropriate cooling/warming therapies per order  - Administer medications as ordered  - Instruct and encourage patient and family to use good hand hygiene technique  - Identify and instruct in appropriate isolation precautions for identified infection/condition  Outcome: Progressing  Goal: Absence of fever/infection during neutropenic period  Description: INTERVENTIONS:  - Monitor WBC    Outcome: Progressing     Problem: SAFETY ADULT  Goal: Patient will remain free of falls  Description: INTERVENTIONS:  - Assess patient frequently for physical needs  -  Identify cognitive and physical deficits and behaviors that affect risk of falls    -  [place] fall precautions as indicated by assessment   - Educate patient/family on patient safety including physical limitations  - Instruct patient to call for assistance with activity based on assessment  - Modify environment to reduce risk of injury  - Consider OT/PT consult to assist with strengthening/mobility  Outcome: Progressing  Goal: Maintain or return to baseline ADL function  Description: INTERVENTIONS:  -  Assess patient's ability to carry out ADLs; assess patient's baseline for ADL function and identify physical deficits which impact ability to perform ADLs (bathing, care of mouth/teeth, toileting, grooming, dressing, etc )  - Assess/evaluate cause of self-care deficits   - Assess range of motion  - Assess patient's mobility; develop plan if impaired  - Assess patient's need for assistive devices and provide as appropriate  - Encourage maximum independence but intervene and supervise when necessary  - Involve family in performance of ADLs  - Assess for home care needs following discharge   - Consider OT consult to assist with ADL evaluation and planning for discharge  - Provide patient education as appropriate  Outcome: Progressing  Goal: Maintain or return mobility status to optimal level  Description: INTERVENTIONS:  - Assess patient's baseline mobility status (ambulation, transfers, stairs, etc )    - Identify cognitive and physical deficits and behaviors that affect mobility  - Identify mobility aids required to assist with transfers and/or ambulation (gait belt, sit-to-stand, lift, walker, cane, etc )  - [place] fall precautions as indicated by assessment  - Record patient progress and toleration of activity level on Mobility SBAR; progress patient to next Phase/Stage  - Instruct patient to call for assistance with activity based on assessment  - Consider rehabilitation consult to assist with strengthening/weightbearing, etc   Outcome: Progressing     Problem: DISCHARGE PLANNING  Goal: Discharge to home or other facility with appropriate resources  Description: INTERVENTIONS:  - Identify barriers to discharge w/patient and caregiver  - Arrange for needed discharge resources and transportation as appropriate  - Identify discharge learning needs (meds, wound care, etc )  - Arrange for interpretive services to assist at discharge as needed  - Refer to Case Management Department for coordinating discharge planning if the patient needs post-hospital services based on physician/advanced practitioner order or complex needs related to functional status, cognitive ability, or social support system  Outcome: Progressing     Problem: Knowledge Deficit  Goal: Patient/family/caregiver demonstrates understanding of disease process, treatment plan, medications, and discharge instructions  Description: Complete learning assessment and assess knowledge base    Interventions:  - Provide teaching at level of understanding  - Provide teaching via preferred learning methods  Outcome: Progressing

## 2020-08-16 NOTE — PLAN OF CARE
Problem: Potential for Falls  Goal: Patient will remain free of falls  Description: INTERVENTIONS:  - Assess patient frequently for physical needs  -  Identify cognitive and physical deficits and behaviors that affect risk of falls  -  [place] fall precautions as indicated by assessment   - Educate patient/family on patient safety including physical limitations  - Instruct patient to call for assistance with activity based on assessment  - Modify environment to reduce risk of injury  - Consider OT/PT consult to assist with strengthening/mobility  Outcome: Progressing     Problem: Prexisting or High Potential for Compromised Skin Integrity  Goal: Skin integrity is maintained or improved  Description: INTERVENTIONS:  - Identify patients at risk for skin breakdown  - Assess and monitor skin integrity  - Assess and monitor nutrition and hydration status  - Monitor labs   - Assess for incontinence   - Turn and reposition patient  - Assist with mobility/ambulation  - Relieve pressure over bony prominences  - Avoid friction and shearing  - Provide appropriate hygiene as needed including keeping skin clean and dry  - Evaluate need for skin moisturizer/barrier cream  - Collaborate with interdisciplinary team   - Patient/family teaching  - Consider wound care consult   Outcome: Progressing     Problem: Nutrition/Hydration-ADULT  Goal: Nutrient/Hydration intake appropriate for improving, restoring or maintaining nutritional needs  Description: Monitor and assess patient's nutrition/hydration status for malnutrition  Collaborate with interdisciplinary team and initiate plan and interventions as ordered  Monitor patient's weight and dietary intake as ordered or per policy  Utilize nutrition screening tool and intervene as necessary  Determine patient's food preferences and provide high-protein, high-caloric foods as appropriate       INTERVENTIONS:  - Monitor oral intake, urinary output, labs, and treatment plans  - Assess nutrition and hydration status and recommend course of action  - Evaluate amount of meals eaten  - Assist patient with eating if necessary   - Allow adequate time for meals  - Recommend/ encourage appropriate diets, oral nutritional supplements, and vitamin/mineral supplements  - Order, calculate, and assess calorie counts as needed  - Recommend, monitor, and adjust tube feedings and TPN/PPN based on assessed needs  - Assess need for intravenous fluids  - Provide specific nutrition/hydration education as appropriate  - Include patient/family/caregiver in decisions related to nutrition  Outcome: Progressing     Problem: PAIN - ADULT  Goal: Verbalizes/displays adequate comfort level or baseline comfort level  Description: Interventions:  - Encourage patient to monitor pain and request assistance  - Assess pain using appropriate pain scale  - Administer analgesics based on type and severity of pain and evaluate response  - Implement non-pharmacological measures as appropriate and evaluate response  - Consider cultural and social influences on pain and pain management  - Notify physician/advanced practitioner if interventions unsuccessful or patient reports new pain  Outcome: Progressing     Problem: INFECTION - ADULT  Goal: Absence or prevention of progression during hospitalization  Description: INTERVENTIONS:  - Assess and monitor for signs and symptoms of infection  - Monitor lab/diagnostic results  - Monitor all insertion sites, i e  indwelling lines, tubes, and drains  - Monitor endotracheal if appropriate and nasal secretions for changes in amount and color  - [place] appropriate cooling/warming therapies per order  - Administer medications as ordered  - Instruct and encourage patient and family to use good hand hygiene technique  - Identify and instruct in appropriate isolation precautions for identified infection/condition  Outcome: Progressing  Goal: Absence of fever/infection during neutropenic period  Description: INTERVENTIONS:  - Monitor WBC    Outcome: Progressing     Problem: SAFETY ADULT  Goal: Patient will remain free of falls  Description: INTERVENTIONS:  - Assess patient frequently for physical needs  -  Identify cognitive and physical deficits and behaviors that affect risk of falls    -  [place] fall precautions as indicated by assessment   - Educate patient/family on patient safety including physical limitations  - Instruct patient to call for assistance with activity based on assessment  - Modify environment to reduce risk of injury  - Consider OT/PT consult to assist with strengthening/mobility  Outcome: Progressing  Goal: Maintain or return to baseline ADL function  Description: INTERVENTIONS:  -  Assess patient's ability to carry out ADLs; assess patient's baseline for ADL function and identify physical deficits which impact ability to perform ADLs (bathing, care of mouth/teeth, toileting, grooming, dressing, etc )  - Assess/evaluate cause of self-care deficits   - Assess range of motion  - Assess patient's mobility; develop plan if impaired  - Assess patient's need for assistive devices and provide as appropriate  - Encourage maximum independence but intervene and supervise when necessary  - Involve family in performance of ADLs  - Assess for home care needs following discharge   - Consider OT consult to assist with ADL evaluation and planning for discharge  - Provide patient education as appropriate  Outcome: Progressing  Goal: Maintain or return mobility status to optimal level  Description: INTERVENTIONS:  - Assess patient's baseline mobility status (ambulation, transfers, stairs, etc )    - Identify cognitive and physical deficits and behaviors that affect mobility  - Identify mobility aids required to assist with transfers and/or ambulation (gait belt, sit-to-stand, lift, walker, cane, etc )  - [place] fall precautions as indicated by assessment  - Record patient progress and toleration of activity level on Mobility SBAR; progress patient to next Phase/Stage  - Instruct patient to call for assistance with activity based on assessment  - Consider rehabilitation consult to assist with strengthening/weightbearing, etc   Outcome: Progressing     Problem: DISCHARGE PLANNING  Goal: Discharge to home or other facility with appropriate resources  Description: INTERVENTIONS:  - Identify barriers to discharge w/patient and caregiver  - Arrange for needed discharge resources and transportation as appropriate  - Identify discharge learning needs (meds, wound care, etc )  - Arrange for interpretive services to assist at discharge as needed  - Refer to Case Management Department for coordinating discharge planning if the patient needs post-hospital services based on physician/advanced practitioner order or complex needs related to functional status, cognitive ability, or social support system  Outcome: Progressing     Problem: Knowledge Deficit  Goal: Patient/family/caregiver demonstrates understanding of disease process, treatment plan, medications, and discharge instructions  Description: Complete learning assessment and assess knowledge base    Interventions:  - Provide teaching at level of understanding  - Provide teaching via preferred learning methods  Outcome: Progressing

## 2020-08-16 NOTE — ASSESSMENT & PLAN NOTE
· Bilateral hydronephrosis with ureteral obstruction status post bilateral stenting  · Had some hematuria in the setting of stenting however clearing up

## 2020-08-16 NOTE — ASSESSMENT & PLAN NOTE
· Hypokalemia continue to replete  · Hypophosphatemia has been repleted with potassium phosphorus    Results from last 7 days   Lab Units 08/16/20  0618  08/13/20  0520 08/12/20  0634 08/11/20  0443   POTASSIUM mmol/L 3 3*   < > 2 8* 3 0* 3 0*   MAGNESIUM mg/dL  --   --   --  2 0 2 2   PHOSPHORUS mg/dL  --   --  2 7 1 5* 2 8    < > = values in this interval not displayed

## 2020-08-16 NOTE — ASSESSMENT & PLAN NOTE
· Worsening anemia postprocedure may be dilutional  · Hemoglobin stable    Results from last 7 days   Lab Units 08/16/20  0618 08/14/20  0510 08/13/20  0520 08/12/20  0634 08/11/20  0443 08/10/20  0152   HEMOGLOBIN g/dL 8 9* 9 0* 7 9* 8 1* 8 2* 10 3*

## 2020-08-17 VITALS
HEART RATE: 84 BPM | RESPIRATION RATE: 18 BRPM | TEMPERATURE: 97.5 F | WEIGHT: 290 LBS | OXYGEN SATURATION: 95 % | DIASTOLIC BLOOD PRESSURE: 59 MMHG | SYSTOLIC BLOOD PRESSURE: 117 MMHG | HEIGHT: 62 IN | BODY MASS INDEX: 53.37 KG/M2

## 2020-08-17 LAB
ALBUMIN SERPL BCP-MCNC: 2 G/DL (ref 3.5–5)
ALP SERPL-CCNC: 41 U/L (ref 46–116)
ALT SERPL W P-5'-P-CCNC: 42 U/L (ref 12–78)
ANION GAP SERPL CALCULATED.3IONS-SCNC: 5 MMOL/L (ref 4–13)
AST SERPL W P-5'-P-CCNC: 60 U/L (ref 5–45)
BACTERIA BLD CULT: NORMAL
BACTERIA BLD CULT: NORMAL
BILIRUB SERPL-MCNC: 0.26 MG/DL (ref 0.2–1)
BUN SERPL-MCNC: 8 MG/DL (ref 5–25)
CALCIUM SERPL-MCNC: 8.7 MG/DL (ref 8.3–10.1)
CHLORIDE SERPL-SCNC: 106 MMOL/L (ref 100–108)
CO2 SERPL-SCNC: 33 MMOL/L (ref 21–32)
CREAT SERPL-MCNC: 0.79 MG/DL (ref 0.6–1.3)
ERYTHROCYTE [DISTWIDTH] IN BLOOD BY AUTOMATED COUNT: 18.4 % (ref 11.6–15.1)
GFR SERPL CREATININE-BSD FRML MDRD: 69 ML/MIN/1.73SQ M
GLUCOSE SERPL-MCNC: 144 MG/DL (ref 65–140)
HCT VFR BLD AUTO: 33.5 % (ref 34.8–46.1)
HGB BLD-MCNC: 9.5 G/DL (ref 11.5–15.4)
MCH RBC QN AUTO: 25.2 PG (ref 26.8–34.3)
MCHC RBC AUTO-ENTMCNC: 28.4 G/DL (ref 31.4–37.4)
MCV RBC AUTO: 89 FL (ref 82–98)
PLATELET # BLD AUTO: 274 THOUSANDS/UL (ref 149–390)
PMV BLD AUTO: 10 FL (ref 8.9–12.7)
POTASSIUM SERPL-SCNC: 3.8 MMOL/L (ref 3.5–5.3)
PROT SERPL-MCNC: 6 G/DL (ref 6.4–8.2)
RBC # BLD AUTO: 3.77 MILLION/UL (ref 3.81–5.12)
SARS-COV-2 RNA RESP QL NAA+PROBE: POSITIVE
SODIUM SERPL-SCNC: 144 MMOL/L (ref 136–145)
WBC # BLD AUTO: 6.43 THOUSAND/UL (ref 4.31–10.16)

## 2020-08-17 PROCEDURE — 99239 HOSP IP/OBS DSCHRG MGMT >30: CPT | Performed by: HOSPITALIST

## 2020-08-17 PROCEDURE — 99233 SBSQ HOSP IP/OBS HIGH 50: CPT | Performed by: INTERNAL MEDICINE

## 2020-08-17 PROCEDURE — 85027 COMPLETE CBC AUTOMATED: CPT | Performed by: INTERNAL MEDICINE

## 2020-08-17 PROCEDURE — 80053 COMPREHEN METABOLIC PANEL: CPT | Performed by: INTERNAL MEDICINE

## 2020-08-17 PROCEDURE — 87635 SARS-COV-2 COVID-19 AMP PRB: CPT | Performed by: INTERNAL MEDICINE

## 2020-08-17 RX ORDER — CEPHALEXIN 500 MG/1
500 CAPSULE ORAL EVERY 6 HOURS SCHEDULED
Qty: 12 CAPSULE | Refills: 0 | Status: SHIPPED | OUTPATIENT
Start: 2020-08-17 | End: 2020-08-20

## 2020-08-17 RX ORDER — VANCOMYCIN HYDROCHLORIDE 1 G/200ML
1000 INJECTION, SOLUTION INTRAVENOUS EVERY 12 HOURS
Qty: 3600 ML | Refills: 0 | Status: SHIPPED | OUTPATIENT
Start: 2020-08-17 | End: 2020-08-26

## 2020-08-17 RX ORDER — METRONIDAZOLE 500 MG/1
500 TABLET ORAL EVERY 8 HOURS SCHEDULED
Qty: 15 TABLET | Refills: 0 | Status: SHIPPED | OUTPATIENT
Start: 2020-08-17 | End: 2020-08-22

## 2020-08-17 RX ADMIN — POTASSIUM CHLORIDE 20 MEQ: 1500 TABLET, EXTENDED RELEASE ORAL at 10:55

## 2020-08-17 RX ADMIN — ASPIRIN 81 MG: 81 TABLET, COATED ORAL at 10:55

## 2020-08-17 RX ADMIN — FUROSEMIDE 40 MG: 40 TABLET ORAL at 10:55

## 2020-08-17 RX ADMIN — METOPROLOL TARTRATE 25 MG: 25 TABLET, FILM COATED ORAL at 10:55

## 2020-08-17 RX ADMIN — VANCOMYCIN HYDROCHLORIDE 1000 MG: 1 INJECTION, SOLUTION INTRAVENOUS at 10:56

## 2020-08-17 RX ADMIN — Medication 2000 UNITS: at 10:55

## 2020-08-17 RX ADMIN — METRONIDAZOLE 500 MG: 500 TABLET ORAL at 14:38

## 2020-08-17 RX ADMIN — LEVOTHYROXINE SODIUM 150 MCG: 75 TABLET ORAL at 05:15

## 2020-08-17 RX ADMIN — PANTOPRAZOLE SODIUM 40 MG: 40 TABLET, DELAYED RELEASE ORAL at 05:14

## 2020-08-17 RX ADMIN — METRONIDAZOLE 500 MG: 500 TABLET ORAL at 05:14

## 2020-08-17 RX ADMIN — CEPHALEXIN 500 MG: 500 CAPSULE ORAL at 12:07

## 2020-08-17 RX ADMIN — CEPHALEXIN 500 MG: 500 CAPSULE ORAL at 05:14

## 2020-08-17 RX ADMIN — GUAIFENESIN 600 MG: 600 TABLET, EXTENDED RELEASE ORAL at 10:55

## 2020-08-17 RX ADMIN — DOCUSATE SODIUM 100 MG: 100 CAPSULE, LIQUID FILLED ORAL at 10:55

## 2020-08-17 RX ADMIN — APIXABAN 5 MG: 5 TABLET, FILM COATED ORAL at 10:55

## 2020-08-17 NOTE — CASE MANAGEMENT
Since Ld's 8/17/ 2020 labwork shows positive COVID results, this case management assistant called the patient in order to review her medicare rights with her  The patient verbally consented to understanding her medicare rights over the phone

## 2020-08-17 NOTE — PLAN OF CARE
Problem: Potential for Falls  Goal: Patient will remain free of falls  Description: INTERVENTIONS:  - Assess patient frequently for physical needs  -  Identify cognitive and physical deficits and behaviors that affect risk of falls  -  [place] fall precautions as indicated by assessment   - Educate patient/family on patient safety including physical limitations  - Instruct patient to call for assistance with activity based on assessment  - Modify environment to reduce risk of injury  - Consider OT/PT consult to assist with strengthening/mobility  8/17/2020 1120 by Karissa Sutton RN  Outcome: Progressing  8/17/2020 1120 by Karissa Sutton RN  Outcome: Progressing     Problem: Prexisting or High Potential for Compromised Skin Integrity  Goal: Skin integrity is maintained or improved  Description: INTERVENTIONS:  - Identify patients at risk for skin breakdown  - Assess and monitor skin integrity  - Assess and monitor nutrition and hydration status  - Monitor labs   - Assess for incontinence   - Turn and reposition patient  - Assist with mobility/ambulation  - Relieve pressure over bony prominences  - Avoid friction and shearing  - Provide appropriate hygiene as needed including keeping skin clean and dry  - Evaluate need for skin moisturizer/barrier cream  - Collaborate with interdisciplinary team   - Patient/family teaching  - Consider wound care consult   8/17/2020 1120 by Karissa Sutton RN  Outcome: Progressing  8/17/2020 1120 by Karissa Sutton RN  Outcome: Progressing     Problem: Nutrition/Hydration-ADULT  Goal: Nutrient/Hydration intake appropriate for improving, restoring or maintaining nutritional needs  Description: Monitor and assess patient's nutrition/hydration status for malnutrition  Collaborate with interdisciplinary team and initiate plan and interventions as ordered  Monitor patient's weight and dietary intake as ordered or per policy  Utilize nutrition screening tool and intervene as necessary   Determine patient's food preferences and provide high-protein, high-caloric foods as appropriate       INTERVENTIONS:  - Monitor oral intake, urinary output, labs, and treatment plans  - Assess nutrition and hydration status and recommend course of action  - Evaluate amount of meals eaten  - Assist patient with eating if necessary   - Allow adequate time for meals  - Recommend/ encourage appropriate diets, oral nutritional supplements, and vitamin/mineral supplements  - Order, calculate, and assess calorie counts as needed  - Recommend, monitor, and adjust tube feedings and TPN/PPN based on assessed needs  - Assess need for intravenous fluids  - Provide specific nutrition/hydration education as appropriate  - Include patient/family/caregiver in decisions related to nutrition  8/17/2020 1120 by Yarely Clarke RN  Outcome: Progressing  8/17/2020 1120 by Yarely Clarke RN  Outcome: Progressing     Problem: PAIN - ADULT  Goal: Verbalizes/displays adequate comfort level or baseline comfort level  Description: Interventions:  - Encourage patient to monitor pain and request assistance  - Assess pain using appropriate pain scale  - Administer analgesics based on type and severity of pain and evaluate response  - Implement non-pharmacological measures as appropriate and evaluate response  - Consider cultural and social influences on pain and pain management  - Notify physician/advanced practitioner if interventions unsuccessful or patient reports new pain  8/17/2020 1120 by Yarely Clarke RN  Outcome: Progressing  8/17/2020 1120 by Yarely Clarke RN  Outcome: Progressing     Problem: INFECTION - ADULT  Goal: Absence or prevention of progression during hospitalization  Description: INTERVENTIONS:  - Assess and monitor for signs and symptoms of infection  - Monitor lab/diagnostic results  - Monitor all insertion sites, i e  indwelling lines, tubes, and drains  - Monitor endotracheal if appropriate and nasal secretions for changes in amount and color  - [place] appropriate cooling/warming therapies per order  - Administer medications as ordered  - Instruct and encourage patient and family to use good hand hygiene technique  - Identify and instruct in appropriate isolation precautions for identified infection/condition  8/17/2020 1120 by Huber Ontiveros RN  Outcome: Progressing  8/17/2020 1120 by Huber Ontiveros RN  Outcome: Progressing  Goal: Absence of fever/infection during neutropenic period  Description: INTERVENTIONS:  - Monitor WBC    8/17/2020 1120 by Huber Ontiveros RN  Outcome: Progressing  8/17/2020 1120 by Huber Ontiveros RN  Outcome: Progressing     Problem: SAFETY ADULT  Goal: Patient will remain free of falls  Description: INTERVENTIONS:  - Assess patient frequently for physical needs  -  Identify cognitive and physical deficits and behaviors that affect risk of falls    -  [place] fall precautions as indicated by assessment   - Educate patient/family on patient safety including physical limitations  - Instruct patient to call for assistance with activity based on assessment  - Modify environment to reduce risk of injury  - Consider OT/PT consult to assist with strengthening/mobility  8/17/2020 1120 by Huber Ontiveros RN  Outcome: Progressing  8/17/2020 1120 by Huber Ontiveros RN  Outcome: Progressing  Goal: Maintain or return to baseline ADL function  Description: INTERVENTIONS:  -  Assess patient's ability to carry out ADLs; assess patient's baseline for ADL function and identify physical deficits which impact ability to perform ADLs (bathing, care of mouth/teeth, toileting, grooming, dressing, etc )  - Assess/evaluate cause of self-care deficits   - Assess range of motion  - Assess patient's mobility; develop plan if impaired  - Assess patient's need for assistive devices and provide as appropriate  - Encourage maximum independence but intervene and supervise when necessary  - Involve family in performance of ADLs  - Assess for home care needs following discharge   - Consider OT consult to assist with ADL evaluation and planning for discharge  - Provide patient education as appropriate  8/17/2020 1120 by Zahraa Carmen RN  Outcome: Progressing  8/17/2020 1120 by Zahraa Carmen RN  Outcome: Progressing  Goal: Maintain or return mobility status to optimal level  Description: INTERVENTIONS:  - Assess patient's baseline mobility status (ambulation, transfers, stairs, etc )    - Identify cognitive and physical deficits and behaviors that affect mobility  - Identify mobility aids required to assist with transfers and/or ambulation (gait belt, sit-to-stand, lift, walker, cane, etc )  - [place] fall precautions as indicated by assessment  - Record patient progress and toleration of activity level on Mobility SBAR; progress patient to next Phase/Stage  - Instruct patient to call for assistance with activity based on assessment  - Consider rehabilitation consult to assist with strengthening/weightbearing, etc   8/17/2020 1120 by Zahraa Carmen RN  Outcome: Progressing  8/17/2020 1120 by Zahraa Carmen RN  Outcome: Progressing     Problem: DISCHARGE PLANNING  Goal: Discharge to home or other facility with appropriate resources  Description: INTERVENTIONS:  - Identify barriers to discharge w/patient and caregiver  - Arrange for needed discharge resources and transportation as appropriate  - Identify discharge learning needs (meds, wound care, etc )  - Arrange for interpretive services to assist at discharge as needed  - Refer to Case Management Department for coordinating discharge planning if the patient needs post-hospital services based on physician/advanced practitioner order or complex needs related to functional status, cognitive ability, or social support system  8/17/2020 1120 by Zahraa Carmen RN  Outcome: Progressing  8/17/2020 1120 by Zahraa Carmen RN  Outcome: Progressing     Problem: Knowledge Deficit  Goal: Patient/family/caregiver demonstrates understanding of disease process, treatment plan, medications, and discharge instructions  Description: Complete learning assessment and assess knowledge base    Interventions:  - Provide teaching at level of understanding  - Provide teaching via preferred learning methods  Outcome: Progressing

## 2020-08-17 NOTE — PLAN OF CARE
Problem: Potential for Falls  Goal: Patient will remain free of falls  Description: INTERVENTIONS:  - Assess patient frequently for physical needs  -  Identify cognitive and physical deficits and behaviors that affect risk of falls  -  [place] fall precautions as indicated by assessment   - Educate patient/family on patient safety including physical limitations  - Instruct patient to call for assistance with activity based on assessment  - Modify environment to reduce risk of injury  - Consider OT/PT consult to assist with strengthening/mobility  Outcome: Progressing     Problem: Prexisting or High Potential for Compromised Skin Integrity  Goal: Skin integrity is maintained or improved  Description: INTERVENTIONS:  - Identify patients at risk for skin breakdown  - Assess and monitor skin integrity  - Assess and monitor nutrition and hydration status  - Monitor labs   - Assess for incontinence   - Turn and reposition patient  - Assist with mobility/ambulation  - Relieve pressure over bony prominences  - Avoid friction and shearing  - Provide appropriate hygiene as needed including keeping skin clean and dry  - Evaluate need for skin moisturizer/barrier cream  - Collaborate with interdisciplinary team   - Patient/family teaching  - Consider wound care consult   Outcome: Progressing     Problem: Nutrition/Hydration-ADULT  Goal: Nutrient/Hydration intake appropriate for improving, restoring or maintaining nutritional needs  Description: Monitor and assess patient's nutrition/hydration status for malnutrition  Collaborate with interdisciplinary team and initiate plan and interventions as ordered  Monitor patient's weight and dietary intake as ordered or per policy  Utilize nutrition screening tool and intervene as necessary  Determine patient's food preferences and provide high-protein, high-caloric foods as appropriate       INTERVENTIONS:  - Monitor oral intake, urinary output, labs, and treatment plans  - Assess nutrition and hydration status and recommend course of action  - Evaluate amount of meals eaten  - Assist patient with eating if necessary   - Allow adequate time for meals  - Recommend/ encourage appropriate diets, oral nutritional supplements, and vitamin/mineral supplements  - Order, calculate, and assess calorie counts as needed  - Recommend, monitor, and adjust tube feedings and TPN/PPN based on assessed needs  - Assess need for intravenous fluids  - Provide specific nutrition/hydration education as appropriate  - Include patient/family/caregiver in decisions related to nutrition  Outcome: Progressing     Problem: PAIN - ADULT  Goal: Verbalizes/displays adequate comfort level or baseline comfort level  Description: Interventions:  - Encourage patient to monitor pain and request assistance  - Assess pain using appropriate pain scale  - Administer analgesics based on type and severity of pain and evaluate response  - Implement non-pharmacological measures as appropriate and evaluate response  - Consider cultural and social influences on pain and pain management  - Notify physician/advanced practitioner if interventions unsuccessful or patient reports new pain  Outcome: Progressing     Problem: INFECTION - ADULT  Goal: Absence or prevention of progression during hospitalization  Description: INTERVENTIONS:  - Assess and monitor for signs and symptoms of infection  - Monitor lab/diagnostic results  - Monitor all insertion sites, i e  indwelling lines, tubes, and drains  - Monitor endotracheal if appropriate and nasal secretions for changes in amount and color  - [place] appropriate cooling/warming therapies per order  - Administer medications as ordered  - Instruct and encourage patient and family to use good hand hygiene technique  - Identify and instruct in appropriate isolation precautions for identified infection/condition  Outcome: Progressing  Goal: Absence of fever/infection during neutropenic period  Description: INTERVENTIONS:  - Monitor WBC    Outcome: Progressing     Problem: SAFETY ADULT  Goal: Patient will remain free of falls  Description: INTERVENTIONS:  - Assess patient frequently for physical needs  -  Identify cognitive and physical deficits and behaviors that affect risk of falls    -  [place] fall precautions as indicated by assessment   - Educate patient/family on patient safety including physical limitations  - Instruct patient to call for assistance with activity based on assessment  - Modify environment to reduce risk of injury  - Consider OT/PT consult to assist with strengthening/mobility  Outcome: Progressing  Goal: Maintain or return to baseline ADL function  Description: INTERVENTIONS:  -  Assess patient's ability to carry out ADLs; assess patient's baseline for ADL function and identify physical deficits which impact ability to perform ADLs (bathing, care of mouth/teeth, toileting, grooming, dressing, etc )  - Assess/evaluate cause of self-care deficits   - Assess range of motion  - Assess patient's mobility; develop plan if impaired  - Assess patient's need for assistive devices and provide as appropriate  - Encourage maximum independence but intervene and supervise when necessary  - Involve family in performance of ADLs  - Assess for home care needs following discharge   - Consider OT consult to assist with ADL evaluation and planning for discharge  - Provide patient education as appropriate  Outcome: Progressing  Goal: Maintain or return mobility status to optimal level  Description: INTERVENTIONS:  - Assess patient's baseline mobility status (ambulation, transfers, stairs, etc )    - Identify cognitive and physical deficits and behaviors that affect mobility  - Identify mobility aids required to assist with transfers and/or ambulation (gait belt, sit-to-stand, lift, walker, cane, etc )  - [place] fall precautions as indicated by assessment  - Record patient progress and toleration of activity level on Mobility SBAR; progress patient to next Phase/Stage  - Instruct patient to call for assistance with activity based on assessment  - Consider rehabilitation consult to assist with strengthening/weightbearing, etc   Outcome: Progressing     Problem: DISCHARGE PLANNING  Goal: Discharge to home or other facility with appropriate resources  Description: INTERVENTIONS:  - Identify barriers to discharge w/patient and caregiver  - Arrange for needed discharge resources and transportation as appropriate  - Identify discharge learning needs (meds, wound care, etc )  - Arrange for interpretive services to assist at discharge as needed  - Refer to Case Management Department for coordinating discharge planning if the patient needs post-hospital services based on physician/advanced practitioner order or complex needs related to functional status, cognitive ability, or social support system  Outcome: Progressing     Problem: Knowledge Deficit  Goal: Patient/family/caregiver demonstrates understanding of disease process, treatment plan, medications, and discharge instructions  Description: Complete learning assessment and assess knowledge base    Interventions:  - Provide teaching at level of understanding  - Provide teaching via preferred learning methods  Outcome: Progressing

## 2020-08-17 NOTE — ASSESSMENT & PLAN NOTE
· Hypokalemia continue to replete  · Hypophosphatemia has been repleted with potassium phosphorus    Results from last 7 days   Lab Units 08/17/20  0630  08/13/20  0520 08/12/20  0634 08/11/20  0443   POTASSIUM mmol/L 3 8   < > 2 8* 3 0* 3 0*   MAGNESIUM mg/dL  --   --   --  2 0 2 2   PHOSPHORUS mg/dL  --   --  2 7 1 5* 2 8    < > = values in this interval not displayed

## 2020-08-17 NOTE — ASSESSMENT & PLAN NOTE
Wt Readings from Last 3 Encounters:   08/11/20 132 kg (290 lb)   04/28/20 132 kg (291 lb 10 7 oz)   03/12/20 (!) 144 kg (318 lb)     · She is euvolemic

## 2020-08-17 NOTE — PROGRESS NOTES
Progress Note - Infectious Disease   Ktaelynn Colon 81 y o  female MRN: [id number]  Unit/Bed#: E5 -01 Encounter: [id number]    Impression/Plan:  1  Sepsis   Source is most likely obstructive pyelonephritis with secondary bacteremia  Patient is clinically improved   Fever has resolved   WBC has normalized  Repeat blood cultures are negative after 5 days  She has remained hemodynamically stable   -antibiotics as below  -monitor CBC and BMP  -monitor vitals  -supportive care     2  Polymicrobial bacteremia  Blood cultures grew Enterococcus faecalis and Bacteroides fragilis in both sets, with Proteus mirabilis showing in one set   Suspect this is all secondary to  source   Urine culture grew Proteus mirabilis, gram-negative enteric like collette, and a Streptococcus species   Fortunately patient's repeat blood cultures are negative   Her TTE was negative   Patient is currently receiving IV vancomycin, oral Keflex, and oral Flagyl, and tolerating antibiotic treatment without difficulty   PICC line has been placed   -continue IV vancomycin through 08/25/2020 to complete 14 days of treatment  -continue pharmacy consult for guidance on vancomycin dosage  -continue oral Keflex through 08/19/2020 to complete 10 days of treatment  -continue oral Flagyl through 08/21/2020 to complete 10 days of treatment  -PICC will need to be removed after patient's final dose of IV antibiotics on 08/25/2020  -she will require weekly CBCD, BMP, and vancomycin trough while on IV antibiotics  -monitor vitals     3  Bilateral pyelonephritis secondary to obstructive ureterolithiasis   CT of the abdomen/pelvis showed bilateral hydronephrosis and fat stranding adjacent to the kidneys   Patient has multiple renal calculi with obstruction  Concern patient's stones are harboring bacteria as she has previously had urine cultures positive for Proteus and Enterococcus   New urine culture grew Proteus mirabilis, a Gram-negative enteric like collette, and a Streptococcus species   Patient is now status post bilateral ureteral stenting  Montes De Oca catheter remains intact   She has no further abdominal/flank pain or urinary symptoms   -antibiotics as above  -monitor CBC and BMP  -serial exams and care Montes De Oca catheter  -continue follow-up with urology     4  COVID-19 virus  Patient again testing positive on 8/17/2020  She was initially positive in April 2020 but did test negative upon her readmission on 08/10/2020  Patient now tested again before her return to [facility] and is now showing as positive  Suspect patient is not in an active disease state as she has no respiratory symptoms and her O2 saturation remains stable on room air  She may continue to have abnormal test results for some time   -monitor vitals  -monitor respiratory status    5  Acute kidney injury   Likely secondary to obstruction from renal calculi   Also consider secondary to sepsis   For patient's past medical records it appears her baseline creatinine is approximately 0 8-1   Creatinine was elevated at 1 84 upon admission but has now normalized  -monitor BMP  -dose adjust antibiotics for renal function as needed  -avoid nephrotoxins     6  Acute hypoxic respiratory failure   Patient O2 saturation was 89% on room air upon admission   She did test positive for COVID in April 2020 but repeat testing was negative  Suspect respiratory issues may have been initially related to her sepsis   Respiratory status did worsen after surgery, consider Pickwickian syndrome   She did require temporary mechanical ventilation but has been extubated and her O2 saturation now remains stable on room air  Chest CT showed no ground-glass opacities or focal consolidations  -monitor vitals  -monitor respiratory status     7  Morbid obesity   BMI = 53 04      8  Multiple antibiotic allergies   Patient reports a rash with Augmentin and Bactrim   She reports she has tolerated amoxicillin in the past and would be willing to try penicillin if needed   She is currently receiving vancomycin, Keflex, and Flagyl which she is tolerating without difficulty   Will continue these for now and keep patient's allergies in mind as we move for with antibiotic treatment   -monitor patient for adverse medication reaction    Patient is stable for discharge from ID standpoint  Above plan was discussed in detail with patient at the bedside  Antibiotics:  Keflex 6  Flagyl 6  Vancomycin 7  Antibiotics 8    Subjective:  Patient reports she is feeling fine  She denies cough, shortness of breath, and chest pain  Feels fine on room air at this time  She is concerned about what her repeat positive testing means for her current room at [facility]  Has no concerns with her Montes De Oca catheter  Has no pain or pressure over her bladder  She has no concerns with her PICC line, reports she has had one before and did not have any difficulty with it  She has no fever, chills, sweats, shakes; no nausea, vomiting, abdominal pain, or diarrhea  No new symptoms  Objective:  Vitals:  Temp:  [97 5 °F (36 4 °C)-98 °F (36 7 °C)] 97 5 °F (36 4 °C)  HR:  [75-84] 84  Resp:  [18] 18  BP: (117-165)/(59-76) 117/59  SpO2:  [95 %-96 %] 95 %  Temp (24hrs), Av 8 °F (36 6 °C), Min:97 5 °F (36 4 °C), Max:98 °F (36 7 °C)  Current: Temperature: 97 5 °F (36 4 °C)    Physical Exam:   General Appearance:  Alert, interactive, nontoxic, no acute distress  Patient appears comfortable supine in bed  Throat: Oropharynx moist without lesions  Lungs:   Clear to auscultation bilaterally; no wheezes, rhonchi or rales; respirations unlabored; she is on room air   Heart:  RRR; no murmur, rub or gallop   Abdomen:   Soft, morbidly obese, non-tender, non-distended, positive bowel sounds  Genitourinary:  Montes De Oca catheter intact   Extremities: No clubbing or cyanosis; RUE PICC intact, dressing is clean/dry, no spreading erythema or leakage at site   Skin: No new rashes, lesions, or draining wounds noted on exposed skin  Labs, Imaging, & Other studies:   All pertinent labs and imaging studies were personally reviewed  Results from last 7 days   Lab Units 08/17/20  0630 08/16/20  0618 08/14/20  0510   WBC Thousand/uL 6 43 6 62 5 51   HEMOGLOBIN g/dL 9 5* 8 9* 9 0*   PLATELETS Thousands/uL 274 267 227     Results from last 7 days   Lab Units 08/17/20  0630 08/16/20  0618  08/13/20  0520   POTASSIUM mmol/L 3 8 3 3*   < > 2 8*   CHLORIDE mmol/L 106 107   < > 109*   CO2 mmol/L 33* 33*   < > 26   BUN mg/dL 8 9   < > 12   CREATININE mg/dL 0 79 0 81   < > 0 92   EGFR ml/min/1 73sq m 69 67   < > 58   CALCIUM mg/dL 8 7 8 1*   < > 7 6*   AST U/L 60* 54*  --  23   ALT U/L 42 32  --  10*   ALK PHOS U/L 41* 42*  --  45*    < > = values in this interval not displayed  Results from last 7 days   Lab Units 08/12/20  0633 08/12/20  8125   BLOOD CULTURE  No Growth After 4 Days  No Growth After 4 Days

## 2020-08-17 NOTE — PROGRESS NOTES
Vancomycin IV Pharmacy-to-Dose Consultation    Claire Barkley is a 81 y o  female who is currently receiving Vancomycin IV with management by the Pharmacy Consult service  Assessment/Plan:  The patient was reviewed  Renal function is stable and no signs or symptoms of nephrotoxicity and/or infusion reactions were documented in the chart  Based on todays assessment, continue current vancomycin (day # 7) dosing of 1000mg IV every 12 hours, with a plan for trough to be drawn at 0930 on 8/18/20    We will continue to follow the patients culture results and clinical progress daily      Porfirio Drilling, Pharmacist

## 2020-08-17 NOTE — ASSESSMENT & PLAN NOTE
· UNRULY on CKD 3 secondary to UTI/sepsis  · Resolved    Results from last 7 days   Lab Units 08/17/20  0630 08/16/20  0618 08/14/20  0510 08/13/20  0520 08/12/20  0634 08/11/20  0443   BUN mg/dL 8 9 8 12 20 22   CREATININE mg/dL 0 79 0 81 0 91 0 92 1 13 1 51*

## 2020-08-17 NOTE — COVID-19 HEALTH CARE FACILITY TRANSFER FORM
[facility] to [address] Transfer - COVID-19 Assessment             Name of Patient: Eusebia Montes De Oca                : 1935          Transport Date: 20       Has the patient been laboratory tested for COVID-19? []  NO  If No,Test was not indicated per  CDC Testing Criteria   May Transfer Patient   [x] YES  If Tested Results below     COVID-19 References              SARS-CoV-2   Date/Time Value Ref Range Status   2020 06:39 AM Positive (A) Negative Final            Question is to be completed for any patient who tests positive for COVID-19        1  [] Yes [May Transfer] [] No [May Not Transfer]          Question is to be completed for any patient who is tested for COVID-19            2    [] Yes [May Not Transfer] [] No [May Transfer]          Signature of Physician or Health Care Professional: Mirian Lennon DO 20          Form updated as of 3/24/2020

## 2020-08-17 NOTE — ASSESSMENT & PLAN NOTE
Please fill out patient survey if you receive one. We would like to hear back from you, good or bad.      Medicare Wellness Visit  Plan for Preventive Care    A good way for you to stay healthy is to use preventive care.  Medicare covers many services that can help you stay healthy.* The goal of these services is to find any health problems as quickly as possible. Finding problems early can help make them easier to treat.  Your personal plan below lists the services you may need and when they are due.     Health Maintenance Summary     Topic Due On Due Status Completed On Postpone Until Reason    Osteoporosis Screening  Completed Sep 21, 2016      Colorectal Cancer Screening - Colonoscopy  Mar 14, 2019 Not Due Mar 14, 2018      Immunization - Pneumococcal Aug 7, 2016 Postponed  Jun 4, 2018 Patient Refused    Immunization - TDAP Pregnancy  Hidden       Medicare Wellness Visit Jun 4, 2019 Not Due Jun 4, 2018      IMMUNIZATION - DTaP/Tdap/Td Jun 5, 2025 Not Due Jun 5, 2015      Immunization-Influenza Sep 1, 2018 Not Due       Depression Screening Jun 4, 2019 Not Due Jun 4, 2018      Hepatitis C Screening  Completed Sep 26, 2016      Lung Cancer Screening May 29, 2019 Not Due May 29, 2018      MAMMOGRAM - BREAST CANCER SCREENING Sep 7, 2017 Postponed Sep 7, 2016 Jun 4, 2018 Patient Refused           Preventive Care for Women and Men    Heart Screenings (Cardiovascular):  · Blood tests are used to check your cholesterol, lipid and triglyceride levels. High levels can increase your risk for heart disease and stroke. High levels can be treated with medications, diet and exercise. Lowering your levels can help keep your heart and blood vessels healthy.  Your provider will order these tests if they are needed.    · An ultrasound is done to see if you have an abdominal aortic aneurysm (AAA).  This is an enlargement of one of the main blood vessels that delivers blood to the body.   In the [place], 9,000 deaths are  · Paroxysmal atrial fibrillation on metoprolol    · Restarted eliquis caused by AAA.  You may not even know you have this problem and as many as 1 in 3 people will have a serious problem if it is not treated.  Early diagnosis allows for more effective treatment and cure.  If you have a family history of AAA or are a male age 65-75 who has smoked, you are at higher risk of an AAA.  Your provider can order this test, if needed.    Colorectal Screening:  · There are many tests that are used to check for cancer of your colon and rectum. You and your provider should discuss what test is best for you and when to have it done.  Options include:  · Screening Colonoscopy: exam of the entire colon, seen through a flexible lighted tube.  · Flexible Sigmoidoscopy: exam of the last third (sigmoid portion) of the colon and rectum, seen through a flexible lighted tube.  · Cologuard DNA stool test: a sample of your stool is used to screen for cancer and unseen blood in your stool.  · Fecal Occult Blood Test: a sample of your stool is studied to find any unseen blood    Flu Shot:  · An immunization that helps to prevent influenza (the flu). You should get this every year. The best time to get the shot is in the fall.    Pneumococcal Shot:  • Vaccines are available that can help prevent pneumococcal disease, which is any type of infection caused by Streptococcus pneumoniae bacteria.   Their use can prevent some cases of pneumonia, meningitis, and sepsis. There are two types of pneumococcal vaccines:   o Conjugate vaccines (PCV-13 or Prevnar 13®) - helps protect against the 13 types of pneumococcal bacteria that are the most common causes of serious infections in children and adults.    o Polysaccharide vaccine (PPSV23 or Tewrbozwv34®) - helps protect against 23 types of pneumococcal bacteria for patients who are recommended to get it.  These vaccines should be given at least 12 months apart.  A booster is usually not needed.     Hepatitis B Shot:  · An immunization that helps to protect people from  getting Hepatitis B. Hepatitis B is a virus that spreads through contact with infected blood or body fluids. Many people with the virus do not have symptoms.  The virus can lead to serious problems, such as liver disease. Some people are at higher risk than others. Your doctor will tell you if you need this shot.     Diabetes Screening:  · A test to measure sugar (glucose) in your blood is called a fasting blood sugar. Fasting means you cannot have food or drink for at least 8 hours before the test. This test can detect diabetes long before you may notice symptoms.    Glaucoma Screening:  · Glaucoma screening is performed by your eye doctor. The test measures the fluid pressure inside your eyes to determine if you have glaucoma.     Hepatitis C Screening:  · A blood test to see if you have the hepatitis C virus.  Hepatitis C attacks the liver and is a major cause of chronic liver disease.  Medicare will cover a single screening for all adults born between 1945 & 1965, or high risk patients (people who have injected illegal drugs or people who have had blood transfusions).  High risk patients who continue to inject illegal drugs can be screened for Hepatitis C every year.    Smoking and Tobacco-Use Cessation Counseling:  · Tobacco is the single greatest cause of disease and early death in our country today. Medication and counseling together can increase a person’s chance of quitting for good.   · Medicare covers two quitting attempts per year, with four counseling sessions per attempt (eight sessions in a 12 month period)    Preventive Screening tests for Women    Screening Mammograms and Breast Exams:  · An x-ray of your breasts to check for breast cancer before you or your doctor may be able to feel it.  If breast cancer is found early it can usually be treated with success.    Pelvic Exams and Pap Tests:  · An exam to check for cervical and vaginal cancer. A Pap test is a lab test in which cells are taken from  your cervix and sent to the lab to look for signs of cervical cancer. If cancer of the cervix is found early, chances for a cure are good. Testing can generally end at age 65, or if a woman has a hysterectomy for a benign condition. Your provider may recommend more frequent testing if certain abnormal results are found.    Bone Mass Measurements:  · A painless x-ray of your bone density to see if you are at risk for a broken bone. Bone density refers to the thickness of bones or how tightly the bone tissue is packed.    Preventive Screening tests for Men    Prostate Screening:  · PSA - Prostate Cancer blood test.  Experts do not recommend routine screening of healthy men with no signs or symptoms of prostate disease.  However, men should not ignore urinary symptoms, and should discuss their family history with their doctor.    *Medicare pays for many preventive services to keep you healthy. For some of these services, you might have to pay a deductible, coinsurance, and / or copayment.  The amounts vary depending on the type of services you need and the kind of Medicare health plan you have.

## 2020-08-17 NOTE — DISCHARGE SUMMARY
Discharge- [facility] 1935, 81 y o  female MRN: [id number]    Unit/Bed#: E5 -01 Encounter: [id number]    Primary Care Provider: Roque Cho MD   Date and time admitted to hospital: 8/10/2020  1:45 AM        Hydronephrosis with obstructing calculus  Assessment & Plan  · Bilateral hydronephrosis with ureteral obstruction status post bilateral stenting  · Had some hematuria in the setting of stenting however clearing up    * Sepsis [facility])  Assessment & Plan  · Sepsis secondary to polymicrobial bacteremia likely urinary source including bacteroides proteus and enterococcus  · Patient had bilateral ureteral obstruction due to large calculi status post bilateral stenting  · PICC placed for long-term vancomycin administration  · Per ID, continue on the current antibiotics for 14 days:  vancomycin (8/25) keflex (8/19) and metronidazole (8/21)    Acute kidney injury ([facility] [address] )  Assessment & Plan  · UNRULY on CKD 3 secondary to UTI/sepsis  · Resolved    Results from last 7 days   Lab Units 08/17/20  0630 08/16/20  0618 08/14/20  0510 08/13/20  0520 08/12/20  0634 08/11/20  0443   BUN mg/dL 8 9 8 12 20 22   CREATININE mg/dL 0 79 0 81 0 91 0 92 1 13 1 51*       Acute hypokalemia  Assessment & Plan  · Hypokalemia continue to replete  · Hypophosphatemia has been repleted with potassium phosphorus    Results from last 7 days   Lab Units 08/17/20  0630  08/13/20  0520 08/12/20  0634 08/11/20  0443   POTASSIUM mmol/L 3 8   < > 2 8* 3 0* 3 0*   MAGNESIUM mg/dL  --   --   --  2 0 2 2   PHOSPHORUS mg/dL  --   --  2 7 1 5* 2 8    < > = values in this interval not displayed         Chronic diastolic (congestive) heart failure (HCC)  Assessment & Plan  Wt Readings from Last 3 Encounters:   08/11/20 132 kg (290 lb)   04/28/20 132 kg (291 lb 10 7 oz)   03/12/20 (!) 144 kg (318 lb)     · She is euvolemic    Acute on chronic respiratory failure with hypoxia (HCC)  Assessment & Plan  · Acute respiratory failure with hypoxia with history of COVID-19 in April  · Continue supplemental oxygen    Paroxysmal A-fib (HCC)  Assessment & Plan  · Paroxysmal atrial fibrillation on metoprolol  · Restarted eliquis      Discharging Physician / Practitioner: Claudy Medellin DO  PCP: Robyn Peraza MD  Admission Date:   Admission Orders (From admission, onward)     Ordered        08/10/20 0523  Inpatient Admission  Once                   Discharge Date: 08/17/20    Resolved Problems  Date Reviewed: 8/16/2020          Resolved    Left ureteral calculus 8/10/2020     Resolved by  DHRUV Torres    Overview Signed 6/12/2019 10:52 AM by Rosy Magallanes MD     Added automatically from request for surgery [id number]                 Consultations During Hospital Stay:  · ID  · Urology            Reason for Admission: fever and chills      Hospital Course:     Isabella Hickman is a 81 y o  female patient who originally presented to the hospital on 8/10/2020 due to fever and chills  Found to have sepsis with polymicrobial bacteremia likely from a urinary source  She has bilat obstructing kidney stones and needed bilateral stent placment  She will need IV antibiotics through a PICC line as outlined above  The PICC line should be removed 8/26/2020  She has no COVID symptoms, but was tested for screening and is COVID positive  [facility] is aware and has agreed to take her back with COVID precaustion  ID feels she is likely an asymptomatic carrier  Please see above list of diagnoses and related plan for additional information  Condition at Discharge: good       Discharge Day Visit / Exam:     Subjective:  Feels well  No fever or chills    No back pain  No cough  No sob        Vitals: Blood Pressure: 117/59 (08/16/20 2300)  Pulse: 84 (08/16/20 2300)  Temperature: 97 5 °F (36 4 °C) (08/16/20 2300)  Temp Source: Temporal (08/16/20 2300)  Respirations: 18 (08/16/20 2300)  Height: 5' 2" (157 5 cm) (08/11/20 1300)  Weight - Scale: 132 kg (290 lb) (08/11/20 1300)  SpO2: 95 % (08/16/20 2300)    Exam:     Physical Exam  Vitals signs and nursing note reviewed  HENT:      Head: Normocephalic and atraumatic  Eyes:      Pupils: Pupils are equal, round, and reactive to light  Cardiovascular:      Rate and Rhythm: Normal rate and regular rhythm  Heart sounds: No murmur  No friction rub  No gallop  Pulmonary:      Effort: Pulmonary effort is normal       Breath sounds: Normal breath sounds  No wheezing or rales  Abdominal:      General: Bowel sounds are normal       Palpations: Abdomen is soft  Tenderness: There is no abdominal tenderness  Mary Huerta Discharge instructions/Information to patient and family:   See after visit summary for information provided to patient and family  Provisions for Follow-Up Care:  See after visit summary for information related to follow-up care and any pertinent home health orders  Disposition:     [facility]  Discharge Statement:  I spent 45 minutes discharging the patient  This time was spent on the day of discharge  I had direct contact with the patient on the day of discharge  Greater than 50% of the total time was spent examining patient, answering all patient questions, arranging and discussing plan of care with patient as well as directly providing post-discharge instructions  Additional time then spent on discharge activities  Discharge Medications:  See after visit summary for reconciled discharge medications provided to patient and family        ** Please Note: This note has been constructed using a voice recognition system **

## 2020-08-18 ENCOUNTER — TELEPHONE (OUTPATIENT)
Dept: UROLOGY | Facility: MEDICAL CENTER | Age: 85
End: 2020-08-18

## 2020-08-18 ENCOUNTER — TELEPHONE (OUTPATIENT)
Dept: UROLOGY | Facility: AMBULATORY SURGERY CENTER | Age: 85
End: 2020-08-18

## 2020-08-18 ENCOUNTER — TELEPHONE (OUTPATIENT)
Dept: INFECTIOUS DISEASES | Facility: CLINIC | Age: 85
End: 2020-08-18

## 2020-08-18 DIAGNOSIS — A41.9 SEPSIS, DUE TO UNSPECIFIED ORGANISM, UNSPECIFIED WHETHER ACUTE ORGAN DYSFUNCTION PRESENT (HCC): ICD-10-CM

## 2020-08-18 DIAGNOSIS — R78.81 BACTEREMIA: Primary | ICD-10-CM

## 2020-08-18 NOTE — TELEPHONE ENCOUNTER
Patient stented by Dr Lit Slater 8/10 @ [facility] emergently  I have held 9/2 with Dr Seven Wray for 2nd stage Bilateral URS  She has OV scheduled 8/19  H&P/CONSENT sign and she will need UCx at this time as well  Thank you!

## 2020-08-18 NOTE — TELEPHONE ENCOUNTER
Spoke to Trevon @ [facility] to schedule 2nd stage URS with Dr Eugenio Escobedo 9/2 @ [address]  She has H&P/Consent OV 8/19 @ 1:00 [place] office-confirmed date/time location with Trevon  She will have a UCx at the visit also  Paperwork will be faxed to Trevon tomorrow when I am in office

## 2020-08-18 NOTE — TELEPHONE ENCOUNTER
Called [phone]/[facility] - spoke to Naima Vick regarding weekly labs  She takes 1 gm q12 which is scheduled for 9a/9p  She will have her first set of labs (cbcd bmp vanco trough) this morning before first dose  She will have another set drawn next Monday  She was provided with our fax number and that we would not need follow up, just the weekly labs  Provided her with our phone number for any questions or concerns

## 2020-08-18 NOTE — TELEPHONE ENCOUNTER
Call placed to patients care facility to make them aware that we need to cancel patients apt for 8/19/20 due to her coming back covid positive  Facility states that they will call back to reschedule once virus is resolved

## 2020-08-19 ENCOUNTER — TELEPHONE (OUTPATIENT)
Dept: UROLOGY | Facility: AMBULATORY SURGERY CENTER | Age: 85
End: 2020-08-19

## 2020-08-19 NOTE — TELEPHONE ENCOUNTER
Spoke to Review Trackers @ [facility]  I was notified after we scheduled Patient for surgery that she tested positive for COVID  We will r/s her once it is resolved  She will also need new OV with Dr Baudilio Ramirez

## 2020-08-20 NOTE — TELEPHONE ENCOUNTER
Spoke to Nurse @ [facility] and informed her Patient would need to be retested and provided she is negative she could be r/s at that time

## 2020-08-24 ENCOUNTER — TELEPHONE (OUTPATIENT)
Dept: INFECTIOUS DISEASES | Facility: CLINIC | Age: 85
End: 2020-08-24

## 2020-08-24 NOTE — TELEPHONE ENCOUNTER
Received a call from [facility] with [address]  Patient on 1gm vanco Q12 with a vanco trough of 23  Patient only has two doses left  Dr Alex Sarabia would like her to complete as ordered to end after tomorrow morning's dose with no dose change  Confirmed with Cristal

## 2020-08-25 LAB
BASE EXCESS BLDA CALC-SCNC: 6 MMOL/L (ref -2–3)
CA-I BLD-SCNC: 1.08 MMOL/L (ref 1.12–1.32)
GLUCOSE SERPL-MCNC: 154 MG/DL (ref 65–140)
HCO3 BLDA-SCNC: 30 MMOL/L (ref 24–30)
HCT VFR BLD CALC: 29 % (ref 34.8–46.1)
HGB BLDA-MCNC: 9.9 G/DL (ref 11.5–15.4)
PCO2 BLD: 31 MMOL/L (ref 21–32)
PCO2 BLD: 42.3 MM HG (ref 42–50)
PH BLD: 7.46 [PH] (ref 7.3–7.4)
PO2 BLD: 172 MM HG (ref 35–45)
POTASSIUM BLD-SCNC: 2.6 MMOL/L (ref 3.5–5.3)
SAO2 % BLD FROM PO2: 100 % (ref 60–85)
SODIUM BLD-SCNC: 141 MMOL/L (ref 136–145)
SPECIMEN SOURCE: ABNORMAL

## 2020-08-26 ENCOUNTER — TELEPHONE (OUTPATIENT)
Dept: UROLOGY | Facility: AMBULATORY SURGERY CENTER | Age: 85
End: 2020-08-26

## 2020-08-26 NOTE — TELEPHONE ENCOUNTER
Cristal Newell [facility] called regarding negative covid results then did 08/25/20  she is faxing results [phone] I will have scanned into chart and forward message to Surgical Coordinator  The fax received was not actual results from a lab I contacted Fortion Schreiber informed her actual lab results needed she will look into

## 2020-08-26 NOTE — TELEPHONE ENCOUNTER
Spoke to Supa Cardozo @ [facility] about COVID guidelines for being r/s for surgery  Patient to be tested 14 days after +result by a lab test and results must be faxed to our office  [phone]  Attn: Katharine QUINONEZ  Patient will also need to be tested 10 days prior to surgery which is 9/20-surgery date held 9/22 with Dr Ashley Silva @ [facility]  She has an OV with Delicia Deng 9/25 @ 8:30 for H&P  Day of surgery Patient will be retested upon arrival for surgery  Cristal repeated back and understood  She was waiting for a call back from the Nurse concerning a catheter issue as well  I checked and Henry Petit was sent to [facility] clinical by call center  She should receive a call later today

## 2020-08-26 NOTE — TELEPHONE ENCOUNTER
Spoke to [facility] and informed her Patient would require COVID retest 14 days after her + result on 8/18  Once we receive the actual  lab test showing Patient is Negative for COVID for retest, she will require an OV with Dr Julio Tan for H&P and signing consent for surgery that will then be scheduled

## 2020-08-26 NOTE — TELEPHONE ENCOUNTER
Call placed to [facility] where patient resides and appointment scheduled for 9/25/2020 at 8:30am for H &P with Erica Vora  This is the only available appointment with CRNP or provider at this time prior to 9/30/20  [facility] accepted this appointment

## 2020-08-26 NOTE — TELEPHONE ENCOUNTER
Patient need OV with Dr Baudilio Ramirez or PA @ [facility] office before 9/30 surgery for H&P  Please make it after Sept 4th  Require assistance to find OV  Thank you!

## 2020-08-26 NOTE — TELEPHONE ENCOUNTER
Call placed to [facility] at [facility] and spoke with her  She informed me that she spoke to [facility], the surgery scheduler, in regards to COVID testing  Pt is scheduled for COVID testing in September prior to surgery  She is also asking if yeager catheter needs to remain in place until she is seen in the office on 9/25/2020 for H&P  Yeager catheter is causing the patient discomfort and mild pain at the insertion site at this time  If  Yeager catheter is to remain, facility is requesting orders for flushing secondary to sediment build up  Informed [facility] that I will reach out to the provider in regards to her questions and contact her with any further recommendations

## 2020-08-26 NOTE — TELEPHONE ENCOUNTER
Call received from [facility] at [facility]  This is in regard to the need for a negative COVID test   She will be tested again on 8/31  She also has an issue with the catheter as well  She is having discomfort with the catheter  Inquiring if it is necessary    Please call [facility] at [phone]  Thank you

## 2020-08-27 NOTE — TELEPHONE ENCOUNTER
Montes De Oca catheter can will stay in place until upcoming procedure  OK to provide orders to flush catheter once daily and PRN to maintain patency  Patient should also increase water intake  Can apply small amount of petroleum jelly near insertion site for irritation

## 2020-08-27 NOTE — TELEPHONE ENCOUNTER
Called [facility] and spoke to Michelle Quintana RN    Verbal orders given per AP's recommendation and faxed to [facility] [phone]

## 2020-08-31 ENCOUNTER — TELEPHONE (OUTPATIENT)
Dept: UROLOGY | Facility: MEDICAL CENTER | Age: 85
End: 2020-08-31

## 2020-08-31 LAB — EXT SARS-COV-2: NOT DETECTED

## 2020-08-31 NOTE — TELEPHONE ENCOUNTER
PILII  Patient at [facility]   Scheduled for surgery 9/30  Call received in regard to location for the H&P on 9/25    Requesting the location of the surgery  Updated with all information  Requested to be called at [phone]  With time for surgery  Thank you

## 2020-08-31 NOTE — TELEPHONE ENCOUNTER
Spoke with Michelle Quintana at [facility] and left msg that H&P appt is at our offices in [address] [place] [address] at 2:43 a m  and that surgery scheduler will be in touch the day before her surgery with the time she needs to be at hospital

## 2020-09-03 NOTE — TELEPHONE ENCOUNTER
Spoke to [place] and confirmed that I checked with Dr Farhan Aguilar surgery scheduler Roney Baron and he does NOT hold aspirin or Blood thinners for this surgery  She should take hypertension meds with only a small sip of water morning of surgery  She verbalized understanding

## 2020-09-03 NOTE — TELEPHONE ENCOUNTER
Marlin Campbell from [place] called regarding the medications the patient should stop before the procedure and what she can take the morning of  Please advise

## 2020-09-09 ENCOUNTER — TELEPHONE (OUTPATIENT)
Dept: UROLOGY | Facility: AMBULATORY SURGERY CENTER | Age: 85
End: 2020-09-09

## 2020-09-09 NOTE — TELEPHONE ENCOUNTER
Patient's daughter, POA calling to state that her Mother is in pain and wondering if there is an earlier date for surgery with Dr Murali Mejias  We had not received and call from [facility] since 8/25 concerning this Patient  She tested + for COVID and was r/s for URS at that time for 9/30  I spoke to Nicol @ [facility] 9/3 but she was only inquiring about meds to hold for the upcoming surgery

## 2020-09-14 ENCOUNTER — TELEPHONE (OUTPATIENT)
Dept: UROLOGY | Facility: AMBULATORY SURGERY CENTER | Age: 85
End: 2020-09-14

## 2020-09-14 DIAGNOSIS — N39.0 URINARY TRACT INFECTION WITHOUT HEMATURIA, SITE UNSPECIFIED: Primary | ICD-10-CM

## 2020-09-14 RX ORDER — CIPROFLOXACIN 500 MG/1
500 TABLET, FILM COATED ORAL EVERY 12 HOURS SCHEDULED
Qty: 14 TABLET | Refills: 0 | Status: SHIPPED | OUTPATIENT
Start: 2020-09-14 | End: 2020-09-21

## 2020-09-14 RX ORDER — CIPROFLOXACIN 500 MG/1
500 TABLET, FILM COATED ORAL EVERY 12 HOURS SCHEDULED
Qty: 14 TABLET | Refills: 0 | Status: SHIPPED | OUTPATIENT
Start: 2020-09-14 | End: 2020-09-14 | Stop reason: SDUPTHER

## 2020-09-14 NOTE — TELEPHONE ENCOUNTER
Justice Marroquin from [address] calling to advise patient labs are postive and would like to know treatment plan prior to surgery  Justice Marroquin will fax over records

## 2020-09-14 NOTE — TELEPHONE ENCOUNTER
Message left on nurses voicemail at [facility]  Per Marlin Campbell at care facility the pharmacy needed to be changed  I have changed/updated pharmacy and [facility] is sending medication there for patient  We need to know what type of note needs to be sent to physician at facility for recommendations for patients care           Fax number for facility: [phone]

## 2020-09-14 NOTE — TELEPHONE ENCOUNTER
Call placed to [facility] in regards to patients urine testing results  Office contact information left on machine for facility to call our office back

## 2020-09-14 NOTE — LETTER
Re: Jaek Connolly          - 1935     09/15/2020      To whom it May concern: The above-mentioned patient had a urine culture performed 2020 which resulted positive for Proteus mirabilis  Prescription for ciprofloxacin 1 tablet p o  B i d  X7 days sent to pharmacy on file  Patient to take to completion            Thank you,      DHRUV Villarreal

## 2020-09-14 NOTE — TELEPHONE ENCOUNTER
Latoya Arreola from [facility] called and stated RX needs to be sent to new pharmacy in file [facility] in [place]  She also states along with that they will need a letter of recommendation for the physician at facility

## 2020-09-14 NOTE — TELEPHONE ENCOUNTER
Medications sent to pharmacy as requested  My recommendation is for the patient to take that medication

## 2020-09-15 NOTE — TELEPHONE ENCOUNTER
Please fax over the result of the urine culture report to [address] so they have a copy for their file   Thank you  Please print the copy of bladder that was attached to this communication and faxed to nursing facility

## 2020-09-15 NOTE — TELEPHONE ENCOUNTER
Copy of UC report and and note from [address] re script sent for ciprofloxacin faxed to [facility] [phone]

## 2020-09-15 NOTE — TELEPHONE ENCOUNTER
Spoke to Nurse at [facility] requesting doctor note re pt's UTI and treatment with antibiotics be faxed to [phone]

## 2020-09-17 NOTE — TELEPHONE ENCOUNTER
Spoke to Gokul Pendleton at [facility] and advised pt does not have to be on stretcher for H&P visit  She can come to appt in wheelchair

## 2020-09-17 NOTE — TELEPHONE ENCOUNTER
Call received from Trevon at [facility]  Inquiring if patient does need to arrive for H&P on stretcher  Pleaser call to advise as soon as possible for them to set up transport    Trevon can be reached at [phone]  Thank you

## 2020-09-21 RX ORDER — AMLODIPINE BESYLATE 10 MG/1
10 TABLET ORAL DAILY
Status: ON HOLD | COMMUNITY
End: 2020-09-30 | Stop reason: CLARIF

## 2020-09-21 RX ORDER — CHOLECALCIFEROL (VITAMIN D3) 50 MCG
TABLET ORAL
COMMUNITY
Start: 2020-08-03

## 2020-09-21 RX ORDER — ASCORBIC ACID 500 MG
TABLET ORAL
COMMUNITY
Start: 2020-08-03

## 2020-09-25 ENCOUNTER — OFFICE VISIT (OUTPATIENT)
Dept: UROLOGY | Facility: MEDICAL CENTER | Age: 85
End: 2020-09-25
Payer: MEDICARE

## 2020-09-25 VITALS — TEMPERATURE: 96.7 F | SYSTOLIC BLOOD PRESSURE: 120 MMHG | DIASTOLIC BLOOD PRESSURE: 82 MMHG | HEART RATE: 86 BPM

## 2020-09-25 DIAGNOSIS — N39.0 URINARY TRACT INFECTION WITHOUT HEMATURIA, SITE UNSPECIFIED: ICD-10-CM

## 2020-09-25 DIAGNOSIS — N20.0 BILATERAL NEPHROLITHIASIS: Primary | ICD-10-CM

## 2020-09-25 LAB
BACTERIA UR QL AUTO: ABNORMAL /HPF
BILIRUB UR QL STRIP: NEGATIVE
CLARITY UR: ABNORMAL
COLOR UR: ABNORMAL
GLUCOSE UR STRIP-MCNC: NEGATIVE MG/DL
HGB UR QL STRIP.AUTO: ABNORMAL
KETONES UR STRIP-MCNC: NEGATIVE MG/DL
LEUKOCYTE ESTERASE UR QL STRIP: ABNORMAL
MUCOUS THREADS UR QL AUTO: ABNORMAL
NITRITE UR QL STRIP: POSITIVE
NON-SQ EPI CELLS URNS QL MICRO: ABNORMAL /HPF
OTHER STN SPEC: ABNORMAL
PH UR STRIP.AUTO: 7 [PH]
PROT UR STRIP-MCNC: ABNORMAL MG/DL
RBC #/AREA URNS AUTO: ABNORMAL /HPF
SL AMB  POCT GLUCOSE, UA: NORMAL
SL AMB LEUKOCYTE ESTERASE,UA: NORMAL
SL AMB POCT BILIRUBIN,UA: NORMAL
SL AMB POCT BLOOD,UA: NORMAL
SL AMB POCT CLARITY,UA: NORMAL
SL AMB POCT COLOR,UA: NORMAL
SL AMB POCT KETONES,UA: NORMAL
SL AMB POCT NITRITE,UA: NORMAL
SL AMB POCT PH,UA: 7
SL AMB POCT SPECIFIC GRAVITY,UA: 1.02
SL AMB POCT URINE PROTEIN: NORMAL
SL AMB POCT UROBILINOGEN: 0.2
SP GR UR STRIP.AUTO: 1.01 (ref 1–1.03)
UROBILINOGEN UR QL STRIP.AUTO: 0.2 E.U./DL
WBC #/AREA URNS AUTO: ABNORMAL /HPF

## 2020-09-25 PROCEDURE — 87086 URINE CULTURE/COLONY COUNT: CPT | Performed by: NURSE PRACTITIONER

## 2020-09-25 PROCEDURE — 87077 CULTURE AEROBIC IDENTIFY: CPT | Performed by: NURSE PRACTITIONER

## 2020-09-25 PROCEDURE — 87186 SC STD MICRODIL/AGAR DIL: CPT | Performed by: NURSE PRACTITIONER

## 2020-09-25 PROCEDURE — 99214 OFFICE O/P EST MOD 30 MIN: CPT | Performed by: NURSE PRACTITIONER

## 2020-09-25 PROCEDURE — 81001 URINALYSIS AUTO W/SCOPE: CPT | Performed by: NURSE PRACTITIONER

## 2020-09-25 PROCEDURE — 81002 URINALYSIS NONAUTO W/O SCOPE: CPT | Performed by: NURSE PRACTITIONER

## 2020-09-25 RX ORDER — NITROFURANTOIN 25; 75 MG/1; MG/1
100 CAPSULE ORAL 2 TIMES DAILY
Qty: 10 CAPSULE | Refills: 0 | Status: SHIPPED | OUTPATIENT
Start: 2020-09-25 | End: 2020-09-30

## 2020-09-25 NOTE — PROGRESS NOTES
Pre-op visit  9/25/2020      Chief Complaint   Patient presents with    Nephrolithiasis     Assessment and Plan     81 y o  female managed by Dr Murali Mejias    1  Nephrolithiasis  · Urinalysis with microscopy and urine culture ordered -sent from the office  · Prescription for Macrobid provided    History and physical was performed for the patients upcoming  Cystoscopy, ureteroscopy with laser lithotripsy, retrograde pyelogram and insertion of bilateral ureteral stents scheduled for  09/30/2020 with Dr Murali Mejias  All questions and concerns regarding surgery have been addressed and answered  Will proceed with surgery as planned  Coronavirus Source NASOPHARYNX   [facility] ([facility])     SARS COV 2 Patient Unknown   [facility] ([facility])     SARS Coronavirus 2 PCR NOT DETECTED          CT CHEST, ABDOMEN AND PELVIS WITHOUT IV CONTRAST     INDICATION:   back pain across mid back, cough, hypoxia  Hypoxia  Pain across the mid back since Tuesday  Ongoing cough with clear sputum since having COVID-19 in April 2020  The patient tested negative for COVID-19 on August 10, 2020  Mild nausea  Prior history of tobacco use  History of kidney stones      COMPARISON:  CT of the abdomen and pelvis dated May 21, 2019     TECHNIQUE: CT examination of the chest, abdomen and pelvis was performed without intravenous contrast   Axial, sagittal, and coronal 2D reformatted images were created from the source data and submitted for interpretation       Radiation dose length product (DLP) for this visit:  1709 mGy-cm   This examination, like all CT scans performed in the [facility], was performed utilizing techniques to minimize radiation dose exposure, including the use of iterative   reconstruction and automated exposure control       Enteric contrast was not administered       FINDINGS:     CHEST     LUNGS:  There is minimal dependent atelectasis, left greater than right    There is mild tree-in-bud opacity areas scattered bilaterally, most noticeable in the right upper lobe  There is no focal consolidation      PLEURA:  Unremarkable      HEART/GREAT VESSELS:  There is coronary artery disease  There is atherosclerosis of the thoracic aorta  There is no significant pericardial effusion      MEDIASTINUM AND OSBALDO:  Unremarkable      CHEST WALL AND LOWER NECK:   Unremarkable      ABDOMEN     LIVER/BILIARY TREE:  Unremarkable      GALLBLADDER:  Gallbladder is surgically absent      SPLEEN:  Unremarkable      PANCREAS:  Unremarkable      ADRENAL GLANDS:  Unremarkable      KIDNEYS/URETERS:      RIGHT: There is an extrarenal pelvis on the right  There is a large calculus in the distal aspect of the right extrarenal pelvis and extending into the right ureteropelvic junction, measuring approximately 25 x 14 mm  This is new compared to May 21,   2019  There is mild right hydronephrosis  There is stranding of the fat adjacent to the right kidney  Superimposed infection should be excluded  Urology consultation is recommended  There is a cluster of nonobstructing calculi posteriorly in the   interpolar region right kidney      LEFT: There is a partially extrarenal pelvis on the left  Several calculi are present within the left renal pelvis and extending towards the left ureteropelvic junction  The largest calculus in the left extrarenal pelvis measures approximately 16 mm        Additionally, there is an approximately 11 x 8 mm calculus within the proximal left ureter, approximately 2 5 cm beyond the left ureteropelvic junction  There is mild to moderate left hydronephrosis, similar to slightly worse compared with May 21, 2019  There is, however, more stranding of the fat adjacent to the left kidney and tracking caudally around the proximal left ureter  Superimposed infection should be excluded    Urology consultation is recommended      There are also several nonobstructing calculi in the upper pole and interpolar region left kidney         STOMACH AND BOWEL:  There is no evidence of bowel obstruction  There is colonic diverticulosis without evidence of acute diverticulitis      APPENDIX:  No findings to suggest appendicitis      ABDOMINOPELVIC CAVITY:  As described above  No pneumoperitoneum      VESSELS:  There is atherosclerosis  There is no abdominal aortic aneurysm      PELVIS     REPRODUCTIVE ORGANS:  Unremarkable for patient's age      URINARY BLADDER:  Unremarkable      ABDOMINAL WALL/INGUINAL REGIONS:  Unremarkable      OSSEOUS STRUCTURES:  No acute fracture or destructive osseous lesion  There are degenerative changes of both shoulders  There is multilevel degenerative change of the spine      IMPRESSION:     Bilateral nephrolithiasis        There is an approximately 25 x 14 mm calculus within the distal aspect of the right extrarenal pelvis and extending into the right ureteropelvic junction, new compared with May 21, 2019  There is mild right hydronephrosis and there is stranding of the   fat adjacent to the right kidney and tracking caudally adjacent to the proximal right ureter, also new compared with May 21, 2019      There is an approximately 11 x 8 mm calculus within the proximal left ureter, approximately 2 5 cm beyond the left ureteropelvic junction, similar to May 21, 2019  There are also several calculi within the left renal pelvis and extending towards the   left ureteropelvic junction, the largest of which measures approximately 16 mm  There is mild to moderate left hydronephrosis, similar to slightly worse compared with May 21, 2019  There is, however, more stranding of the fat adjacent to the left   kidney and tracking caudally adjacent to the proximal left ureter      Superimposed infection should be excluded  Correlation with urinalysis and urine culture and sensitivity is recommended    Urology consultation is recommended      Other findings as described above, please see discussion      The images are available for clinical review    History of Present Illness  Claire Barkley is a 81 y o  female here for history and physical prior to their upcoming  Cystoscopy, bilateral ureteroscopy with laser lithotripsy, retrograde pyelogram and insertion of bilateral ureteral stents for  09/30/2020  The patient has had no overall changes in their health since their last visit and denies any prior complications with anesthesia        Review of Systems   Constitutional: Negative for chills and fever  Respiratory: Negative for cough and shortness of breath  Cardiovascular: Negative for chest pain  Gastrointestinal: Negative for abdominal distention, abdominal pain, blood in stool, nausea and vomiting  Genitourinary: Negative for difficulty urinating, dysuria, enuresis, flank pain, frequency, hematuria and urgency  Musculoskeletal: Negative for back pain  Edema noted to bilateral lower extremity   Skin: Negative for rash  Neurological: Negative for dizziness           Past Medical History  Past Medical History:   Diagnosis Date    Acute kidney failure (HCC)     Anemia     Arthritis     Atrial fibrillation ([address] )     Bacterial infection due to Proteus mirabilis 5/23/2019    Chafing     folds of skin and back of thighs    Chronic indwelling Montes De Oca catheter     removed    Colon polyp     Coronary artery disease     Difficulty walking     Discitis     Discitis     Dysphagia     Dysphagia     Dysuria     Gait abnormality     GERD (gastroesophageal reflux disease)     History of transfusion     Hyperlipidemia     Hypertension     Hypothyroidism     Irregular heart beat     Kidney stone     Left ureteral calculus 6/12/2019    Added automatically from request for surgery [id number]    Lymphedema     Major depressive disorder     MDD (major depressive disorder)     MI (myocardial infarction) ([address] )     Morbid obesity ([address] )     Morbid obesity ([address] )     Muscle weakness     Muscle weakness (generalized)     NSTEMI (non-ST elevated myocardial infarction) [facility]     Osteoporosis     Paroxysmal A-fib (HCC)     Recurrent UTI     Renal disorder     RLS (restless legs syndrome)     Sleep apnea     Symptomatic anemia 1/15/2020    Syncope 1/17/2020    Wheelchair bound     unable to bear weight , hx infected prosthesis       Past Social History  Past Surgical History:   Procedure Laterality Date    CHOLECYSTECTOMY      COLONOSCOPY      FL RETROGRADE PYELOGRAM  5/22/2019    FL RETROGRADE PYELOGRAM  8/10/2020    HYSTERECTOMY      JOINT REPLACEMENT Bilateral     knee replacment    LAPAROSCOPIC GASTRIC BANDING      SD CYSTO/URETERO W/LITHOTRIPSY &INDWELL STENT INSRT Left 6/26/2019    Procedure: CYSTO, URETEROSCOPY W/HOLMIUM LASER, STENT EXCHANGE;  Surgeon: Viona Peabody, MD;  Location: [facility];  Service: Urology    SD CYSTOURETHROSCOPY,URETER CATHETER Left 5/22/2019    Procedure: CYSTOSCOPY; RIGHT RETROGRADE PYELOGRAM WITH INSERTION STENT URETERAL LEFT;  Surgeon: Viona Peabody, MD;  Location: [facility];  Service: Urology    SD CYSTOURETHROSCOPY,URETER CATHETER Bilateral 8/10/2020    Procedure: CYSTOSCOPY RETROGRADE PYELOGRAM WITH INSERTION STENT URETERAL;  Surgeon: Darrel Baldwin MD;  Location: [facility];  Service: Urology    SD XCAPSL CTRC RMVL INSJ IO LENS PROSTH W/O ECP Right 3/12/2020    Procedure: CATARACT REMOVAL W/INTRAOCULAR LENS;  Surgeon: Ned Homans, MD;  Location: [address] [place] [address] MAIN OR;  Service: Ophthalmology    REMOVAL GASTRIC BAND LAPAROSCOPIC      VENTRAL HERNIA REPAIR      x4       Past Family History  History reviewed  No pertinent family history  Past Social history  Social History     Socioeconomic History    Marital status:       Spouse name: Not on file    Number of children: Not on file    Years of education: Not on file    Highest education level: Not on file   Occupational History    Not on file   Social Needs    Financial resource strain: Not on file  Food insecurity     Worry: Not on file     Inability: Not on file    Transportation needs     Medical: Not on file     Non-medical: Not on file   Tobacco Use    Smoking status: Former Smoker     Types: Cigars    Smokeless tobacco: Never Used    Tobacco comment: smoked when was very much younger for one summer   Substance and Sexual Activity    Alcohol use:  Yes     Alcohol/week: 1 0 standard drinks     Types: 1 Glasses of wine per week     Frequency: Monthly or less     Drinks per session: 1 or 2     Comment: socially     Drug use: Never    Sexual activity: Not on file   Lifestyle    Physical activity     Days per week: Not on file     Minutes per session: Not on file    Stress: Not on file   Relationships    Social connections     Talks on phone: Not on file     Gets together: Not on file     Attends Taoist service: Not on file     Active member of club or organization: Not on file     Attends meetings of clubs or organizations: Not on file     Relationship status: Not on file    Intimate partner violence     Fear of current or ex partner: Not on file     Emotionally abused: Not on file     Physically abused: Not on file     Forced sexual activity: Not on file   Other Topics Concern    Not on file   Social History Narrative    Not on file       Current Medications  Current Outpatient Medications   Medication Sig Dispense Refill    acetaminophen (TYLENOL) 325 mg tablet Take 650 mg by mouth every 6 (six) hours as needed for mild pain       alendronate (FOSAMAX) 70 mg tablet Take 70 mg by mouth see administration instructions Give once a day on friday      amLODIPine (NORVASC) 10 mg tablet Take 10 mg by mouth daily      apixaban (Eliquis) 5 mg Take 5 mg by mouth 2 (two) times a day      ascorbic acid (VITAMIN C) 500 mg tablet       aspirin (ECOTRIN LOW STRENGTH) 81 mg EC tablet Take 81 mg by mouth daily      atorvastatin (LIPITOR) 10 mg tablet Take 10 mg by mouth daily at bedtime       bisacodyl (DULCOLAX) 10 mg suppository Insert 10 mg into the rectum as needed       calcium carbonate-vitamin D (OSCAL-D) 500 mg-200 units per tablet Take 1 tablet by mouth daily with breakfast      Cholecalciferol (Vitamin D) 50 MCG (2000 UT) tablet       cholecalciferol (VITAMIN D3) 1,000 units tablet Take 2,000 Units by mouth daily       Coenzyme Q10 (CO Q10) 100 MG CAPS Take by mouth      docusate sodium (COLACE) 100 mg capsule Take 100 mg by mouth 2 (two) times a day      furosemide (LASIX) 40 mg tablet Take 40 mg by mouth daily       levothyroxine 150 mcg tablet Take 150 mcg by mouth daily in the early morning       lutein 6 mg Take 6 mg by mouth daily      magnesium hydroxide (MILK OF MAGNESIA) 400 mg/5 mL oral suspension daily as needed       Melatonin 5 MG TABS Take 5 mg by mouth daily at bedtime       metoprolol tartrate (LOPRESSOR) 25 mg tablet Take 25 mg by mouth every 12 (twelve) hours      nitroglycerin (NITROSTAT) 0 4 mg SL tablet Place 0 4 mg under the tongue every 5 (five) minutes as needed for chest pain       pantoprazole (PROTONIX) 40 mg tablet Take 1 tablet (40 mg total) by mouth 2 (two) times a day before meals 170 tablet 0    pramipexole (MIRAPEX) 1 mg tablet Take 0 5 mg by mouth daily at bedtime       Skin Protectants, Misc  (DERMACERIN) CREA Apply topically      talc Apply topically as needed for irritation       No current facility-administered medications for this visit  Allergies  Allergies   Allergen Reactions    Augmentin [Amoxicillin-Pot Clavulanate] Rash     Patient reports that she has tolerated amoxicillin in the past and would be willing to try penicillin if needed      Hydralazine Other (See Comments)     Headache, dizziness, nausea,    Sulfamethoxazole-Trimethoprim Rash    Actonel  [Risedronate Sodium]     Lisinopril Other (See Comments)     "cough"      Medical Tape      Pulls off skin      Meloxicam Cough    Wound Dressing Adhesive Other (See Comments)     "pulls skin off"- darrel paper tape    Conjugated Estrogens Rash     Premarin Cream      Neurontin [Gabapentin] Rash         Past Medical History, Social History, Family History, medications and allergies were reviewed        Vitals  Vitals:    09/25/20 0819   BP: 120/82   Pulse: 86   Temp: (!) 96 7 °F (35 9 °C)     Physical Exam  /82   Pulse 86   Temp (!) 96 7 °F (35 9 °C)   General appearance: alert and oriented, in no acute distress  Lungs: clear to auscultation bilaterally  Heart: regular rate and rhythm  Abdomen: Soft, nontender  Extremities: edema bilateral lower extremities   Skin: Warm, dry and intact  Neurologic: Grossly normal     DHRUV Tompkins

## 2020-09-25 NOTE — H&P
Pre-op visit  9/25/2020      Chief Complaint   Patient presents with    Nephrolithiasis     Assessment and Plan     81 y o  female managed by Dr Ashley Silva    1  Nephrolithiasis  · Urinalysis with microscopy and urine culture ordered -sent from the office  · Prescription for Macrobid provided    History and physical was performed for the patients upcoming  Cystoscopy, ureteroscopy with laser lithotripsy, retrograde pyelogram and insertion of bilateral ureteral stents scheduled for  09/30/2020 with Dr Ashley Silva  All questions and concerns regarding surgery have been addressed and answered  Will proceed with surgery as planned  Coronavirus Source NASOPHARYNX   [facility])     SARS COV 2 Patient Unknown   [facility]     SARS Coronavirus 2 PCR NOT DETECTED          CT CHEST, ABDOMEN AND PELVIS WITHOUT IV CONTRAST     INDICATION:   back pain across mid back, cough, hypoxia  Hypoxia  Pain across the mid back since Tuesday  Ongoing cough with clear sputum since having COVID-19 in April 2020  The patient tested negative for COVID-19 on August 10, 2020  Mild nausea  Prior history of tobacco use  History of kidney stones      COMPARISON:  CT of the abdomen and pelvis dated May 21, 2019     TECHNIQUE: CT examination of the chest, abdomen and pelvis was performed without intravenous contrast   Axial, sagittal, and coronal 2D reformatted images were created from the source data and submitted for interpretation       Radiation dose length product (DLP) for this visit:  1709 mGy-cm   This examination, like all CT scans performed in the [facility], was performed utilizing techniques to minimize radiation dose exposure, including the use of iterative   reconstruction and automated exposure control       Enteric contrast was not administered       FINDINGS:     CHEST     LUNGS:  There is minimal dependent atelectasis, left greater than right    There is mild tree-in-bud opacity areas scattered bilaterally, most noticeable in the right upper lobe  There is no focal consolidation      PLEURA:  Unremarkable      HEART/GREAT VESSELS:  There is coronary artery disease  There is atherosclerosis of the thoracic aorta  There is no significant pericardial effusion      MEDIASTINUM AND OSBALDO:  Unremarkable      CHEST WALL AND LOWER NECK:   Unremarkable      ABDOMEN     LIVER/BILIARY TREE:  Unremarkable      GALLBLADDER:  Gallbladder is surgically absent      SPLEEN:  Unremarkable      PANCREAS:  Unremarkable      ADRENAL GLANDS:  Unremarkable      KIDNEYS/URETERS:      RIGHT: There is an extrarenal pelvis on the right  There is a large calculus in the distal aspect of the right extrarenal pelvis and extending into the right ureteropelvic junction, measuring approximately 25 x 14 mm  This is new compared to May 21,   2019  There is mild right hydronephrosis  There is stranding of the fat adjacent to the right kidney  Superimposed infection should be excluded  Urology consultation is recommended  There is a cluster of nonobstructing calculi posteriorly in the   interpolar region right kidney      LEFT: There is a partially extrarenal pelvis on the left  Several calculi are present within the left renal pelvis and extending towards the left ureteropelvic junction  The largest calculus in the left extrarenal pelvis measures approximately 16 mm        Additionally, there is an approximately 11 x 8 mm calculus within the proximal left ureter, approximately 2 5 cm beyond the left ureteropelvic junction  There is mild to moderate left hydronephrosis, similar to slightly worse compared with May 21, 2019  There is, however, more stranding of the fat adjacent to the left kidney and tracking caudally around the proximal left ureter  Superimposed infection should be excluded    Urology consultation is recommended      There are also several nonobstructing calculi in the upper pole and interpolar region left kidney         STOMACH AND BOWEL:  There is no evidence of bowel obstruction  There is colonic diverticulosis without evidence of acute diverticulitis      APPENDIX:  No findings to suggest appendicitis      ABDOMINOPELVIC CAVITY:  As described above  No pneumoperitoneum      VESSELS:  There is atherosclerosis  There is no abdominal aortic aneurysm      PELVIS     REPRODUCTIVE ORGANS:  Unremarkable for patient's age      URINARY BLADDER:  Unremarkable      ABDOMINAL WALL/INGUINAL REGIONS:  Unremarkable      OSSEOUS STRUCTURES:  No acute fracture or destructive osseous lesion  There are degenerative changes of both shoulders  There is multilevel degenerative change of the spine      IMPRESSION:     Bilateral nephrolithiasis        There is an approximately 25 x 14 mm calculus within the distal aspect of the right extrarenal pelvis and extending into the right ureteropelvic junction, new compared with May 21, 2019  There is mild right hydronephrosis and there is stranding of the   fat adjacent to the right kidney and tracking caudally adjacent to the proximal right ureter, also new compared with May 21, 2019      There is an approximately 11 x 8 mm calculus within the proximal left ureter, approximately 2 5 cm beyond the left ureteropelvic junction, similar to May 21, 2019  There are also several calculi within the left renal pelvis and extending towards the   left ureteropelvic junction, the largest of which measures approximately 16 mm  There is mild to moderate left hydronephrosis, similar to slightly worse compared with May 21, 2019  There is, however, more stranding of the fat adjacent to the left   kidney and tracking caudally adjacent to the proximal left ureter      Superimposed infection should be excluded  Correlation with urinalysis and urine culture and sensitivity is recommended    Urology consultation is recommended      Other findings as described above, please see discussion      The images are available for clinical review    History of Present Illness  Juice Arredondo is a 81 y o  female here for history and physical prior to their upcoming  Cystoscopy, bilateral ureteroscopy with laser lithotripsy, retrograde pyelogram and insertion of bilateral ureteral stents for  09/30/2020  The patient has had no overall changes in their health since their last visit and denies any prior complications with anesthesia        Review of Systems   Constitutional: Negative for chills and fever  Respiratory: Negative for cough and shortness of breath  Cardiovascular: Negative for chest pain  Gastrointestinal: Negative for abdominal distention, abdominal pain, blood in stool, nausea and vomiting  Genitourinary: Negative for difficulty urinating, dysuria, enuresis, flank pain, frequency, hematuria and urgency  Musculoskeletal: Negative for back pain  Edema noted to bilateral lower extremity   Skin: Negative for rash  Neurological: Negative for dizziness           Past Medical History  Past Medical History:   Diagnosis Date    Acute kidney failure (HCC)     Anemia     Arthritis     Atrial fibrillation ([facility] [address] )     Bacterial infection due to Proteus mirabilis 5/23/2019    Chafing     folds of skin and back of thighs    Chronic indwelling [facility] [place] [facility] catheter     removed    Colon polyp     Coronary artery disease     Difficulty walking     Discitis     Discitis     Dysphagia     Dysphagia     Dysuria     Gait abnormality     GERD (gastroesophageal reflux disease)     History of transfusion     Hyperlipidemia     Hypertension     Hypothyroidism     Irregular heart beat     Kidney stone     Left ureteral calculus 6/12/2019    Added automatically from request for surgery [id number]    Lymphedema     Major depressive disorder     MDD (major depressive disorder)     MI (myocardial infarction) ([address]     Morbid obesity ([address]     Morbid obesity ([address]     Muscle weakness     Muscle weakness (generalized)     NSTEMI (non-ST elevated myocardial infarction) ([facility] [address]     Osteoporosis     Paroxysmal A-fib (HCC)     Recurrent UTI     Renal disorder     RLS (restless legs syndrome)     Sleep apnea     Symptomatic anemia 1/15/2020    Syncope 1/17/2020    Wheelchair bound     unable to bear weight , hx infected prosthesis       Past Social History  Past Surgical History:   Procedure Laterality Date    CHOLECYSTECTOMY      COLONOSCOPY      FL RETROGRADE PYELOGRAM  5/22/2019    FL RETROGRADE PYELOGRAM  8/10/2020    HYSTERECTOMY      JOINT REPLACEMENT Bilateral     knee replacment    LAPAROSCOPIC GASTRIC BANDING      CT CYSTO/URETERO W/LITHOTRIPSY &INDWELL STENT INSRT Left 6/26/2019    Procedure: CYSTO, URETEROSCOPY W/HOLMIUM LASER, STENT EXCHANGE;  Surgeon: Berenice Hess MD;  Location: [facility];  Service: Urology    CT CYSTOURETHROSCOPY,URETER CATHETER Left 5/22/2019    Procedure: CYSTOSCOPY; RIGHT RETROGRADE PYELOGRAM WITH INSERTION STENT URETERAL LEFT;  Surgeon: Berenice Hess MD;  [facility];  Service: Urology    CT CYSTOURETHROSCOPY,URETER CATHETER Bilateral 8/10/2020    Procedure: CYSTOSCOPY RETROGRADE PYELOGRAM WITH INSERTION STENT URETERAL;  Surgeon: Megan Garcia MD;  Location[facility];  Service: Urology    CT XCAPSL CTRC RMVL INSJ IO LENS PROSTH W/O ECP Right 3/12/2020    Procedure: CATARACT REMOVAL W/INTRAOCULAR LENS;  Surgeon: Flores Mcmanus MD;  Location: [facility] MA[facility] OR;  Service: Ophthalmology    REMOVAL GASTRIC BAND LAPAROSCOPIC      VENTRAL HERNIA REPAIR      x4       Past Family History  History reviewed  No pertinent family history  Past Social history  Social History     Socioeconomic History    Marital status:       Spouse name: Not on file    Number of children: Not on file    Years of education: Not on file    Highest education level: Not on file   Occupational History    Not on file   Social Needs    Financial resource strain: Not on file  Food insecurity     Worry: Not on file     Inability: Not on file    Transportation needs     Medical: Not on file     Non-medical: Not on file   Tobacco Use    Smoking status: Former Smoker     Types: Cigars    Smokeless tobacco: Never Used    Tobacco comment: smoked when was very much younger for one summer   Substance and Sexual Activity    Alcohol use:  Yes     Alcohol/week: 1 0 standard drinks     Types: 1 Glasses of wine per week     Frequency: Monthly or less     Drinks per session: 1 or 2     Comment: socially     Drug use: Never    Sexual activity: Not on file   Lifestyle    Physical activity     Days per week: Not on file     Minutes per session: Not on file    Stress: Not on file   Relationships    Social connections     Talks on phone: Not on file     Gets together: Not on file     Attends [employer] service: Not on file     Active member of club or organization: Not on file     Attends meetings of clubs or organizations: Not on file     Relationship status: Not on file    Intimate partner violence     Fear of current or ex partner: Not on file     Emotionally abused: Not on file     Physically abused: Not on file     Forced sexual activity: Not on file   Other Topics Concern    Not on file   Social History Narrative    Not on file       Current Medications  Current Outpatient Medications   Medication Sig Dispense Refill    acetaminophen (TYLENOL) 325 mg tablet Take 650 mg by mouth every 6 (six) hours as needed for mild pain       alendronate (FOSAMAX) 70 mg tablet Take 70 mg by mouth see administration instructions Give once a day on friday      amLODIPine (NORVASC) 10 mg tablet Take 10 mg by mouth daily      apixaban (Eliquis) 5 mg Take 5 mg by mouth 2 (two) times a day      ascorbic acid (VITAMIN C) 500 mg tablet       aspirin (ECOTRIN LOW STRENGTH) 81 mg EC tablet Take 81 mg by mouth daily      atorvastatin (LIPITOR) 10 mg tablet Take 10 mg by mouth daily at bedtime       bisacodyl (DULCOLAX) 10 mg suppository Insert 10 mg into the rectum as needed       calcium carbonate-vitamin D (OSCAL-D) 500 mg-200 units per tablet Take 1 tablet by mouth daily with breakfast      Cholecalciferol (Vitamin D) 50 MCG (2000 UT) tablet       cholecalciferol (VITAMIN D3) 1,000 units tablet Take 2,000 Units by mouth daily       Coenzyme Q10 (CO Q10) 100 MG CAPS Take by mouth      docusate sodium (COLACE) 100 mg capsule Take 100 mg by mouth 2 (two) times a day      furosemide (LASIX) 40 mg tablet Take 40 mg by mouth daily       levothyroxine 150 mcg tablet Take 150 mcg by mouth daily in the early morning       lutein 6 mg Take 6 mg by mouth daily      magnesium hydroxide (MILK OF MAGNESIA) 400 mg/5 mL oral suspension daily as needed       Melatonin 5 MG TABS Take 5 mg by mouth daily at bedtime       metoprolol tartrate (LOPRESSOR) 25 mg tablet Take 25 mg by mouth every 12 (twelve) hours      nitroglycerin (NITROSTAT) 0 4 mg SL tablet Place 0 4 mg under the tongue every 5 (five) minutes as needed for chest pain       pantoprazole (PROTONIX) 40 mg tablet Take 1 tablet (40 mg total) by mouth 2 (two) times a day before meals 170 tablet 0    pramipexole (MIRAPEX) 1 mg tablet Take 0 5 mg by mouth daily at bedtime       Skin Protectants, Misc  (DERMACERIN) CREA Apply topically      talc Apply topically as needed for irritation       No current facility-administered medications for this visit  Allergies  Allergies   Allergen Reactions    Augmentin [Amoxicillin-Pot Clavulanate] Rash     Patient reports that she has tolerated amoxicillin in the past and would be willing to try penicillin if needed      Hydralazine Other (See Comments)     Headache, dizziness, nausea,    Sulfamethoxazole-Trimethoprim Rash    Actonel  [Risedronate Sodium]     Lisinopril Other (See Comments)     "cough"      Medical Tape      Pulls off skin      Meloxicam Cough    Wound Dressing Adhesive Other (See Comments)     "pulls skin off"- darrel paper tape    Conjugated Estrogens Rash     Premarin Cream      Neurontin [Gabapentin] Rash         Past Medical History, Social History, Family History, medications and allergies were reviewed        Vitals  Vitals:    09/25/20 0819   BP: 120/82   Pulse: 86   Temp: (!) 96 7 °F (35 9 °C)     Physical Exam  /82   Pulse 86   Temp (!) 96 7 °F (35 9 °C)   General appearance: alert and oriented, in no acute distress  Lungs: clear to auscultation bilaterally  Heart: regular rate and rhythm  Abdomen: Soft, nontender  Extremities: edema bilateral lower extremities   Skin: Warm, dry and intact  Neurologic: Grossly normal     Levander Medicus, CRNP

## 2020-09-25 NOTE — PATIENT INSTRUCTIONS
Prescription for Macrobid provided  Urinalysis with microscopy and urine culture ordered in sent to the lab from the office today  Surgery scheduled for 09/30/2020

## 2020-09-26 ENCOUNTER — TELEPHONE (OUTPATIENT)
Dept: UROLOGY | Facility: MEDICAL CENTER | Age: 85
End: 2020-09-26

## 2020-09-26 NOTE — TELEPHONE ENCOUNTER
Please ensure that that the nursing facility has received the prescription for Macrobid for patient to start preoperatively as she currently has urinary tract infection

## 2020-09-28 ENCOUNTER — PREP FOR PROCEDURE (OUTPATIENT)
Dept: UROLOGY | Facility: AMBULATORY SURGERY CENTER | Age: 85
End: 2020-09-28

## 2020-09-28 RX ORDER — ONDANSETRON 4 MG/1
4 TABLET, FILM COATED ORAL EVERY 8 HOURS PRN
COMMUNITY

## 2020-09-28 RX ORDER — LEVOFLOXACIN 5 MG/ML
750 INJECTION, SOLUTION INTRAVENOUS ONCE
Status: CANCELLED | OUTPATIENT
Start: 2020-09-30 | End: 2020-09-28

## 2020-09-28 RX ORDER — POTASSIUM CHLORIDE 20 MEQ/1
20 TABLET, EXTENDED RELEASE ORAL 2 TIMES DAILY
COMMUNITY

## 2020-09-28 RX ORDER — POLYETHYLENE GLYCOL 3350 17 G/17G
17 POWDER, FOR SOLUTION ORAL DAILY PRN
COMMUNITY

## 2020-09-28 NOTE — TELEPHONE ENCOUNTER
Called and spoke to nurse at nursing home she stated that pt had there fisrt day of Macrobid so the script has been started

## 2020-09-29 ENCOUNTER — ANESTHESIA EVENT (OUTPATIENT)
Dept: PERIOP | Facility: HOSPITAL | Age: 85
DRG: 856 | End: 2020-09-29
Payer: MEDICARE

## 2020-09-29 LAB
BACTERIA UR CULT: ABNORMAL
BACTERIA UR CULT: ABNORMAL

## 2020-09-30 ENCOUNTER — APPOINTMENT (INPATIENT)
Dept: RADIOLOGY | Facility: HOSPITAL | Age: 85
DRG: 856 | End: 2020-09-30
Payer: MEDICARE

## 2020-09-30 ENCOUNTER — ANESTHESIA (OUTPATIENT)
Dept: PERIOP | Facility: HOSPITAL | Age: 85
DRG: 856 | End: 2020-09-30
Payer: MEDICARE

## 2020-09-30 ENCOUNTER — APPOINTMENT (OUTPATIENT)
Dept: RADIOLOGY | Facility: HOSPITAL | Age: 85
DRG: 856 | End: 2020-09-30
Payer: MEDICARE

## 2020-09-30 ENCOUNTER — TELEPHONE (OUTPATIENT)
Dept: UROLOGY | Facility: MEDICAL CENTER | Age: 85
End: 2020-09-30

## 2020-09-30 ENCOUNTER — HOSPITAL ENCOUNTER (INPATIENT)
Facility: HOSPITAL | Age: 85
LOS: 8 days | Discharge: NURSING, REHAB OR LONG-TERM CARE | DRG: 856 | End: 2020-10-08
Attending: UROLOGY | Admitting: UROLOGY
Payer: MEDICARE

## 2020-09-30 VITALS — HEART RATE: 72 BPM

## 2020-09-30 DIAGNOSIS — N20.1 URETERAL CALCULUS: ICD-10-CM

## 2020-09-30 DIAGNOSIS — I48.0 PAROXYSMAL A-FIB (HCC): ICD-10-CM

## 2020-09-30 DIAGNOSIS — I50.32 CHRONIC DIASTOLIC (CONGESTIVE) HEART FAILURE (HCC): ICD-10-CM

## 2020-09-30 DIAGNOSIS — A41.9 SEPSIS, DUE TO UNSPECIFIED ORGANISM, UNSPECIFIED WHETHER ACUTE ORGAN DYSFUNCTION PRESENT (HCC): ICD-10-CM

## 2020-09-30 DIAGNOSIS — N17.9 ACUTE KIDNEY INJURY (HCC): ICD-10-CM

## 2020-09-30 DIAGNOSIS — B49 FUNGEMIA: ICD-10-CM

## 2020-09-30 DIAGNOSIS — N20.1 URETEROLITHIASIS: Primary | ICD-10-CM

## 2020-09-30 PROBLEM — R74.01 TRANSAMINITIS: Status: ACTIVE | Noted: 2020-09-30

## 2020-09-30 PROBLEM — R65.20 SEVERE SEPSIS (HCC): Status: ACTIVE | Noted: 2020-08-10

## 2020-09-30 LAB
ALBUMIN SERPL BCP-MCNC: 3 G/DL (ref 3.5–5)
ALP SERPL-CCNC: 103 U/L (ref 46–116)
ALT SERPL W P-5'-P-CCNC: 122 U/L (ref 12–78)
ANION GAP SERPL CALCULATED.3IONS-SCNC: 13 MMOL/L (ref 4–13)
ANION GAP SERPL CALCULATED.3IONS-SCNC: 19 MMOL/L (ref 4–13)
ANISOCYTOSIS BLD QL SMEAR: PRESENT
AST SERPL W P-5'-P-CCNC: 267 U/L (ref 5–45)
BASOPHILS # BLD MANUAL: 0 THOUSAND/UL (ref 0–0.1)
BASOPHILS NFR MAR MANUAL: 0 % (ref 0–1)
BILIRUB SERPL-MCNC: 1.49 MG/DL (ref 0.2–1)
BUN SERPL-MCNC: 19 MG/DL (ref 5–25)
BUN SERPL-MCNC: 19 MG/DL (ref 5–25)
CALCIUM ALBUM COR SERPL-MCNC: 9.4 MG/DL (ref 8.3–10.1)
CALCIUM SERPL-MCNC: 7.9 MG/DL (ref 8.3–10.1)
CALCIUM SERPL-MCNC: 8.6 MG/DL (ref 8.3–10.1)
CHLORIDE SERPL-SCNC: 106 MMOL/L (ref 100–108)
CHLORIDE SERPL-SCNC: 106 MMOL/L (ref 100–108)
CO2 SERPL-SCNC: 15 MMOL/L (ref 21–32)
CO2 SERPL-SCNC: 18 MMOL/L (ref 21–32)
CREAT SERPL-MCNC: 1.62 MG/DL (ref 0.6–1.3)
CREAT SERPL-MCNC: 1.78 MG/DL (ref 0.6–1.3)
EOSINOPHIL # BLD MANUAL: 0 THOUSAND/UL (ref 0–0.4)
EOSINOPHIL NFR BLD MANUAL: 0 % (ref 0–6)
ERYTHROCYTE [DISTWIDTH] IN BLOOD BY AUTOMATED COUNT: 18.8 % (ref 11.6–15.1)
GFR SERPL CREATININE-BSD FRML MDRD: 26 ML/MIN/1.73SQ M
GFR SERPL CREATININE-BSD FRML MDRD: 29 ML/MIN/1.73SQ M
GLUCOSE SERPL-MCNC: 144 MG/DL (ref 65–140)
GLUCOSE SERPL-MCNC: 147 MG/DL (ref 65–140)
HCT VFR BLD AUTO: 37.8 % (ref 34.8–46.1)
HGB BLD-MCNC: 9.8 G/DL (ref 11.5–15.4)
LACTATE SERPL-SCNC: 10.7 MMOL/L (ref 0.5–2)
LACTATE SERPL-SCNC: 6.7 MMOL/L (ref 0.5–2)
LACTATE SERPL-SCNC: 8.3 MMOL/L (ref 0.5–2)
LYMPHOCYTES # BLD AUTO: 0.19 THOUSAND/UL (ref 0.6–4.47)
LYMPHOCYTES # BLD AUTO: 3 % (ref 14–44)
MCH RBC QN AUTO: 22.1 PG (ref 26.8–34.3)
MCHC RBC AUTO-ENTMCNC: 25.9 G/DL (ref 31.4–37.4)
MCV RBC AUTO: 85 FL (ref 82–98)
METAMYELOCYTES NFR BLD MANUAL: 1 % (ref 0–1)
MONOCYTES # BLD AUTO: 0.44 THOUSAND/UL (ref 0–1.22)
MONOCYTES NFR BLD: 7 % (ref 4–12)
NEUTROPHILS # BLD MANUAL: 5.55 THOUSAND/UL (ref 1.85–7.62)
NEUTS BAND NFR BLD MANUAL: 15 % (ref 0–8)
NEUTS SEG NFR BLD AUTO: 74 % (ref 43–75)
NRBC BLD AUTO-RTO: 0 /100 WBCS
OVALOCYTES BLD QL SMEAR: PRESENT
PLATELET # BLD AUTO: 239 THOUSANDS/UL (ref 149–390)
PLATELET BLD QL SMEAR: ADEQUATE
PMV BLD AUTO: 10.9 FL (ref 8.9–12.7)
POLYCHROMASIA BLD QL SMEAR: PRESENT
POTASSIUM SERPL-SCNC: 3.6 MMOL/L (ref 3.5–5.3)
POTASSIUM SERPL-SCNC: 3.8 MMOL/L (ref 3.5–5.3)
PROCALCITONIN SERPL-MCNC: 6.29 NG/ML
PROT SERPL-MCNC: 6.8 G/DL (ref 6.4–8.2)
RBC # BLD AUTO: 4.43 MILLION/UL (ref 3.81–5.12)
SARS-COV-2 RNA RESP QL NAA+PROBE: NEGATIVE
SODIUM SERPL-SCNC: 137 MMOL/L (ref 136–145)
SODIUM SERPL-SCNC: 140 MMOL/L (ref 136–145)
TOTAL CELLS COUNTED SPEC: 100
WBC # BLD AUTO: 6.24 THOUSAND/UL (ref 4.31–10.16)

## 2020-09-30 PROCEDURE — 80048 BASIC METABOLIC PNL TOTAL CA: CPT | Performed by: PHYSICIAN ASSISTANT

## 2020-09-30 PROCEDURE — 52356 CYSTO/URETERO W/LITHOTRIPSY: CPT | Performed by: UROLOGY

## 2020-09-30 PROCEDURE — 0TF48ZZ FRAGMENTATION IN LEFT KIDNEY PELVIS, VIA NATURAL OR ARTIFICIAL OPENING ENDOSCOPIC: ICD-10-PCS | Performed by: UROLOGY

## 2020-09-30 PROCEDURE — 99223 1ST HOSP IP/OBS HIGH 75: CPT | Performed by: INTERNAL MEDICINE

## 2020-09-30 PROCEDURE — 85007 BL SMEAR W/DIFF WBC COUNT: CPT | Performed by: PHYSICIAN ASSISTANT

## 2020-09-30 PROCEDURE — 93005 ELECTROCARDIOGRAM TRACING: CPT

## 2020-09-30 PROCEDURE — 87040 BLOOD CULTURE FOR BACTERIA: CPT | Performed by: PHYSICIAN ASSISTANT

## 2020-09-30 PROCEDURE — 71045 X-RAY EXAM CHEST 1 VIEW: CPT

## 2020-09-30 PROCEDURE — 0TP98DZ REMOVAL OF INTRALUMINAL DEVICE FROM URETER, VIA NATURAL OR ARTIFICIAL OPENING ENDOSCOPIC: ICD-10-PCS | Performed by: UROLOGY

## 2020-09-30 PROCEDURE — 74018 RADEX ABDOMEN 1 VIEW: CPT

## 2020-09-30 PROCEDURE — 83605 ASSAY OF LACTIC ACID: CPT | Performed by: PHYSICIAN ASSISTANT

## 2020-09-30 PROCEDURE — C1769 GUIDE WIRE: HCPCS | Performed by: UROLOGY

## 2020-09-30 PROCEDURE — 0T788DZ DILATION OF BILATERAL URETERS WITH INTRALUMINAL DEVICE, VIA NATURAL OR ARTIFICIAL OPENING ENDOSCOPIC: ICD-10-PCS | Performed by: UROLOGY

## 2020-09-30 PROCEDURE — 99232 SBSQ HOSP IP/OBS MODERATE 35: CPT | Performed by: UROLOGY

## 2020-09-30 PROCEDURE — 80053 COMPREHEN METABOLIC PANEL: CPT | Performed by: PHYSICIAN ASSISTANT

## 2020-09-30 PROCEDURE — C2617 STENT, NON-COR, TEM W/O DEL: HCPCS | Performed by: UROLOGY

## 2020-09-30 PROCEDURE — 85027 COMPLETE CBC AUTOMATED: CPT | Performed by: PHYSICIAN ASSISTANT

## 2020-09-30 PROCEDURE — 0TF38ZZ FRAGMENTATION IN RIGHT KIDNEY PELVIS, VIA NATURAL OR ARTIFICIAL OPENING ENDOSCOPIC: ICD-10-PCS | Performed by: UROLOGY

## 2020-09-30 PROCEDURE — 99223 1ST HOSP IP/OBS HIGH 75: CPT | Performed by: PHYSICIAN ASSISTANT

## 2020-09-30 PROCEDURE — 84145 PROCALCITONIN (PCT): CPT | Performed by: PHYSICIAN ASSISTANT

## 2020-09-30 PROCEDURE — 87635 SARS-COV-2 COVID-19 AMP PRB: CPT | Performed by: UROLOGY

## 2020-09-30 DEVICE — STENT URETERAL 6 FR 26CM INLAY OPTIMA: Type: IMPLANTABLE DEVICE | Site: URETER | Status: FUNCTIONAL

## 2020-09-30 RX ORDER — DOCUSATE SODIUM 100 MG/1
100 CAPSULE, LIQUID FILLED ORAL 2 TIMES DAILY
Status: DISCONTINUED | OUTPATIENT
Start: 2020-09-30 | End: 2020-10-08 | Stop reason: HOSPADM

## 2020-09-30 RX ORDER — LEVOFLOXACIN 5 MG/ML
750 INJECTION, SOLUTION INTRAVENOUS ONCE
Status: DISCONTINUED | OUTPATIENT
Start: 2020-09-30 | End: 2020-09-30 | Stop reason: HOSPADM

## 2020-09-30 RX ORDER — DEXAMETHASONE SODIUM PHOSPHATE 4 MG/ML
INJECTION, SOLUTION INTRA-ARTICULAR; INTRALESIONAL; INTRAMUSCULAR; INTRAVENOUS; SOFT TISSUE AS NEEDED
Status: DISCONTINUED | OUTPATIENT
Start: 2020-09-30 | End: 2020-09-30

## 2020-09-30 RX ORDER — POTASSIUM CHLORIDE 20 MEQ/1
20 TABLET, EXTENDED RELEASE ORAL 2 TIMES DAILY
Status: DISCONTINUED | OUTPATIENT
Start: 2020-09-30 | End: 2020-10-08 | Stop reason: HOSPADM

## 2020-09-30 RX ORDER — FENTANYL CITRATE/PF 50 MCG/ML
25 SYRINGE (ML) INJECTION
Status: DISCONTINUED | OUTPATIENT
Start: 2020-09-30 | End: 2020-09-30 | Stop reason: HOSPADM

## 2020-09-30 RX ORDER — ACETAMINOPHEN 325 MG/1
650 TABLET ORAL EVERY 6 HOURS PRN
Status: DISCONTINUED | OUTPATIENT
Start: 2020-09-30 | End: 2020-10-08 | Stop reason: HOSPADM

## 2020-09-30 RX ORDER — MAGNESIUM HYDROXIDE 1200 MG/15ML
LIQUID ORAL AS NEEDED
Status: DISCONTINUED | OUTPATIENT
Start: 2020-09-30 | End: 2020-09-30 | Stop reason: HOSPADM

## 2020-09-30 RX ORDER — ONDANSETRON 2 MG/ML
INJECTION INTRAMUSCULAR; INTRAVENOUS AS NEEDED
Status: DISCONTINUED | OUTPATIENT
Start: 2020-09-30 | End: 2020-09-30

## 2020-09-30 RX ORDER — ATORVASTATIN CALCIUM 10 MG/1
10 TABLET, FILM COATED ORAL
Status: DISCONTINUED | OUTPATIENT
Start: 2020-09-30 | End: 2020-09-30

## 2020-09-30 RX ORDER — SODIUM CHLORIDE 9 MG/ML
150 INJECTION, SOLUTION INTRAVENOUS CONTINUOUS
Status: DISPENSED | OUTPATIENT
Start: 2020-09-30 | End: 2020-10-01

## 2020-09-30 RX ORDER — MIDAZOLAM HYDROCHLORIDE 2 MG/2ML
INJECTION, SOLUTION INTRAMUSCULAR; INTRAVENOUS AS NEEDED
Status: DISCONTINUED | OUTPATIENT
Start: 2020-09-30 | End: 2020-09-30

## 2020-09-30 RX ORDER — OXYBUTYNIN CHLORIDE 5 MG/1
5 TABLET ORAL 3 TIMES DAILY PRN
Qty: 20 TABLET | Refills: 0 | Status: SHIPPED | OUTPATIENT
Start: 2020-09-30

## 2020-09-30 RX ORDER — OXYBUTYNIN CHLORIDE 5 MG/1
5 TABLET ORAL 2 TIMES DAILY
Status: DISCONTINUED | OUTPATIENT
Start: 2020-09-30 | End: 2020-09-30 | Stop reason: ALTCHOICE

## 2020-09-30 RX ORDER — LANOLIN ALCOHOL/MO/W.PET/CERES
6 CREAM (GRAM) TOPICAL
Status: DISCONTINUED | OUTPATIENT
Start: 2020-09-30 | End: 2020-10-08 | Stop reason: HOSPADM

## 2020-09-30 RX ORDER — ONDANSETRON 2 MG/ML
4 INJECTION INTRAMUSCULAR; INTRAVENOUS ONCE
Status: COMPLETED | OUTPATIENT
Start: 2020-09-30 | End: 2020-09-30

## 2020-09-30 RX ORDER — FENTANYL CITRATE 50 UG/ML
INJECTION, SOLUTION INTRAMUSCULAR; INTRAVENOUS AS NEEDED
Status: DISCONTINUED | OUTPATIENT
Start: 2020-09-30 | End: 2020-09-30

## 2020-09-30 RX ORDER — PANTOPRAZOLE SODIUM 40 MG/1
40 TABLET, DELAYED RELEASE ORAL
Status: DISCONTINUED | OUTPATIENT
Start: 2020-09-30 | End: 2020-10-08 | Stop reason: HOSPADM

## 2020-09-30 RX ORDER — SODIUM CHLORIDE 9 MG/ML
125 INJECTION, SOLUTION INTRAVENOUS CONTINUOUS
Status: DISCONTINUED | OUTPATIENT
Start: 2020-09-30 | End: 2020-09-30

## 2020-09-30 RX ORDER — GLYCOPYRROLATE 0.2 MG/ML
INJECTION INTRAMUSCULAR; INTRAVENOUS AS NEEDED
Status: DISCONTINUED | OUTPATIENT
Start: 2020-09-30 | End: 2020-09-30

## 2020-09-30 RX ORDER — LEVOTHYROXINE SODIUM 0.07 MG/1
150 TABLET ORAL
Status: DISCONTINUED | OUTPATIENT
Start: 2020-10-01 | End: 2020-10-08 | Stop reason: HOSPADM

## 2020-09-30 RX ORDER — HYDROCODONE BITARTRATE AND ACETAMINOPHEN 5; 325 MG/1; MG/1
1 TABLET ORAL EVERY 6 HOURS PRN
Status: DISCONTINUED | OUTPATIENT
Start: 2020-09-30 | End: 2020-10-01 | Stop reason: SDUPTHER

## 2020-09-30 RX ORDER — ONDANSETRON 2 MG/ML
4 INJECTION INTRAMUSCULAR; INTRAVENOUS EVERY 4 HOURS PRN
Status: DISCONTINUED | OUTPATIENT
Start: 2020-09-30 | End: 2020-10-08 | Stop reason: HOSPADM

## 2020-09-30 RX ORDER — HYDROCODONE BITARTRATE AND ACETAMINOPHEN 5; 325 MG/1; MG/1
1-2 TABLET ORAL EVERY 6 HOURS PRN
Qty: 20 TABLET | Refills: 0 | Status: SHIPPED | OUTPATIENT
Start: 2020-09-30 | End: 2020-10-10

## 2020-09-30 RX ORDER — LIDOCAINE HYDROCHLORIDE 20 MG/ML
INJECTION, SOLUTION INFILTRATION; PERINEURAL AS NEEDED
Status: DISCONTINUED | OUTPATIENT
Start: 2020-09-30 | End: 2020-09-30

## 2020-09-30 RX ORDER — PRAMIPEXOLE DIHYDROCHLORIDE 0.5 MG/1
0.5 TABLET ORAL
Status: DISCONTINUED | OUTPATIENT
Start: 2020-09-30 | End: 2020-10-08 | Stop reason: HOSPADM

## 2020-09-30 RX ORDER — METOCLOPRAMIDE HYDROCHLORIDE 5 MG/ML
INJECTION INTRAMUSCULAR; INTRAVENOUS AS NEEDED
Status: DISCONTINUED | OUTPATIENT
Start: 2020-09-30 | End: 2020-09-30

## 2020-09-30 RX ORDER — ASPIRIN 81 MG/1
81 TABLET ORAL DAILY
Status: DISCONTINUED | OUTPATIENT
Start: 2020-10-01 | End: 2020-10-08 | Stop reason: HOSPADM

## 2020-09-30 RX ORDER — HYDROCODONE BITARTRATE AND ACETAMINOPHEN 5; 325 MG/1; MG/1
1 TABLET ORAL EVERY 6 HOURS PRN
Status: DISCONTINUED | OUTPATIENT
Start: 2020-09-30 | End: 2020-10-08 | Stop reason: HOSPADM

## 2020-09-30 RX ORDER — ONDANSETRON 2 MG/ML
4 INJECTION INTRAMUSCULAR; INTRAVENOUS ONCE AS NEEDED
Status: DISCONTINUED | OUTPATIENT
Start: 2020-09-30 | End: 2020-09-30 | Stop reason: HOSPADM

## 2020-09-30 RX ORDER — OXYBUTYNIN CHLORIDE 5 MG/1
5 TABLET, EXTENDED RELEASE ORAL DAILY
Status: DISCONTINUED | OUTPATIENT
Start: 2020-09-30 | End: 2020-10-02

## 2020-09-30 RX ORDER — NITROGLYCERIN 0.4 MG/1
0.4 TABLET SUBLINGUAL
Status: DISCONTINUED | OUTPATIENT
Start: 2020-09-30 | End: 2020-10-08 | Stop reason: HOSPADM

## 2020-09-30 RX ORDER — PROPOFOL 10 MG/ML
INJECTION, EMULSION INTRAVENOUS AS NEEDED
Status: DISCONTINUED | OUTPATIENT
Start: 2020-09-30 | End: 2020-09-30

## 2020-09-30 RX ORDER — HYDROMORPHONE HCL/PF 1 MG/ML
0.5 SYRINGE (ML) INJECTION EVERY 4 HOURS PRN
Status: DISCONTINUED | OUTPATIENT
Start: 2020-09-30 | End: 2020-10-08 | Stop reason: HOSPADM

## 2020-09-30 RX ADMIN — ACETAMINOPHEN 650 MG: 325 TABLET ORAL at 14:05

## 2020-09-30 RX ADMIN — SODIUM CHLORIDE 125 ML/HR: 0.9 INJECTION, SOLUTION INTRAVENOUS at 07:18

## 2020-09-30 RX ADMIN — SODIUM CHLORIDE 125 ML/HR: 0.9 INJECTION, SOLUTION INTRAVENOUS at 11:30

## 2020-09-30 RX ADMIN — FENTANYL CITRATE 25 MCG: 50 INJECTION, SOLUTION INTRAMUSCULAR; INTRAVENOUS at 09:15

## 2020-09-30 RX ADMIN — FENTANYL CITRATE 25 MCG: 50 INJECTION, SOLUTION INTRAMUSCULAR; INTRAVENOUS at 09:20

## 2020-09-30 RX ADMIN — DOCUSATE SODIUM 100 MG: 100 CAPSULE, LIQUID FILLED ORAL at 17:52

## 2020-09-30 RX ADMIN — FENTANYL CITRATE 25 MCG: 50 INJECTION, SOLUTION INTRAMUSCULAR; INTRAVENOUS at 09:09

## 2020-09-30 RX ADMIN — CEFEPIME HYDROCHLORIDE 2000 MG: 2 INJECTION, POWDER, FOR SOLUTION INTRAVENOUS at 20:16

## 2020-09-30 RX ADMIN — PRAMIPEXOLE DIHYDROCHLORIDE 0.5 MG: 0.5 TABLET ORAL at 22:19

## 2020-09-30 RX ADMIN — POTASSIUM CHLORIDE 20 MEQ: 1500 TABLET, EXTENDED RELEASE ORAL at 17:53

## 2020-09-30 RX ADMIN — SODIUM CHLORIDE 1000 ML: 0.9 INJECTION, SOLUTION INTRAVENOUS at 14:07

## 2020-09-30 RX ADMIN — PANTOPRAZOLE SODIUM 40 MG: 40 TABLET, DELAYED RELEASE ORAL at 17:52

## 2020-09-30 RX ADMIN — FENTANYL CITRATE 25 MCG: 50 INJECTION, SOLUTION INTRAMUSCULAR; INTRAVENOUS at 09:04

## 2020-09-30 RX ADMIN — DEXAMETHASONE SODIUM PHOSPHATE 4 MG: 4 INJECTION, SOLUTION INTRAMUSCULAR; INTRAVENOUS at 09:10

## 2020-09-30 RX ADMIN — GLYCOPYRROLATE 0.2 MG: 0.2 INJECTION, SOLUTION INTRAMUSCULAR; INTRAVENOUS at 08:46

## 2020-09-30 RX ADMIN — VANCOMYCIN HYDROCHLORIDE 1250 MG: 1 INJECTION, POWDER, LYOPHILIZED, FOR SOLUTION INTRAVENOUS at 17:52

## 2020-09-30 RX ADMIN — CEFTRIAXONE SODIUM 1000 MG: 1 INJECTION, POWDER, FOR SOLUTION INTRAMUSCULAR; INTRAVENOUS at 08:26

## 2020-09-30 RX ADMIN — ONDANSETRON 4 MG: 2 INJECTION INTRAMUSCULAR; INTRAVENOUS at 12:35

## 2020-09-30 RX ADMIN — METOCLOPRAMIDE 5 MG: 5 INJECTION, SOLUTION INTRAMUSCULAR; INTRAVENOUS at 08:46

## 2020-09-30 RX ADMIN — SODIUM CHLORIDE 1000 ML: 0.9 INJECTION, SOLUTION INTRAVENOUS at 15:30

## 2020-09-30 RX ADMIN — OXYBUTYNIN CHLORIDE 5 MG: 5 TABLET, EXTENDED RELEASE ORAL at 13:03

## 2020-09-30 RX ADMIN — MIDAZOLAM 2 MG: 1 INJECTION INTRAMUSCULAR; INTRAVENOUS at 08:46

## 2020-09-30 RX ADMIN — PROPOFOL 80 MG: 10 INJECTION, EMULSION INTRAVENOUS at 09:00

## 2020-09-30 RX ADMIN — LIDOCAINE HYDROCHLORIDE 2.5 ML: 20 INJECTION, SOLUTION INFILTRATION; PERINEURAL at 09:00

## 2020-09-30 RX ADMIN — APIXABAN 2.5 MG: 2.5 TABLET, FILM COATED ORAL at 17:52

## 2020-09-30 RX ADMIN — ONDANSETRON 4 MG: 2 INJECTION INTRAMUSCULAR; INTRAVENOUS at 09:57

## 2020-09-30 NOTE — TELEPHONE ENCOUNTER
----- Message from Merly Valentin MD sent at 9/30/2020 10:35 AM EDT -----  Pls arrange for nurse stent pull next week- has 2 stents, then see me with CONRADO 3 months

## 2020-09-30 NOTE — ANESTHESIA POSTPROCEDURE EVALUATION
Post-Op Assessment Note    CV Status:  Stable  Pain Score: 3    Pain management: adequate     Mental Status:  Alert and awake   Hydration Status:  Euvolemic   PONV Controlled:  Controlled   Airway Patency:  Patent      Post Op Vitals Reviewed: Yes      Staff: Anesthesiologist, CRNA         No complications documented      /63 (09/30/20 1113)    Temp 97 5 °F (36 4 °C) (09/30/20 1113)    Pulse 68 (09/30/20 1113)   Resp 16 (09/30/20 1113)    SpO2 93 % (09/30/20 1113)

## 2020-09-30 NOTE — ANESTHESIA PREPROCEDURE EVALUATION
Procedure:  CYSTO, URETEROSCOPY W/HOLMIUM LASER, RETROGRADE PYELOGRAM, STENT (Bilateral Bladder)    Relevant Problems   ANESTHESIA (within normal limits)      CARDIO   (+) Essential hypertension   (+) Mixed hyperlipidemia   (+) Paroxysmal A-fib (HCC)      /RENAL   (+) Acute kidney injury (HCC)   (+) Hydronephrosis with obstructing calculus      HEMATOLOGY   (+) Anemia   (+) Thrombocytopenia (HCC)      MUSCULOSKELETAL (within normal limits)      NEURO/PSYCH (within normal limits)      PULMONARY   (+) Acute on chronic respiratory failure with hypoxia (HCC)        Physical Exam    Airway    Mallampati score: III  TM Distance: <3 FB  Neck ROM: limited     Dental   upper dentures and lower dentures,     Cardiovascular  Rhythm: regular, Rate: normal, Cardiovascular exam normal    Pulmonary  Breath sounds clear to auscultation,     Other Findings        Anesthesia Plan  ASA Score- 3     Anesthesia Type- general with ASA Monitors  Additional Monitors:   Airway Plan: LMA  Comment: EKG unchanged  Hgb,10  GFR OK  ECHO:LEFT VENTRICLE:  Low normal left ventricular ejection fraction, 50%  Mild left ventricular cavity enlargement  Normal left ventricular wall thickness  The inferior apex and septal apex appear significantly hypokinetic  Grade 2 left ventricular diastolic  dysfunction (pseudonormalization)  Mildly elevated left atrial pressures  LAST OP (Stents-8/10/20): Patient became increasingly septic requiring norepinephrine infusion  Transferred to ICU following placement of left arterial catheter and peripheral IV insertion  Kept intubated   Plan Factors-    Chart reviewed  EKG reviewed  Existing labs reviewed  Patient summary reviewed  Patient is not a current smoker  Patient instructed to abstain from smoking on day of procedure  Patient did not smoke on day of surgery  Induction- intravenous  Postoperative Plan- Plan for postoperative opioid use       Informed Consent- Anesthetic plan and risks discussed with patient

## 2020-09-30 NOTE — ADDENDUM NOTE
Addendum  created 09/30/20 1133 by Josh Ly CRNA    Intraprocedure LDAs edited, LDA properties accepted

## 2020-10-01 PROBLEM — D62 ACUTE BLOOD LOSS ANEMIA: Status: ACTIVE | Noted: 2020-10-01

## 2020-10-01 LAB
ABO GROUP BLD: NORMAL
ALBUMIN SERPL BCP-MCNC: 2.2 G/DL (ref 3.5–5)
ALP SERPL-CCNC: 112 U/L (ref 46–116)
ALT SERPL W P-5'-P-CCNC: 281 U/L (ref 12–78)
ANION GAP SERPL CALCULATED.3IONS-SCNC: 10 MMOL/L (ref 4–13)
AST SERPL W P-5'-P-CCNC: 344 U/L (ref 5–45)
ATRIAL RATE: 95 BPM
BILIRUB SERPL-MCNC: 0.79 MG/DL (ref 0.2–1)
BLD GP AB SCN SERPL QL: POSITIVE
BLOOD GROUP ANTIBODIES SERPL: NORMAL
BUN SERPL-MCNC: 23 MG/DL (ref 5–25)
CALCIUM ALBUM COR SERPL-MCNC: 9.2 MG/DL (ref 8.3–10.1)
CALCIUM SERPL-MCNC: 7.8 MG/DL (ref 8.3–10.1)
CHLORIDE SERPL-SCNC: 106 MMOL/L (ref 100–108)
CO2 SERPL-SCNC: 20 MMOL/L (ref 21–32)
CREAT SERPL-MCNC: 1.74 MG/DL (ref 0.6–1.3)
ERYTHROCYTE [DISTWIDTH] IN BLOOD BY AUTOMATED COUNT: 18.8 % (ref 11.6–15.1)
GFR SERPL CREATININE-BSD FRML MDRD: 27 ML/MIN/1.73SQ M
GLUCOSE SERPL-MCNC: 125 MG/DL (ref 65–140)
HCT VFR BLD AUTO: 25.6 % (ref 34.8–46.1)
HCT VFR BLD AUTO: 27.9 % (ref 34.8–46.1)
HGB BLD-MCNC: 7 G/DL (ref 11.5–15.4)
HGB BLD-MCNC: 7.5 G/DL (ref 11.5–15.4)
KELL GROUP AG RBC: NEGATIVE
LACTATE SERPL-SCNC: 4.3 MMOL/L (ref 0.5–2)
LACTATE SERPL-SCNC: 4.3 MMOL/L (ref 0.5–2)
LACTATE SERPL-SCNC: 5.2 MMOL/L (ref 0.5–2)
MCH RBC QN AUTO: 22.2 PG (ref 26.8–34.3)
MCHC RBC AUTO-ENTMCNC: 26.9 G/DL (ref 31.4–37.4)
MCV RBC AUTO: 83 FL (ref 82–98)
NRBC BLD AUTO-RTO: 0 /100 WBCS
P AXIS: 45 DEGREES
PLATELET # BLD AUTO: 148 THOUSANDS/UL (ref 149–390)
PMV BLD AUTO: 10.9 FL (ref 8.9–12.7)
POTASSIUM SERPL-SCNC: 4.5 MMOL/L (ref 3.5–5.3)
PR INTERVAL: 174 MS
PROCALCITONIN SERPL-MCNC: 91.35 NG/ML
PROT SERPL-MCNC: 5.4 G/DL (ref 6.4–8.2)
QRS AXIS: -34 DEGREES
QRSD INTERVAL: 78 MS
QT INTERVAL: 360 MS
QTC INTERVAL: 452 MS
RBC # BLD AUTO: 3.38 MILLION/UL (ref 3.81–5.12)
RH BLD: POSITIVE
SODIUM SERPL-SCNC: 136 MMOL/L (ref 136–145)
SPECIMEN EXPIRATION DATE: NORMAL
T WAVE AXIS: 19 DEGREES
VENTRICULAR RATE: 95 BPM
WBC # BLD AUTO: 18.5 THOUSAND/UL (ref 4.31–10.16)

## 2020-10-01 PROCEDURE — 86922 COMPATIBILITY TEST ANTIGLOB: CPT

## 2020-10-01 PROCEDURE — 86850 RBC ANTIBODY SCREEN: CPT | Performed by: PHYSICIAN ASSISTANT

## 2020-10-01 PROCEDURE — 84145 PROCALCITONIN (PCT): CPT | Performed by: PHYSICIAN ASSISTANT

## 2020-10-01 PROCEDURE — 99232 SBSQ HOSP IP/OBS MODERATE 35: CPT | Performed by: INTERNAL MEDICINE

## 2020-10-01 PROCEDURE — 85027 COMPLETE CBC AUTOMATED: CPT | Performed by: PHYSICIAN ASSISTANT

## 2020-10-01 PROCEDURE — 99233 SBSQ HOSP IP/OBS HIGH 50: CPT | Performed by: PHYSICIAN ASSISTANT

## 2020-10-01 PROCEDURE — 86905 BLOOD TYPING RBC ANTIGENS: CPT

## 2020-10-01 PROCEDURE — 99232 SBSQ HOSP IP/OBS MODERATE 35: CPT | Performed by: PHYSICIAN ASSISTANT

## 2020-10-01 PROCEDURE — 85014 HEMATOCRIT: CPT | Performed by: PHYSICIAN ASSISTANT

## 2020-10-01 PROCEDURE — 83605 ASSAY OF LACTIC ACID: CPT | Performed by: PHYSICIAN ASSISTANT

## 2020-10-01 PROCEDURE — 86900 BLOOD TYPING SEROLOGIC ABO: CPT | Performed by: PHYSICIAN ASSISTANT

## 2020-10-01 PROCEDURE — 85018 HEMOGLOBIN: CPT | Performed by: PHYSICIAN ASSISTANT

## 2020-10-01 PROCEDURE — 93010 ELECTROCARDIOGRAM REPORT: CPT | Performed by: INTERNAL MEDICINE

## 2020-10-01 PROCEDURE — P9016 RBC LEUKOCYTES REDUCED: HCPCS

## 2020-10-01 PROCEDURE — 86870 RBC ANTIBODY IDENTIFICATION: CPT | Performed by: PHYSICIAN ASSISTANT

## 2020-10-01 PROCEDURE — 86901 BLOOD TYPING SEROLOGIC RH(D): CPT | Performed by: PHYSICIAN ASSISTANT

## 2020-10-01 PROCEDURE — 86921 COMPATIBILITY TEST INCUBATE: CPT

## 2020-10-01 PROCEDURE — 86902 BLOOD TYPE ANTIGEN DONOR EA: CPT

## 2020-10-01 PROCEDURE — 30233N1 TRANSFUSION OF NONAUTOLOGOUS RED BLOOD CELLS INTO PERIPHERAL VEIN, PERCUTANEOUS APPROACH: ICD-10-PCS | Performed by: INTERNAL MEDICINE

## 2020-10-01 PROCEDURE — 80053 COMPREHEN METABOLIC PANEL: CPT | Performed by: PHYSICIAN ASSISTANT

## 2020-10-01 RX ORDER — SODIUM CHLORIDE 9 MG/ML
150 INJECTION, SOLUTION INTRAVENOUS CONTINUOUS
Status: DISPENSED | OUTPATIENT
Start: 2020-10-01 | End: 2020-10-01

## 2020-10-01 RX ADMIN — OXYBUTYNIN CHLORIDE 5 MG: 5 TABLET, EXTENDED RELEASE ORAL at 08:32

## 2020-10-01 RX ADMIN — SODIUM CHLORIDE, SODIUM LACTATE, POTASSIUM CHLORIDE, AND CALCIUM CHLORIDE 1000 ML: .6; .31; .03; .02 INJECTION, SOLUTION INTRAVENOUS at 09:57

## 2020-10-01 RX ADMIN — SODIUM CHLORIDE 1000 ML: 0.9 INJECTION, SOLUTION INTRAVENOUS at 15:31

## 2020-10-01 RX ADMIN — CEFEPIME HYDROCHLORIDE 2000 MG: 2 INJECTION, POWDER, FOR SOLUTION INTRAVENOUS at 19:21

## 2020-10-01 RX ADMIN — PANTOPRAZOLE SODIUM 40 MG: 40 TABLET, DELAYED RELEASE ORAL at 18:17

## 2020-10-01 RX ADMIN — CEFEPIME HYDROCHLORIDE 2000 MG: 2 INJECTION, POWDER, FOR SOLUTION INTRAVENOUS at 07:09

## 2020-10-01 RX ADMIN — POTASSIUM CHLORIDE 20 MEQ: 1500 TABLET, EXTENDED RELEASE ORAL at 08:31

## 2020-10-01 RX ADMIN — PANTOPRAZOLE SODIUM 40 MG: 40 TABLET, DELAYED RELEASE ORAL at 05:24

## 2020-10-01 RX ADMIN — LEVOTHYROXINE SODIUM 150 MCG: 75 TABLET ORAL at 05:24

## 2020-10-01 RX ADMIN — POTASSIUM CHLORIDE 20 MEQ: 1500 TABLET, EXTENDED RELEASE ORAL at 18:19

## 2020-10-01 RX ADMIN — ACETAMINOPHEN 650 MG: 325 TABLET ORAL at 04:39

## 2020-10-01 RX ADMIN — PRAMIPEXOLE DIHYDROCHLORIDE 0.5 MG: 0.5 TABLET ORAL at 21:29

## 2020-10-01 RX ADMIN — DOCUSATE SODIUM 100 MG: 100 CAPSULE, LIQUID FILLED ORAL at 18:19

## 2020-10-01 RX ADMIN — ASPIRIN 81 MG: 81 TABLET, COATED ORAL at 08:31

## 2020-10-01 RX ADMIN — DOCUSATE SODIUM 100 MG: 100 CAPSULE, LIQUID FILLED ORAL at 08:32

## 2020-10-01 RX ADMIN — SODIUM CHLORIDE 150 ML/HR: 0.9 INJECTION, SOLUTION INTRAVENOUS at 06:20

## 2020-10-01 RX ADMIN — APIXABAN 2.5 MG: 2.5 TABLET, FILM COATED ORAL at 08:32

## 2020-10-01 RX ADMIN — HYDROCODONE BITARTRATE AND ACETAMINOPHEN 1 TABLET: 5; 325 TABLET ORAL at 07:13

## 2020-10-02 LAB
ABO GROUP BLD BPU: NORMAL
ABO GROUP BLD BPU: NORMAL
ANION GAP SERPL CALCULATED.3IONS-SCNC: 9 MMOL/L (ref 4–13)
BPU ID: NORMAL
BPU ID: NORMAL
BUN SERPL-MCNC: 21 MG/DL (ref 5–25)
CALCIUM SERPL-MCNC: 8.1 MG/DL (ref 8.3–10.1)
CHLORIDE SERPL-SCNC: 110 MMOL/L (ref 100–108)
CO2 SERPL-SCNC: 20 MMOL/L (ref 21–32)
CREAT SERPL-MCNC: 1.34 MG/DL (ref 0.6–1.3)
CROSSMATCH: NORMAL
CROSSMATCH: NORMAL
ERYTHROCYTE [DISTWIDTH] IN BLOOD BY AUTOMATED COUNT: 18.7 % (ref 11.6–15.1)
GFR SERPL CREATININE-BSD FRML MDRD: 37 ML/MIN/1.73SQ M
GLUCOSE SERPL-MCNC: 105 MG/DL (ref 65–140)
HCT VFR BLD AUTO: 30 % (ref 34.8–46.1)
HGB BLD-MCNC: 8.5 G/DL (ref 11.5–15.4)
LACTATE SERPL-SCNC: 2.4 MMOL/L (ref 0.5–2)
LACTATE SERPL-SCNC: 2.6 MMOL/L (ref 0.5–2)
MCH RBC QN AUTO: 23.6 PG (ref 26.8–34.3)
MCHC RBC AUTO-ENTMCNC: 28.3 G/DL (ref 31.4–37.4)
MCV RBC AUTO: 83 FL (ref 82–98)
NRBC BLD AUTO-RTO: 0 /100 WBCS
PLATELET # BLD AUTO: 122 THOUSANDS/UL (ref 149–390)
PMV BLD AUTO: 11.1 FL (ref 8.9–12.7)
POTASSIUM SERPL-SCNC: 4.3 MMOL/L (ref 3.5–5.3)
PROCALCITONIN SERPL-MCNC: 55.11 NG/ML
RBC # BLD AUTO: 3.6 MILLION/UL (ref 3.81–5.12)
SODIUM SERPL-SCNC: 139 MMOL/L (ref 136–145)
UNIT DISPENSE STATUS: NORMAL
UNIT DISPENSE STATUS: NORMAL
UNIT PRODUCT CODE: NORMAL
UNIT PRODUCT CODE: NORMAL
UNIT RH: NORMAL
UNIT RH: NORMAL
WBC # BLD AUTO: 12.95 THOUSAND/UL (ref 4.31–10.16)

## 2020-10-02 PROCEDURE — 83605 ASSAY OF LACTIC ACID: CPT | Performed by: PHYSICIAN ASSISTANT

## 2020-10-02 PROCEDURE — 85027 COMPLETE CBC AUTOMATED: CPT | Performed by: PHYSICIAN ASSISTANT

## 2020-10-02 PROCEDURE — 84145 PROCALCITONIN (PCT): CPT | Performed by: PHYSICIAN ASSISTANT

## 2020-10-02 PROCEDURE — 99232 SBSQ HOSP IP/OBS MODERATE 35: CPT | Performed by: PHYSICIAN ASSISTANT

## 2020-10-02 PROCEDURE — 80048 BASIC METABOLIC PNL TOTAL CA: CPT | Performed by: PHYSICIAN ASSISTANT

## 2020-10-02 RX ORDER — FLUCONAZOLE 2 MG/ML
400 INJECTION, SOLUTION INTRAVENOUS EVERY 24 HOURS
Status: DISCONTINUED | OUTPATIENT
Start: 2020-10-02 | End: 2020-10-03

## 2020-10-02 RX ORDER — OXYBUTYNIN CHLORIDE 5 MG/1
5 TABLET ORAL 3 TIMES DAILY PRN
Status: DISCONTINUED | OUTPATIENT
Start: 2020-10-02 | End: 2020-10-08 | Stop reason: HOSPADM

## 2020-10-02 RX ADMIN — FLUCONAZOLE IN SODIUM CHLORIDE 400 MG: 2 INJECTION, SOLUTION INTRAVENOUS at 22:02

## 2020-10-02 RX ADMIN — APIXABAN 2.5 MG: 2.5 TABLET, FILM COATED ORAL at 17:24

## 2020-10-02 RX ADMIN — POTASSIUM CHLORIDE 20 MEQ: 1500 TABLET, EXTENDED RELEASE ORAL at 08:00

## 2020-10-02 RX ADMIN — CEFEPIME HYDROCHLORIDE 2000 MG: 2 INJECTION, POWDER, FOR SOLUTION INTRAVENOUS at 07:59

## 2020-10-02 RX ADMIN — PRAMIPEXOLE DIHYDROCHLORIDE 0.5 MG: 0.5 TABLET ORAL at 21:40

## 2020-10-02 RX ADMIN — LEVOTHYROXINE SODIUM 150 MCG: 75 TABLET ORAL at 06:41

## 2020-10-02 RX ADMIN — POTASSIUM CHLORIDE 20 MEQ: 1500 TABLET, EXTENDED RELEASE ORAL at 17:24

## 2020-10-02 RX ADMIN — OXYBUTYNIN CHLORIDE 5 MG: 5 TABLET, EXTENDED RELEASE ORAL at 08:00

## 2020-10-02 RX ADMIN — CEFTRIAXONE 2000 MG: 2 INJECTION, POWDER, FOR SOLUTION INTRAMUSCULAR; INTRAVENOUS at 19:53

## 2020-10-02 RX ADMIN — DOCUSATE SODIUM 100 MG: 100 CAPSULE, LIQUID FILLED ORAL at 17:24

## 2020-10-02 RX ADMIN — PANTOPRAZOLE SODIUM 40 MG: 40 TABLET, DELAYED RELEASE ORAL at 06:41

## 2020-10-02 RX ADMIN — ASPIRIN 81 MG: 81 TABLET, COATED ORAL at 08:00

## 2020-10-02 RX ADMIN — DOCUSATE SODIUM 100 MG: 100 CAPSULE, LIQUID FILLED ORAL at 08:00

## 2020-10-02 RX ADMIN — HYDROCODONE BITARTRATE AND ACETAMINOPHEN 1 TABLET: 5; 325 TABLET ORAL at 19:53

## 2020-10-02 RX ADMIN — PANTOPRAZOLE SODIUM 40 MG: 40 TABLET, DELAYED RELEASE ORAL at 15:23

## 2020-10-02 RX ADMIN — OXYBUTYNIN CHLORIDE 5 MG: 5 TABLET ORAL at 21:40

## 2020-10-02 RX ADMIN — APIXABAN 2.5 MG: 2.5 TABLET, FILM COATED ORAL at 08:00

## 2020-10-03 PROBLEM — B37.7 CANDIDEMIA (HCC): Status: ACTIVE | Noted: 2020-10-03

## 2020-10-03 PROBLEM — B49 FUNGEMIA: Status: ACTIVE | Noted: 2020-10-03

## 2020-10-03 LAB
ANION GAP SERPL CALCULATED.3IONS-SCNC: 10 MMOL/L (ref 4–13)
BASOPHILS # BLD AUTO: 0.02 THOUSANDS/ΜL (ref 0–0.1)
BASOPHILS NFR BLD AUTO: 0 % (ref 0–1)
BUN SERPL-MCNC: 15 MG/DL (ref 5–25)
CALCIUM SERPL-MCNC: 8.1 MG/DL (ref 8.3–10.1)
CHLORIDE SERPL-SCNC: 111 MMOL/L (ref 100–108)
CO2 SERPL-SCNC: 20 MMOL/L (ref 21–32)
CREAT SERPL-MCNC: 1.1 MG/DL (ref 0.6–1.3)
EOSINOPHIL # BLD AUTO: 0.21 THOUSAND/ΜL (ref 0–0.61)
EOSINOPHIL NFR BLD AUTO: 2 % (ref 0–6)
ERYTHROCYTE [DISTWIDTH] IN BLOOD BY AUTOMATED COUNT: 19 % (ref 11.6–15.1)
GFR SERPL CREATININE-BSD FRML MDRD: 47 ML/MIN/1.73SQ M
GLUCOSE SERPL-MCNC: 109 MG/DL (ref 65–140)
HCT VFR BLD AUTO: 29.9 % (ref 34.8–46.1)
HGB BLD-MCNC: 8.4 G/DL (ref 11.5–15.4)
IMM GRANULOCYTES # BLD AUTO: 0.07 THOUSAND/UL (ref 0–0.2)
IMM GRANULOCYTES NFR BLD AUTO: 1 % (ref 0–2)
LACTATE SERPL-SCNC: 2.5 MMOL/L (ref 0.5–2)
LYMPHOCYTES # BLD AUTO: 0.63 THOUSANDS/ΜL (ref 0.6–4.47)
LYMPHOCYTES NFR BLD AUTO: 7 % (ref 14–44)
MCH RBC QN AUTO: 23.3 PG (ref 26.8–34.3)
MCHC RBC AUTO-ENTMCNC: 28.1 G/DL (ref 31.4–37.4)
MCV RBC AUTO: 83 FL (ref 82–98)
MONOCYTES # BLD AUTO: 0.35 THOUSAND/ΜL (ref 0.17–1.22)
MONOCYTES NFR BLD AUTO: 4 % (ref 4–12)
NEUTROPHILS # BLD AUTO: 7.38 THOUSANDS/ΜL (ref 1.85–7.62)
NEUTS SEG NFR BLD AUTO: 86 % (ref 43–75)
NRBC BLD AUTO-RTO: 0 /100 WBCS
PLATELET # BLD AUTO: 132 THOUSANDS/UL (ref 149–390)
PMV BLD AUTO: 11.2 FL (ref 8.9–12.7)
POTASSIUM SERPL-SCNC: 4 MMOL/L (ref 3.5–5.3)
PROCALCITONIN SERPL-MCNC: 31.2 NG/ML
RBC # BLD AUTO: 3.61 MILLION/UL (ref 3.81–5.12)
SODIUM SERPL-SCNC: 141 MMOL/L (ref 136–145)
WBC # BLD AUTO: 8.66 THOUSAND/UL (ref 4.31–10.16)

## 2020-10-03 PROCEDURE — 99223 1ST HOSP IP/OBS HIGH 75: CPT | Performed by: INTERNAL MEDICINE

## 2020-10-03 PROCEDURE — 84145 PROCALCITONIN (PCT): CPT | Performed by: PHYSICIAN ASSISTANT

## 2020-10-03 PROCEDURE — 80048 BASIC METABOLIC PNL TOTAL CA: CPT | Performed by: PHYSICIAN ASSISTANT

## 2020-10-03 PROCEDURE — 85025 COMPLETE CBC W/AUTO DIFF WBC: CPT | Performed by: PHYSICIAN ASSISTANT

## 2020-10-03 PROCEDURE — 83605 ASSAY OF LACTIC ACID: CPT | Performed by: INTERNAL MEDICINE

## 2020-10-03 PROCEDURE — 99232 SBSQ HOSP IP/OBS MODERATE 35: CPT | Performed by: INTERNAL MEDICINE

## 2020-10-03 PROCEDURE — 99232 SBSQ HOSP IP/OBS MODERATE 35: CPT | Performed by: UROLOGY

## 2020-10-03 RX ORDER — TROPICAMIDE 10 MG/ML
1 SOLUTION/ DROPS OPHTHALMIC 2 TIMES DAILY
Status: COMPLETED | OUTPATIENT
Start: 2020-10-03 | End: 2020-10-03

## 2020-10-03 RX ADMIN — PANTOPRAZOLE SODIUM 40 MG: 40 TABLET, DELAYED RELEASE ORAL at 05:06

## 2020-10-03 RX ADMIN — TROPICAMIDE 1 DROP: 10 SOLUTION/ DROPS OPHTHALMIC at 13:16

## 2020-10-03 RX ADMIN — MICAFUNGIN SODIUM 100 MG: 50 INJECTION, POWDER, LYOPHILIZED, FOR SOLUTION INTRAVENOUS at 09:58

## 2020-10-03 RX ADMIN — DOCUSATE SODIUM 100 MG: 100 CAPSULE, LIQUID FILLED ORAL at 08:25

## 2020-10-03 RX ADMIN — PANTOPRAZOLE SODIUM 40 MG: 40 TABLET, DELAYED RELEASE ORAL at 15:15

## 2020-10-03 RX ADMIN — TROPICAMIDE 1 DROP: 10 SOLUTION/ DROPS OPHTHALMIC at 12:09

## 2020-10-03 RX ADMIN — APIXABAN 2.5 MG: 2.5 TABLET, FILM COATED ORAL at 08:25

## 2020-10-03 RX ADMIN — OXYBUTYNIN CHLORIDE 5 MG: 5 TABLET ORAL at 05:06

## 2020-10-03 RX ADMIN — DOCUSATE SODIUM 100 MG: 100 CAPSULE, LIQUID FILLED ORAL at 17:48

## 2020-10-03 RX ADMIN — POTASSIUM CHLORIDE 20 MEQ: 1500 TABLET, EXTENDED RELEASE ORAL at 08:25

## 2020-10-03 RX ADMIN — OXYBUTYNIN CHLORIDE 5 MG: 5 TABLET ORAL at 15:17

## 2020-10-03 RX ADMIN — LEVOTHYROXINE SODIUM 150 MCG: 75 TABLET ORAL at 05:05

## 2020-10-03 RX ADMIN — APIXABAN 2.5 MG: 2.5 TABLET, FILM COATED ORAL at 17:48

## 2020-10-03 RX ADMIN — PRAMIPEXOLE DIHYDROCHLORIDE 0.5 MG: 0.5 TABLET ORAL at 22:45

## 2020-10-03 RX ADMIN — ASPIRIN 81 MG: 81 TABLET, COATED ORAL at 08:25

## 2020-10-03 RX ADMIN — POTASSIUM CHLORIDE 20 MEQ: 1500 TABLET, EXTENDED RELEASE ORAL at 17:48

## 2020-10-04 LAB
ALBUMIN SERPL BCP-MCNC: 2.1 G/DL (ref 3.5–5)
ALP SERPL-CCNC: 107 U/L (ref 46–116)
ALT SERPL W P-5'-P-CCNC: 92 U/L (ref 12–78)
ANION GAP SERPL CALCULATED.3IONS-SCNC: 8 MMOL/L (ref 4–13)
AST SERPL W P-5'-P-CCNC: 36 U/L (ref 5–45)
BASOPHILS # BLD AUTO: 0.02 THOUSANDS/ΜL (ref 0–0.1)
BASOPHILS NFR BLD AUTO: 0 % (ref 0–1)
BILIRUB SERPL-MCNC: 0.54 MG/DL (ref 0.2–1)
BUN SERPL-MCNC: 11 MG/DL (ref 5–25)
CALCIUM ALBUM COR SERPL-MCNC: 10.2 MG/DL (ref 8.3–10.1)
CALCIUM SERPL-MCNC: 8.7 MG/DL (ref 8.3–10.1)
CHLORIDE SERPL-SCNC: 108 MMOL/L (ref 100–108)
CO2 SERPL-SCNC: 24 MMOL/L (ref 21–32)
CREAT SERPL-MCNC: 1.06 MG/DL (ref 0.6–1.3)
EOSINOPHIL # BLD AUTO: 0.18 THOUSAND/ΜL (ref 0–0.61)
EOSINOPHIL NFR BLD AUTO: 4 % (ref 0–6)
ERYTHROCYTE [DISTWIDTH] IN BLOOD BY AUTOMATED COUNT: 19.6 % (ref 11.6–15.1)
GFR SERPL CREATININE-BSD FRML MDRD: 49 ML/MIN/1.73SQ M
GLUCOSE SERPL-MCNC: 92 MG/DL (ref 65–140)
HCT VFR BLD AUTO: 31.8 % (ref 34.8–46.1)
HGB BLD-MCNC: 9 G/DL (ref 11.5–15.4)
IMM GRANULOCYTES # BLD AUTO: 0.06 THOUSAND/UL (ref 0–0.2)
IMM GRANULOCYTES NFR BLD AUTO: 1 % (ref 0–2)
LYMPHOCYTES # BLD AUTO: 0.75 THOUSANDS/ΜL (ref 0.6–4.47)
LYMPHOCYTES NFR BLD AUTO: 16 % (ref 14–44)
MCH RBC QN AUTO: 23.5 PG (ref 26.8–34.3)
MCHC RBC AUTO-ENTMCNC: 28.3 G/DL (ref 31.4–37.4)
MCV RBC AUTO: 83 FL (ref 82–98)
MONOCYTES # BLD AUTO: 0.49 THOUSAND/ΜL (ref 0.17–1.22)
MONOCYTES NFR BLD AUTO: 10 % (ref 4–12)
NEUTROPHILS # BLD AUTO: 3.33 THOUSANDS/ΜL (ref 1.85–7.62)
NEUTS SEG NFR BLD AUTO: 69 % (ref 43–75)
NRBC BLD AUTO-RTO: 0 /100 WBCS
PLATELET # BLD AUTO: 143 THOUSANDS/UL (ref 149–390)
PMV BLD AUTO: 11.2 FL (ref 8.9–12.7)
POTASSIUM SERPL-SCNC: 3.8 MMOL/L (ref 3.5–5.3)
PROT SERPL-MCNC: 6 G/DL (ref 6.4–8.2)
RBC # BLD AUTO: 3.83 MILLION/UL (ref 3.81–5.12)
SODIUM SERPL-SCNC: 140 MMOL/L (ref 136–145)
WBC # BLD AUTO: 4.83 THOUSAND/UL (ref 4.31–10.16)

## 2020-10-04 PROCEDURE — 85025 COMPLETE CBC W/AUTO DIFF WBC: CPT | Performed by: INTERNAL MEDICINE

## 2020-10-04 PROCEDURE — 80053 COMPREHEN METABOLIC PANEL: CPT | Performed by: INTERNAL MEDICINE

## 2020-10-04 PROCEDURE — 99233 SBSQ HOSP IP/OBS HIGH 50: CPT | Performed by: INTERNAL MEDICINE

## 2020-10-04 PROCEDURE — 87040 BLOOD CULTURE FOR BACTERIA: CPT | Performed by: INTERNAL MEDICINE

## 2020-10-04 PROCEDURE — 99232 SBSQ HOSP IP/OBS MODERATE 35: CPT | Performed by: INTERNAL MEDICINE

## 2020-10-04 RX ADMIN — APIXABAN 2.5 MG: 2.5 TABLET, FILM COATED ORAL at 16:02

## 2020-10-04 RX ADMIN — OXYBUTYNIN CHLORIDE 5 MG: 5 TABLET ORAL at 09:44

## 2020-10-04 RX ADMIN — MELATONIN 6 MG: at 23:24

## 2020-10-04 RX ADMIN — APIXABAN 2.5 MG: 2.5 TABLET, FILM COATED ORAL at 09:43

## 2020-10-04 RX ADMIN — OXYBUTYNIN CHLORIDE 5 MG: 5 TABLET ORAL at 23:23

## 2020-10-04 RX ADMIN — MICAFUNGIN SODIUM 100 MG: 50 INJECTION, POWDER, LYOPHILIZED, FOR SOLUTION INTRAVENOUS at 09:44

## 2020-10-04 RX ADMIN — OXYBUTYNIN CHLORIDE 5 MG: 5 TABLET ORAL at 15:59

## 2020-10-04 RX ADMIN — PRAMIPEXOLE DIHYDROCHLORIDE 0.5 MG: 0.5 TABLET ORAL at 23:20

## 2020-10-04 RX ADMIN — POTASSIUM CHLORIDE 20 MEQ: 1500 TABLET, EXTENDED RELEASE ORAL at 15:59

## 2020-10-04 RX ADMIN — HYDROCODONE BITARTRATE AND ACETAMINOPHEN 1 TABLET: 5; 325 TABLET ORAL at 20:02

## 2020-10-04 RX ADMIN — PANTOPRAZOLE SODIUM 40 MG: 40 TABLET, DELAYED RELEASE ORAL at 05:31

## 2020-10-04 RX ADMIN — POTASSIUM CHLORIDE 20 MEQ: 1500 TABLET, EXTENDED RELEASE ORAL at 09:43

## 2020-10-04 RX ADMIN — ASPIRIN 81 MG: 81 TABLET, COATED ORAL at 09:44

## 2020-10-04 RX ADMIN — DOCUSATE SODIUM 100 MG: 100 CAPSULE, LIQUID FILLED ORAL at 16:03

## 2020-10-04 RX ADMIN — PANTOPRAZOLE SODIUM 40 MG: 40 TABLET, DELAYED RELEASE ORAL at 16:02

## 2020-10-04 RX ADMIN — DOCUSATE SODIUM 100 MG: 100 CAPSULE, LIQUID FILLED ORAL at 09:43

## 2020-10-04 RX ADMIN — LEVOTHYROXINE SODIUM 150 MCG: 75 TABLET ORAL at 05:31

## 2020-10-05 ENCOUNTER — APPOINTMENT (INPATIENT)
Dept: NON INVASIVE DIAGNOSTICS | Facility: HOSPITAL | Age: 85
DRG: 856 | End: 2020-10-05
Payer: MEDICARE

## 2020-10-05 ENCOUNTER — APPOINTMENT (INPATIENT)
Dept: RADIOLOGY | Facility: HOSPITAL | Age: 85
DRG: 856 | End: 2020-10-05
Payer: MEDICARE

## 2020-10-05 PROBLEM — N17.9 ACUTE KIDNEY INJURY (HCC): Status: RESOLVED | Noted: 2019-05-21 | Resolved: 2020-10-05

## 2020-10-05 LAB
ALBUMIN SERPL BCP-MCNC: 2.1 G/DL (ref 3.5–5)
ALP SERPL-CCNC: 100 U/L (ref 46–116)
ALT SERPL W P-5'-P-CCNC: 67 U/L (ref 12–78)
ANION GAP SERPL CALCULATED.3IONS-SCNC: 6 MMOL/L (ref 4–13)
AST SERPL W P-5'-P-CCNC: 23 U/L (ref 5–45)
BASOPHILS # BLD AUTO: 0.04 THOUSANDS/ΜL (ref 0–0.1)
BASOPHILS NFR BLD AUTO: 1 % (ref 0–1)
BILIRUB SERPL-MCNC: 0.42 MG/DL (ref 0.2–1)
BUN SERPL-MCNC: 10 MG/DL (ref 5–25)
CALCIUM ALBUM COR SERPL-MCNC: 9.9 MG/DL (ref 8.3–10.1)
CALCIUM SERPL-MCNC: 8.4 MG/DL (ref 8.3–10.1)
CHLORIDE SERPL-SCNC: 107 MMOL/L (ref 100–108)
CO2 SERPL-SCNC: 26 MMOL/L (ref 21–32)
CREAT SERPL-MCNC: 1.05 MG/DL (ref 0.6–1.3)
EOSINOPHIL # BLD AUTO: 0.27 THOUSAND/ΜL (ref 0–0.61)
EOSINOPHIL NFR BLD AUTO: 4 % (ref 0–6)
ERYTHROCYTE [DISTWIDTH] IN BLOOD BY AUTOMATED COUNT: 20 % (ref 11.6–15.1)
GFR SERPL CREATININE-BSD FRML MDRD: 49 ML/MIN/1.73SQ M
GLUCOSE SERPL-MCNC: 95 MG/DL (ref 65–140)
HCT VFR BLD AUTO: 31.8 % (ref 34.8–46.1)
HGB BLD-MCNC: 8.8 G/DL (ref 11.5–15.4)
IMM GRANULOCYTES # BLD AUTO: 0.14 THOUSAND/UL (ref 0–0.2)
IMM GRANULOCYTES NFR BLD AUTO: 2 % (ref 0–2)
LYMPHOCYTES # BLD AUTO: 1.41 THOUSANDS/ΜL (ref 0.6–4.47)
LYMPHOCYTES NFR BLD AUTO: 22 % (ref 14–44)
MCH RBC QN AUTO: 23 PG (ref 26.8–34.3)
MCHC RBC AUTO-ENTMCNC: 27.7 G/DL (ref 31.4–37.4)
MCV RBC AUTO: 83 FL (ref 82–98)
MONOCYTES # BLD AUTO: 0.63 THOUSAND/ΜL (ref 0.17–1.22)
MONOCYTES NFR BLD AUTO: 10 % (ref 4–12)
NEUTROPHILS # BLD AUTO: 3.96 THOUSANDS/ΜL (ref 1.85–7.62)
NEUTS SEG NFR BLD AUTO: 61 % (ref 43–75)
NRBC BLD AUTO-RTO: 0 /100 WBCS
PLATELET # BLD AUTO: 155 THOUSANDS/UL (ref 149–390)
PMV BLD AUTO: 11.9 FL (ref 8.9–12.7)
POTASSIUM SERPL-SCNC: 4 MMOL/L (ref 3.5–5.3)
PROT SERPL-MCNC: 5.8 G/DL (ref 6.4–8.2)
RBC # BLD AUTO: 3.83 MILLION/UL (ref 3.81–5.12)
SODIUM SERPL-SCNC: 139 MMOL/L (ref 136–145)
WBC # BLD AUTO: 6.45 THOUSAND/UL (ref 4.31–10.16)

## 2020-10-05 PROCEDURE — 71045 X-RAY EXAM CHEST 1 VIEW: CPT

## 2020-10-05 PROCEDURE — 87186 SC STD MICRODIL/AGAR DIL: CPT

## 2020-10-05 PROCEDURE — 93306 TTE W/DOPPLER COMPLETE: CPT | Performed by: INTERNAL MEDICINE

## 2020-10-05 PROCEDURE — 87106 FUNGI IDENTIFICATION YEAST: CPT | Performed by: PHYSICIAN ASSISTANT

## 2020-10-05 PROCEDURE — 99232 SBSQ HOSP IP/OBS MODERATE 35: CPT | Performed by: PHYSICIAN ASSISTANT

## 2020-10-05 PROCEDURE — 93308 TTE F-UP OR LMTD: CPT

## 2020-10-05 PROCEDURE — 85025 COMPLETE CBC W/AUTO DIFF WBC: CPT | Performed by: INTERNAL MEDICINE

## 2020-10-05 PROCEDURE — 99232 SBSQ HOSP IP/OBS MODERATE 35: CPT | Performed by: STUDENT IN AN ORGANIZED HEALTH CARE EDUCATION/TRAINING PROGRAM

## 2020-10-05 PROCEDURE — 80053 COMPREHEN METABOLIC PANEL: CPT | Performed by: INTERNAL MEDICINE

## 2020-10-05 PROCEDURE — 99233 SBSQ HOSP IP/OBS HIGH 50: CPT | Performed by: INTERNAL MEDICINE

## 2020-10-05 RX ORDER — GUAIFENESIN/DEXTROMETHORPHAN 100-10MG/5
10 SYRUP ORAL EVERY 4 HOURS PRN
Status: DISCONTINUED | OUTPATIENT
Start: 2020-10-05 | End: 2020-10-08 | Stop reason: HOSPADM

## 2020-10-05 RX ORDER — ATORVASTATIN CALCIUM 10 MG/1
10 TABLET, FILM COATED ORAL
Status: DISCONTINUED | OUTPATIENT
Start: 2020-10-05 | End: 2020-10-08 | Stop reason: HOSPADM

## 2020-10-05 RX ADMIN — HYDROCODONE BITARTRATE AND ACETAMINOPHEN 1 TABLET: 5; 325 TABLET ORAL at 19:46

## 2020-10-05 RX ADMIN — POTASSIUM CHLORIDE 20 MEQ: 1500 TABLET, EXTENDED RELEASE ORAL at 17:08

## 2020-10-05 RX ADMIN — PRAMIPEXOLE DIHYDROCHLORIDE 0.5 MG: 0.5 TABLET ORAL at 23:33

## 2020-10-05 RX ADMIN — MICAFUNGIN SODIUM 100 MG: 50 INJECTION, POWDER, LYOPHILIZED, FOR SOLUTION INTRAVENOUS at 08:49

## 2020-10-05 RX ADMIN — ASPIRIN 81 MG: 81 TABLET, COATED ORAL at 08:41

## 2020-10-05 RX ADMIN — PANTOPRAZOLE SODIUM 40 MG: 40 TABLET, DELAYED RELEASE ORAL at 06:30

## 2020-10-05 RX ADMIN — OXYBUTYNIN CHLORIDE 5 MG: 5 TABLET ORAL at 08:50

## 2020-10-05 RX ADMIN — POTASSIUM CHLORIDE 20 MEQ: 1500 TABLET, EXTENDED RELEASE ORAL at 08:41

## 2020-10-05 RX ADMIN — OXYBUTYNIN CHLORIDE 5 MG: 5 TABLET ORAL at 19:46

## 2020-10-05 RX ADMIN — APIXABAN 2.5 MG: 2.5 TABLET, FILM COATED ORAL at 17:08

## 2020-10-05 RX ADMIN — PANTOPRAZOLE SODIUM 40 MG: 40 TABLET, DELAYED RELEASE ORAL at 17:08

## 2020-10-05 RX ADMIN — APIXABAN 2.5 MG: 2.5 TABLET, FILM COATED ORAL at 08:41

## 2020-10-05 RX ADMIN — ATORVASTATIN CALCIUM 10 MG: 10 TABLET, FILM COATED ORAL at 17:09

## 2020-10-05 RX ADMIN — LEVOTHYROXINE SODIUM 150 MCG: 75 TABLET ORAL at 05:48

## 2020-10-05 RX ADMIN — GUAIFENESIN AND DEXTROMETHORPHAN 10 ML: 100; 10 SYRUP ORAL at 19:47

## 2020-10-05 RX ADMIN — DOCUSATE SODIUM 100 MG: 100 CAPSULE, LIQUID FILLED ORAL at 08:41

## 2020-10-05 RX ADMIN — HYDROCODONE BITARTRATE AND ACETAMINOPHEN 1 TABLET: 5; 325 TABLET ORAL at 08:49

## 2020-10-05 RX ADMIN — DOCUSATE SODIUM 100 MG: 100 CAPSULE, LIQUID FILLED ORAL at 17:08

## 2020-10-06 LAB
ANION GAP SERPL CALCULATED.3IONS-SCNC: 8 MMOL/L (ref 4–13)
BASOPHILS # BLD MANUAL: 0 THOUSAND/UL (ref 0–0.1)
BASOPHILS NFR MAR MANUAL: 0 % (ref 0–1)
BUN SERPL-MCNC: 9 MG/DL (ref 5–25)
CALCIUM SERPL-MCNC: 8.5 MG/DL (ref 8.3–10.1)
CHLORIDE SERPL-SCNC: 105 MMOL/L (ref 100–108)
CO2 SERPL-SCNC: 24 MMOL/L (ref 21–32)
CREAT SERPL-MCNC: 0.87 MG/DL (ref 0.6–1.3)
EOSINOPHIL # BLD MANUAL: 0.36 THOUSAND/UL (ref 0–0.4)
EOSINOPHIL NFR BLD MANUAL: 6 % (ref 0–6)
ERYTHROCYTE [DISTWIDTH] IN BLOOD BY AUTOMATED COUNT: 20.4 % (ref 11.6–15.1)
GFR SERPL CREATININE-BSD FRML MDRD: 62 ML/MIN/1.73SQ M
GLUCOSE SERPL-MCNC: 94 MG/DL (ref 65–140)
HCT VFR BLD AUTO: 33.3 % (ref 34.8–46.1)
HGB BLD-MCNC: 9.1 G/DL (ref 11.5–15.4)
LACTATE SERPL-SCNC: 1.5 MMOL/L (ref 0.5–2)
LACTATE SERPL-SCNC: 2.1 MMOL/L (ref 0.5–2)
LYMPHOCYTES # BLD AUTO: 1.61 THOUSAND/UL (ref 0.6–4.47)
LYMPHOCYTES # BLD AUTO: 27 % (ref 14–44)
MAGNESIUM SERPL-MCNC: 1.7 MG/DL (ref 1.6–2.6)
MCH RBC QN AUTO: 23 PG (ref 26.8–34.3)
MCHC RBC AUTO-ENTMCNC: 27.3 G/DL (ref 31.4–37.4)
MCV RBC AUTO: 84 FL (ref 82–98)
METAMYELOCYTES NFR BLD MANUAL: 2 % (ref 0–1)
MONOCYTES # BLD AUTO: 0.66 THOUSAND/UL (ref 0–1.22)
MONOCYTES NFR BLD: 11 % (ref 4–12)
MYELOCYTES NFR BLD MANUAL: 2 % (ref 0–1)
NEUTROPHILS # BLD MANUAL: 3.11 THOUSAND/UL (ref 1.85–7.62)
NEUTS SEG NFR BLD AUTO: 52 % (ref 43–75)
NRBC BLD AUTO-RTO: 0 /100 WBCS
PHOSPHATE SERPL-MCNC: 2.9 MG/DL (ref 2.3–4.1)
PLATELET # BLD AUTO: 171 THOUSANDS/UL (ref 149–390)
PLATELET BLD QL SMEAR: ADEQUATE
PMV BLD AUTO: 10.8 FL (ref 8.9–12.7)
POTASSIUM SERPL-SCNC: 4 MMOL/L (ref 3.5–5.3)
RBC # BLD AUTO: 3.95 MILLION/UL (ref 3.81–5.12)
RBC MORPH BLD: NORMAL
SODIUM SERPL-SCNC: 137 MMOL/L (ref 136–145)
TOTAL CELLS COUNTED SPEC: 100
WBC # BLD AUTO: 5.98 THOUSAND/UL (ref 4.31–10.16)

## 2020-10-06 PROCEDURE — 85027 COMPLETE CBC AUTOMATED: CPT | Performed by: STUDENT IN AN ORGANIZED HEALTH CARE EDUCATION/TRAINING PROGRAM

## 2020-10-06 PROCEDURE — 99232 SBSQ HOSP IP/OBS MODERATE 35: CPT | Performed by: STUDENT IN AN ORGANIZED HEALTH CARE EDUCATION/TRAINING PROGRAM

## 2020-10-06 PROCEDURE — 85007 BL SMEAR W/DIFF WBC COUNT: CPT | Performed by: STUDENT IN AN ORGANIZED HEALTH CARE EDUCATION/TRAINING PROGRAM

## 2020-10-06 PROCEDURE — 84100 ASSAY OF PHOSPHORUS: CPT | Performed by: STUDENT IN AN ORGANIZED HEALTH CARE EDUCATION/TRAINING PROGRAM

## 2020-10-06 PROCEDURE — 99232 SBSQ HOSP IP/OBS MODERATE 35: CPT | Performed by: INTERNAL MEDICINE

## 2020-10-06 PROCEDURE — 99232 SBSQ HOSP IP/OBS MODERATE 35: CPT | Performed by: PHYSICIAN ASSISTANT

## 2020-10-06 PROCEDURE — 83735 ASSAY OF MAGNESIUM: CPT | Performed by: STUDENT IN AN ORGANIZED HEALTH CARE EDUCATION/TRAINING PROGRAM

## 2020-10-06 PROCEDURE — 83605 ASSAY OF LACTIC ACID: CPT | Performed by: STUDENT IN AN ORGANIZED HEALTH CARE EDUCATION/TRAINING PROGRAM

## 2020-10-06 PROCEDURE — 80048 BASIC METABOLIC PNL TOTAL CA: CPT | Performed by: STUDENT IN AN ORGANIZED HEALTH CARE EDUCATION/TRAINING PROGRAM

## 2020-10-06 RX ORDER — FLUCONAZOLE 100 MG/1
400 TABLET ORAL EVERY 24 HOURS
Status: DISCONTINUED | OUTPATIENT
Start: 2020-10-07 | End: 2020-10-08 | Stop reason: HOSPADM

## 2020-10-06 RX ORDER — FUROSEMIDE 40 MG/1
40 TABLET ORAL DAILY
Status: DISCONTINUED | OUTPATIENT
Start: 2020-10-07 | End: 2020-10-08 | Stop reason: HOSPADM

## 2020-10-06 RX ADMIN — GUAIFENESIN AND DEXTROMETHORPHAN 10 ML: 100; 10 SYRUP ORAL at 05:31

## 2020-10-06 RX ADMIN — PANTOPRAZOLE SODIUM 40 MG: 40 TABLET, DELAYED RELEASE ORAL at 16:29

## 2020-10-06 RX ADMIN — PRAMIPEXOLE DIHYDROCHLORIDE 0.5 MG: 0.5 TABLET ORAL at 22:08

## 2020-10-06 RX ADMIN — DOCUSATE SODIUM 100 MG: 100 CAPSULE, LIQUID FILLED ORAL at 08:36

## 2020-10-06 RX ADMIN — OXYBUTYNIN CHLORIDE 5 MG: 5 TABLET ORAL at 22:05

## 2020-10-06 RX ADMIN — APIXABAN 2.5 MG: 2.5 TABLET, FILM COATED ORAL at 08:36

## 2020-10-06 RX ADMIN — METOPROLOL TARTRATE 25 MG: 25 TABLET, FILM COATED ORAL at 22:08

## 2020-10-06 RX ADMIN — GUAIFENESIN AND DEXTROMETHORPHAN 10 ML: 100; 10 SYRUP ORAL at 22:04

## 2020-10-06 RX ADMIN — PANTOPRAZOLE SODIUM 40 MG: 40 TABLET, DELAYED RELEASE ORAL at 05:30

## 2020-10-06 RX ADMIN — MICAFUNGIN SODIUM 100 MG: 50 INJECTION, POWDER, LYOPHILIZED, FOR SOLUTION INTRAVENOUS at 08:42

## 2020-10-06 RX ADMIN — POTASSIUM CHLORIDE 20 MEQ: 1500 TABLET, EXTENDED RELEASE ORAL at 18:08

## 2020-10-06 RX ADMIN — HYDROCODONE BITARTRATE AND ACETAMINOPHEN 1 TABLET: 5; 325 TABLET ORAL at 22:04

## 2020-10-06 RX ADMIN — OXYBUTYNIN CHLORIDE 5 MG: 5 TABLET ORAL at 13:47

## 2020-10-06 RX ADMIN — HYDROCODONE BITARTRATE AND ACETAMINOPHEN 1 TABLET: 5; 325 TABLET ORAL at 13:47

## 2020-10-06 RX ADMIN — LEVOTHYROXINE SODIUM 150 MCG: 75 TABLET ORAL at 05:31

## 2020-10-06 RX ADMIN — METOPROLOL TARTRATE 25 MG: 25 TABLET, FILM COATED ORAL at 08:36

## 2020-10-06 RX ADMIN — POTASSIUM CHLORIDE 20 MEQ: 1500 TABLET, EXTENDED RELEASE ORAL at 08:36

## 2020-10-06 RX ADMIN — ASPIRIN 81 MG: 81 TABLET, COATED ORAL at 08:36

## 2020-10-06 RX ADMIN — ATORVASTATIN CALCIUM 10 MG: 10 TABLET, FILM COATED ORAL at 16:28

## 2020-10-06 RX ADMIN — DOCUSATE SODIUM 100 MG: 100 CAPSULE, LIQUID FILLED ORAL at 18:08

## 2020-10-06 RX ADMIN — APIXABAN 2.5 MG: 2.5 TABLET, FILM COATED ORAL at 18:08

## 2020-10-07 PROCEDURE — 99232 SBSQ HOSP IP/OBS MODERATE 35: CPT | Performed by: STUDENT IN AN ORGANIZED HEALTH CARE EDUCATION/TRAINING PROGRAM

## 2020-10-07 PROCEDURE — 99232 SBSQ HOSP IP/OBS MODERATE 35: CPT | Performed by: PHYSICIAN ASSISTANT

## 2020-10-07 PROCEDURE — 99232 SBSQ HOSP IP/OBS MODERATE 35: CPT | Performed by: INTERNAL MEDICINE

## 2020-10-07 RX ADMIN — ATORVASTATIN CALCIUM 10 MG: 10 TABLET, FILM COATED ORAL at 16:38

## 2020-10-07 RX ADMIN — FUROSEMIDE 40 MG: 40 TABLET ORAL at 08:36

## 2020-10-07 RX ADMIN — METOPROLOL TARTRATE 25 MG: 25 TABLET, FILM COATED ORAL at 21:18

## 2020-10-07 RX ADMIN — ASPIRIN 81 MG: 81 TABLET, COATED ORAL at 08:36

## 2020-10-07 RX ADMIN — POTASSIUM CHLORIDE 20 MEQ: 1500 TABLET, EXTENDED RELEASE ORAL at 17:33

## 2020-10-07 RX ADMIN — PANTOPRAZOLE SODIUM 40 MG: 40 TABLET, DELAYED RELEASE ORAL at 16:38

## 2020-10-07 RX ADMIN — LEVOTHYROXINE SODIUM 150 MCG: 75 TABLET ORAL at 06:00

## 2020-10-07 RX ADMIN — FLUCONAZOLE 400 MG: 100 TABLET ORAL at 09:57

## 2020-10-07 RX ADMIN — METOPROLOL TARTRATE 25 MG: 25 TABLET, FILM COATED ORAL at 08:36

## 2020-10-07 RX ADMIN — APIXABAN 2.5 MG: 2.5 TABLET, FILM COATED ORAL at 17:33

## 2020-10-07 RX ADMIN — DOCUSATE SODIUM 100 MG: 100 CAPSULE, LIQUID FILLED ORAL at 08:36

## 2020-10-07 RX ADMIN — PRAMIPEXOLE DIHYDROCHLORIDE 0.5 MG: 0.5 TABLET ORAL at 21:17

## 2020-10-07 RX ADMIN — PANTOPRAZOLE SODIUM 40 MG: 40 TABLET, DELAYED RELEASE ORAL at 06:00

## 2020-10-07 RX ADMIN — GUAIFENESIN AND DEXTROMETHORPHAN 10 ML: 100; 10 SYRUP ORAL at 08:44

## 2020-10-07 RX ADMIN — POTASSIUM CHLORIDE 20 MEQ: 1500 TABLET, EXTENDED RELEASE ORAL at 08:36

## 2020-10-07 RX ADMIN — APIXABAN 2.5 MG: 2.5 TABLET, FILM COATED ORAL at 08:36

## 2020-10-07 RX ADMIN — DOCUSATE SODIUM 100 MG: 100 CAPSULE, LIQUID FILLED ORAL at 17:33

## 2020-10-08 VITALS
WEIGHT: 256.5 LBS | HEART RATE: 80 BPM | RESPIRATION RATE: 18 BRPM | OXYGEN SATURATION: 95 % | DIASTOLIC BLOOD PRESSURE: 69 MMHG | BODY MASS INDEX: 45.45 KG/M2 | TEMPERATURE: 99.1 F | HEIGHT: 63 IN | SYSTOLIC BLOOD PRESSURE: 134 MMHG

## 2020-10-08 LAB — SARS-COV-2 RNA RESP QL NAA+PROBE: NEGATIVE

## 2020-10-08 PROCEDURE — 99232 SBSQ HOSP IP/OBS MODERATE 35: CPT | Performed by: INTERNAL MEDICINE

## 2020-10-08 PROCEDURE — 87635 SARS-COV-2 COVID-19 AMP PRB: CPT | Performed by: PHYSICIAN ASSISTANT

## 2020-10-08 PROCEDURE — 99232 SBSQ HOSP IP/OBS MODERATE 35: CPT | Performed by: PHYSICIAN ASSISTANT

## 2020-10-08 PROCEDURE — 99238 HOSP IP/OBS DSCHRG MGMT 30/<: CPT | Performed by: PHYSICIAN ASSISTANT

## 2020-10-08 RX ORDER — FLUCONAZOLE 200 MG/1
400 TABLET ORAL EVERY 24 HOURS
Qty: 16 TABLET | Refills: 0 | Status: SHIPPED | OUTPATIENT
Start: 2020-10-09 | End: 2020-10-17

## 2020-10-08 RX ADMIN — PANTOPRAZOLE SODIUM 40 MG: 40 TABLET, DELAYED RELEASE ORAL at 05:41

## 2020-10-08 RX ADMIN — OXYBUTYNIN CHLORIDE 5 MG: 5 TABLET ORAL at 03:58

## 2020-10-08 RX ADMIN — GUAIFENESIN AND DEXTROMETHORPHAN 10 ML: 100; 10 SYRUP ORAL at 12:10

## 2020-10-08 RX ADMIN — METOPROLOL TARTRATE 25 MG: 25 TABLET, FILM COATED ORAL at 09:03

## 2020-10-08 RX ADMIN — DOCUSATE SODIUM 100 MG: 100 CAPSULE, LIQUID FILLED ORAL at 09:03

## 2020-10-08 RX ADMIN — ACETAMINOPHEN 650 MG: 325 TABLET ORAL at 00:50

## 2020-10-08 RX ADMIN — FUROSEMIDE 40 MG: 40 TABLET ORAL at 09:03

## 2020-10-08 RX ADMIN — GUAIFENESIN AND DEXTROMETHORPHAN 10 ML: 100; 10 SYRUP ORAL at 00:51

## 2020-10-08 RX ADMIN — ASPIRIN 81 MG: 81 TABLET, COATED ORAL at 09:03

## 2020-10-08 RX ADMIN — HYDROCODONE BITARTRATE AND ACETAMINOPHEN 1 TABLET: 5; 325 TABLET ORAL at 03:59

## 2020-10-08 RX ADMIN — FLUCONAZOLE 400 MG: 100 TABLET ORAL at 09:57

## 2020-10-08 RX ADMIN — POTASSIUM CHLORIDE 20 MEQ: 1500 TABLET, EXTENDED RELEASE ORAL at 09:03

## 2020-10-08 RX ADMIN — APIXABAN 2.5 MG: 2.5 TABLET, FILM COATED ORAL at 09:03

## 2020-10-08 RX ADMIN — LEVOTHYROXINE SODIUM 150 MCG: 75 TABLET ORAL at 05:41

## 2020-10-09 LAB
BACTERIA BLD CULT: NORMAL
BACTERIA BLD CULT: NORMAL

## 2020-10-09 NOTE — TELEPHONE ENCOUNTER
Made appt for 3-23-21 with dr Mariela Bowen, arranged with [facility]  Sent order for US to facility for them to schedule prior to appt

## 2020-10-09 NOTE — TELEPHONE ENCOUNTER
Ureteral stents removed at bedside prior to discharge  Patient needs a CONRADO and appt with Dr Radha Pacheco in 3 months  Please arrange

## 2020-10-12 LAB
BACTERIA BLD CULT: ABNORMAL
BACTERIA BLD CULT: ABNORMAL
GRAM STN SPEC: ABNORMAL
GRAM STN SPEC: ABNORMAL

## 2020-10-26 ENCOUNTER — TELEPHONE (OUTPATIENT)
Dept: GASTROENTEROLOGY | Facility: CLINIC | Age: 85
End: 2020-10-26

## 2020-10-26 ENCOUNTER — TELEPHONE (OUTPATIENT)
Dept: UROLOGY | Facility: AMBULATORY SURGERY CENTER | Age: 85
End: 2020-10-26

## 2020-12-16 ENCOUNTER — TELEPHONE (OUTPATIENT)
Dept: UROLOGY | Facility: MEDICAL CENTER | Age: 85
End: 2020-12-16

## 2020-12-16 ENCOUNTER — ANESTHESIA EVENT (OUTPATIENT)
Dept: GASTROENTEROLOGY | Facility: HOSPITAL | Age: 85
End: 2020-12-16

## 2020-12-17 ENCOUNTER — HOSPITAL ENCOUNTER (OUTPATIENT)
Dept: GASTROENTEROLOGY | Facility: HOSPITAL | Age: 85
Setting detail: OUTPATIENT SURGERY
Discharge: HOME | End: 2020-12-17
Attending: INTERNAL MEDICINE | Admitting: INTERNAL MEDICINE
Payer: MEDICARE

## 2020-12-17 ENCOUNTER — ANESTHESIA (OUTPATIENT)
Dept: GASTROENTEROLOGY | Facility: HOSPITAL | Age: 85
End: 2020-12-17

## 2020-12-17 VITALS
HEART RATE: 54 BPM | BODY MASS INDEX: 43.94 KG/M2 | HEIGHT: 63 IN | SYSTOLIC BLOOD PRESSURE: 116 MMHG | TEMPERATURE: 98.3 F | WEIGHT: 248 LBS | OXYGEN SATURATION: 97 % | RESPIRATION RATE: 20 BRPM | DIASTOLIC BLOOD PRESSURE: 55 MMHG

## 2020-12-17 VITALS — HEART RATE: 64 BPM

## 2020-12-17 DIAGNOSIS — B37.81 CANDIDAL ESOPHAGITIS (HCC): Primary | ICD-10-CM

## 2020-12-17 DIAGNOSIS — K20.90 ESOPHAGITIS: ICD-10-CM

## 2020-12-17 PROCEDURE — 88305 TISSUE EXAM BY PATHOLOGIST: CPT | Performed by: PATHOLOGY

## 2020-12-17 PROCEDURE — 88112 CYTOPATH CELL ENHANCE TECH: CPT | Performed by: PATHOLOGY

## 2020-12-17 PROCEDURE — 43239 EGD BIOPSY SINGLE/MULTIPLE: CPT | Performed by: INTERNAL MEDICINE

## 2020-12-17 RX ORDER — SUCRALFATE ORAL 1 G/10ML
1 SUSPENSION ORAL 2 TIMES DAILY
Qty: 600 ML | Refills: 5 | Status: SHIPPED | OUTPATIENT
Start: 2020-12-17 | End: 2021-08-11

## 2020-12-17 RX ORDER — SODIUM CHLORIDE 9 MG/ML
125 INJECTION, SOLUTION INTRAVENOUS CONTINUOUS
Status: DISCONTINUED | OUTPATIENT
Start: 2020-12-17 | End: 2020-12-21 | Stop reason: HOSPADM

## 2020-12-17 RX ORDER — FLUCONAZOLE 200 MG/1
200 TABLET ORAL DAILY
Qty: 14 TABLET | Refills: 0 | Status: SHIPPED | OUTPATIENT
Start: 2020-12-17 | End: 2020-12-31

## 2020-12-17 RX ORDER — PROPOFOL 10 MG/ML
INJECTION, EMULSION INTRAVENOUS AS NEEDED
Status: DISCONTINUED | OUTPATIENT
Start: 2020-12-17 | End: 2020-12-17

## 2020-12-17 RX ADMIN — PROPOFOL 120 MG: 10 INJECTION, EMULSION INTRAVENOUS at 13:15

## 2020-12-17 RX ADMIN — PROPOFOL 30 MG: 10 INJECTION, EMULSION INTRAVENOUS at 13:18

## 2020-12-17 RX ADMIN — SODIUM CHLORIDE 125 ML/HR: 0.9 INJECTION, SOLUTION INTRAVENOUS at 12:58

## 2020-12-28 ENCOUNTER — TELEPHONE (OUTPATIENT)
Dept: GASTROENTEROLOGY | Facility: MEDICAL CENTER | Age: 85
End: 2020-12-28

## 2020-12-28 DIAGNOSIS — D50.9 IRON DEFICIENCY ANEMIA, UNSPECIFIED IRON DEFICIENCY ANEMIA TYPE: Primary | ICD-10-CM

## 2021-03-01 ENCOUNTER — OFFICE VISIT (OUTPATIENT)
Dept: GASTROENTEROLOGY | Facility: MEDICAL CENTER | Age: 86
End: 2021-03-01
Payer: MEDICARE

## 2021-03-01 VITALS — SYSTOLIC BLOOD PRESSURE: 126 MMHG | HEART RATE: 70 BPM | TEMPERATURE: 98.2 F | DIASTOLIC BLOOD PRESSURE: 78 MMHG

## 2021-03-01 DIAGNOSIS — D62 ACUTE BLOOD LOSS ANEMIA: Primary | ICD-10-CM

## 2021-03-01 DIAGNOSIS — B37.81 CANDIDAL ESOPHAGITIS (HCC): ICD-10-CM

## 2021-03-01 DIAGNOSIS — K21.00 GASTROESOPHAGEAL REFLUX DISEASE WITH ESOPHAGITIS WITHOUT HEMORRHAGE: ICD-10-CM

## 2021-03-01 PROCEDURE — 99214 OFFICE O/P EST MOD 30 MIN: CPT | Performed by: PHYSICIAN ASSISTANT

## 2021-03-01 NOTE — PROGRESS NOTES
Assessment/Plan:     Diagnoses and all orders for this visit:    Acute blood loss anemia  Gastroesophageal reflux disease with esophagitis without hemorrhage  Candidal esophagitis (HCC)      Patient was seen initially last year for anemia, underwent endoscopy and was found to have class C esophagitis  She is on blood thinners  It was felt that her anemia was more likely secondary to urology etiology versus GI  She denies any overt rectal bleeding  Her hemoglobin is stable  Her reflux symptoms are well controlled with pantoprazole b i d  She did have diarrhea from Carafate which she has self discontinued  She did again have Candida esophagus and was treated with fluconazole  She denies any inhaler use but was on multiple antibiotics from urosepsis which may have contributed  She denies any dysphagia symptoms at this time and therefore would not recommend repeating endoscopy at this time  Can consider in the next 1-2 years depending on her health  Will see her back in 6 months or sooner if necessary  Subjective:      Patient ID: Thaddeus Srinivasan is a 81 y o  female  HPI       This is a follow-up for anemia  Patient was seen last year when she presented to the hospital with dark stools and was found have a hemoglobin of 3 8  She underwent endoscopy  At that time showing a 1 cm hiatal hernia, grade C esophagitis, whitish plaque in the esophagus  Biopsies were positive for Candida and she was treated with fluconazole  It was recommended that she undergo repeat endoscopy however due to COVID this did not happen until December  She was found to have ulcerated mucosa and erosion with plaque in the lower 3rd of the esophagus, 1 cm hiatal hernia, gastritis  Biopsies were negative for H pylori but again positive for Candida  She was treated with fluconazole  It was also recommended that she take Carafate unfortunately this caused her to have significant diarrhea    Since discontinuing the Carafate her bowels have returned to normal   She does typically use MiraLax, Colace and Dulcolax as needed for constipation  She states her heartburn symptoms are under good control with pantoprazole b i d  She denies any dysphagia  She denies any blood in her stools  Her most recent hemoglobin was within normal limits  Patient Active Problem List   Diagnosis    Ureterolithiasis    Urinary tract infection    Essential hypertension    Mixed hyperlipidemia    Paroxysmal A-fib (HCC)    Thrombocytopenia (HCC)    Hydronephrosis with obstructing calculus    Acute on chronic respiratory failure with hypoxia (HCC)    Chronic diastolic (congestive) heart failure (HCC)    Candidal esophagitis (HCC)    Acute hypokalemia    Morbid obesity with BMI of 45 0-49 9, adult ([facility])    Severe sepsis ([facility])    Anemia    Transaminitis    Acute blood loss anemia    Fungemia    Gastroesophageal reflux disease with esophagitis without hemorrhage     Allergies   Allergen Reactions    Augmentin [Amoxicillin-Pot Clavulanate] Rash     Patient reports that she has tolerated amoxicillin in the past and would be willing to try penicillin if needed      Hydralazine Other (See Comments)     Headache, dizziness, nausea,    Sulfamethoxazole-Trimethoprim Rash    Actonel  [Risedronate Sodium]     Lisinopril Other (See Comments)     "cough"      Medical Tape      Pulls off skin      Meloxicam Cough    Risedronate     Wound Dressing Adhesive Other (See Comments)     "pulls skin off"- darrel paper tape    Conjugated Estrogens Rash     Premarin Cream      Neurontin [Gabapentin] Rash     Current Outpatient Medications on File Prior to Visit   Medication Sig    acetaminophen (TYLENOL) 325 mg tablet Take 650 mg by mouth every 6 (six) hours as needed for mild pain     alendronate (FOSAMAX) 70 mg tablet Take 70 mg by mouth see administration instructions Give once a day on friday    apixaban (Eliquis) 5 mg Take 5 mg by mouth 2 (two) times a day    ascorbic acid (VITAMIN C) 500 mg tablet     aspirin (ECOTRIN LOW STRENGTH) 81 mg EC tablet Take 81 mg by mouth daily    atorvastatin (LIPITOR) 10 mg tablet Take 10 mg by mouth daily at bedtime     bisacodyl (DULCOLAX) 10 mg suppository Insert 10 mg into the rectum as needed     Cholecalciferol (Vitamin D) 50 MCG (2000 UT) tablet     cholecalciferol (VITAMIN D3) 1,000 units tablet Take 2,000 Units by mouth daily     Coenzyme Q10 (CO Q10) 100 MG CAPS Take by mouth    docusate sodium (COLACE) 100 mg capsule Take 100 mg by mouth 2 (two) times a day    furosemide (LASIX) 40 mg tablet Take 40 mg by mouth daily     levothyroxine 150 mcg tablet Take 150 mcg by mouth daily in the early morning     lutein 6 mg Take 6 mg by mouth daily    magnesium hydroxide (MILK OF MAGNESIA) 400 mg/5 mL oral suspension daily as needed     Melatonin 5 MG TABS Take 5 mg by mouth daily at bedtime     metoprolol tartrate (LOPRESSOR) 25 mg tablet Take 25 mg by mouth every 12 (twelve) hours    nitroglycerin (NITROSTAT) 0 4 mg SL tablet Place 0 4 mg under the tongue every 5 (five) minutes as needed for chest pain     ondansetron (ZOFRAN) 4 mg tablet Take 4 mg by mouth every 8 (eight) hours as needed for nausea or vomiting    oxybutynin (DITROPAN) 5 mg tablet Take 1 tablet (5 mg total) by mouth 3 (three) times a day as needed (bladder spasm)    pantoprazole (PROTONIX) 40 mg tablet Take 1 tablet (40 mg total) by mouth 2 (two) times a day before meals    polyethylene glycol (MIRALAX) 17 g packet Take 17 g by mouth daily as needed    potassium chloride (K-DUR,KLOR-CON) 20 mEq tablet Take 20 mEq by mouth 2 (two) times a day    pramipexole (MIRAPEX) 1 mg tablet Take 0 5 mg by mouth daily at bedtime     Skin Protectants, Misc   (DERMACERIN) CREA Apply topically    talc Apply topically as needed for irritation    sucralfate (CARAFATE) 1 g/10 mL suspension Take 10 mL (1 g total) by mouth 2 (two) times a day     No current facility-administered medications on file prior to visit  History reviewed  No pertinent family history  Past Medical History:   Diagnosis Date    Acute and chronic respiratory failure with hypoxia (HCC)     Acute kidney failure (HCC)     Anemia     Angina pectoris (HCC)     Arthritis     osteoarthritis    Atrial fibrillation (HCC)     Bacteremia     Bacterial infection due to Proteus mirabilis 5/23/2019    Chafing     folds of skin and back of thighs    CHF (congestive heart failure) (HCC)     Chronic indwelling Mont[place]ca catheter     removed    Chronic pain disorder     pain in thoracic spine    Colon polyp     Constipation     Coronary artery disease     Difficulty walking     lack of coordination    Discitis     Discitis     Disease of thyroid gland     hypothyroid    Dyspepsia     Dysphagia     Dysuria     Gait abnormality     GERD (gastroesophageal reflux disease)     Heart failure (HCC)     History of transfusion     Hx of bleeding disorder     Hydronephrosis     Hyperlipidemia     Hypertension     Hypokalemia     Hypothyroidism     Insomnia     Irregular heart beat     Kidney stone     Left ureteral calculus 6/12/2019    Added automatically from request for surgery [id number]    Lymphedema     Macular degeneration     Major depressive disorder     Morbid obesity ([address] )     Morbid obesity ([address]     Muscle weakness     Myocardial infarction ([address] )     NSTEMI (non-ST elevated myocardial infarction) ([address] )     Osteoporosis     Paroxysmal A-fib ([address] )     Personal history of other infectious and parasitic diseases     Recurrent UTI     Renal disorder     RLS (restless legs syndrome)     Sepsis ([address] )     Sleep apnea     Symptomatic anemia 1/15/2020    Syncope 1/17/2020    Vitamin D deficiency     Wheelchair bound     unable to bear weight , hx infected prosthesis     Social History     Socioeconomic History    Marital status:       Spouse name: None  Number of children: None    Years of education: None    Highest education level: None   Occupational History    None   Social Needs    Financial resource strain: None    Food insecurity     Worry: None     Inability: None    Transportation needs     Medical: None     Non-medical: None   Tobacco Use    Smoking status: Former Smoker     Types: Cigars    Smokeless tobacco: Never Used    Tobacco comment: smoked when was very much younger for one summer   Substance and Sexual Activity    Alcohol use:  Yes     Alcohol/week: 1 0 standard drinks     Types: 1 Glasses of wine per week     Frequency: Monthly or less     Drinks per session: 1 or 2     Comment: socially     Drug use: Never    Sexual activity: None   Lifestyle    Physical activity     Days per week: None     Minutes per session: None    Stress: None   Relationships    Social connections     Talks on phone: None     Gets together: None     Attends Jainism service: None     Active member of club or organization: None     Attends meetings of clubs or organizations: None     Relationship status: None    Intimate partner violence     Fear of current or ex partner: None     Emotionally abused: None     Physically abused: None     Forced sexual activity: None   Other Topics Concern    None   Social History Narrative    None     Past Surgical History:   Procedure Laterality Date    ABDOMINAL SURGERY      APPENDECTOMY      CATARACT EXTRACTION      COLONOSCOPY      CYSTOSCOPY      DILATION AND CURETTAGE OF UTERUS      FL RETROGRADE PYELOGRAM  5/22/2019    FL RETROGRADE PYELOGRAM  8/10/2020    HYSTERECTOMY      JOINT REPLACEMENT Bilateral     knee replacment    LAPAROSCOPIC GASTRIC BANDING      DC CYSTO/URETERO W/LITHOTRIPSY &INDWELL STENT INSRT Left 6/26/2019    Procedure: CYSTO, URETEROSCOPY W/HOLMIUM LASER, STENT EXCHANGE;  Surgeon: Gagandeep Giang MD;  [facility];  Service: Urology    DC CYSTO/URETERO W/LITHOTRIPSY &INDWELL STENT INSRT Bilateral 9/30/2020    Procedure: CYSTO, URETEROSCOPY W/HOLMIUM LASER, STENT EXCHANGE;  Surgeon: Janette Ibarra MD;  [facility];  Service: Urology    MS CYSTOURETHROSCOPY,URETER CATHETER Left 5/22/2019    Procedure: Steph Carpiosmith; RIGHT RETROGRADE PYELOGRAM WITH INSERTION STENT URETERAL LEFT;  Surgeon: Janette Ibarra MD;  [facility];  Service: Urology    MS CYSTOURETHROSCOPY,URETER CATHETER Bilateral 8/10/2020    Procedure: CYSTOSCOPY RETROGRADE PYELOGRAM WITH INSERTION STENT URETERAL;  Surgeon: Florencia Ledbetter MD;  [facility];  Service: Urology    MS XCAPSL CTRC RMVL INSJ IO LENS [address] W/O ECP Right 3/12/2020    Procedure: CATARACT REMOVAL W/INTRAOCULAR LENS;  Surgeon: Jose Maria Sanchez MD;  Location: [address] [place] [address] MAIN OR;  Service: Ophthalmology    REMOVAL GASTRIC BAND LAPAROSCOPIC      VENTRAL HERNIA REPAIR      x4         Review of Systems   All other systems reviewed and are negative  Objective:      /78   Pulse 70   Temp 98 2 °F (36 8 °C)          Physical Exam  Constitutional:       Appearance: She is well-developed  She is obese  HENT:      Head: Normocephalic and atraumatic  Eyes:      Conjunctiva/sclera: Conjunctivae normal    Neck:      Musculoskeletal: Normal range of motion  Cardiovascular:      Rate and Rhythm: Normal rate and regular rhythm  Pulmonary:      Effort: Pulmonary effort is normal       Breath sounds: Normal breath sounds  Abdominal:      General: Bowel sounds are normal  There is no distension  Palpations: Abdomen is soft  Tenderness: There is no abdominal tenderness  Musculoskeletal:      Comments: In wheelchair   Skin:     General: Skin is warm and dry  Neurological:      Mental Status: She is alert and oriented to person, place, and time     Psychiatric:         Mood and Affect: Mood normal          Behavior: Behavior normal

## 2021-03-09 ENCOUNTER — HOSPITAL ENCOUNTER (OUTPATIENT)
Dept: ULTRASOUND IMAGING | Facility: MEDICAL CENTER | Age: 86
Discharge: HOME | End: 2021-03-09
Attending: UROLOGY
Payer: MEDICARE

## 2021-03-09 DIAGNOSIS — N20.1 URETEROLITHIASIS: ICD-10-CM

## 2021-03-09 PROCEDURE — 76770 US EXAM ABDO BACK WALL COMP: CPT

## 2021-03-15 ENCOUNTER — TELEPHONE (OUTPATIENT)
Dept: UROLOGY | Facility: MEDICAL CENTER | Age: 86
End: 2021-03-15

## 2021-03-15 NOTE — TELEPHONE ENCOUNTER
Patient of Dr Santos Wolff from [address] [facility] called with Significant Findings on U/S of Kidney /Bladder  Results are epic for Review

## 2021-03-22 NOTE — PROGRESS NOTES
[address] [facility]  [facility]  [address] [facility]  [place], [address] [facility]  [phone]  www  [email or web address]      NAME: Miley Mi  AGE: 81 y o  SEX: female  : 1935   MRN: [id number]    DATE: 3/25/2021  TIME: 11:58 AM    Assessment and Plan:      Right renal calculi with mild hydronephrosis  Renal function is normal   She has been through multiple stents and ureteroscopy is over the past 2 years  She has been septic a couple of times  Her most recent sepsis was after I performed bilateral ureteroscopy with laser lithotripsy  Left side is currently stone free  She has only minimal symptoms of right flank pain  My recommendation is to do nothing now but to place a stent if she becomes  Septic and change that stent every 6 months  Urinary incontinence with indwelling Montes De Oca catheter for continence control:  I do not have evidence of her being in retention before, and since she developed somewhat sediment and she keeps having recurrent infections I would recommend that she have the Montes De Oca catheter removed  She will have to use extra pads  She is willing to do this  I will place the order for [place]  To remove the catheter and see how she does  Follow-up 6 months with a renal ultrasound  Chief Complaint   No chief complaint on file  History of Present Illness   History of nephrolithiasis bilateral with ureteral calculus:  Underwent cystoscopy, bilateral ureteroscopy with laser lithotripsy of approximately 2-3 cm of calculi in left kidney, 3 cm stone in the right kidney  This was a lengthy procedure and she became septic after it with   Yeast   She has a chronic Montes De Oca catheter and both stents were removed at the bedside 8 days after the procedure  Ultrasound of the kidney 3/9/2021 shows right renal calculi with moderate hydronephrosis, and left kidney is stone free  She has right flank pain which is positional mostly nature  She remains on Eliquis, and she is on oxybutynin 5 mg daily as needed, and she is on potassium replacement in the form of  potassium chloride  She continues with a lot of sediment in her urine and has to have the catheter changed very frequently  Last change was 3 days ago  The catheter is in because of incontinence  Her creatinine is normal at 1 03  3/19/2021        The following portions of the patient's history were reviewed and updated as appropriate: allergies, current medications, past family history, past medical history, past social history, past surgical history and problem list   Past Medical History:   Diagnosis Date    Acute and chronic respiratory failure with hypoxia (HCC)     Acute kidney failure (HCC)     Anemia     Angina pectoris ([address] )     Arthritis     osteoarthritis    Atrial fibrillation ([address] )     Bacteremia     Bacterial infection due to Proteus mirabilis 5/23/2019    Chafing     folds of skin and back of thighs    CHF (congestive heart failure) ([address] )     Chronic indwelling Montes De Oca catheter     removed    Chronic pain disorder     pain in thoracic spine    Colon polyp     Constipation     Coronary artery disease     Difficulty walking     lack of coordination    Discitis     Discitis     Disease of thyroid gland     hypothyroid    Dyspepsia     Dysphagia     Dysuria     Gait abnormality     GERD (gastroesophageal reflux disease)     Heart failure ([address] )     History of transfusion     Hx of bleeding disorder     Hydronephrosis     Hyperlipidemia     Hypertension     Hypokalemia     Hypothyroidism     Insomnia     Irregular heart beat     Kidney stone     Left ureteral calculus 6/12/2019    Added automatically from request for surgery [id number]    Lymphedema     Macular degeneration     Major depressive disorder     Morbid obesity ([address] )     Morbid obesity ([address] )     Muscle weakness     Myocardial infarction ([address] )     NSTEMI (non-ST elevated myocardial infarction) [facility]     Osteoporosis     Paroxysmal A-fib (HCC)     Personal history of other infectious and parasitic diseases     Recurrent UTI     Renal disorder     RLS (restless legs syndrome)     Sepsis ([facility]     Sleep apnea     Symptomatic anemia 1/15/2020    Syncope 1/17/2020    Vitamin D deficiency     Wheelchair bound     unable to bear weight , hx infected prosthesis     Past Surgical History:   Procedure Laterality Date    ABDOMINAL SURGERY      APPENDECTOMY      CATARACT EXTRACTION      COLONOSCOPY      CYSTOSCOPY      DILATION AND CURETTAGE OF UTERUS      FL RETROGRADE PYELOGRAM  5/22/2019    FL RETROGRADE PYELOGRAM  8/10/2020    HYSTERECTOMY      JOINT REPLACEMENT Bilateral     knee replacment    LAPAROSCOPIC GASTRIC BANDING      NM CYSTO/URETERO W/LITHOTRIPSY &INDWELL STENT INSRT Left 6/26/2019    Procedure: CYSTO, URETEROSCOPY W/HOLMIUM LASER, STENT EXCHANGE;  Surgeon: Mojgan Colvin MD;  [facility];  Service: Urology    NM CYSTO/URETERO W/LITHOTRIPSY &INDWELL STENT INSRT Bilateral 9/30/2020    Procedure: CYSTO, URETEROSCOPY W/HOLMIUM LASER, STENT EXCHANGE;  Surgeon: Mojgan Colvin MD;  [facility];  Service: Urology    NM CYSTOURETHROSCOPY,URETER CATHETER Left 5/22/2019    Procedure: CYSTOSCOPY; RIGHT RETROGRADE PYELOGRAM WITH INSERTION STENT URETERAL LEFT;  Surgeon: Mojgan Colvin MD;  [facility];  Service: Urology    NM CYSTOURETHROSCOPY,URETER CATHETER Bilateral 8/10/2020    Procedure: CYSTOSCOPY RETROGRADE PYELOGRAM WITH INSERTION STENT URETERAL;  Surgeon: Robin Reyes MD;  [facility];  Service: Urology    NM XCAPSL CTRC RMVL INSJ IO LENS [address] W/O ECP Right 3/12/2020    Procedure: CATARACT REMOVAL W/INTRAOCULAR LENS;  Surgeon: Daja Wu MD;  [facility]: [facility];  Service: Ophthalmology    REMOVAL GASTRIC BAND LAPAROSCOPIC      VENTRAL HERNIA REPAIR      x4     shoulder  Review of Systems   Review of Systems Constitutional: Negative for fever  Respiratory: Negative for shortness of breath  Genitourinary: Positive for flank pain  Negative for hematuria  Active Problem List     Patient Active Problem List   Diagnosis    Ureterolithiasis    Urinary tract infection    Essential hypertension    Mixed hyperlipidemia    Paroxysmal A-fib (HCC)    Thrombocytopenia (HCC)    Hydronephrosis with obstructing calculus    Acute on chronic respiratory failure with hypoxia (HCC)    Chronic diastolic (congestive) heart failure (HCC)    Candidal esophagitis (HCC)    Acute hypokalemia    Morbid obesity with BMI of 45 0-49 9, adult (HCC)    Severe sepsis (HCC)    Anemia    Transaminitis    Acute blood loss anemia    Fungemia    Gastroesophageal reflux disease with esophagitis without hemorrhage       Objective   /88 (BP Location: Left arm, Patient Position: Sitting, Cuff Size: Adult)   Ht 5' 3" (1 6 m)   BMI 43 93 kg/m²     Physical Exam  Constitutional:       Appearance: Normal appearance  HENT:      Head: Normocephalic and atraumatic  Eyes:      Extraocular Movements: Extraocular movements intact  Neck:      Musculoskeletal: Normal range of motion  Pulmonary:      Effort: Pulmonary effort is normal    Musculoskeletal:      Comments: In motorized wheelchair   Skin:     Coloration: Skin is not jaundiced or pale  Neurological:      General: No focal deficit present  Mental Status: She is alert and oriented to person, place, and time  Mental status is at baseline  Psychiatric:         Mood and Affect: Mood normal          Behavior: Behavior normal          Thought Content:  Thought content normal          Judgment: Judgment normal              Current Medications     Current Outpatient Medications:     acetaminophen (TYLENOL) 325 mg tablet, Take 650 mg by mouth every 6 (six) hours as needed for mild pain , Disp: , Rfl:     alendronate (FOSAMAX) 70 mg tablet, Take 70 mg by mouth see administration instructions Give once a day on friday, Disp: , Rfl:     apixaban (Eliquis) 5 mg, Take 5 mg by mouth 2 (two) times a day, Disp: , Rfl:     ascorbic acid (VITAMIN C) 500 mg tablet, , Disp: , Rfl:     aspirin (ECOTRIN LOW STRENGTH) 81 mg EC tablet, Take 81 mg by mouth daily, Disp: , Rfl:     atorvastatin (LIPITOR) 10 mg tablet, Take 10 mg by mouth daily at bedtime , Disp: , Rfl:     bisacodyl (DULCOLAX) 10 mg suppository, Insert 10 mg into the rectum as needed , Disp: , Rfl:     calcium carbonate (TUMS) 500 mg chewable tablet, Chew 1 tablet daily, Disp: , Rfl:     calcium carbonate-vitamin D (OSCAL-D) 500 mg-200 units per tablet, , Disp: , Rfl:     Cholecalciferol (Vitamin D) 50 MCG (2000 UT) tablet, , Disp: , Rfl:     cholecalciferol (VITAMIN D3) 1,000 units tablet, Take 2,000 Units by mouth daily , Disp: , Rfl:     Coenzyme Q10 (CO Q10) 100 MG CAPS, Take by mouth, Disp: , Rfl:     docusate sodium (COLACE) 100 mg capsule, Take 100 mg by mouth 2 (two) times a day, Disp: , Rfl:     furosemide (LASIX) 40 mg tablet, Take 40 mg by mouth daily , Disp: , Rfl:     levothyroxine 150 mcg tablet, Take 150 mcg by mouth daily in the early morning , Disp: , Rfl:     loperamide (IMODIUM) 2 mg capsule, , Disp: , Rfl:     lutein 6 mg, Take 6 mg by mouth daily, Disp: , Rfl:     magnesium hydroxide (MILK OF MAGNESIA) 400 mg/5 mL oral suspension, daily as needed , Disp: , Rfl:     Melatonin 5 MG TABS, Take 5 mg by mouth daily at bedtime , Disp: , Rfl:     metoprolol tartrate (LOPRESSOR) 25 mg tablet, Take 25 mg by mouth every 12 (twelve) hours, Disp: , Rfl:     ondansetron (ZOFRAN) 4 mg tablet, Take 4 mg by mouth every 8 (eight) hours as needed for nausea or vomiting, Disp: , Rfl:     oxybutynin (DITROPAN) 5 mg tablet, Take 1 tablet (5 mg total) by mouth 3 (three) times a day as needed (bladder spasm), Disp: 20 tablet, Rfl: 0    pantoprazole (PROTONIX) 40 mg tablet, Take 1 tablet (40 mg total) by mouth 2 (two) times a day before meals, Disp: 170 tablet, Rfl: 0    potassium chloride (K-DUR,KLOR-CON) 20 mEq tablet, Take 20 mEq by mouth 2 (two) times a day, Disp: , Rfl:     potassium chloride 10% oral solution, , Disp: , Rfl:     pramipexole (MIRAPEX) 1 mg tablet, Take 0 5 mg by mouth daily at bedtime , Disp: , Rfl:     Skin Protectants, Misc   (DERMACERIN) CREA, Apply topically, Disp: , Rfl:     talc, Apply topically as needed for irritation, Disp: , Rfl:     nitroglycerin (NITROSTAT) 0 4 mg SL tablet, Place 0 4 mg under the tongue every 5 (five) minutes as needed for chest pain , Disp: , Rfl:     polyethylene glycol (MIRALAX) 17 g packet, Take 17 g by mouth daily as needed, Disp: , Rfl:     sucralfate (CARAFATE) 1 g/10 mL suspension, Take 10 mL (1 g total) by mouth 2 (two) times a day, Disp: 600 mL, Rfl: 5        Erickson Ceja MD

## 2021-03-25 ENCOUNTER — OFFICE VISIT (OUTPATIENT)
Dept: UROLOGY | Facility: MEDICAL CENTER | Age: 86
End: 2021-03-25
Payer: MEDICARE

## 2021-03-25 VITALS — HEIGHT: 63 IN | DIASTOLIC BLOOD PRESSURE: 88 MMHG | SYSTOLIC BLOOD PRESSURE: 150 MMHG | BODY MASS INDEX: 43.93 KG/M2

## 2021-03-25 DIAGNOSIS — N39.41 URGE INCONTINENCE OF URINE: Primary | ICD-10-CM

## 2021-03-25 DIAGNOSIS — N20.0 RENAL CALCULUS, RIGHT: ICD-10-CM

## 2021-03-25 PROCEDURE — 99214 OFFICE O/P EST MOD 30 MIN: CPT | Performed by: UROLOGY

## 2021-03-25 RX ORDER — POTASSIUM CHLORIDE 20MEQ/15ML
LIQUID (ML) ORAL
COMMUNITY
Start: 2021-03-07

## 2021-03-25 RX ORDER — CALCIUM CARBONATE 200(500)MG
1 TABLET,CHEWABLE ORAL DAILY
COMMUNITY

## 2021-03-25 RX ORDER — B-COMPLEX WITH VITAMIN C
TABLET ORAL
COMMUNITY
Start: 2021-02-15

## 2021-03-25 RX ORDER — LOPERAMIDE HYDROCHLORIDE 2 MG/1
CAPSULE ORAL
COMMUNITY
Start: 2021-02-17

## 2021-08-11 ENCOUNTER — OFFICE VISIT (OUTPATIENT)
Dept: GASTROENTEROLOGY | Facility: MEDICAL CENTER | Age: 86
End: 2021-08-11
Payer: COMMERCIAL

## 2021-08-11 VITALS — SYSTOLIC BLOOD PRESSURE: 126 MMHG | HEART RATE: 83 BPM | TEMPERATURE: 98.3 F | DIASTOLIC BLOOD PRESSURE: 72 MMHG

## 2021-08-11 DIAGNOSIS — K21.00 GASTROESOPHAGEAL REFLUX DISEASE WITH ESOPHAGITIS WITHOUT HEMORRHAGE: Primary | ICD-10-CM

## 2021-08-11 DIAGNOSIS — D62 ACUTE BLOOD LOSS ANEMIA: ICD-10-CM

## 2021-08-11 DIAGNOSIS — K59.09 OTHER CONSTIPATION: ICD-10-CM

## 2021-08-11 PROCEDURE — 99214 OFFICE O/P EST MOD 30 MIN: CPT | Performed by: PHYSICIAN ASSISTANT

## 2021-08-11 RX ORDER — FAMOTIDINE 20 MG/1
20 TABLET, FILM COATED ORAL 2 TIMES DAILY
Qty: 60 TABLET | Refills: 5 | Status: SHIPPED | OUTPATIENT
Start: 2021-08-11

## 2021-08-11 NOTE — PROGRESS NOTES
Assessment/Plan:     Diagnoses and all orders for this visit:    Gastroesophageal reflux disease with esophagitis without hemorrhage  She has a history of GERD  Previous endoscopy showed significant esophagitis  She has been treated with pantoprazole 40 mg b i d  but continues to be symptomatic  We did discuss repeating her endoscopy but she would be at increased risk, currently on Eliquis  Did recommend adding Pepcid twice a day to see if this helps with her symptoms and if not we can consider endoscopy in the future  -     famotidine (PEPCID) 20 mg tablet; Take 1 tablet (20 mg total) by mouth 2 (two) times a day    Other constipation  She describes smaller her infrequent stools and does have a history of constipation for which she typically uses MiraLax  She feels that her decreased bowel movements are more secondary to her decreased p o  intake  This is likely given her worsening heartburn symptoms and hopefully with the addition of the Pepcid her appetite will return  Acute blood loss anemia  Patient was seen in the hospital last year when she presented with dark stools and was found to have hemoglobin of 3 8  She underwent endoscopy as mentioned above  Her most recent hemoglobin is within normal limits  She no further workup at this time  Will see her back in 3 months or sooner if necessary pre          Subjective:      Patient ID: Elidia Gonzalez is a 81 y o  female  HPI     This is a follow-up for GERD and anemia  Patient was seen last year in the hospital when she presented with dark stools and was found to have a hemoglobin of 3 8  She underwent endoscopy at that time which showed a 1 cm hiatal hernia, grade C esophagitis and candidiasis  She then had a repeat endoscopy in December and was again found to have ulcerated mucosa with erosion in the lower 3rd of the esophagus, Candida and gastritis  She was treated with fluconazole  She has been taking pantoprazole 40 mg b i d  since  She was previously on Carafate but discontinued as she felt that this caused diarrhea  She states she continues with acid reflux despite the pantoprazole  She also complains of an intermittent poor appetite which she attributes to her COVID infection last year  She denies dysphagia  Currently she is having small bowel movements which she also attributes to her decreased appetite  She is on MiraLax daily  She was started on Senokot yesterday  Her last colonoscopy was several years ago  Patient Active Problem List   Diagnosis    Ureterolithiasis    Urinary tract infection    Essential hypertension    Mixed hyperlipidemia    Paroxysmal A-fib (HCC)    Thrombocytopenia (HCC)    Hydronephrosis with obstructing calculus    Acute on chronic respiratory failure with hypoxia (HCC)    Chronic diastolic (congestive) heart failure (HCC)    Candidal esophagitis (HCC)    Acute hypokalemia    Morbid obesity with BMI of 45 0-49 9, adult ([facility])    Severe sepsis ([facility])    Anemia    Transaminitis    Acute blood loss anemia    Fungemia    Gastroesophageal reflux disease with esophagitis without hemorrhage    Other constipation     Allergies   Allergen Reactions    Augmentin [Amoxicillin-Pot Clavulanate] Rash     Patient reports that she has tolerated amoxicillin in the past and would be willing to try penicillin if needed      Hydralazine Other (See Comments)     Headache, dizziness, nausea,    Sulfamethoxazole-Trimethoprim Rash    Actonel  [Risedronate Sodium]     Lisinopril Other (See Comments)     "cough"      Medical Tape      Pulls off skin      Meloxicam Cough    Risedronate     Wound Dressing Adhesive Other (See Comments)     "pulls skin off"- darrel paper tape    Conjugated Estrogens Rash     Premarin Cream      Neurontin [Gabapentin] Rash     Current Outpatient Medications on File Prior to Visit   Medication Sig    acetaminophen (TYLENOL) 325 mg tablet Take 650 mg by mouth every 6 (six) hours as needed for mild pain     alendronate (FOSAMAX) 70 mg tablet Take 70 mg by mouth see administration instructions Give once a day on friday    apixaban (Eliquis) 5 mg Take 5 mg by mouth 2 (two) times a day    ascorbic acid (VITAMIN C) 500 mg tablet     aspirin (ECOTRIN LOW STRENGTH) 81 mg EC tablet Take 81 mg by mouth daily    atorvastatin (LIPITOR) 10 mg tablet Take 10 mg by mouth daily at bedtime     bisacodyl (DULCOLAX) 10 mg suppository Insert 10 mg into the rectum as needed     calcium carbonate (TUMS) 500 mg chewable tablet Chew 1 tablet daily    calcium carbonate-vitamin D (OSCAL-D) 500 mg-200 units per tablet     Cholecalciferol (Vitamin D) 50 MCG (2000 UT) tablet     cholecalciferol (VITAMIN D3) 1,000 units tablet Take 2,000 Units by mouth daily     Coenzyme Q10 (CO Q10) 100 MG CAPS Take by mouth    docusate sodium (COLACE) 100 mg capsule Take 100 mg by mouth 2 (two) times a day    furosemide (LASIX) 40 mg tablet Take 40 mg by mouth daily     levothyroxine 150 mcg tablet Take 150 mcg by mouth daily in the early morning     loperamide (IMODIUM) 2 mg capsule     lutein 6 mg Take 6 mg by mouth daily    magnesium hydroxide (MILK OF MAGNESIA) 400 mg/5 mL oral suspension daily as needed     Melatonin 5 MG TABS Take 5 mg by mouth daily at bedtime     metoprolol tartrate (LOPRESSOR) 25 mg tablet Take 25 mg by mouth every 12 (twelve) hours    nitroglycerin (NITROSTAT) 0 4 mg SL tablet Place 0 4 mg under the tongue every 5 (five) minutes as needed for chest pain     ondansetron (ZOFRAN) 4 mg tablet Take 4 mg by mouth every 8 (eight) hours as needed for nausea or vomiting    oxybutynin (DITROPAN) 5 mg tablet Take 1 tablet (5 mg total) by mouth 3 (three) times a day as needed (bladder spasm)    pantoprazole (PROTONIX) 40 mg tablet Take 1 tablet (40 mg total) by mouth 2 (two) times a day before meals    polyethylene glycol (MIRALAX) 17 g packet Take 17 g by mouth daily as needed    potassium chloride (K-DUR,KLOR-CON) 20 mEq tablet Take 20 mEq by mouth 2 (two) times a day    potassium chloride 10% oral solution     pramipexole (MIRAPEX) 1 mg tablet Take 0 5 mg by mouth daily at bedtime     Skin Protectants, Misc  (DERMACERIN) CREA Apply topically    talc Apply topically as needed for irritation    [DISCONTINUED] sucralfate (CARAFATE) 1 g/10 mL suspension Take 10 mL (1 g total) by mouth 2 (two) times a day     No current facility-administered medications on file prior to visit  No family history on file    Past Medical History:   Diagnosis Date    Acute and chronic respiratory failure with hypoxia (HCC)     Acute kidney failure (HCC)     Anemia     Angina pectoris (HCC)     Arthritis     osteoarthritis    Atrial fibrillation (HCC)     Bacteremia     Bacterial infection due to Proteus mirabilis 5/23/2019    Chafing     folds of skin and back of thighs    [facility]est[facility] heart failure) [facility]     Chronic indwelling [place] catheter     removed    Chronic pain disorder     pain in thoracic spine    Colon polyp     Constipation     Coronary artery disease     Difficulty walking     lack of coordination    Discitis     Discitis     Disease of thyroid gland     hypothyroid    Dyspepsia     Dysphagia     Dysuria     Gait abnormality     GERD (gastroesophageal reflux disease)     Heart failure (HCC)     History of transfusion     Hx of bleeding disorder     Hydronephrosis     Hyperlipidemia     Hypertension     Hypokalemia     Hypothyroidism     Insomnia     Irregular heart beat     Kidney stone     Left ureteral calculus 6/12/2019    Added automatically from request for surgery [id number]    Lymphedema     Macular degeneration     Major depressive disorder     Morbid obesity ([address] )     [facility]bid obesity [facility]     Muscle weakness     Myocardial infarction ([facility]     NSTEMI (non-ST elevated myocardial infarction) ([facility]     Osteoporosis     Paroxysmal A-fib ([facility]  Personal history of other infectious and parasitic diseases     Recurrent UTI     Renal disorder     RLS (restless legs syndrome)     Sepsis ([address]     Sleep apnea     Symptomatic anemia 1/15/2020    Syncope 1/17/2020    Vitamin D deficiency     Wheelchair bound     unable to bear weight , hx infected prosthesis     Social History     Socioeconomic History    Marital status:      Spouse name: None    Number of children: None    Years of education: None    Highest education level: None   Occupational History    None   Tobacco Use    Smoking status: Former Smoker     Types: Cigars    Smokeless tobacco: Never Used    Tobacco comment: smoked when was very much younger for one summer   Vaping Use    Vaping Use: Never used   Substance and Sexual Activity    Alcohol use: Yes     Alcohol/week: 1 0 standard drinks     Types: 1 Glasses of wine per week     Comment: socially     Drug use: Never    Sexual activity: None   Other Topics Concern    None   Social History Narrative    None     Social Determinants of Health     Financial Resource Strain:     Difficulty of Paying Living Expenses:    Food Insecurity:     Worried About Running Out of Food in the Last Year:     Ran Out of Food in the Last Year:    Transportation Needs:     Lack of Transportation (Medical):      Lack of Transportation (Non-Medical):    Physical Activity:     Days of Exercise per Week:     Minutes of Exercise per Session:    Stress:     Feeling of Stress :    Social Connections:     Frequency of Communication with Friends and Family:     Frequency of Social Gatherings with Friends and Family:     Attends [employer] Services:     Active Member of Clubs or Organizations:     Attends Club or Organization Meetings:     Marital Status:    Intimate Partner Violence:     Fear of Current or Ex-Partner:     Emotionally Abused:     Physically Abused:     Sexually Abused:      Past Surgical History:   Procedure Laterality Date    ABDOMINAL SURGERY      APPENDECTOMY      CATARACT EXTRACTION      COLONOSCOPY      CYSTOSCOPY      DILATION AND CURETTAGE OF UTERUS      FL RETROGRADE PYELOGRAM  5/22/2019    FL RETROGRADE PYELOGRAM  8/10/2020    HYSTERECTOMY      JOINT REPLACEMENT Bilateral     knee replacment    LAPAROSCOPIC GASTRIC BANDING      MS CYSTO/URETERO W/LITHOTRIPSY &INDWELL STENT INSRT Left 6/26/2019    Procedure: CYSTO, URETEROSCOPY W/HOLMIUM LASER, STENT EXCHANGE;  Surgeon: Hilario Carpio MD;  [facility];  Service: Urology    MS CYSTO/URETERO W/LITHOTRIPSY &INDWELL STENT INSRT Bilateral 9/30/2020    Procedure: CYSTO, URETEROSCOPY W/HOLMIUM LASER, STENT EXCHANGE;  Surgeon: Hilario Carpio MD;  [facility];  Service: Urology    MS CYSTOURETHROSCOPY,URETER CATHETER Left 5/22/2019    Procedure: CYSTOSCOPY; RIGHT RETROGRADE PYELOGRAM WITH INSERTION STENT URETERAL LEFT;  Surgeon: Hilario Carpio MD;  [facility];  Service: Urology    MS CYSTOURETHROSCOPY,URETER CATHETER Bilateral 8/10/2020    Procedure: CYSTOSCOPY RETROGRADE PYELOGRAM WITH INSERTION STENT URETERAL;  Surgeon: Heath Garza MD;  [facility];  Service: Urology    MS XCAPSL CTRC RMVL INSJ IO LENS [address] W/O ECP Right 3/12/2020    Procedure: CATARACT REMOVAL W/INTRAOCULAR LENS;  Surgeon: Jesus Calix MD;  Location[facility];  Service: Ophthalmology    REMOVAL GASTRIC BAND LAPAROSCOPIC      VENTRAL HERNIA REPAIR      x4         Review of Systems   All other systems reviewed and are negative  Objective:      /72   Pulse 83   Temp 98 3 °F (36 8 °C)          Physical Exam  Constitutional:       Appearance: She is well-developed  She is obese  HENT:      Head: Normocephalic and atraumatic  Eyes:      Conjunctiva/sclera: Conjunctivae normal    Cardiovascular:      Rate and Rhythm: Normal rate and regular rhythm     Pulmonary:      Effort: Pulmonary effort is normal       Breath sounds: Normal breath sounds  Abdominal:      General: Bowel sounds are normal  There is no distension  Palpations: Abdomen is soft  Tenderness: There is no abdominal tenderness  Musculoskeletal:      Cervical back: Normal range of motion  Comments: In wheelchair   Skin:     General: Skin is warm and dry  Neurological:      Mental Status: She is alert and oriented to person, place, and time     Psychiatric:         Mood and Affect: Mood normal          Behavior: Behavior normal

## 2021-09-07 ENCOUNTER — HOSPITAL ENCOUNTER (OUTPATIENT)
Dept: ULTRASOUND IMAGING | Facility: MEDICAL CENTER | Age: 86
Discharge: HOME | End: 2021-09-07
Attending: UROLOGY
Payer: COMMERCIAL

## 2021-09-07 DIAGNOSIS — N20.0 RENAL CALCULUS, RIGHT: ICD-10-CM

## 2021-09-07 DIAGNOSIS — N39.41 URGE INCONTINENCE OF URINE: ICD-10-CM

## 2021-09-07 PROCEDURE — 76770 US EXAM ABDO BACK WALL COMP: CPT

## 2021-09-13 ENCOUNTER — TELEPHONE (OUTPATIENT)
Dept: UROLOGY | Facility: MEDICAL CENTER | Age: 86
End: 2021-09-13

## 2021-09-14 RX ORDER — ESOMEPRAZOLE MAGNESIUM 40 MG/1
CAPSULE, DELAYED RELEASE ORAL
COMMUNITY
End: 2022-01-21

## 2021-09-14 RX ORDER — PHENAZOPYRIDINE HYDROCHLORIDE 100 MG/1
TABLET, FILM COATED ORAL
COMMUNITY
Start: 2021-08-10

## 2021-09-14 RX ORDER — FERROUS SULFATE 325(65) MG
TABLET ORAL
COMMUNITY

## 2021-09-14 RX ORDER — OMEPRAZOLE 20 MG/1
CAPSULE, DELAYED RELEASE ORAL
COMMUNITY
End: 2022-01-21

## 2021-09-14 RX ORDER — DOCUSATE SODIUM -SENNOSIDES 50; 8.6 MG/1; MG/1
TABLET, COATED ORAL
COMMUNITY
Start: 2021-08-10

## 2021-09-14 RX ORDER — MAGNESIUM OXIDE 400 MG/1
400 TABLET ORAL
COMMUNITY

## 2021-09-14 RX ORDER — CEFTRIAXONE SODIUM 10 G/100ML
INJECTION, POWDER, FOR SOLUTION INTRAVENOUS
COMMUNITY

## 2021-09-16 ENCOUNTER — TELEMEDICINE (OUTPATIENT)
Dept: UROLOGY | Facility: MEDICAL CENTER | Age: 86
End: 2021-09-16
Payer: COMMERCIAL

## 2021-09-16 DIAGNOSIS — N20.0 RENAL CALCULUS, RIGHT: Primary | ICD-10-CM

## 2021-09-16 DIAGNOSIS — R33.9 URINARY RETENTION: ICD-10-CM

## 2021-09-16 PROCEDURE — 99441 PR PHYS/QHP TELEPHONE EVALUATION 5-10 MIN: CPT | Performed by: NURSE PRACTITIONER

## 2021-09-16 RX ORDER — TRIAMCINOLONE ACETONIDE 40 MG/ML
INJECTION, SUSPENSION INTRA-ARTICULAR; INTRAMUSCULAR
COMMUNITY

## 2021-09-16 RX ORDER — ACETAMINOPHEN AND CODEINE PHOSPHATE 300; 30 MG/1; MG/1
TABLET ORAL
COMMUNITY

## 2021-09-16 RX ORDER — OXYCODONE HYDROCHLORIDE 5 MG/1
TABLET ORAL
COMMUNITY

## 2021-09-16 NOTE — PROGRESS NOTES
Virtual Brief Visit    Verification of patient location:    Patient is located in the following state in which I hold an active license [place]      Assessment/Plan:    Problem List Items Addressed This Visit     None      Visit Diagnoses     Renal calculus, right    -  Primary    Relevant Medications    acetaminophen-codeine (TYLENOL with CODEINE #3) 300-30 MG per tablet    oxyCODONE (ROXICODONE) 5 mg immediate release tablet    Other Relevant Orders    US kidney and bladder    Basic metabolic panel    Urinary retention        Relevant Medications    phenazopyridine (PYRIDIUM) 100 mg tablet    Other Relevant Orders    Basic metabolic panel        Nephrolithiasis  ·  ultrasound of kidney and bladder performed 09/07/2021 reveals a 4 mm non-obstructing right intrarenal calculi  ·  discussed dietary behavior modifications to reduce future stone formation  ·  Will repeat ultrasound of kidney and bladder in 1 year  ·  follow up in the office in 1 year    Urinary Incontinence  ·  maintain adequate hydration upwards to 40 oz of water intake per day  ·  avoid /limit bladder irritating foods and beverages  ·  timed voiding  ·  Ensure complete bladder emptying with urination  ·  patient reports no worsening of symptoms -will continue to monitor for worsen/progression of symptoms  · BMP in 1 year  · Follow up in the office in 1 year      Reason for visit is Nephrolithiasis  Chief Complaint   Patient presents with    Virtual Brief Visit        Encounter provider DHRUV Espitia    Provider located at [address] [place] [id number]    Recent Visits  Date Type Provider Dept   09/13/21 Telephone [facility] [place]   Showing recent visits within past 7 days and meeting all other requirements  Today's Visits  Date Type Provider Dept   09/16/21 [address] [place] [address], [address] [place] [address] For Urology [place]   Showing today's visits and meeting all other requirements  Future Appointments  No visits were found meeting these conditions  Showing future appointments within next 150 days and meeting all other requirements       After connecting through telephone, the patient was identified by name and date of birth  Cora Bhatti was informed that this is a telemedicine visit and that the visit is being conducted through telephone  My office door was closed  No one else was in the room  She acknowledged consent and understanding of privacy and security of the platform  The patient has agreed to participate and understands she can discontinue the visit at any time  Patient is aware this is a billable service  Subjective    Cora Bhatti is a 81 y o  female managed by Dr Roe Howell  History of nephrolithiasis bilateral with ureteral calculus:  Underwent cystoscopy, bilateral ureteroscopy with laser lithotripsy of approximately 2-3 cm of calculi in left kidney, 3 cm stone in the right kidney  This was a lengthy procedure and she became septic after it with   Yeast   She has a chronic Montes De Oca catheter and both stents were removed at the bedside 8 days after the procedure  Ultrasound of the kidney 3/9/2021 shows right renal calculi with moderate hydronephrosis, and left kidney is stone free  She has right flank pain which is positional mostly nature  She remains on Eliquis  She denies episodes of gross hematuria         Past Medical History:   Diagnosis Date    Acute and chronic respiratory failure with hypoxia (HCC)     Acute kidney failure (HCC)     Anemia     Angina pectoris (HCC)     Arthritis     osteoarthritis    Atrial fibrillation (HCC)     Bacteremia     Bacterial infection due to Proteus mirabilis 5/23/2019    Chafing     folds of skin and back of thighs    CHF (congestive heart failure) (HCC)     Chronic indwelling Montes De Oca catheter     removed    Chronic pain disorder     pain in thoracic spine    Colon polyp  Constipation     Coronary artery disease     Difficulty walking     lack of coordination    Discitis     Discitis     Disease of thyroid gland     hypothyroid    Dyspepsia     Dysphagia     Dysuria     Gait abnormality     GERD (gastroesophageal reflux disease)     Heart failure ([facility])     History of transfusion     Hx of bleeding disorder     Hydronephrosis     Hyperlipidemia     Hypertension     Hypokalemia     Hypothyroidism     Insomnia     Irregular heart beat     Kidney stone     Left ureteral calculus 6/12/2019    Added automatically from request for surgery [id number]    Lymphedema     Macular degeneration     Major depressive disorder     Morbid obesity [facility]     Morbid obesity ([facility]     Muscle weakness     Myocardial infarction ([facility]     NSTEMI (non-ST elevated myocardial infarction) ([facility]     Osteoporosis     Paroxysmal A-fib ([facility])     Personal history of other infectious and parasitic diseases     Recurrent UTI     Renal disorder     RLS (restless legs syndrome)     Sepsis ([facility]     Sleep apnea     Symptomatic anemia 1/15/2020    Syncope 1/17/2020    Vitamin D deficiency     Wheelchair bound     unable to bear weight , hx infected prosthesis       Past Surgical History:   Procedure Laterality Date    ABDOMINAL SURGERY      APPENDECTOMY      CATARACT EXTRACTION      COLONOSCOPY      CYSTOSCOPY      DILATION AND CURETTAGE OF UTERUS      FL RETROGRADE PYELOGRAM  5/22/2019    FL RETROGRADE PYELOGRAM  8/10/2020    HYSTERECTOMY      JOINT REPLACEMENT Bilateral     knee replacment    LAPAROSCOPIC GASTRIC BANDING      CT CYSTO/URETERO W/LITHOTRIPSY &INDWELL STENT INSRT Left 6/26/2019    Procedure: CYSTO, URETEROSCOPY W/HOLMIUM LASER, STENT EXCHANGE;  Surgeon: Suleiman Thomas MD;  [facility];  Service: Urology    CT CYSTO/URETERO W/LITHOTRIPSY &INDWELL STENT INSRT Bilateral 9/30/2020    Procedure: CYSTO, URETEROSCOPY W/HOLMIUM LASER, STENT EXCHANGE; Surgeon: Surinder Leonardo MD;  [facility];  Service: Urology    UT CYSTOURETHROSCOPY,URETER CATHETER Left 5/22/2019    Procedure: CYSTOSCOPY; RIGHT RETROGRADE PYELOGRAM WITH INSERTION STENT URETERAL LEFT;  Surgeon: Surinder Leonardo MD;  [facility];  Service: Urology    UT CYSTOURETHROSCOPY,URETER CATHETER Bilateral 8/10/2020    Procedure: CYSTOSCOPY RETROGRADE PYELOGRAM WITH INSERTION STENT URETERAL;  Surgeon: Aram Clark MD;  [facility];  Service: Urology    UT XCAPSL CTRC RMVL INSJ IO LENS PROSTH W/O ECP Right 3/12/2020    Procedure: CATARACT REMOVAL W/INTRAOCULAR LENS;  Surgeon: Chiara Cook MD;  Location[facility] OR;  Service: Ophthalmology    REMOVAL GASTRIC BAND LAPAROSCOPIC      VENTRAL HERNIA REPAIR      x4       Current Outpatient Medications   Medication Sig Dispense Refill    acetaminophen (TYLENOL) 325 mg tablet Take 650 mg by mouth every 6 (six) hours as needed for mild pain       acetaminophen-codeine (TYLENOL with CODEINE #3) 300-30 MG per tablet acetaminophen 300 mg-codeine 30 mg tablet      alendronate (FOSAMAX) 70 mg tablet Take 70 mg by mouth see administration instructions Give once a day on friday      apixaban (Eliquis) 5 mg Take 5 mg by mouth 2 (two) times a day      ascorbic acid (VITAMIN C) 500 mg tablet       aspirin (ECOTRIN LOW STRENGTH) 81 mg EC tablet Take 81 mg by mouth daily      atorvastatin (LIPITOR) 10 mg tablet Take 10 mg by mouth daily at bedtime       bisacodyl (DULCOLAX) 10 mg suppository Insert 10 mg into the rectum as needed       calcium carbonate (TUMS) 500 mg chewable tablet Chew 1 tablet daily      calcium carbonate-vitamin D (OSCAL-D) 500 mg-200 units per tablet       cefTRIAXone (ROCEPHIN) 10 g injection ceftriaxone 10 gram solution for injection      Cholecalciferol (Vitamin D) 50 MCG (2000 UT) tablet       cholecalciferol (VITAMIN D3) 1,000 units tablet Take 2,000 Units by mouth daily       Coenzyme Q10 (CO Q10) 100 MG CAPS Take by mouth      docusate sodium (COLACE) 100 mg capsule Take 100 mg by mouth 2 (two) times a day      esomeprazole (NexIUM) 40 MG capsule esomeprazole magnesium 40 mg capsule,delayed release      famotidine (PEPCID) 20 mg tablet Take 1 tablet (20 mg total) by mouth 2 (two) times a day 60 tablet 5    ferrous sulfate 325 (65 Fe) mg tablet ferrous sulfate 325 mg (65 mg iron) tablet      furosemide (LASIX) 40 mg tablet Take 40 mg by mouth daily       levothyroxine 150 mcg tablet Take 150 mcg by mouth daily in the early morning       loperamide (IMODIUM) 2 mg capsule       lutein 6 mg Take 6 mg by mouth daily      magnesium hydroxide (MILK OF MAGNESIA) 400 mg/5 mL oral suspension daily as needed       magnesium oxide (MAG-OX) 400 mg tablet Take 400 mg by mouth      Melatonin 5 MG TABS Take 5 mg by mouth daily at bedtime       metoprolol tartrate (LOPRESSOR) 25 mg tablet Take 25 mg by mouth every 12 (twelve) hours      nitroglycerin (NITROSTAT) 0 4 mg SL tablet Place 0 4 mg under the tongue every 5 (five) minutes as needed for chest pain       omeprazole (PriLOSEC) 20 mg delayed release capsule omeprazole 20 mg capsule,delayed release      ondansetron (ZOFRAN) 4 mg tablet Take 4 mg by mouth every 8 (eight) hours as needed for nausea or vomiting      oxybutynin (DITROPAN) 5 mg tablet Take 1 tablet (5 mg total) by mouth 3 (three) times a day as needed (bladder spasm) 20 tablet 0    oxyCODONE (ROXICODONE) 5 mg immediate release tablet oxycodone 5 mg tablet      pantoprazole (PROTONIX) 40 mg tablet Take 1 tablet (40 mg total) by mouth 2 (two) times a day before meals 170 tablet 0    phenazopyridine (PYRIDIUM) 100 mg tablet       polyethylene glycol (MIRALAX) 17 g packet Take 17 g by mouth daily as needed      potassium chloride (K-DUR,KLOR-CON) 20 mEq tablet Take 20 mEq by mouth 2 (two) times a day      potassium chloride 10% oral solution       pramipexole (MIRAPEX) 1 mg tablet Take 0 5 mg by mouth daily at bedtime       Senexon-S 8 6-50 MG per tablet       Skin Protectants, Misc  (DERMACERIN) CREA Apply topically      talc Apply topically as needed for irritation      triamcinolone acetonide (Kenalog) 40 mg/mL Kenalog 40 mg/mL suspension for injection       No current facility-administered medications for this visit  Allergies   Allergen Reactions    Augmentin [Amoxicillin-Pot Clavulanate] Rash     Patient reports that she has tolerated amoxicillin in the past and would be willing to try penicillin if needed   Hydralazine Other (See Comments)     Headache, dizziness, nausea,    Sulfamethoxazole-Trimethoprim Rash    Actonel  [Risedronate Sodium]     Ciprofloxacin Other (See Comments)     Cipro per faxed information from [employer], no reaction listed    Latex Other (See Comments)     Pt presents to PACU with green band, confirms latex allergy    Lisinopril Other (See Comments)     "cough"      Medical Tape      Pulls off skin      Meloxicam Cough    Risedronate     Wound Dressing Adhesive Other (See Comments)     "pulls skin off"- darrel paper tape    Conjugated Estrogens Rash     Premarin Cream      Neurontin [Gabapentin] Rash     Narrative & Impression   RENAL ULTRASOUND     INDICATION:   N39 41: Urge incontinence  N20 0: Calculus of kidney      COMPARISON: Renal ultrasound dated 3/9/2021      TECHNIQUE:   Ultrasound of the retroperitoneum was performed with a curvilinear transducer utilizing volumetric sweeps and still imaging techniques       FINDINGS:     KIDNEYS:  Symmetric and normal size  Right kidney:  12 5 cm  Left kidney:  12 3 cm      Right kidney  Normal echogenicity and contour  No suspicious masses detected  There is a 1 1 x 1 0 x 1 1 cm simple cyst in the upper pole  No hydronephrosis  4 mm nonobstructing intrarenal calculus is seen in the lower pole  No perinephric fluid collections      Left kidney  Normal echogenicity and contour     No suspicious masses detected  There is a 1 2 x 1 2 x 1 1 cm simple cyst in the upper pole  A 1 1 x 0 9 x 1 0 cm simple cyst is seen in the lower pole  No hydronephrosis  No shadowing calculi  No perinephric fluid collections      URETERS:  Nonvisualized      BLADDER:   Prevoid urinary bladder volume is 456 mL  The patient is unable to urinate and therefore post void images are not obtained  No focal thickening or mass lesions  Bilateral ureteral jets detected      IMPRESSION:     No hydronephrosis     4 mm nonobstructing right intrarenal calculus     456 mL prevoid urinary bladder volume  Patient is unable to urinate and therefore postvoid images are not obtained      The study was marked in EPIC for significant notification  Review of Systems   Constitutional: Negative for chills and fever  Respiratory: Negative for cough and shortness of breath  Cardiovascular: Negative for chest pain  Gastrointestinal: Negative for abdominal distention, abdominal pain, blood in stool, nausea and vomiting  Genitourinary: Negative for difficulty urinating, dysuria, enuresis, flank pain, frequency, hematuria and urgency  Skin: Negative for rash  There were no vitals filed for this visit  I spent 9 minutes directly with the patient during this visit    VIRTUAL VISIT DISCLAIMER      Sage Barkley verbally agrees to participate in [employer]  Pt is aware that [employer] could be limited without vital signs or the ability to perform a full hands-on physical Bulmaro Lou understands she or the provider may request at any time to terminate the video visit and request the patient to seek care or treatment in person        DHRUV Kevin

## 2021-11-11 ENCOUNTER — OFFICE VISIT (OUTPATIENT)
Dept: GASTROENTEROLOGY | Facility: MEDICAL CENTER | Age: 86
End: 2021-11-11
Payer: COMMERCIAL

## 2021-11-11 VITALS — TEMPERATURE: 97.8 F | HEART RATE: 61 BPM | DIASTOLIC BLOOD PRESSURE: 74 MMHG | SYSTOLIC BLOOD PRESSURE: 116 MMHG

## 2021-11-11 DIAGNOSIS — D50.8 OTHER IRON DEFICIENCY ANEMIA: ICD-10-CM

## 2021-11-11 DIAGNOSIS — K59.09 OTHER CONSTIPATION: ICD-10-CM

## 2021-11-11 DIAGNOSIS — K21.00 GASTROESOPHAGEAL REFLUX DISEASE WITH ESOPHAGITIS WITHOUT HEMORRHAGE: Primary | ICD-10-CM

## 2021-11-11 PROCEDURE — 99214 OFFICE O/P EST MOD 30 MIN: CPT | Performed by: PHYSICIAN ASSISTANT

## 2021-11-12 ENCOUNTER — TELEPHONE (OUTPATIENT)
Dept: GASTROENTEROLOGY | Facility: CLINIC | Age: 86
End: 2021-11-12

## 2021-12-16 ENCOUNTER — TELEPHONE (OUTPATIENT)
Dept: PREADMISSION TESTING | Facility: HOSPITAL | Age: 86
End: 2021-12-16

## 2021-12-29 ENCOUNTER — TELEPHONE (OUTPATIENT)
Dept: GASTROENTEROLOGY | Facility: HOSPITAL | Age: 86
End: 2021-12-29

## 2022-01-05 ENCOUNTER — ANESTHESIA EVENT (OUTPATIENT)
Dept: ANESTHESIOLOGY | Facility: HOSPITAL | Age: 87
End: 2022-01-05

## 2022-01-05 ENCOUNTER — TELEPHONE (OUTPATIENT)
Dept: GASTROENTEROLOGY | Facility: HOSPITAL | Age: 87
End: 2022-01-05

## 2022-01-05 ENCOUNTER — ANESTHESIA (OUTPATIENT)
Dept: ANESTHESIOLOGY | Facility: HOSPITAL | Age: 87
End: 2022-01-05

## 2022-01-05 PROBLEM — I21.4 NON-ST ELEVATION MYOCARDIAL INFARCTION (NSTEMI) (HCC): Status: ACTIVE | Noted: 2022-01-05

## 2022-01-05 NOTE — ANESTHESIA PREPROCEDURE EVALUATION
Procedure:  PRE-OP ONLY    Relevant Problems   CARDIO   (+) Essential hypertension   (+) Mixed hyperlipidemia   (+) Non-ST elevation myocardial infarction (NSTEMI) (HCC)   (+) Paroxysmal A-fib (HCC)      GI/HEPATIC   (+) Gastroesophageal reflux disease with esophagitis without hemorrhage      /RENAL   (+) Hydronephrosis with obstructing calculus      HEMATOLOGY   (+) Acute blood loss anemia   (+) Anemia   (+) Thrombocytopenia (HCC)      PULMONARY   (+) Acute on chronic respiratory failure with hypoxia (HCC)      Other   (+) Chronic diastolic (congestive) heart failure (HCC)   (+) Morbid obesity with BMI of 45 0-49 9, adult (HCC)             Anesthesia Plan  ASA Score- 3     Anesthesia Type- IV sedation with anesthesia with ASA Monitors  Additional Monitors:   Airway Plan:           Plan Factors-    Chart reviewed  EKG reviewed  Existing labs reviewed  Induction-     Postoperative Plan-     Informed Consent- Anesthetic plan and risks discussed with patient  I personally reviewed this patient with the CRNA  Discussed and agreed on the Anesthesia Plan with the CRNA  Gerhardt Sep             No results found for: HGBA1C    Lab Results   Component Value Date     10/10/2014    K 4 0 10/06/2020     10/06/2020    CO2 24 10/06/2020    ANIONGAP 5 10/10/2014    BUN 9 10/06/2020    CREATININE 0 87 10/06/2020    GLUCOSE 154 (H) 08/10/2020    GLUF 91 06/26/2019    CALCIUM 8 5 10/06/2020    CORRECTEDCA 9 9 10/05/2020    AST 23 10/05/2020    ALT 67 10/05/2020    ALKPHOS 100 10/05/2020    PROT 7 8 09/29/2014    BILITOT 0 9 09/29/2014    EGFR 62 10/06/2020       Lab Results   Component Value Date    WBC 5 98 10/06/2020    HGB 9 1 (L) 10/06/2020    HCT 33 3 (L) 10/06/2020    MCV 84 10/06/2020     10/06/2020   echo oct 2020   IMPRESSIONS:  There was no (transthoracic) echocardiographic evidence for valvular vegetation      SUMMARY     LEFT VENTRICLE:  Systolic function was normal  Ejection fraction was estimated to be 60 %  There were no regional wall motion abnormalities      HISTORY: PRIOR HISTORY: Obesity  Hypertension  Atrial fibrillation  Congestive heart failure  NSTEMI      PROCEDURE: The procedure was performed at the bedside  This was a routine study  The transthoracic approach was used  The study included complete 2D imaging and color Doppler  The heart rate was 73 bpm, at the start of the study  Image  quality was adequate      LEFT VENTRICLE: Size was normal  Systolic function was normal  Ejection fraction was estimated to be 60 %  There were no regional wall motion abnormalities  Wall thickness was normal  DOPPLER: Left ventricular diastolic function was not  assessed      RIGHT VENTRICLE: The size was normal  Systolic function was normal  Wall thickness was normal      LEFT ATRIUM: Size was normal      RIGHT ATRIUM: Size was normal      MITRAL VALVE: Valve structure was normal  There was normal leaflet separation  DOPPLER: The transmitral velocity was within the normal range  There was no evidence for stenosis  There was no regurgitation      AORTIC VALVE: The valve was trileaflet  Leaflets exhibited normal thickness and normal cuspal separation  DOPPLER: Transaortic velocity was within the normal range  There was no evidence for stenosis  There was no regurgitation      TRICUSPID VALVE: The valve structure was normal  There was normal leaflet separation  DOPPLER: The transtricuspid velocity was within the normal range  There was no evidence for stenosis  There was no regurgitation      PULMONIC VALVE: Leaflets exhibited normal thickness, no calcification, and normal cuspal separation  DOPPLER: The transpulmonic velocity was within the normal range  There was no regurgitation      PERICARDIUM: There was no pericardial effusion   The pericardium was normal in appearance      AORTA: The root exhibited normal size      SYSTEMIC VEINS: IVC: Not assessed      [employer] Accredited Echocardiography Laboratory     Prepared and electronically signed by     [place] Aranda DO  Signed 05-Oct-2020 11:58:04      Normal sinus rhythm  Left axis deviation  Nonspecific ST and T wave abnormality  Abnormal ECG  When compared with ECG of 10-AUG-2020 02:53,  Nonspecific T wave abnormality, improved in Inferior leads  QT has lengthened  Confirmed by Vijaya Carver ([id number]) on 10/1/2020 7:04:53 AM    Echo 2020 aug   LEFT VENTRICLE:  Low normal left ventricular ejection fraction, 50%  Mild left ventricular cavity enlargement  Normal left ventricular wall thickness  The inferior apex and septal apex appear significantly hypokinetic  Grade 2 left ventricular diastolic  dysfunction (pseudonormalization)  Mildly elevated left atrial pressures      LEFT ATRIUM:  Mild left atrial enlargement      MITRAL VALVE:  There was trace regurgitation      TRICUSPID VALVE:  There was trace regurgitation      PULMONIC VALVE:  There was trace regurgitation

## 2022-01-06 ENCOUNTER — ANESTHESIA (OUTPATIENT)
Dept: GASTROENTEROLOGY | Facility: HOSPITAL | Age: 87
End: 2022-01-06

## 2022-01-06 ENCOUNTER — ANESTHESIA EVENT (OUTPATIENT)
Dept: GASTROENTEROLOGY | Facility: HOSPITAL | Age: 87
End: 2022-01-06

## 2022-01-06 ENCOUNTER — HOSPITAL ENCOUNTER (OUTPATIENT)
Dept: GASTROENTEROLOGY | Facility: HOSPITAL | Age: 87
Setting detail: OUTPATIENT SURGERY
Discharge: HOME | End: 2022-01-06
Attending: INTERNAL MEDICINE | Admitting: INTERNAL MEDICINE
Payer: COMMERCIAL

## 2022-01-06 VITALS
OXYGEN SATURATION: 96 % | DIASTOLIC BLOOD PRESSURE: 58 MMHG | HEART RATE: 56 BPM | SYSTOLIC BLOOD PRESSURE: 118 MMHG | RESPIRATION RATE: 20 BRPM | TEMPERATURE: 97.4 F

## 2022-01-06 DIAGNOSIS — K21.00 GASTROESOPHAGEAL REFLUX DISEASE WITH ESOPHAGITIS WITHOUT HEMORRHAGE: ICD-10-CM

## 2022-01-06 PROCEDURE — 43239 EGD BIOPSY SINGLE/MULTIPLE: CPT | Performed by: INTERNAL MEDICINE

## 2022-01-06 PROCEDURE — 88305 TISSUE EXAM BY PATHOLOGIST: CPT | Performed by: SPECIALIST

## 2022-01-06 PROCEDURE — 43251 EGD REMOVE LESION SNARE: CPT | Performed by: INTERNAL MEDICINE

## 2022-01-06 RX ORDER — SODIUM CHLORIDE 9 MG/ML
50 INJECTION, SOLUTION INTRAVENOUS CONTINUOUS
Status: DISCONTINUED | OUTPATIENT
Start: 2022-01-06 | End: 2022-01-10 | Stop reason: HOSPADM

## 2022-01-06 RX ORDER — LIDOCAINE HYDROCHLORIDE 10 MG/ML
INJECTION, SOLUTION EPIDURAL; INFILTRATION; INTRACAUDAL; PERINEURAL AS NEEDED
Status: DISCONTINUED | OUTPATIENT
Start: 2022-01-06 | End: 2022-01-06

## 2022-01-06 RX ORDER — SODIUM CHLORIDE 9 MG/ML
50 INJECTION, SOLUTION INTRAVENOUS CONTINUOUS
Status: CANCELLED | OUTPATIENT
Start: 2022-01-06

## 2022-01-06 RX ORDER — SODIUM CHLORIDE 9 MG/ML
INJECTION, SOLUTION INTRAVENOUS CONTINUOUS PRN
Status: DISCONTINUED | OUTPATIENT
Start: 2022-01-06 | End: 2022-01-06

## 2022-01-06 RX ORDER — PROPOFOL 10 MG/ML
INJECTION, EMULSION INTRAVENOUS AS NEEDED
Status: DISCONTINUED | OUTPATIENT
Start: 2022-01-06 | End: 2022-01-06

## 2022-01-06 RX ADMIN — LIDOCAINE HYDROCHLORIDE 30 MG: 10 INJECTION, SOLUTION EPIDURAL; INFILTRATION; INTRACAUDAL; PERINEURAL at 10:40

## 2022-01-06 RX ADMIN — PROPOFOL 80 MG: 10 INJECTION, EMULSION INTRAVENOUS at 10:40

## 2022-01-06 RX ADMIN — SODIUM CHLORIDE 50 ML/HR: 0.9 INJECTION, SOLUTION INTRAVENOUS at 09:22

## 2022-01-06 RX ADMIN — PROPOFOL 20 MG: 10 INJECTION, EMULSION INTRAVENOUS at 10:43

## 2022-01-06 RX ADMIN — SODIUM CHLORIDE: 0.9 INJECTION, SOLUTION INTRAVENOUS at 09:17

## 2022-01-06 NOTE — ANESTHESIA PREPROCEDURE EVALUATION
Procedure:  EGD    Relevant Problems   CARDIO   (+) Essential hypertension   (+) Mixed hyperlipidemia   (+) Non-ST elevation myocardial infarction (NSTEMI) (HCC)   (+) Paroxysmal A-fib (HCC)      GI/HEPATIC   (+) Gastroesophageal reflux disease with esophagitis without hemorrhage      /RENAL   (+) Hydronephrosis with obstructing calculus      HEMATOLOGY   (+) Acute blood loss anemia   (+) Anemia   (+) Thrombocytopenia (HCC)      PULMONARY   (+) Acute on chronic respiratory failure with hypoxia (HCC)        Physical Exam    Airway    Mallampati score: II  TM Distance: >3 FB  Neck ROM: full     Dental   Comment: Edentulous ,     Cardiovascular  Cardiovascular exam normal    Pulmonary  Pulmonary exam normal     Other Findings        Anesthesia Plan  ASA Score- 3     Anesthesia Type- IV sedation with anesthesia with ASA Monitors  Additional Monitors:   Airway Plan:           Plan Factors-Exercise tolerance (METS): <4 METS  Chart reviewed  EKG reviewed  Existing labs reviewed  Patient is not a current smoker  Patient not instructed to abstain from smoking on day of procedure  Patient did not smoke on day of surgery  Induction-     Postoperative Plan-     Informed Consent- Anesthetic plan and risks discussed with patient  I personally reviewed this patient with the CRNA  Discussed and agreed on the Anesthesia Plan with the CRNA  Maxine Sinha           No results found for: HGBA1C    Lab Results   Component Value Date     10/10/2014    K 4 0 10/06/2020     10/06/2020    CO2 24 10/06/2020    ANIONGAP 5 10/10/2014    BUN 9 10/06/2020    CREATININE 0 87 10/06/2020    GLUCOSE 154 (H) 08/10/2020    GLUF 91 06/26/2019    CALCIUM 8 5 10/06/2020    CORRECTEDCA 9 9 10/05/2020    AST 23 10/05/2020    ALT 67 10/05/2020    ALKPHOS 100 10/05/2020    PROT 7 8 09/29/2014    BILITOT 0 9 09/29/2014    EGFR 62 10/06/2020       Lab Results   Component Value Date    WBC 5 98 10/06/2020    HGB 9 1 (L) 10/06/2020    HCT 33 3 (L) 10/06/2020    MCV 84 10/06/2020     10/06/2020   echo oct 2020   IMPRESSIONS:  There was no (transthoracic) echocardiographic evidence for valvular vegetation      SUMMARY     LEFT VENTRICLE:  Systolic function was normal  Ejection fraction was estimated to be 60 %  There were no regional wall motion abnormalities      HISTORY: PRIOR HISTORY: Obesity  Hypertension  Atrial fibrillation  Congestive heart failure  NSTEMI      PROCEDURE: The procedure was performed at the bedside  This was a routine study  The transthoracic approach was used  The study included complete 2D imaging and color Doppler  The heart rate was 73 bpm, at the start of the study  Image  quality was adequate      LEFT VENTRICLE: Size was normal  Systolic function was normal  Ejection fraction was estimated to be 60 %  There were no regional wall motion abnormalities  Wall thickness was normal  DOPPLER: Left ventricular diastolic function was not  assessed      RIGHT VENTRICLE: The size was normal  Systolic function was normal  Wall thickness was normal      LEFT ATRIUM: Size was normal      RIGHT ATRIUM: Size was normal      MITRAL VALVE: Valve structure was normal  There was normal leaflet separation  DOPPLER: The transmitral velocity was within the normal range  There was no evidence for stenosis  There was no regurgitation      AORTIC VALVE: The valve was trileaflet  Leaflets exhibited normal thickness and normal cuspal separation  DOPPLER: Transaortic velocity was within the normal range  There was no evidence for stenosis  There was no regurgitation      TRICUSPID VALVE: The valve structure was normal  There was normal leaflet separation  DOPPLER: The transtricuspid velocity was within the normal range  There was no evidence for stenosis  There was no regurgitation      PULMONIC VALVE: Leaflets exhibited normal thickness, no calcification, and normal cuspal separation   DOPPLER: The transpulmonic velocity was within the normal range  There was no regurgitation      PERICARDIUM: There was no pericardial effusion  The pericardium was normal in appearance      AORTA: The root exhibited normal size      SYSTEMIC VEINS: IVC: Not assessed      [facility] Accredited Echocardiography Laboratory     Prepared and electronically signed by  Young Kilpatrick DO  Signed 05-Oct-2020 11:58:04      Normal sinus rhythm  Left axis deviation  Nonspecific ST and T wave abnormality  Abnormal ECG  When compared with ECG of 10-AUG-2020 02:53,  Nonspecific T wave abnormality, improved in Inferior leads  QT has lengthened  Confirmed by Vijaya Carver ([id number]) on 10/1/2020 7:04:53 AM    Echo 2020 aug   LEFT VENTRICLE:  Low normal left ventricular ejection fraction, 50%  Mild left ventricular cavity enlargement  Normal left ventricular wall thickness  The inferior apex and septal apex appear significantly hypokinetic  Grade 2 left ventricular diastolic  dysfunction (pseudonormalization)  Mildly elevated left atrial pressures      LEFT ATRIUM:  Mild left atrial enlargement      MITRAL VALVE:  There was trace regurgitation      TRICUSPID VALVE:  There was trace regurgitation      PULMONIC VALVE:  There was trace regurgitation

## 2022-01-06 NOTE — H&P
History and Physical -  [facility]  Flora Lechuga 81 y o  female MRN: [id number]                  HPI: Flora Lechuga is a 81y o  year old female who presents for EGD for GERD and esophageal mucosa  REVIEW OF SYSTEMS: Per the HPI, and otherwise unremarkable      Historical Information   Past Medical History:   Diagnosis Date    Acute and chronic respiratory failure with hypoxia (HCC)     Acute kidney failure (HCC)     Anemia     Angina pectoris (HCC)     Arthritis     osteoarthritis    Atrial fibrillation (HCC)     Bacteremia     Bacterial infection due to Proteus mirabilis 5/23/2019    Chafing     folds of skin and back of thighs    CHF (congestive heart failure) (HCC)     Chronic indwelling Montes De Oca catheter     removed    Chronic pain disorder     pain in thoracic spine    Colon polyp     Constipation     Coronary artery disease     Difficulty walking     lack of coordination    Discitis     Discitis     Disease of thyroid gland     hypothyroid    Dyspepsia     Dysphagia     Dysuria     Gait abnormality     GERD (gastroesophageal reflux disease)     Heart failure (HCC)     History of transfusion     Hx of bleeding disorder     Hydronephrosis     Hyperlipidemia     Hypertension     Hypokalemia     Hypothyroidism     Insomnia     Irregular heart beat     Kidney stone     Left ureteral calculus 6/12/2019    Added automatically from request for surgery [id number]    Lymphedema     Macular degeneration     Major depressive disorder     Morbid obesity ([address]     Morbid obesity ([address]     Muscle weakness     Myocardial infarction ([address]     NSTEMI (non-ST elevated myocardial infarction) ([address]     Osteoporosis     Paroxysmal A-fib ([address]     Personal history of other infectious and parasitic diseases     Recurrent UTI     Renal disorder     RLS (restless legs syndrome)     Sepsis ([address]     Sleep apnea     Symptomatic anemia 1/15/2020    Syncope 1/17/2020  Vitamin D deficiency     Wheelchair bound     unable to bear weight , hx infected prosthesis     Past Surgical History:   Procedure Laterality Date    ABDOMINAL SURGERY      APPENDECTOMY      CATARACT EXTRACTION      COLONOSCOPY      CYSTOSCOPY      DILATION AND CURETTAGE OF UTERUS      FL RETROGRADE PYELOGRAM  5/22/2019    FL RETROGRADE PYELOGRAM  8/10/2020    HYSTERECTOMY      JOINT REPLACEMENT Bilateral     knee replacment    LAPAROSCOPIC GASTRIC BANDING      KS CYSTO/URETERO W/LITHOTRIPSY &INDWELL STENT INSRT Left 6/26/2019    Procedure: CYSTO, URETEROSCOPY W/HOLMIUM LASER, STENT EXCHANGE;  Surgeon: Driss Roa MD;  [facility];  Service: Urology    KS CYSTO/URETERO W/LITHOTRIPSY &INDWELL STENT INSRT Bilateral 9/30/2020    Procedure: CYSTO, URETEROSCOPY W/HOLMIUM LASER, STENT EXCHANGE;  Surgeon: Driss Roa MD;  [facility];  Service: Urology    KS CYSTOURETHROSCOPY,URETER CATHETER Left 5/22/2019    Procedure: CYSTOSCOPY; RIGHT RETROGRADE PYELOGRAM WITH INSERTION STENT URETERAL LEFT;  Surgeon: Driss Roa MD;  [facility];  Service: Urology    KS CYSTOURETHROSCOPY,URETER CATHETER Bilateral 8/10/2020    Procedure: CYSTOSCOPY RETROGRADE PYELOGRAM WITH INSERTION STENT URETERAL;  Surgeon: Vanda Barahona MD;  [facility];  Service: Urology    KS XCAPSL CTRC RMVL INSJ IO LENS PROSTH W/O ECP Right 3/12/2020    Procedure: CATARACT REMOVAL W/INTRAOCULAR LENS;  Surgeon: Bunny Sanchez MD;  Location: [address] [place] [address] MAIN OR;  Service: Ophthalmology    REMOVAL GASTRIC BAND LAPAROSCOPIC      VENTRAL HERNIA REPAIR      x4     Social History   Social History     Substance and Sexual Activity   Alcohol Use Yes    Alcohol/week: 1 0 standard drink    Types: 1 Glasses of wine per week    Comment: socially      Social History     Substance and Sexual Activity   Drug Use Never     Social History     Tobacco Use   Smoking Status Former Smoker    Types: Cigars   Smokeless Tobacco Never Used   Tobacco Comment    smoked when was very much younger for one summer     History reviewed  No pertinent family history  Meds/Allergies       Current Outpatient Medications:     levothyroxine 150 mcg tablet    acetaminophen (TYLENOL) 325 mg tablet    acetaminophen-codeine (TYLENOL with CODEINE #3) 300-30 MG per tablet    alendronate (FOSAMAX) 70 mg tablet    apixaban (Eliquis) 5 mg    ascorbic acid (VITAMIN C) 500 mg tablet    aspirin (ECOTRIN LOW STRENGTH) 81 mg EC tablet    atorvastatin (LIPITOR) 10 mg tablet    bisacodyl (DULCOLAX) 10 mg suppository    calcium carbonate (TUMS) 500 mg chewable tablet    calcium carbonate-vitamin D (OSCAL-D) 500 mg-200 units per tablet    cefTRIAXone (ROCEPHIN) 10 g injection    Cholecalciferol (Vitamin D) 50 MCG (2000 UT) tablet    cholecalciferol (VITAMIN D3) 1,000 units tablet    Coenzyme Q10 (CO Q10) 100 MG CAPS    docusate sodium (COLACE) 100 mg capsule    esomeprazole (NexIUM) 40 MG capsule    famotidine (PEPCID) 20 mg tablet    ferrous sulfate 325 (65 Fe) mg tablet    furosemide (LASIX) 40 mg tablet    loperamide (IMODIUM) 2 mg capsule    lutein 6 mg    magnesium hydroxide (MILK OF MAGNESIA) 400 mg/5 mL oral suspension    magnesium oxide (MAG-OX) 400 mg tablet    Melatonin 5 MG TABS    metoprolol tartrate (LOPRESSOR) 25 mg tablet    nitroglycerin (NITROSTAT) 0 4 mg SL tablet    omeprazole (PriLOSEC) 20 mg delayed release capsule    ondansetron (ZOFRAN) 4 mg tablet    oxybutynin (DITROPAN) 5 mg tablet    oxyCODONE (ROXICODONE) 5 mg immediate release tablet    pantoprazole (PROTONIX) 40 mg tablet    phenazopyridine (PYRIDIUM) 100 mg tablet    polyethylene glycol (MIRALAX) 17 g packet    potassium chloride (K-DUR,KLOR-CON) 20 mEq tablet    potassium chloride 10% oral solution    pramipexole (MIRAPEX) 1 mg tablet    Senexon-S 8 6-50 MG per tablet    Skin Protectants, Misc   (DERMACERIN) CREA    talc    triamcinolone acetonide (Kenalog) 40 mg/mL    Current Facility-Administered Medications:     sodium chloride 0 9 % infusion, 50 mL/hr, Intravenous, Continuous, 50 mL/hr at 01/06/22 3932    Facility-Administered Medications Ordered in Other Encounters:     sodium chloride 0 9 % infusion, , Intravenous, Continuous PRN, New Bag at 01/06/22 3910    Allergies   Allergen Reactions    Augmentin [Amoxicillin-Pot Clavulanate] Rash     Patient reports that she has tolerated amoxicillin in the past and would be willing to try penicillin if needed   Hydralazine Other (See Comments)     Headache, dizziness, nausea,    Sulfamethoxazole-Trimethoprim Rash    Actonel  [Risedronate Sodium]     Ciprofloxacin Other (See Comments)     Cipro per faxed information from [facility], no reaction listed    Latex Other (See Comments)     Pt presents to PACU with green band, confirms latex allergy    Lisinopril Other (See Comments)     "cough"      Macrobid [Nitrofurantoin] Hives    Medical Tape      Pulls off skin      Meloxicam Cough    Risedronate     Wound Dressing Adhesive Other (See Comments)     "pulls skin off"- darrel paper tape    Conjugated Estrogens Rash     Premarin Cream      Neurontin [Gabapentin] Rash       Objective     /63   Pulse (!) 53   Temp 98 °F (36 7 °C) (Temporal)   Resp 16   SpO2 95%       PHYSICAL EXAM    Gen: NAD  Head: NCAT  CV: RRR  CHEST: Clear  ABD: soft, NT/ND  EXT: no edema      ASSESSMENT/PLAN:  This is a 81y o  year old female here for EGD, and she is stable and optimized for her procedure

## 2022-01-06 NOTE — NURSING NOTE
Returned from GI procedure, awake and alert  Tolerating po well  Ambulance called for transport back to [facility] - they will be here  approximately at 1500

## 2022-01-06 NOTE — ANESTHESIA POSTPROCEDURE EVALUATION
Post-Op Assessment Note    CV Status:  Stable  Pain Score: 0    Pain management: adequate     Mental Status:  Alert and awake   Hydration Status:  Stable   PONV Controlled:  None   Airway Patency:  Patent      Post Op Vitals Reviewed: Yes      Staff: CRNA         No complications documented      BP      Temp      Pulse    Resp      SpO2

## 2022-01-18 ENCOUNTER — TELEPHONE (OUTPATIENT)
Dept: GASTROENTEROLOGY | Facility: CLINIC | Age: 87
End: 2022-01-18

## 2022-01-18 NOTE — TELEPHONE ENCOUNTER
----- Message from Chely Barrett MD sent at 1/17/2022  2:11 PM EST -----  Call patient to report normal results

## 2022-01-21 ENCOUNTER — OFFICE VISIT (OUTPATIENT)
Dept: GASTROENTEROLOGY | Facility: MEDICAL CENTER | Age: 87
End: 2022-01-21
Payer: MEDICARE

## 2022-01-21 VITALS — HEART RATE: 52 BPM | DIASTOLIC BLOOD PRESSURE: 80 MMHG | SYSTOLIC BLOOD PRESSURE: 128 MMHG

## 2022-01-21 DIAGNOSIS — K59.09 OTHER CONSTIPATION: ICD-10-CM

## 2022-01-21 DIAGNOSIS — K21.00 GASTROESOPHAGEAL REFLUX DISEASE WITH ESOPHAGITIS WITHOUT HEMORRHAGE: Primary | ICD-10-CM

## 2022-01-21 PROBLEM — B37.81 CANDIDAL ESOPHAGITIS (HCC): Status: RESOLVED | Noted: 2020-01-22 | Resolved: 2022-01-21

## 2022-01-21 PROCEDURE — 99214 OFFICE O/P EST MOD 30 MIN: CPT | Performed by: PHYSICIAN ASSISTANT

## 2022-01-21 NOTE — PROGRESS NOTES
Assessment/Plan:     Diagnoses and all orders for this visit:    Gastroesophageal reflux disease with esophagitis without hemorrhage  Patient has a history of GERD  Recent endoscopy showed chronic gastritis  She is currently on pantoprazole 40 mg b i d  and famotidine 20 mg b i d  with good control of her symptoms  Would recommend continuing on this dose  Other constipation  She does have a history of constipation  She is currently on MiraLax every other day with good results  Would recommend continuing  Will see her back in 6 months or sooner if necessary  Subjective:      Patient ID: Delmar Culp is a 81 y o  female  HPI     This is a follow-up for GERD, constipation and to discuss her endoscopy  At her last visit patient was complaining of persistent heartburn symptoms despite taking pantoprazole 40 mg b i d  and famotidine 20 mg b i d  William Britton Therefore was recommended she have a repeat endoscopy for further evaluation  This was performed on 01/06/22 which showed a 1 cm hiatal hernia and edematous and erythematous mucosa in the stomach  Biopsies were negative for H pylori but did show chronic gastritis  She denies any heartburn or reflux symptoms at this times  She is still on the above-mentioned medications  She was also having problems with constipation, currently on MiraLax every other day and is having a bowel movement every other day  Her last colonoscopy was many years ago and reportedly polyps were found      Patient Active Problem List   Diagnosis    Ureterolithiasis    Urinary tract infection    Essential hypertension    Mixed hyperlipidemia    Paroxysmal A-fib (HCC)    Thrombocytopenia (HCC)    Hydronephrosis with obstructing calculus    Acute on chronic respiratory failure with hypoxia (HCC)    Chronic diastolic (congestive) heart failure (HCC)    Acute hypokalemia    Morbid obesity with BMI of 45 0-49 9, adult (HCC)    Severe sepsis (HCC)    Anemia    Transaminitis    Acute blood loss anemia    Fungemia    Gastroesophageal reflux disease with esophagitis without hemorrhage    Other constipation    Non-ST elevation myocardial infarction (NSTEMI) ([facility])     Allergies   Allergen Reactions    Augmentin [Amoxicillin-Pot Clavulanate] Rash     Patient reports that she has tolerated amoxicillin in the past and would be willing to try penicillin if needed      Hydralazine Other (See Comments)     Headache, dizziness, nausea,    Sulfamethoxazole-Trimethoprim Rash    Actonel  [Risedronate Sodium]     Ciprofloxacin Other (See Comments)     Cipro per faxed information from [facility], no reaction listed    Latex Other (See Comments)     Pt presents to PACU with green band, confirms latex allergy    Lisinopril Other (See Comments)     "cough"      Macrobid [Nitrofurantoin] Hives    Medical Tape      Pulls off skin      Meloxicam Cough    Risedronate     Wound Dressing Adhesive Other (See Comments)     "pulls skin off"- darrel paper tape    Conjugated Estrogens Rash     Premarin Cream      Neurontin [Gabapentin] Rash     Current Outpatient Medications on File Prior to Visit   Medication Sig    acetaminophen (TYLENOL) 325 mg tablet Take 650 mg by mouth every 6 (six) hours as needed for mild pain     alendronate (FOSAMAX) 70 mg tablet Take 70 mg by mouth see administration instructions Give once a day on friday    apixaban (Eliquis) 5 mg Take 5 mg by mouth 2 (two) times a day    aspirin (ECOTRIN LOW STRENGTH) 81 mg EC tablet Take 81 mg by mouth daily    atorvastatin (LIPITOR) 10 mg tablet Take 10 mg by mouth daily at bedtime     bisacodyl (DULCOLAX) 10 mg suppository Insert 10 mg into the rectum as needed     calcium carbonate (TUMS) 500 mg chewable tablet Chew 1 tablet daily    cefTRIAXone (ROCEPHIN) 10 g injection ceftriaxone 10 gram solution for injection    cholecalciferol (VITAMIN D3) 1,000 units tablet Take 2,000 Units by mouth daily     Coenzyme Q10 (CO Q10) 100 MG CAPS Take by mouth    docusate sodium (COLACE) 100 mg capsule Take 100 mg by mouth 2 (two) times a day    famotidine (PEPCID) 20 mg tablet Take 1 tablet (20 mg total) by mouth 2 (two) times a day    furosemide (LASIX) 40 mg tablet Take 40 mg by mouth daily     levothyroxine 150 mcg tablet Take 150 mcg by mouth daily in the early morning     lutein 6 mg Take 6 mg by mouth daily    magnesium hydroxide (MILK OF MAGNESIA) 400 mg/5 mL oral suspension daily as needed     Melatonin 5 MG TABS Take 5 mg by mouth daily at bedtime     metoprolol tartrate (LOPRESSOR) 25 mg tablet Take 25 mg by mouth every 12 (twelve) hours    nitroglycerin (NITROSTAT) 0 4 mg SL tablet Place 0 4 mg under the tongue every 5 (five) minutes as needed for chest pain     ondansetron (ZOFRAN) 4 mg tablet Take 4 mg by mouth every 8 (eight) hours as needed for nausea or vomiting    oxybutynin (DITROPAN) 5 mg tablet Take 1 tablet (5 mg total) by mouth 3 (three) times a day as needed (bladder spasm)    oxyCODONE (ROXICODONE) 5 mg immediate release tablet oxycodone 5 mg tablet    pantoprazole (PROTONIX) 40 mg tablet Take 1 tablet (40 mg total) by mouth 2 (two) times a day before meals    phenazopyridine (PYRIDIUM) 100 mg tablet     polyethylene glycol (MIRALAX) 17 g packet Take 17 g by mouth daily as needed    potassium chloride (K-DUR,KLOR-CON) 20 mEq tablet Take 20 mEq by mouth 2 (two) times a day    potassium chloride 10% oral solution     pramipexole (MIRAPEX) 1 mg tablet Take 0 5 mg by mouth daily at bedtime     Senexon-S 8 6-50 MG per tablet     Skin Protectants, Misc   (DERMACERIN) CREA Apply topically    talc Apply topically as needed for irritation    triamcinolone acetonide (Kenalog) 40 mg/mL Kenalog 40 mg/mL suspension for injection    acetaminophen-codeine (TYLENOL with CODEINE #3) 300-30 MG per tablet acetaminophen 300 mg-codeine 30 mg tablet (Patient not taking: Reported on 11/11/2021)    ascorbic acid (VITAMIN C) 500 mg tablet  (Patient not taking: Reported on 11/11/2021 )    calcium carbonate-vitamin D (OSCAL-D) 500 mg-200 units per tablet  (Patient not taking: Reported on 11/11/2021 )    Cholecalciferol (Vitamin D) 50 MCG (2000 UT) tablet  (Patient not taking: Reported on 11/11/2021 )    ferrous sulfate 325 (65 Fe) mg tablet ferrous sulfate 325 mg (65 mg iron) tablet (Patient not taking: Reported on 1/21/2022)    loperamide (IMODIUM) 2 mg capsule  (Patient not taking: Reported on 11/11/2021 )    magnesium oxide (MAG-OX) 400 mg tablet Take 400 mg by mouth (Patient not taking: Reported on 11/11/2021 )    [DISCONTINUED] esomeprazole (NexIUM) 40 MG capsule esomeprazole magnesium 40 mg capsule,delayed release (Patient not taking: Reported on 11/11/2021)    [DISCONTINUED] omeprazole (PriLOSEC) 20 mg delayed release capsule omeprazole 20 mg capsule,delayed release (Patient not taking: Reported on 11/11/2021)     No current facility-administered medications on file prior to visit  History reviewed  No pertinent family history    Past Medical History:   Diagnosis Date    Acute and chronic respiratory failure with hypoxia (HCC)     Acute kidney failure (HCC)     Anemia     Angina pectoris (HCC)     Arthritis     osteoarthritis    Atrial fibrillation (HCC)     Bacteremia     Bacterial infection due to Proteus mirabilis 5/23/2019    Chafing     folds of skin and back of thighs    CHF (congestive heart failure) (HCC)     Chronic indwelling Montes De Oca catheter     removed    Chronic pain disorder     pain in thoracic spine    Colon polyp     Constipation     Coronary artery disease     Difficulty walking     lack of coordination    Discitis     Discitis     Disease of thyroid gland     hypothyroid    Dyspepsia     Dysphagia     Dysuria     Gait abnormality     GERD (gastroesophageal reflux disease)     Heart failure (HCC)     History of transfusion     Hx of bleeding disorder     Hydronephrosis     Hyperlipidemia     Hypertension     Hypokalemia     Hypothyroidism     Insomnia     Irregular heart beat     Kidney stone     Left ureteral calculus 6/12/2019    Added automatically from request for surgery [id number]    Lymphedema     Macular degeneration     Major depressive disorder     [facility]bid obesity ([facility] )     Morbid obesity ([facility] )     Muscle weakness     Myocardial infarction ([facility] )     NSTEMI (non-ST elevated myocardial infarction) ([facility] )     Osteoporosis     Paroxysmal A-fib (HCC)     Personal history of other infectious and parasitic diseases     Recurrent UTI     Renal disorder     RLS (restless legs syndrome)     Sepsis ([facility] )     Sleep apnea     Symptomatic anemia 1/15/2020    Syncope 1/17/2020    Vitamin D deficiency     Wheelchair bound     unable to bear weight , hx infected prosthesis     Social History     Socioeconomic History    Marital status:      Spouse name: None    Number of children: None    Years of education: None    Highest education level: None   Occupational History    None   Tobacco Use    Smoking status: Former Smoker     Types: Cigars    Smokeless tobacco: Never Used    Tobacco comment: smoked when was very much younger for one summer   Vaping Use    Vaping Use: Never used   Substance and Sexual Activity    Alcohol use:  Yes     Alcohol/week: 1 0 standard drink     Types: 1 Glasses of wine per week     Comment: socially     Drug use: Never    Sexual activity: None   Other Topics Concern    None   Social History Narrative    None     Social Determinants of Health     Financial Resource Strain: Not on file   Food Insecurity: Not on file   Transportation Needs: Not on file   Physical Activity: Not on file   Stress: Not on file   Social Connections: Not on file   Intimate Partner Violence: Not on file   Housing Stability: Not on file     Past Surgical History:   Procedure Laterality Date    ABDOMINAL SURGERY      APPENDECTOMY  CATARACT EXTRACTION      COLONOSCOPY      CYSTOSCOPY      DILATION AND CURETTAGE OF UTERUS      FL RETROGRADE PYELOGRAM  5/22/2019    FL RETROGRADE PYELOGRAM  8/10/2020    HYSTERECTOMY      JOINT REPLACEMENT Bilateral     knee replacment    LAPAROSCOPIC GASTRIC BANDING      NV CYSTO/URETERO W/LITHOTRIPSY &INDWELL STENT INSRT Left 6/26/2019    Procedure: CYSTO, URETEROSCOPY W/HOLMIUM LASER, STENT EXCHANGE;  Surgeon: Yue Greenfield MD;  [facility];  Service: Urology    NV CYSTO/URETERO W/LITHOTRIPSY &INDWELL STENT INSRT Bilateral 9/30/2020    Procedure: CYSTO, URETEROSCOPY W/HOLMIUM LASER, STENT EXCHANGE;  Surgeon: Yue Greenfield MD;  [facility];  Service: Urology    NV CYSTOURETHROSCOPY,URETER CATHETER Left 5/22/2019    Procedure: CYSTOSCOPY; RIGHT RETROGRADE PYELOGRAM WITH INSERTION STENT URETERAL LEFT;  Surgeon: Yue Greenfield MD;  [facility];  Service: Urology    NV CYSTOURETHROSCOPY,URETER CATHETER Bilateral 8/10/2020    Procedure: CYSTOSCOPY RETROGRADE PYELOGRAM WITH INSERTION STENT URETERAL;  Surgeon: Edson Galvan MD;  [facility];  Service: Urology    NV XCAPSL CTRC RMVL INSJ IO LENS [address] W/O ECP Right 3/12/2020    Procedure: CATARACT REMOVAL W/INTRAOCULAR LENS;  Surgeon: Piero May MD;  Location: [address] [place] [address] MAIN OR;  Service: Ophthalmology    REMOVAL GASTRIC BAND LAPAROSCOPIC      VENTRAL HERNIA REPAIR      x4         Review of Systems   All other systems reviewed and are negative  Objective:      /80   Pulse (!) 52          Physical Exam  Constitutional:       Appearance: She is well-developed  She is obese  HENT:      Head: Normocephalic and atraumatic  Eyes:      Conjunctiva/sclera: Conjunctivae normal    Cardiovascular:      Rate and Rhythm: Normal rate and regular rhythm  Pulmonary:      Effort: Pulmonary effort is normal       Breath sounds: Normal breath sounds     Abdominal:      General: Bowel sounds are normal  There is no distension  Palpations: Abdomen is soft  Tenderness: There is no abdominal tenderness  Musculoskeletal:      Cervical back: Normal range of motion  Comments: In a wheelchair   Skin:     General: Skin is warm and dry  Neurological:      Mental Status: She is alert and oriented to person, place, and time     Psychiatric:         Mood and Affect: Mood normal          Behavior: Behavior normal

## 2022-06-21 ENCOUNTER — TELEPHONE (OUTPATIENT)
Dept: GASTROENTEROLOGY | Facility: MEDICAL CENTER | Age: 87
End: 2022-06-21

## 2022-06-21 ENCOUNTER — OFFICE VISIT (OUTPATIENT)
Dept: GASTROENTEROLOGY | Facility: MEDICAL CENTER | Age: 87
End: 2022-06-21
Payer: COMMERCIAL

## 2022-06-21 VITALS — HEART RATE: 56 BPM | SYSTOLIC BLOOD PRESSURE: 119 MMHG | TEMPERATURE: 97.4 F | DIASTOLIC BLOOD PRESSURE: 73 MMHG

## 2022-06-21 DIAGNOSIS — K59.09 OTHER CONSTIPATION: ICD-10-CM

## 2022-06-21 DIAGNOSIS — K21.00 GASTROESOPHAGEAL REFLUX DISEASE WITH ESOPHAGITIS WITHOUT HEMORRHAGE: Primary | ICD-10-CM

## 2022-06-21 PROCEDURE — 99214 OFFICE O/P EST MOD 30 MIN: CPT | Performed by: PHYSICIAN ASSISTANT

## 2022-06-21 NOTE — TELEPHONE ENCOUNTER
Patient wanted to update her vaccine records if possible   She remembered the dates not the    COVID Vaccine  1st dose 1/13/2021  2nd dose 2/3/2021  3rd dose not sure  4th dose 6/21/22

## 2022-06-21 NOTE — PROGRESS NOTES
Assessment/Plan:     Diagnoses and all orders for this visit:    Gastroesophageal reflux disease with esophagitis without hemorrhage  Patient has a history of GERD, currently on pantoprazole 40 mg b i d  and famotidine 20 mg b i d  with good control of her symptoms  She does have occasional breakthrough symptoms with spicy foods for which she uses Tums  Will continue with the above medications  Other constipation  She also has a history of constipation, currently using MiraLax every other day and typically has a bowel movement daily or every other day  Would recommend continuing  Will see her back on a yearly basis or sooner if necessary  Subjective:      Patient ID: Kimberly Lares is a 81 y o  female  HPI     This is a follow-up for GERD and constipation  Patient is currently on pantoprazole 40 mg b i d  and famotidine 20 mg b i d  with fairly good control of her symptoms except for when she eats spicy food she does have breakthrough symptoms for which she uses Tums  She underwent endoscopy in January which showed a 1 cm hiatal hernia, edematous and erythematous mucosa in the stomach  Biopsies were negative for H pylori but were positive for chronic gastritis  She was also previously having problems with constipation, currently on MiraLax every other day typically having a bowel movement every day or every other  Her last colonoscopy was many years ago and reportedly polyps were found, reports are unavailable      Patient Active Problem List   Diagnosis    Ureterolithiasis    Urinary tract infection    Essential hypertension    Mixed hyperlipidemia    Paroxysmal A-fib (HCC)    Thrombocytopenia (HCC)    Hydronephrosis with obstructing calculus    Acute on chronic respiratory failure with hypoxia (HCC)    Chronic diastolic (congestive) heart failure (HCC)    Acute hypokalemia    Morbid obesity with BMI of 45 0-49 9, adult (HCC)    Severe sepsis (HCC)    Anemia    Transaminitis  Acute blood loss anemia    Fungemia    Gastroesophageal reflux disease with esophagitis without hemorrhage    Other constipation    Non-ST elevation myocardial [facility] ([facility]STEMI) ([facility])     Allergies   Allergen Reactions    Augmentin [Amoxicillin-Pot Clavulanate] Rash     Patient reports that she has tolerated amoxicillin in the past and would be willing to try penicillin if needed      Hydralazine Other (See Comments)     Headache, dizziness, nausea,    Sulfamethoxazole-Trimethoprim Rash    Actonel  [Risedronate Sodium]     Ciprofloxacin Other (See Comments)     Cipro per faxed information [facility], no reaction listed    Latex Other (See Comments)     Pt presents to PACU with green band, confirms latex allergy    Lisinopril Other (See Comments)     "cough"      Macrobid [Nitrofurantoin] Hives    Medical Tape      Pulls off skin      Meloxicam Cough    Risedronate     Wound Dressing Adhesive Other (See Comments)     "pulls skin off"- darrel paper tape    Conjugated Estrogens Rash     Premarin Cream      Neurontin [Gabapentin] Rash     Current Outpatient Medications on File Prior to Visit   Medication Sig    acetaminophen (TYLENOL) 325 mg tablet Take 650 mg by mouth every 6 (six) hours as needed for mild pain     alendronate (FOSAMAX) 70 mg tablet Take 70 mg by mouth see administration instructions Give once a day on friday    apixaban (ELIQUIS) 5 mg Take 5 mg by mouth 2 (two) times a day    aspirin (ECOTRIN LOW STRENGTH) 81 mg EC tablet Take 81 mg by mouth daily    atorvastatin (LIPITOR) 10 mg tablet Take 10 mg by mouth daily at bedtime     calcium carbonate (TUMS) 500 mg chewable tablet Chew 1 tablet daily    cholecalciferol (VITAMIN D3) 1,000 units tablet Take 2,000 Units by mouth daily     Coenzyme Q10 (CO Q10) 100 MG CAPS Take by mouth    docusate sodium (COLACE) 100 mg capsule Take 100 mg by mouth 2 (two) times a day    famotidine (PEPCID) 20 mg tablet Take 1 tablet (20 mg total) by mouth 2 (two) times a day    furosemide (LASIX) 40 mg tablet Take 40 mg by mouth daily     levothyroxine 150 mcg tablet Take 150 mcg by mouth daily in the early morning     lutein 6 mg Take 6 mg by mouth daily    Melatonin 5 MG TABS Take 5 mg by mouth daily at bedtime     metoprolol tartrate (LOPRESSOR) 25 mg tablet Take 25 mg by mouth every 12 (twelve) hours    nitroglycerin (NITROSTAT) 0 4 mg SL tablet Place 0 4 mg under the tongue every 5 (five) minutes as needed for chest pain     ondansetron (ZOFRAN) 4 mg tablet Take 4 mg by mouth every 8 (eight) hours as needed for nausea or vomiting    oxybutynin (DITROPAN) 5 mg tablet Take 1 tablet (5 mg total) by mouth 3 (three) times a day as needed (bladder spasm)    pantoprazole (PROTONIX) 40 mg tablet Take 1 tablet (40 mg total) by mouth 2 (two) times a day before meals    phenazopyridine (PYRIDIUM) 100 mg tablet     polyethylene glycol (MIRALAX) 17 g packet Take 17 g by mouth daily as needed    potassium chloride (K-DUR,KLOR-CON) 20 mEq tablet Take 20 mEq by mouth 2 (two) times a day    talc Apply topically as needed for irritation    acetaminophen-codeine (TYLENOL with CODEINE #3) 300-30 MG per tablet acetaminophen 300 mg-codeine 30 mg tablet (Patient not taking: No sig reported)    ascorbic acid (VITAMIN C) 500 mg tablet  (Patient not taking: No sig reported)    bisacodyl (DULCOLAX) 10 mg suppository Insert 10 mg into the rectum as needed  (Patient not taking: Reported on 6/21/2022)    calcium carbonate-vitamin D (OSCAL-D) 500 mg-200 units per tablet  (Patient not taking: No sig reported)    cefTRIAXone (ROCEPHIN) 10 g injection ceftriaxone 10 gram solution for injection    Cholecalciferol (Vitamin D) 50 MCG (2000 UT) tablet  (Patient not taking: No sig reported)    ferrous sulfate 325 (65 Fe) mg tablet ferrous sulfate 325 mg (65 mg iron) tablet (Patient not taking: No sig reported)    loperamide (IMODIUM) 2 mg capsule  (Patient not taking: No sig reported)    magnesium hydroxide (MILK OF MAGNESIA) 400 mg/5 mL oral suspension daily as needed  (Patient not taking: Reported on 6/21/2022)    magnesium oxide (MAG-OX) 400 mg tablet Take 400 mg by mouth (Patient not taking: No sig reported)    oxyCODONE (ROXICODONE) 5 mg immediate release tablet oxycodone 5 mg tablet (Patient not taking: Reported on 6/21/2022)    potassium chloride 10% oral solution  (Patient not taking: Reported on 6/21/2022)    pramipexole (MIRAPEX) 1 mg tablet Take 0 5 mg by mouth daily at bedtime     Senexon-S 8 6-50 MG per tablet     Skin Protectants, Misc  (DERMACERIN) CREA Apply topically    triamcinolone acetonide (KENALOG-40) 40 mg/mL Kenalog 40 mg/mL suspension for injection     No current facility-administered medications on file prior to visit  History reviewed  No pertinent family history    Past Medical History:   Diagnosis Date    Acute and chronic respiratory failure with hypoxia (HCC)     Acute kidney failure (HCC)     Anemia     Angina pectoris (HCC)     Arthritis     osteoarthritis    Atrial fibrillation (HCC)     Bacteremia     Bacterial infection due to Proteus mirabilis 5/23/2019    Chafing     folds of skin and back of thighs    CHF (congestive heart failure) (HCC)     Chronic indwelling [place] catheter     removed    Chronic pain disorder     pain in thoracic spine    Colon polyp     Constipation     Coronary artery disease     Difficulty walking     lack of coordination    Discitis     Discitis     Disease of thyroid gland     hypothyroid    Dyspepsia     Dysphagia     Dysuria     Gait abnormality     GERD (gastroesophageal reflux disease)     Heart failure (HCC)     History of transfusion     Hx of bleeding disorder     Hydronephrosis     Hyperlipidemia     Hypertension     Hypokalemia     Hypothyroidism     Insomnia     Irregular heart beat     Kidney stone     Left ureteral calculus 6/12/2019    Added automatically from request for surgery [id number]    Lymphedema     Macular degeneration     Major depressive disorder     [facility]     [facility]     Muscle weakness     Myocardial infarction ([facility] )     NSTEMI (non-ST elevated myocardial infarction) ([facility] )     Osteoporosis     Paroxysmal A-fib (HCC)     Personal history of other infectious and parasitic diseases     Recurrent UTI     Renal disorder     RLS (restless legs syndrome)     Sepsis ([facility] )     Sleep apnea     Symptomatic anemia 1/15/2020    Syncope 1/17/2020    Vitamin D deficiency     Wheelchair bound     unable to bear weight , hx infected prosthesis     Social History     Socioeconomic History    Marital status:      Spouse name: None    Number of children: None    Years of education: None    Highest education level: None   Occupational History    None   Tobacco Use    Smoking status: Former Smoker     Types: Cigars    Smokeless tobacco: Never Used    Tobacco comment: smoked when was very much younger for one summer   Vaping Use    Vaping Use: Never used   Substance and Sexual Activity    Alcohol use:  Yes     Alcohol/week: 1 0 standard drink     Types: 1 Glasses of wine per week     Comment: socially     Drug use: Never    Sexual activity: None   Other Topics Concern    None   Social History Narrative    None     Social Determinants of Health     Financial Resource Strain: Not on file   Food Insecurity: Not on file   Transportation Needs: Not on file   Physical Activity: Not on file   Stress: Not on file   Social Connections: Not on file   Intimate Partner Violence: Not on file   Housing Stability: Not on file     Past Surgical History:   Procedure Laterality Date    ABDOMINAL SURGERY      APPENDECTOMY      CATARACT EXTRACTION      COLONOSCOPY      CYSTOSCOPY      DILATION AND CURETTAGE OF UTERUS      FL RETROGRADE PYELOGRAM  5/22/2019    FL RETROGRADE PYELOGRAM  8/10/2020    HYSTERECTOMY  JOINT REPLACEMENT Bilateral     knee replacment    LAPAROSCOPIC GASTRIC BANDING      MA CYSTO/URETERO W/LITHOTRIPSY &INDWELL STENT INSRT Left 6/26/2019    Procedure: CYSTO, URETEROSCOPY W/HOLMIUM LASER, STENT EXCHANGE;  Surgeon: Cat Thomas MD;  [facility];  Service: Urology    MA CYSTO/URETERO W/LITHOTRIPSY &INDWELL STENT INSRT Bilateral 9/30/2020    Procedure: CYSTO, URETEROSCOPY W/HOLMIUM LASER, STENT EXCHANGE;  Surgeon: Cat Thomas MD;  [facility];  Service: Urology    MA CYSTOURETHROSCOPY,URETER CATHETER Left 5/22/2019    Procedure: CYSTOSCOPY; RIGHT RETROGRADE PYELOGRAM WITH INSERTION STENT URETERAL LEFT;  Surgeon: Cat Thomas MD;  [facility];  Service: Urology    MA CYSTOURETHROSCOPY,URETER CATHETER Bilateral 8/10/2020    Procedure: CYSTOSCOPY RETROGRADE PYELOGRAM WITH INSERTION STENT URETERAL;  Surgeon: Park Metcalf MD;  [facility];  Service: Urology    MA XCAPSL CTRC RMVL INSJ IO LENS [address] W/O ECP Right 3/12/2020    Procedure: CATARACT REMOVAL W/INTRAOCULAR LENS;  Surgeon: Patti Cruz MD;  Location: [facility];  Service: Ophthalmology    REMOVAL GASTRIC BAND LAPAROSCOPIC      VENTRAL HERNIA REPAIR      x4         Review of Systems   All other systems reviewed and are negative  Objective:      /73   Pulse 56   Temp (!) 97 4 °F (36 3 °C)          Physical Exam  Constitutional:       Appearance: She is well-developed  She is obese  HENT:      Head: Normocephalic and atraumatic  Eyes:      Conjunctiva/sclera: Conjunctivae normal    Cardiovascular:      Rate and Rhythm: Normal rate and regular rhythm  Pulmonary:      Effort: Pulmonary effort is normal       Breath sounds: Normal breath sounds  Abdominal:      General: Bowel sounds are normal  There is no distension  Palpations: Abdomen is soft  Tenderness: There is no abdominal tenderness  Musculoskeletal:      Cervical back: Normal range of motion        Comments: In a wheelchair   Skin:     General: Skin is warm and dry  Neurological:      Mental Status: She is alert and oriented to person, place, and time     Psychiatric:         Mood and Affect: Mood normal          Behavior: Behavior normal

## 2022-09-14 ENCOUNTER — TELEPHONE (OUTPATIENT)
Dept: OTHER | Facility: OTHER | Age: 87
End: 2022-09-14

## 2022-09-14 NOTE — TELEPHONE ENCOUNTER
PT  Called in requesting a call back regarding her upcoming ultrasound and appt  PT states she is not currently having and issues and wants to know if she has to get the ultrasound done, being that is causes her issues when irritated or pushed hard in area  Please call her cell phone back at [phone]

## 2023-02-02 ENCOUNTER — OFFICE VISIT (OUTPATIENT)
Dept: UROLOGY | Facility: MEDICAL CENTER | Age: 88
End: 2023-02-02

## 2023-02-02 VITALS — HEART RATE: 73 BPM | SYSTOLIC BLOOD PRESSURE: 122 MMHG | DIASTOLIC BLOOD PRESSURE: 80 MMHG

## 2023-02-02 DIAGNOSIS — N20.0 RENAL CALCULUS, RIGHT: ICD-10-CM

## 2023-02-02 DIAGNOSIS — R31.0 GROSS HEMATURIA: ICD-10-CM

## 2023-02-02 DIAGNOSIS — N39.41 URGE INCONTINENCE OF URINE: Primary | ICD-10-CM

## 2023-02-02 RX ORDER — TRAMADOL HYDROCHLORIDE 50 MG/1
TABLET ORAL
COMMUNITY
Start: 2023-01-20

## 2023-02-02 NOTE — PROGRESS NOTES
2/2/2023      Chief Complaint   Patient presents with   • Urge Incontinence   • Left Ureteral Calculus     Assessment and Plan    81 y o  female managed by Dr Lexi Leon  Nephrolithiasis  ?  ultrasound of kidney and bladder not performed prior to office visit  ?  discussed dietary behavior modifications to reduce future stone formation  ? Will repeat ultrasound of kidney and bladder in 1 year  ?  follow up in the office in 1 year     Urinary Incontinence  ?  maintain adequate hydration upwards to 40 oz of water intake per day  ? avoid /limit bladder irritating foods and beverages  ?  timed voiding  ? Ensure complete bladder emptying with urination  ? patient reports no worsening of symptoms -will continue to monitor for worsen/progression of symptoms  ? BMP in 1 year  ? Follow up in the office in 1 year    History of Present Illness  Gerardo Mendez is a 81 y o  female here for follow up evaluation of  bilateral ureteral calculus:  Underwent cystoscopy, bilateral ureteroscopy with laser lithotripsy of approximately 2-3 cm of calculi in left kidney, 3 cm stone in the right kidney  This was a lengthy procedure and she became septic after  She has a chronic Montes De Oca catheter and both stents were removed at the bedside 8 days after the procedure  Ultrasound of the kidney 3/9/2021 shows right renal calculi with moderate hydronephrosis, and left kidney is stone free  She has right flank pain which is positional mostly nature  She remains on Eliquis  She denies episodes of gross hematuria  Review of Systems   Constitutional: Negative for chills and fever  Respiratory: Negative for cough and shortness of breath  Cardiovascular: Negative for chest pain  Gastrointestinal: Negative for abdominal distention, abdominal pain, blood in stool, nausea and vomiting  Genitourinary: Negative for difficulty urinating, dysuria, enuresis, flank pain, frequency, hematuria and urgency  Skin: Negative for rash  Past Medical History  Past Medical History:   Diagnosis Date   • Acute and chronic respiratory failure with hypoxia (HCC)    • Acute kidney failure (HCC)    • Anemia    • Angina pectoris (HCC)    • Arthritis     osteoarthritis   • Atrial fibrillation ([facility])    • Bacteremia    • Bacterial infection due to Proteus mirabilis 5/23/2019   • Chafing     folds of skin and back of thighs   • CHF (congestive heart failure) ([facility])    • Chronic indwelling Montes De Oca catheter     removed   • Chronic pain disorder     pain in thoracic spine   • Colon polyp    • Constipation    • Coronary artery disease    • Difficulty walking     lack of coordination   • Discitis    • Discitis    • Disease of thyroid gland     hypothyroid   • Dyspepsia    • Dysphagia    • Dysuria    • Gait abnormality    • GERD (gastroesophageal reflux disease)    • Heart failure ([facility])    • History of transfusion    • Hx of bleeding disorder    • Hydronephrosis    • Hyperlipidemia    • Hypertension    • Hypokalemia    • Hypothyroidism    • Insomnia    • Irregular heart beat    • Kidney stone    • Left ureteral calculus 6/12/2019    Added automatically from request for surgery [id number]   • Lymphedema    • Macular degeneration    • Major depressive disorder    • Morbid obesity ([facility] )    • Morbid obesity ([place] [address] )    • Muscle weakness    • Myocardial infarction [facility])    • NSTEMI (non-ST elevated myocardial infarction) ([place] [address] )    • Osteoporosis    • Paroxysmal A-fib ([facility])    • Personal history of other infectious and parasitic diseases    • Recurrent UTI    • Renal disorder    • RLS (restless legs syndrome)    • Sepsis ([place] [address] )    • Sleep apnea    • Symptomatic anemia 1/15/2020   • Syncope 1/17/2020   • Vitamin D deficiency    • Wheelchair bound     unable to bear weight , hx infected prosthesis       Past Social History  Past Surgical History:   Procedure Laterality Date   • ABDOMINAL SURGERY     • APPENDECTOMY     • CATARACT EXTRACTION     • COLONOSCOPY     • CYSTOSCOPY • DILATION AND CURETTAGE OF UTERUS     • FL RETROGRADE PYELOGRAM  5/22/2019   • FL RETROGRADE PYELOGRAM  8/10/2020   • HYSTERECTOMY     • JOINT REPLACEMENT Bilateral     knee replacment   • LAPAROSCOPIC GASTRIC BANDING     • MT CYSTO BLADDER W/URETERAL CATHETERIZATION Left 5/22/2019    Procedure: CYSTOSCOPY; RIGHT RETROGRADE PYELOGRAM WITH INSERTION STENT URETERAL LEFT;  Surgeon: Maria Fernanda Gleason MD;  [facility];  Service: Urology   • MT CYSTO BLADDER W/URETERAL CATHETERIZATION Bilateral 8/10/2020    Procedure: CYSTOSCOPY RETROGRADE PYELOGRAM WITH INSERTION STENT URETERAL;  Surgeon: Cinthya Pandey MD;  [facility];  Service: Urology   • MT CYSTO/URETERO W/LITHOTRIPSY &INDWELL STENT INSRT Left 6/26/2019    Procedure: CYSTO, URETEROSCOPY W/HOLMIUM LASER, STENT EXCHANGE;  Surgeon: Maria Fernanda Gleason MD;  [facility];  Service: Urology   • MT CYSTO/URETERO W/LITHOTRIPSY &INDWELL STENT INSRT Bilateral 9/30/2020    Procedure: CYSTO, URETEROSCOPY W/HOLMIUM LASER, STENT EXCHANGE;  Surgeon: Maria Fernanda Gleason MD;  [facility];  Service: Urology   • MT XCAPSL CTRC RMVL INSJ IO LENS PROSTH W/O ECP Right 3/12/2020    Procedure: CATARACT REMOVAL W/INTRAOCULAR LENS;  Surgeon: Tina Mcclain MD;  Location: [address] [place] [address] MAIN OR;  Service: Ophthalmology   • REMOVAL GASTRIC BAND LAPAROSCOPIC     • VENTRAL HERNIA REPAIR      x4     Social History     Tobacco Use   Smoking Status Former   • Types: Cigars   Smokeless Tobacco Never   Tobacco Comments    smoked when was very much younger for one summer       Past Family History  No family history on file  Past Social history  Social History     Socioeconomic History   • Marital status:       Spouse name: Not on file   • Number of children: Not on file   • Years of education: Not on file   • Highest education level: Not on file   Occupational History   • Not on file   Tobacco Use   • Smoking status: Former     Types: Cigars   • Smokeless tobacco: Never   • Tobacco comments:     smoked when was very much younger for one summer   Vaping Use   • Vaping Use: Never used   Substance and Sexual Activity   • Alcohol use:  Yes     Alcohol/week: 1 0 standard drink     Types: 1 Glasses of wine per week     Comment: socially    • Drug use: Never   • Sexual activity: Not on file   Other Topics Concern   • Not on file   Social History Narrative   • Not on file     Social Determinants of Health     Financial Resource Strain: Not on file   Food Insecurity: Not on file   Transportation Needs: Not on file   Physical Activity: Not on file   Stress: Not on file   Social Connections: Not on file   Intimate Partner Violence: Not on file   Housing Stability: Not on file       Current Medications  Current Outpatient Medications   Medication Sig Dispense Refill   • acetaminophen (TYLENOL) 325 mg tablet Take 650 mg by mouth every 6 (six) hours as needed for mild pain      • acetaminophen-codeine (TYLENOL with CODEINE #3) 300-30 MG per tablet acetaminophen 300 mg-codeine 30 mg tablet     • alendronate (FOSAMAX) 70 mg tablet Take 70 mg by mouth see administration instructions Give once a day on friday     • apixaban (ELIQUIS) 5 mg Take 5 mg by mouth 2 (two) times a day     • aspirin (ECOTRIN LOW STRENGTH) 81 mg EC tablet Take 81 mg by mouth daily     • atorvastatin (LIPITOR) 10 mg tablet Take 10 mg by mouth daily at bedtime      • Calcium Carb-Cholecalciferol (calcium carbonate-vitamin D) 500 mg-5 mcg tablet      • calcium carbonate (TUMS) 500 mg chewable tablet Chew 1 tablet daily     • cholecalciferol (VITAMIN D3) 1,000 units tablet Take 2,000 Units by mouth daily      • Coenzyme Q10 (CO Q10) 100 MG CAPS Take by mouth     • Diclofenac Sodium (VOLTAREN) 1 %      • docusate sodium (COLACE) 100 mg capsule Take 100 mg by mouth 2 (two) times a day     • famotidine (PEPCID) 20 mg tablet Take 1 tablet (20 mg total) by mouth 2 (two) times a day 60 tablet 5   • furosemide (LASIX) 40 mg tablet Take 40 mg by mouth daily      • levothyroxine 150 mcg tablet Take 150 mcg by mouth daily in the early morning      • lutein 6 mg Take 6 mg by mouth daily     • Melatonin 5 MG TABS Take 5 mg by mouth daily at bedtime      • metoprolol tartrate (LOPRESSOR) 25 mg tablet Take 25 mg by mouth every 12 (twelve) hours     • oxybutynin (DITROPAN) 5 mg tablet Take 1 tablet (5 mg total) by mouth 3 (three) times a day as needed (bladder spasm) 20 tablet 0   • pantoprazole (PROTONIX) 40 mg tablet Take 1 tablet (40 mg total) by mouth 2 (two) times a day before meals 170 tablet 0   • phenazopyridine (PYRIDIUM) 100 mg tablet      • polyethylene glycol (MIRALAX) 17 g packet Take 17 g by mouth daily as needed     • potassium chloride (K-DUR,KLOR-CON) 20 mEq tablet Take 20 mEq by mouth 2 (two) times a day     • pramipexole (MIRAPEX) 1 mg tablet Take 0 5 mg by mouth daily at bedtime      • Senexon-S 8 6-50 MG per tablet      • Skin Protectants, Misc   (DERMACERIN) CREA Apply topically     • talc Apply topically as needed for irritation     • traMADol (ULTRAM) 50 mg tablet      • triamcinolone acetonide (KENALOG-40) 40 mg/mL Kenalog 40 mg/mL suspension for injection     • ascorbic acid (VITAMIN C) 500 mg tablet      • bisacodyl (DULCOLAX) 10 mg suppository Insert 10 mg into the rectum as needed     • calcium carbonate-vitamin D (OSCAL-D) 500 mg-200 units per tablet      • cefTRIAXone (ROCEPHIN) 10 g injection ceftriaxone 10 gram solution for injection (Patient not taking: Reported on 2/2/2023)     • Cholecalciferol (Vitamin D) 50 MCG (2000 UT) tablet      • ferrous sulfate 325 (65 Fe) mg tablet ferrous sulfate 325 mg (65 mg iron) tablet     • loperamide (IMODIUM) 2 mg capsule      • magnesium hydroxide (MILK OF MAGNESIA) 400 mg/5 mL oral suspension daily as needed     • magnesium oxide (MAG-OX) 400 mg tablet Take 400 mg by mouth     • nitroglycerin (NITROSTAT) 0 4 mg SL tablet Place 0 4 mg under the tongue every 5 (five) minutes as needed for chest pain  (Patient not taking: Reported on 2/2/2023)     • ondansetron (ZOFRAN) 4 mg tablet Take 4 mg by mouth every 8 (eight) hours as needed for nausea or vomiting     • oxyCODONE (ROXICODONE) 5 mg immediate release tablet      • potassium chloride 10% oral solution        No current facility-administered medications for this visit  Allergies  Allergies   Allergen Reactions   • Augmentin [Amoxicillin-Pot Clavulanate] Rash     Patient reports that she has tolerated amoxicillin in the past and would be willing to try penicillin if needed  • Hydralazine Other (See Comments)     Headache, dizziness, nausea,   • Sulfamethoxazole-Trimethoprim Rash   • Actonel  [Risedronate Sodium]    • Ciprofloxacin Other (See Comments)     Cipro per faxed information from [facility], no reaction listed   • Latex Other (See Comments)     Pt presents to PACU with green band, confirms latex allergy   • Lisinopril Other (See Comments)     "cough"     • Macrobid [Nitrofurantoin] Hives   • Medical Tape      Pulls off skin     • Meloxicam Cough   • Risedronate    • Wound Dressing Adhesive Other (See Comments)     "pulls skin off"- darrel paper tape   • Conjugated Estrogens Rash     Premarin Cream     • Neurontin [Gabapentin] Rash         The following portions of the patient's history were reviewed and updated as appropriate: allergies, current medications, past medical history, past social history, past surgical history and problem list       Vitals  Vitals:    02/02/23 1256   BP: 122/80   BP Location: Left arm   Patient Position: Sitting   Cuff Size: Large   Pulse: 73           Physical Exam  Physical Exam  Vitals reviewed  Constitutional:       General: She is not in acute distress  Appearance: Normal appearance  Cardiovascular:      Heart sounds: Normal heart sounds  Pulmonary:      Effort: Pulmonary effort is normal  No respiratory distress  Breath sounds: Normal breath sounds     Musculoskeletal:         General: Normal range of motion  Skin:     General: Skin is warm and dry  Neurological:      General: No focal deficit present  Mental Status: She is alert  Psychiatric:         Mood and Affect: Mood normal          Behavior: Behavior normal        Results  No results found for this or any previous visit (from the past 1 hour(s))  ]  No results found for: PSA  Lab Results   Component Value Date    GLUCOSE 154 (H) 08/10/2020    CALCIUM 8 5 10/06/2020     10/10/2014    K 4 0 10/06/2020    CO2 24 10/06/2020     10/06/2020    BUN 9 10/06/2020    CREATININE 0 87 10/06/2020     Lab Results   Component Value Date    WBC 5 98 10/06/2020    HGB 9 1 (L) 10/06/2020    HCT 33 3 (L) 10/06/2020    MCV 84 10/06/2020     10/06/2020       Orders  Orders Placed This Encounter   Procedures   • POCT urine dip auto non-scope       DHRUV Nguyen

## 2023-02-02 NOTE — PATIENT INSTRUCTIONS
Schedule US kidney and bladder   Obtain Urine for cytology   Continue with proper hydration upwards to 40 oz per day   Add lemon/lime to the water

## 2023-03-03 ENCOUNTER — TELEPHONE (OUTPATIENT)
Dept: UROLOGY | Facility: AMBULATORY SURGERY CENTER | Age: 88
End: 2023-03-03

## 2023-03-03 DIAGNOSIS — R39.9 UTI SYMPTOMS: Primary | ICD-10-CM

## 2023-03-03 NOTE — TELEPHONE ENCOUNTER
Patient under care of [facility]    Reason for call: patient needs appointment for recurrent UTI    Patient symptoms are: She has blood in her urine, dysuria, urgency, frequency, no fever, some lower back pain  Symptoms began 3 months ago on/off, but she did not mention it to PA at her last visit      Matilda Childress can be reached at [phone]

## 2023-03-03 NOTE — TELEPHONE ENCOUNTER
Called and spoke with [facility]  Reports that NP saw pt and requesting anohter visit  Report pt still having burning, blood in urine and discomfort  Order faxed for UA/UC to [facility], also follow up appt scheduled for 03/16/22   Orders faxed to [phone]

## 2023-03-03 NOTE — TELEPHONE ENCOUNTER
Pt facility called and stated pt is not able to stand on her own and asking if office staff prefers pt to be brought on stretcher    Call [phone]

## 2023-03-10 DIAGNOSIS — N30.00 ACUTE CYSTITIS WITHOUT HEMATURIA: Primary | ICD-10-CM

## 2023-03-13 ENCOUNTER — TELEPHONE (OUTPATIENT)
Dept: UROLOGY | Facility: MEDICAL CENTER | Age: 88
End: 2023-03-13

## 2023-03-13 NOTE — TELEPHONE ENCOUNTER
Call placed to [facility] and spoke with nurse ,Jeff Sanchez  I reviewed the recommendation of Dr Robin Rodrigues with her  She is aware and orders were faxed over to facility as requested  Appointment for 3- has been cancelled and facility is aware

## 2023-03-13 NOTE — TELEPHONE ENCOUNTER
Tristen Clayton from [facility] called stating patient was having chills and shewas pale and had a headache last night  They ordered a stat ua, and uc   They got the results of UA  And she has + 4 bacteria ,pos nitrate and leukocytes  They are waiting for the uc results  Patient should be seen as soon as possible  The appointment that she had for 03/16/23 was cancelled but they really need her to be seen  Please review and advise   The results are being fax to the office now  Please call the Clinic department so patient can be set up for appointment and transportation for patient       Phone number to [phone]

## 2023-03-13 NOTE — TELEPHONE ENCOUNTER
Spoke to Dr Brice Worley directly about patient and he reviewed labs that were sent over  He agreed that patient should be seen on Thursday for a discussion and to review UC  UC is still pending at this time  He did advise that if symptoms worsen until Thursday, she should proceed to the nearest ER for evaluation and care  Call placed to [facility] and spoke with [facility] and reviewed with her these recommendations of Dr Brice Worley  She is aware and patient is scheduled for Thursday and appointment is confirmed

## 2023-03-13 NOTE — TELEPHONE ENCOUNTER
----- Message from Aditi Chaves MD sent at 3/10/2023  4:03 PM EST -----   This patient is scheduled to see me March 16  She is a [facility] patient  Jessicajenn Kim saw her last month, requested a urine culture, does not look like it has been done  Prior culture from [facility] was contaminated  Please ask [facility] to use this order I just put in now and do a catheterized culture  They will send us the results and then we will decide what the next step is  Does not need to see us in March 16 until we have the culture back

## 2023-03-15 ENCOUNTER — TELEPHONE (OUTPATIENT)
Dept: UROLOGY | Facility: MEDICAL CENTER | Age: 88
End: 2023-03-15

## 2023-03-15 NOTE — TELEPHONE ENCOUNTER
Called [facility] and left msg on VM of Viri @ [facility] confirming pt's appt with Dr Isis Bean tomorrow

## 2023-03-15 NOTE — TELEPHONE ENCOUNTER
----- Message from Kirk Vivas MD sent at 3/14/2023  5:59 PM EDT -----  I agree with Aleta Brown, does not need to be seen March 16  ----- Message -----  From: DHRUV Dickerson  Sent: 3/9/2023   3:16 PM EDT  To: Kirk Vivas MD    I do not see any reason for her to come in sooner than her follow-up in 1 year  ----- Message -----  From: Kirk Vivas MD  Sent: 3/8/2023  11:34 AM EST  To: DHRUV Dickerson    You saw her on February 2  Your note said 1 year follow-up  Then there is a phone report that  she is scheduled for March 16  Does she need to come in then?

## 2023-03-16 ENCOUNTER — OFFICE VISIT (OUTPATIENT)
Dept: UROLOGY | Facility: MEDICAL CENTER | Age: 88
End: 2023-03-16

## 2023-03-16 VITALS — SYSTOLIC BLOOD PRESSURE: 126 MMHG | HEART RATE: 58 BPM | OXYGEN SATURATION: 95 % | DIASTOLIC BLOOD PRESSURE: 66 MMHG

## 2023-03-16 DIAGNOSIS — N39.0 RECURRENT UTI: ICD-10-CM

## 2023-03-16 DIAGNOSIS — N20.0 CALCULUS OF KIDNEY: Primary | ICD-10-CM

## 2023-03-16 NOTE — PROGRESS NOTES
HISTORY:    Recurrent UTIs, history of many stones in the past     Recent ultrasound done at the nursing home, did not show much, but it was a poor quality ultrasound  Urgency incontinence no change    Last straight cath showed skin contaminated         ASSESSMENT / PLAN:    Needs CT to further evaluate  She is high risk for procedure due to her other comorbidities, but at least we will know if she has stones on the CT    The following portions of the patient's history were reviewed and updated as appropriate: allergies, current medications, past family history, past medical history, past social history, past surgical history and problem list     Review of Systems   All other systems reviewed and are negative  Objective:     Physical Exam  Constitutional:       Appearance: Normal appearance  She is obese  Neurological:      Mental Status: She is alert             No results found for: PSA]  BUN   Date Value Ref Range Status   10/06/2020 9 5 - 25 mg/dL Final   10/10/2014 11 5 - 27 mg/dL Final     Creatinine   Date Value Ref Range Status   10/06/2020 0 87 0 60 - 1 30 mg/dL Final     Comment:     Standardized to IDMS reference method   10/10/2014 0 63 0 60 - 1 30 mg/dL Final     Comment:     Standardized to IDMS reference method     No components found for: CBC      Patient Active Problem List   Diagnosis   • Ureterolithiasis   • Urinary tract infection   • Essential hypertension   • Mixed hyperlipidemia   • Paroxysmal A-fib (HCC)   • Thrombocytopenia (HCC)   • Hydronephrosis with obstructing calculus   • Acute on chronic respiratory failure with hypoxia (HCC)   • Chronic diastolic (congestive) heart failure (HCC)   • Acute hypokalemia   • Morbid obesity with BMI of 45 0-49 9, adult ([facility] [address] )   • Severe sepsis (HCC)   • Anemia   • Transaminitis   • Acute blood loss anemia   • Fungemia   • Gastroesophageal reflux disease with esophagitis without hemorrhage   • Other constipation   • Non-ST elevation myocardial infarction (NSTEMI) ([facility] [address] )        Diagnoses and all orders for this visit:    Calculus of kidney  -     CT abdomen pelvis wo contrast; Future    Recurrent UTI           Patient ID: Britta Reynolds is a 81 y o  female        Current Outpatient Medications:   •  acetaminophen (TYLENOL) 325 mg tablet, Take 650 mg by mouth every 6 (six) hours as needed for mild pain , Disp: , Rfl:   •  acetaminophen-codeine (TYLENOL with CODEINE #3) 300-30 MG per tablet, acetaminophen 300 mg-codeine 30 mg tablet, Disp: , Rfl:   •  alendronate (FOSAMAX) 70 mg tablet, Take 70 mg by mouth see administration instructions Give once a day on friday, Disp: , Rfl:   •  apixaban (ELIQUIS) 5 mg, Take 5 mg by mouth 2 (two) times a day, Disp: , Rfl:   •  ascorbic acid (VITAMIN C) 500 mg tablet, , Disp: , Rfl:   •  aspirin (ECOTRIN LOW STRENGTH) 81 mg EC tablet, Take 81 mg by mouth daily, Disp: , Rfl:   •  atorvastatin (LIPITOR) 10 mg tablet, Take 10 mg by mouth daily at bedtime , Disp: , Rfl:   •  bisacodyl (DULCOLAX) 10 mg suppository, Insert 10 mg into the rectum as needed, Disp: , Rfl:   •  Calcium Carb-Cholecalciferol (calcium carbonate-vitamin D) 500 mg-5 mcg tablet, , Disp: , Rfl:   •  calcium carbonate (TUMS) 500 mg chewable tablet, Chew 1 tablet daily, Disp: , Rfl:   •  calcium carbonate-vitamin D (OSCAL-D) 500 mg-200 units per tablet, , Disp: , Rfl:   •  cefTRIAXone (ROCEPHIN) 10 g injection, ceftriaxone 10 gram solution for injection (Patient not taking: Reported on 2/2/2023), Disp: , Rfl:   •  Cholecalciferol (Vitamin D) 50 MCG (2000 UT) tablet, , Disp: , Rfl:   •  cholecalciferol (VITAMIN D3) 1,000 units tablet, Take 2,000 Units by mouth daily , Disp: , Rfl:   •  Coenzyme Q10 (CO Q10) 100 MG CAPS, Take by mouth, Disp: , Rfl:   •  Diclofenac Sodium (VOLTAREN) 1 %, , Disp: , Rfl:   •  docusate sodium (COLACE) 100 mg capsule, Take 100 mg by mouth 2 (two) times a day, Disp: , Rfl:   •  famotidine (PEPCID) 20 mg tablet, Take 1 tablet (20 mg total) by mouth 2 (two) times a day, Disp: 60 tablet, Rfl: 5  •  ferrous sulfate 325 (65 Fe) mg tablet, ferrous sulfate 325 mg (65 mg iron) tablet, Disp: , Rfl:   •  furosemide (LASIX) 40 mg tablet, Take 40 mg by mouth daily , Disp: , Rfl:   •  levothyroxine 150 mcg tablet, Take 150 mcg by mouth daily in the early morning , Disp: , Rfl:   •  loperamide (IMODIUM) 2 mg capsule, , Disp: , Rfl:   •  lutein 6 mg, Take 6 mg by mouth daily, Disp: , Rfl:   •  magnesium hydroxide (MILK OF MAGNESIA) 400 mg/5 mL oral suspension, daily as needed, Disp: , Rfl:   •  magnesium oxide (MAG-OX) 400 mg tablet, Take 400 mg by mouth, Disp: , Rfl:   •  Melatonin 5 MG TABS, Take 5 mg by mouth daily at bedtime , Disp: , Rfl:   •  metoprolol tartrate (LOPRESSOR) 25 mg tablet, Take 25 mg by mouth every 12 (twelve) hours, Disp: , Rfl:   •  nitroglycerin (NITROSTAT) 0 4 mg SL tablet, Place 0 4 mg under the tongue every 5 (five) minutes as needed for chest pain  (Patient not taking: Reported on 2/2/2023), Disp: , Rfl:   •  ondansetron (ZOFRAN) 4 mg tablet, Take 4 mg by mouth every 8 (eight) hours as needed for nausea or vomiting, Disp: , Rfl:   •  oxybutynin (DITROPAN) 5 mg tablet, Take 1 tablet (5 mg total) by mouth 3 (three) times a day as needed (bladder spasm), Disp: 20 tablet, Rfl: 0  •  oxyCODONE (ROXICODONE) 5 mg immediate release tablet, , Disp: , Rfl:   •  pantoprazole (PROTONIX) 40 mg tablet, Take 1 tablet (40 mg total) by mouth 2 (two) times a day before meals, Disp: 170 tablet, Rfl: 0  •  phenazopyridine (PYRIDIUM) 100 mg tablet, , Disp: , Rfl:   •  polyethylene glycol (MIRALAX) 17 g packet, Take 17 g by mouth daily as needed, Disp: , Rfl:   •  potassium chloride (K-DUR,KLOR-CON) 20 mEq tablet, Take 20 mEq by mouth 2 (two) times a day, Disp: , Rfl:   •  potassium chloride 10% oral solution, , Disp: , Rfl:   •  pramipexole (MIRAPEX) 1 mg tablet, Take 0 5 mg by mouth daily at bedtime , Disp: , Rfl:   •  Senexon-S 8 6-50 MG per tablet, , Disp: , Rfl:   •  Skin Protectants, Misc   (DERMACERIN) CREA, Apply topically, Disp: , Rfl:   •  talc, Apply topically as needed for irritation, Disp: , Rfl:   •  traMADol (ULTRAM) 50 mg tablet, , Disp: , Rfl:   •  triamcinolone acetonide (KENALOG-40) 40 mg/mL, Kenalog 40 mg/mL suspension for injection, Disp: , Rfl:     Past Medical History:   Diagnosis Date   • Acute and chronic respiratory failure with hypoxia ([facility])    • Acute kidney failure ([facility])    • Anemia    • Angina pectoris ([facility])    • Arthritis     osteoarthritis   • Atrial fibrillation ([place] [address] )    • Bacteremia    • Bacterial infection due to Proteus mirabilis 5/23/2019   • Chafing     folds of skin and back of thighs   • CHF (congestive heart failure) ([facility])    • Chronic indwelling [place] catheter     removed   • Chronic pain disorder     pain in thoracic spine   • Colon polyp    • Constipation    • Coronary artery disease    • Difficulty walking     lack of coordination   • Discitis    • Discitis    • Disease of thyroid gland     hypothyroid   • Dyspepsia    • Dysphagia    • Dysuria    • Gait abnormality    • GERD (gastroesophageal reflux disease)    • Heart failure ([place] [address] )    • History of transfusion    • Hx of bleeding disorder    • Hydronephrosis    • Hyperlipidemia    • Hypertension    • Hypokalemia    • Hypothyroidism    • Insomnia    • Irregular heart beat    • Kidney stone    • Left ureteral calculus 6/12/2019    Added automatically from request for surgery [id number]   • Lymphedema    • Macular degeneration    • Major depressive disorder    • Morbid obesity ([place] [address] )    • Morbid obesity ([place] [address] )    • Muscle weakness    • Myocardial infarction [facility])    • NSTEMI (non-ST elevated myocardial infarction) ([place] [address] )    • Osteoporosis    • Paroxysmal A-fib ([place] [address] )    • Personal history of other infectious and parasitic diseases    • Recurrent UTI    • Renal disorder    • RLS (restless legs syndrome)    • Sepsis ([place] [address] )    • Sleep apnea    • Symptomatic anemia 1/15/2020   • Syncope 1/17/2020   • Vitamin D deficiency    • Wheelchair bound     unable to bear weight , hx infected prosthesis       Past Surgical History:   Procedure Laterality Date   • ABDOMINAL SURGERY     • APPENDECTOMY     • CATARACT EXTRACTION     • COLONOSCOPY     • CYSTOSCOPY     • DILATION AND CURETTAGE OF UTERUS     • FL RETROGRADE PYELOGRAM  5/22/2019   • FL RETROGRADE PYELOGRAM  8/10/2020   • HYSTERECTOMY     • JOINT REPLACEMENT Bilateral     knee replacment   • LAPAROSCOPIC GASTRIC BANDING     • VT CYSTO BLADDER W/URETERAL CATHETERIZATION Left 5/22/2019    Procedure: CYSTOSCOPY; RIGHT RETROGRADE PYELOGRAM WITH INSERTION STENT URETERAL LEFT;  Surgeon: Viona Peabody, MD;  Location: [facility];  Service: Urology   • VT CYSTO BLADDER W/URETERAL CATHETERIZATION Bilateral 8/10/2020    Procedure: CYSTOSCOPY RETROGRADE PYELOGRAM WITH INSERTION STENT URETERAL;  Surgeon: Darrel Baldwin MD;  Location: [facility];  Service: Urology   • VT CYSTO/URETERO W/LITHOTRIPSY &INDWELL STENT INSRT Left 6/26/2019    Procedure: CYSTO, URETEROSCOPY W/HOLMIUM LASER, STENT EXCHANGE;  Surgeon: Viona Peabody, MD;  Location: [facility];  Service: Urology   • VT CYSTO/URETERO W/LITHOTRIPSY &INDWELL STENT INSRT Bilateral 9/30/2020    Procedure: CYSTO, URETEROSCOPY W/HOLMIUM LASER, STENT EXCHANGE;  Surgeon: Viona Peabody, MD;  Location: [facility];  Service: Urology   • VT XCAPSL CTRC RMVL INSJ IO LENS [address] W/O ECP Right 3/12/2020    Procedure: CATARACT REMOVAL W/INTRAOCULAR LENS;  Surgeon: Ned Homans, MD;  Location: [facility] OR;  Service: Ophthalmology   • REMOVAL GASTRIC BAND LAPAROSCOPIC     • VENTRAL HERNIA REPAIR      x4       Social History

## 2023-03-22 ENCOUNTER — TELEPHONE (OUTPATIENT)
Dept: UROLOGY | Facility: AMBULATORY SURGERY CENTER | Age: 88
End: 2023-03-22

## 2023-03-22 NOTE — TELEPHONE ENCOUNTER
Remy Ortiz from [facility] calling to cancel previous appt made earlier today for 4/13/23 with Isabel Ayon due to this appt being too far out  She stated the patient saw Dr Aleshia Dale on 3/16 in [facility] and was to follow up in 2 weeks  Please advise, I could not find any availability with the [facility] in [facility] in that time frame      Remy Ortiz can be reached at [phone] ext [phone] to reschedule

## 2023-04-03 ENCOUNTER — HOSPITAL ENCOUNTER (OUTPATIENT)
Dept: CT IMAGING | Facility: HOSPITAL | Age: 88
Discharge: HOME | End: 2023-04-03
Attending: UROLOGY

## 2023-04-03 DIAGNOSIS — N20.0 CALCULUS OF KIDNEY: ICD-10-CM

## 2023-04-04 ENCOUNTER — TELEPHONE (OUTPATIENT)
Dept: UROLOGY | Facility: AMBULATORY SURGERY CENTER | Age: 88
End: 2023-04-04

## 2023-04-04 NOTE — TELEPHONE ENCOUNTER
Radiology Test Results - Radiology Calling with report update    Pt under care of: Dr Cloud Lung Findings - Please review

## 2023-04-13 PROBLEM — N20.0 NEPHROLITHIASIS: Status: ACTIVE | Noted: 2019-05-21

## 2023-04-13 PROBLEM — N13.2 HYDRONEPHROSIS WITH OBSTRUCTING CALCULUS: Status: RESOLVED | Noted: 2019-05-21 | Resolved: 2023-04-13

## 2023-04-13 PROBLEM — N28.89 RENAL MASS: Status: ACTIVE | Noted: 2023-04-13

## 2023-04-26 ENCOUNTER — TELEPHONE (OUTPATIENT)
Dept: OTHER | Facility: OTHER | Age: 88
End: 2023-04-26

## 2023-04-26 NOTE — TELEPHONE ENCOUNTER
Facility is requesting a lab script for a basic metabolic and urine culture panel to be faxed to [phone]

## 2023-06-02 ENCOUNTER — HOSPITAL ENCOUNTER (OUTPATIENT)
Dept: ULTRASOUND IMAGING | Facility: HOSPITAL | Age: 88
Discharge: HOME | End: 2023-06-02

## 2023-06-02 DIAGNOSIS — N95.0 POSTMENOPAUSAL BLEEDING: ICD-10-CM

## 2023-08-11 ENCOUNTER — OFFICE VISIT (OUTPATIENT)
Dept: OBGYN CLINIC | Facility: CLINIC | Age: 88
End: 2023-08-11
Payer: COMMERCIAL

## 2023-08-11 VITALS
HEIGHT: 63 IN | WEIGHT: 276 LBS | SYSTOLIC BLOOD PRESSURE: 128 MMHG | DIASTOLIC BLOOD PRESSURE: 78 MMHG | BODY MASS INDEX: 48.9 KG/M2

## 2023-08-11 DIAGNOSIS — N95.0 PMB (POSTMENOPAUSAL BLEEDING): Primary | ICD-10-CM

## 2023-08-11 DIAGNOSIS — Z90.711: ICD-10-CM

## 2023-08-11 PROCEDURE — 99203 OFFICE O/P NEW LOW 30 MIN: CPT | Performed by: OBSTETRICS & GYNECOLOGY

## 2023-08-11 RX ORDER — DIPHENOXYLATE HYDROCHLORIDE AND ATROPINE SULFATE 2.5; .025 MG/1; MG/1
TABLET ORAL
COMMUNITY

## 2023-08-11 RX ORDER — CEFTRIAXONE 1 G/1
INJECTION, POWDER, FOR SOLUTION INTRAMUSCULAR; INTRAVENOUS
COMMUNITY
Start: 2023-06-13

## 2023-08-11 RX ORDER — APIXABAN 2.5 MG/1
TABLET, FILM COATED ORAL
COMMUNITY
Start: 2023-06-01

## 2023-08-11 RX ORDER — AMLODIPINE BESYLATE 10 MG/1
TABLET ORAL
COMMUNITY

## 2023-08-11 RX ORDER — IRON POLYSACCHARIDE COMPLEX 150 MG
150 CAPSULE ORAL DAILY
COMMUNITY

## 2023-08-11 RX ORDER — DIPHENHYDRAMINE HCL 25 MG/1
TABLET ORAL
COMMUNITY
Start: 2023-06-14

## 2023-08-11 RX ORDER — OMEPRAZOLE 20 MG/1
CAPSULE, DELAYED RELEASE ORAL
COMMUNITY

## 2023-08-11 RX ORDER — DIPHENHYDRAMINE HYDROCHLORIDE 25 MG/1
CAPSULE ORAL
COMMUNITY
Start: 2023-06-22

## 2023-08-11 RX ORDER — HYDROCODONE BITARTRATE AND ACETAMINOPHEN 5; 325 MG/1; MG/1
TABLET ORAL
COMMUNITY

## 2023-08-11 RX ORDER — LINEZOLID 600 MG/1
TABLET, FILM COATED ORAL
COMMUNITY

## 2023-08-11 RX ORDER — ESOMEPRAZOLE MAGNESIUM 40 MG/1
CAPSULE, DELAYED RELEASE ORAL
COMMUNITY

## 2023-08-11 NOTE — PROGRESS NOTES
Assessment Nirmala Puri was seen today for gynecologic exam.    Diagnoses and all orders for this visit:    PMB (postmenopausal bleeding)    Hx of abdominal supracervical subtotal hysterectomy    The patient is 81years old. N95.0: Postmenopausal bleeding. Supracervical hysterectomy.     COMPARISON: Renal stone CT performed April 3, 2023     TECHNIQUE:   Transabdominal pelvic ultrasound was performed in sagittal and transverse planes with a curvilinear transducer. Additional transvaginal imaging was performed to better evaluate the endometrium and ovaries. Imaging included volumetric   sweeps as well as traditional still imaging technique.     FINDINGS:     Expected surgical changes of prior supracervical hysterectomy.     OVARIES/ADNEXA:  Postmenopausal ovaries are not seen. No suspicious adnexal mass.     OTHER:  No free fluid or loculated fluid collections.     IMPRESSION:     Normal ultrasound examination for a patient who has undergone prior supracervical hysterectomy     Plan  80 yo with hx of PMB that has not recurred  Presenting for evaluation  Today we discussed recent US shiwing supracervical hysterectomy and no pathology . Patient not actively bleeding and declines vaginal exam at this time. Vulvar exam shows no active bleeding and no skin breakage or ulcers. We discussed no need for further evaluation unless bleeding recurs and she would want to pursue further evaluation with vaginal exam    Remberto Dalal is a 81 y.o. female here for a problem visit presenting via stretcher. She is a patient at [facility] , senior living  . Presents for evaluation after episode of vaginal bleeding 6 months ago . States she had told NP at [facility] and consultation for GYN placed. Denies any active bleeding and has hx of hysterectomy . Does not desire vaginal exam at this time  .      Patient Active Problem List   Diagnosis   • Nephrolithiasis   • Urinary tract infection   • Essential hypertension   • Mixed hyperlipidemia   • Paroxysmal A-fib (HCC)   • Thrombocytopenia (HCC)   • Acute on chronic respiratory failure with hypoxia (HCC)   • Chronic diastolic (congestive) heart failure (HCC)   • Acute hypokalemia   • Morbid obesity with BMI of 45.0-49.9, adult (HCC)   • Severe sepsis (HCC)   • Anemia   • Transaminitis   • Acute blood loss anemia   • Fungemia   • Gastroesophageal reflux disease with esophagitis without hemorrhage   • Other constipation   • Non-ST elevation myocardial infarction (NSTEMI) ([address])   • Renal mass   • Hx of abdominal supracervical subtotal hysterectomy       Gynecologic History  No LMP recorded.  Patient is postmenopausal.      Past Medical History:   Diagnosis Date   • Acute and chronic respiratory failure with hypoxia (HCC)    • Acute kidney failure (HCC)    • Anemia    • Angina pectoris (HCC)    • Arthritis     osteoarthritis   • Atrial fibrillation (HCC)    • Bacteremia    • Bacterial infection due to Proteus mirabilis 5/23/2019   • Chafing     folds of skin and back of thighs   • CHF (congestive heart failure) (HCC)    • Chronic indwelling [place] catheter     removed   • Chronic pain disorder     pain in thoracic spine   • Colon polyp    • Constipation    • Coronary artery disease    • Difficulty walking     lack of coordination   • Discitis    • Discitis    • Disease of thyroid gland     hypothyroid   • Dyspepsia    • Dysphagia    • Dysuria    • Gait abnormality    • GERD (gastroesophageal reflux disease)    • Heart failure ([address])    • History of transfusion    • Hx of bleeding disorder    • Hydronephrosis    • Hydronephrosis with obstructing calculus 5/21/2019    Added automatically from request for surgery [id number]   • Hyperlipidemia    • Hypertension    • Hypokalemia    • Hypothyroidism    • Insomnia    • Irregular heart beat    • Kidney stone    • Left ureteral calculus 6/12/2019    Added automatically from request for surgery [id number]   • Lymphedema    • Macular degeneration    • Major depressive disorder    • Morbid obesity ([address])    • Morbid obesity ([address])    • Muscle weakness    • Myocardial infarction [facility])    • NSTEMI (non-ST elevated myocardial infarction) ([address])    • Osteoporosis    • Paroxysmal A-fib [facility])    • Personal history of other infectious and parasitic diseases    • Recurrent UTI    • Renal disorder    • RLS (restless legs syndrome)    • Sepsis ([address])    • Sleep apnea    • Symptomatic anemia 1/15/2020   • Syncope 1/17/2020   • Vitamin D deficiency    • Wheelchair bound     unable to bear weight , hx infected prosthesis     Past Surgical History:   Procedure Laterality Date   • ABDOMINAL SURGERY     • APPENDECTOMY     • CATARACT EXTRACTION     • COLONOSCOPY     • CYSTOSCOPY     • DILATION AND CURETTAGE OF UTERUS     • FL RETROGRADE PYELOGRAM  5/22/2019   • FL RETROGRADE PYELOGRAM  8/10/2020   • HYSTERECTOMY     • JOINT REPLACEMENT Bilateral     knee replacment   • LAPAROSCOPIC GASTRIC BANDING     • KS CYSTO BLADDER W/URETERAL CATHETERIZATION Left 5/22/2019    Procedure: CYSTOSCOPY; RIGHT RETROGRADE PYELOGRAM WITH INSERTION STENT URETERAL LEFT;  Surgeon: Giuliana Wilcox MD;  [facility];  Service: Urology   • KS CYSTO BLADDER W/URETERAL CATHETERIZATION Bilateral 8/10/2020    Procedure: CYSTOSCOPY RETROGRADE PYELOGRAM WITH INSERTION STENT URETERAL;  Surgeon: Sanna Min MD;  [facility];  Service: Urology   • KS CYSTO/URETERO W/LITHOTRIPSY &INDWELL STENT INSRT Left 6/26/2019    Procedure: CYSTO, URETEROSCOPY W/HOLMIUM LASER, STENT EXCHANGE;  Surgeon: Giuliana Wilcox MD;  [facility];  Service: Urology   • KS CYSTO/URETERO W/LITHOTRIPSY &INDWELL STENT INSRT Bilateral 9/30/2020    Procedure: CYSTO, URETEROSCOPY W/HOLMIUM LASER, STENT EXCHANGE;  Surgeon: Giuliana Wilcox MD;  [facility];  Service: Urology   • KS XCAPSL CTRC RMVL INSJ IO LENS PROSTH W/O ECP Right 3/12/2020    Procedure: CATARACT REMOVAL W/INTRAOCULAR LENS;  Surgeon: Albert Cardona MD; Location:  MAIN OR;  Service: Ophthalmology   • REMOVAL GASTRIC BAND LAPAROSCOPIC     • VENTRAL HERNIA REPAIR      x4     History reviewed. No pertinent family history. Social History     Socioeconomic History   • Marital status:      Spouse name: Not on file   • Number of children: Not on file   • Years of education: Not on file   • Highest education level: Not on file   Occupational History   • Not on file   Tobacco Use   • Smoking status: Former     Types: Cigars   • Smokeless tobacco: Never   • Tobacco comments:     smoked when was very much younger for one summer   Vaping Use   • Vaping Use: Never used   Substance and Sexual Activity   • Alcohol use: Yes     Alcohol/week: 1.0 standard drink of alcohol     Types: 1 Glasses of wine per week     Comment: ocaccional   • Drug use: Never   • Sexual activity: Not Currently     Partners: Male   Other Topics Concern   • Not on file   Social History Narrative   • Not on file     Social Determinants of Health     Financial Resource Strain: Not on file   Food Insecurity: Not on file   Transportation Needs: Not on file   Physical Activity: Not on file   Stress: Not on file   Social Connections: Not on file   Intimate Partner Violence: Not on file   Housing Stability: Not on file     Allergies   Allergen Reactions   • Augmentin [Amoxicillin-Pot Clavulanate] Rash     Patient reports that she has tolerated amoxicillin in the past and would be willing to try penicillin if needed.    • Hydralazine Other (See Comments)     Headache, dizziness, nausea,   • Sulfamethoxazole-Trimethoprim Rash   • Actonel  [Risedronate Sodium]    • Ciprofloxacin Other (See Comments)     Cipro per faxed information from [facility], no reaction listed   • Latex Other (See Comments)     Pt presents to PACU with green band, confirms latex allergy   • Lisinopril Other (See Comments)     "cough"     • Macrobid [Nitrofurantoin] Hives   • Medical Tape      Pulls off skin     • Meloxicam Cough   • Risedronate    • Wound Dressing Adhesive Other (See Comments)     "pulls skin off"- darrel paper tape   • Conjugated Estrogens Rash     Premarin Cream     • Neurontin [Gabapentin] Rash       Current Outpatient Medications:   •  acetaminophen (TYLENOL) 325 mg tablet, Take 650 mg by mouth every 6 (six) hours as needed for mild pain , Disp: , Rfl:   •  acetaminophen-codeine (TYLENOL with CODEINE #3) 300-30 MG per tablet, acetaminophen 300 mg-codeine 30 mg tablet, Disp: , Rfl:   •  alendronate (FOSAMAX) 70 mg tablet, Take 70 mg by mouth see administration instructions Give once a day on friday, Disp: , Rfl:   •  amLODIPine (NORVASC) 10 mg tablet, , Disp: , Rfl:   •  apixaban (ELIQUIS) 5 mg, Take 5 mg by mouth 2 (two) times a day, Disp: , Rfl:   •  ascorbic acid (VITAMIN C) 500 mg tablet, , Disp: , Rfl:   •  aspirin (ECOTRIN LOW STRENGTH) 81 mg EC tablet, Take 81 mg by mouth daily, Disp: , Rfl:   •  atorvastatin (LIPITOR) 10 mg tablet, Take 10 mg by mouth daily at bedtime , Disp: , Rfl:   •  Banophen 25 MG capsule, , Disp: , Rfl:   •  Banophen 25 MG tablet, , Disp: , Rfl:   •  bisacodyl (DULCOLAX) 10 mg suppository, Insert 10 mg into the rectum as needed, Disp: , Rfl:   •  Calcium Carb-Cholecalciferol (calcium carbonate-vitamin D) 500 mg-5 mcg tablet, , Disp: , Rfl:   •  calcium carbonate (TUMS) 500 mg chewable tablet, Chew 1 tablet daily, Disp: , Rfl:   •  calcium carbonate-vitamin D (OSCAL-D) 500 mg-200 units per tablet, , Disp: , Rfl:   •  cefTRIAXone (ROCEPHIN) 1 g injection, , Disp: , Rfl:   •  cefTRIAXone (ROCEPHIN) 2 g injection, , Disp: , Rfl:   •  Cholecalciferol (Vitamin D) 50 MCG (2000 UT) tablet, , Disp: , Rfl:   •  cholecalciferol (VITAMIN D3) 1,000 units tablet, Take 2,000 Units by mouth daily , Disp: , Rfl:   •  Coenzyme Q10 (CO Q10) 100 MG CAPS, Take by mouth, Disp: , Rfl:   •  Diclofenac Sodium (VOLTAREN) 1 %, , Disp: , Rfl:   •  docusate sodium (COLACE) 100 mg capsule, Take 100 mg by mouth 2 (two) times a day, Disp: , Rfl:   •  Eliquis 2.5 MG, , Disp: , Rfl:   •  esomeprazole (NexIUM) 40 MG capsule, , Disp: , Rfl:   •  famotidine (PEPCID) 20 mg tablet, Take 1 tablet (20 mg total) by mouth 2 (two) times a day, Disp: 60 tablet, Rfl: 5  •  ferrous sulfate 325 (65 Fe) mg tablet, ferrous sulfate 325 mg (65 mg iron) tablet, Disp: , Rfl:   •  furosemide (LASIX) 40 mg tablet, Take 40 mg by mouth daily , Disp: , Rfl:   •  HYDROcodone-acetaminophen (NORCO) 5-325 mg per tablet, , Disp: , Rfl:   •  iron polysaccharides (FERREX) 150 mg capsule, Take 150 mg by mouth daily, Disp: , Rfl:   •  levothyroxine 150 mcg tablet, Take 150 mcg by mouth daily in the early morning , Disp: , Rfl:   •  lidocaine, PF, (XYLOCAINE-MPF) 1 %, , Disp: , Rfl:   •  linezolid (ZYVOX) 600 mg tablet, , Disp: , Rfl:   •  loperamide (IMODIUM) 2 mg capsule, , Disp: , Rfl:   •  lutein 6 mg, Take 6 mg by mouth daily, Disp: , Rfl:   •  magnesium hydroxide (MILK OF MAGNESIA) 400 mg/5 mL oral suspension, daily as needed, Disp: , Rfl:   •  magnesium oxide (MAG-OX) 400 mg tablet, Take 400 mg by mouth, Disp: , Rfl:   •  Melatonin 5 MG TABS, Take 5 mg by mouth daily at bedtime , Disp: , Rfl:   •  metoprolol tartrate (LOPRESSOR) 25 mg tablet, Take 25 mg by mouth every 12 (twelve) hours, Disp: , Rfl:   •  multivitamin (THERAGRAN) TABS, Insert 1 suppository every day by rectal route., Disp: , Rfl:   •  multivitamin (THERAGRAN) TABS, , Disp: , Rfl:   •  multivitamin (THERAGRAN) TABS, , Disp: , Rfl:   •  nitroglycerin (NITROSTAT) 0.4 mg SL tablet, Place 0.4 mg under the tongue every 5 (five) minutes as needed for chest pain, Disp: , Rfl:   •  omeprazole (PriLOSEC) 20 mg delayed release capsule, , Disp: , Rfl:   •  ondansetron (ZOFRAN) 4 mg tablet, Take 4 mg by mouth every 8 (eight) hours as needed for nausea or vomiting, Disp: , Rfl:   •  oxybutynin (DITROPAN) 5 mg tablet, Take 1 tablet (5 mg total) by mouth 3 (three) times a day as needed (bladder spasm), Disp: 20 tablet, Rfl: 0  •  oxyCODONE (ROXICODONE) 5 mg immediate release tablet, , Disp: , Rfl:   •  pantoprazole (PROTONIX) 40 mg tablet, Take 1 tablet (40 mg total) by mouth 2 (two) times a day before meals, Disp: 170 tablet, Rfl: 0  •  phenazopyridine (PYRIDIUM) 100 mg tablet, , Disp: , Rfl:   •  polyethylene glycol (MIRALAX) 17 g packet, Take 17 g by mouth daily as needed, Disp: , Rfl:   •  potassium chloride (K-DUR,KLOR-CON) 20 mEq tablet, Take 20 mEq by mouth 2 (two) times a day, Disp: , Rfl:   •  potassium chloride (MICRO-K) 10 MEQ CR capsule, , Disp: , Rfl:   •  potassium chloride 10% oral solution, , Disp: , Rfl:   •  pramipexole (MIRAPEX) 0.5 mg tablet, , Disp: , Rfl:   •  pramipexole (MIRAPEX) 1 mg tablet, Take 0.5 mg by mouth daily at bedtime , Disp: , Rfl:   •  Senexon-S 8.6-50 MG per tablet, , Disp: , Rfl:   •  Skin Protectants, Misc. (DERMACERIN) CREA, Apply topically, Disp: , Rfl:   •  talc, Apply topically as needed for irritation, Disp: , Rfl:   •  traMADol (ULTRAM) 50 mg tablet, , Disp: , Rfl:   •  triamcinolone acetonide (KENALOG-40) 40 mg/mL, Kenalog 40 mg/mL suspension for injection, Disp: , Rfl:   •  cefTRIAXone (ROCEPHIN) 10 g injection, ceftriaxone 10 gram solution for injection (Patient not taking: Reported on 2/2/2023), Disp: , Rfl:     Review of Systems  Constitutional :no fever, feels well, no tiredness, no recent weight gain or loss  ENT: no ear ache, no loss of hearing, no nosebleeds or nasal discharge, no sore throat or hoarseness. Cardiovascular: no complaints of slow or fast heart beat, no chest pain, no palpitations, no leg claudication or lower extremity edema.   Respiratory: no complaints of shortness of shortness of breath, no GOODEN  Breasts:no complaints of breast pain, breast lump, or nipple discharge  Gastrointestinal: no complaints of abdominal pain, constipation, nausea, vomiting, or diarrhea or bloody stools  Genitourinary : no complaints of dysuria, incontinence, pelvic pain, no dysmenorrhea, vaginal discharge or abnormal vaginal bleeding and as noted in HPI. Musculoskeletal: no complaints of arthralgia, no myalgia, no joint swelling or stiffness, no limb pain or swelling.   Integumentary: no complaints of skin rash or lesion, itching or dry skin  Neurological: no complaints of headache, no confusion, no numbness or tingling, no dizziness or fainting     Objective     /78   Ht 5' 3" (1.6 m)   Wt 125 kg (276 lb)   BMI 48.89 kg/m²     General:   appears stated age, cooperative, alert normal mood and affect   Lungs: Unlabored breathing     Abdomen: soft, non-tender, without masses or organomegaly   Vulva: normal, normal female genitalia   Vagina: Deferred    Psychiatric orientation to person, place, and time: normal. mood and affect: normal

## 2023-10-10 ENCOUNTER — HOSPITAL ENCOUNTER (OUTPATIENT)
Dept: CT IMAGING | Facility: HOSPITAL | Age: 88
Discharge: HOME | End: 2023-10-10
Payer: COMMERCIAL

## 2023-10-10 DIAGNOSIS — N20.0 RENAL CALCULUS, RIGHT: ICD-10-CM

## 2023-10-10 DIAGNOSIS — N28.89 RENAL MASS: ICD-10-CM

## 2023-10-10 PROCEDURE — 74178 CT ABD&PLV WO CNTR FLWD CNTR: CPT

## 2023-10-10 PROCEDURE — G1004 CDSM NDSC: HCPCS

## 2023-10-10 RX ADMIN — IOHEXOL 100 ML: 350 INJECTION, SOLUTION INTRAVENOUS at 14:42

## 2023-11-21 ENCOUNTER — OFFICE VISIT (OUTPATIENT)
Dept: UROLOGY | Facility: CLINIC | Age: 88
End: 2023-11-21
Payer: COMMERCIAL

## 2023-11-21 VITALS
SYSTOLIC BLOOD PRESSURE: 128 MMHG | HEIGHT: 60 IN | HEART RATE: 56 BPM | DIASTOLIC BLOOD PRESSURE: 80 MMHG | OXYGEN SATURATION: 99 % | BODY MASS INDEX: 53.9 KG/M2

## 2023-11-21 DIAGNOSIS — E66.01 MORBID OBESITY WITH BMI OF 45.0-49.9, ADULT (HCC): ICD-10-CM

## 2023-11-21 DIAGNOSIS — N28.89 RENAL MASS: Primary | ICD-10-CM

## 2023-11-21 PROCEDURE — 99213 OFFICE O/P EST LOW 20 MIN: CPT | Performed by: PHYSICIAN ASSISTANT

## 2023-11-21 RX ORDER — AMOXICILLIN 500 MG/1
CAPSULE ORAL
COMMUNITY
Start: 2023-10-07

## 2023-11-21 NOTE — PROGRESS NOTES
11/21/2023      Chief Complaint   Patient presents with    Follow-up     CT done on 10/10/23         Assessment and Plan    81 y.o. female managed by     1. Renal Mass  CT completed on 10/10/2023: Slightly complex 2 cm LEFT renal cystic lesion, possibly proteinaceous or hemorrhagic but cannot exclude thin nonvascular septations. 1 cm interpolar LEFT renal cyst. No urothelial lesion. No hydroureteronephrosis  No drastic change visualized from CT obtained on 4/3/2023  Follow up renal ultrasound in 1 year     2. Nephrolithiasis  History of URS with stone extraction with recurrent septic episodes. CT completed on 10/10/2023: At least 4 small nonobstructive calyceal calculi on the RIGHT, the largest 4 mm. Currently nonobstructive 13 x 9 mm calculus in the renal pelvis. Renal cortical atrophy. No parenchymal or urothelial lesion. No hydroureteronephrosis. Incidentally noted gonadal vein calcifications adjacent to the proximal ureter. Patient denies pain or other urinary symptoms at this time. Recommend continue to monitor, previous recommendation for stent only for any hydronephrosis   Follow up renal ultrasound in 1 year      History of Present Illness  Daphnie Scott is a 81 y.o. female followed by Dr. Amando Bennett here for evaluation of recent renal CT scan for renal cysts and stones. Her renal CT completed on 10/10/2023 shows minimal changes. Two left sided complex cysts visualized as well as multiple non-obstructing right sided stones. She previously had ultrasound in 2021 at which time the cysts were described as simple. She denies pain, fevers, and other signs of infectious/obstructive process at this time. She reports hx of sepsis with lithotripsy, and repeat surgeries were not recommended per her report. She is wheelchair bound and rarely ambulates on her own. Review of Systems   Constitutional:  Negative for activity change, chills, fatigue and fever. HENT:  Negative for congestion.     Respiratory:  Negative for apnea and shortness of breath. Cardiovascular:  Positive for leg swelling. Negative for chest pain. Gastrointestinal:  Negative for abdominal pain, constipation and diarrhea. Genitourinary:  Positive for dysuria (chronic). Negative for difficulty urinating, flank pain, frequency, hematuria, pelvic pain, urgency, vaginal bleeding and vaginal discharge. Musculoskeletal:  Positive for gait problem (wheelchair bound). Negative for arthralgias and back pain. Neurological:  Negative for dizziness and headaches. Psychiatric/Behavioral: Negative. All other systems reviewed and are negative. Radiology  Narrative & Impression   CT ABDOMEN AND PELVIS WITH AND WITHOUT IV CONTRAST     INDICATION:   N28.89: Other specified disorders of kidney and ureter  N20.0: Calculus of kidney. COMPARISON: 4/3/2023     TECHNIQUE: Initial CT of the abdomen and pelvis was performed without intravenous contrast.  Subsequent dynamic CT evaluation of the abdomen and pelvis was performed after the administration of intravenous contrast in both nephrographic and delayed   phases. Multiplanar 2D reformatted images were created from the source data. This examination, like all CT scans performed in the [facility], was performed utilizing techniques to minimize radiation dose exposure, including the use of iterative reconstruction and automated exposure control. Radiation dose length   product (DLP) for this visit:  3586 mGy-cm     IV Contrast:  100 mL of iohexol (OMNIPAQUE)  Enteric Contrast:  Enteric contrast was not administered. FINDINGS:     ABDOMEN     RIGHT KIDNEY AND URETER:  At least 4 small nonobstructive calyceal calculi, the largest 4 mm. Currently nonobstructive 13 x 9 mm calculus in the renal pelvis. Renal cortical atrophy. No parenchymal or urothelial lesion. No hydroureteronephrosis. Incidentally noted gonadal vein calcifications adjacent to the proximal ureter.      LEFT KIDNEY AND URETER:  2 cm left renal cyst with intermediate density (18-23 HU) on postcontrast images. 1 cm cyst in the interpolar region. No urolithiasis, hydronephrosis, hydroureter. Mild cortical atrophy. URINARY BLADDER:  No bladder wall mass. No calculi. LOWER CHEST:  No clinically significant abnormality identified in the visualized lower chest.     LIVER/BILIARY TREE: One or more subcentimeter sharply circumscribed low-density hepatic lesion(s) are noted, too small to accurately characterize, but statistically most likely to represent subcentimeter hepatic cysts. No suspicious solid hepatic lesion   is identified. Hepatic contours are normal.  No biliary dilatation. GALLBLADDER: Surgically absent     SPLEEN: A few small hypodensities, likely cysts or lymphangioma the, otherwise unremarkable. PANCREAS: Diffusely atrophic, otherwise unremarkable. ADRENAL GLANDS:  Unremarkable. STOMACH AND BOWEL:  Unremarkable. APPENDIX:  No findings to suggest appendicitis. ABDOMINOPELVIC CAVITY:  No ascites. No free intraperitoneal air. No lymphadenopathy. VESSELS:  Unremarkable for patient's age. PELVIS     REPRODUCTIVE ORGANS: Atrophic or partially surgically absent uterus. No adnexal masses. ABDOMINAL WALL/INGUINAL REGIONS:  Unremarkable. OSSEOUS STRUCTURES: Multilevel degenerative changes in the visible spine. IMPRESSION:  Nonobstructive right renal calculi, as above. Slightly complex 2 cm left renal cystic lesion, possibly proteinaceous or hemorrhagic but cannot exclude thin nonvascular septations. Recommend correlation with ultrasound or MRI for further characterization. 1 cm interpolar left renal cyst.  No urothelial lesion. No hydroureteronephrosis. Other findings, as per the body of the report.        Vitals  Vitals:    11/21/23 1103   BP: 128/80   BP Location: Left arm   Patient Position: Sitting   Cuff Size: Adult   Pulse: 56   SpO2: 99%   Height: 5' (1.524 m) Physical Exam  Constitutional:       General: She is not in acute distress. Appearance: Normal appearance. She is not ill-appearing. HENT:      Head: Normocephalic and atraumatic. Right Ear: External ear normal.      Left Ear: External ear normal.      Nose: Nose normal.      Mouth/Throat:      Pharynx: Oropharynx is clear. Eyes:      Extraocular Movements: Extraocular movements intact. Conjunctiva/sclera: Conjunctivae normal.   Cardiovascular:      Rate and Rhythm: Normal rate. Pulmonary:      Effort: Pulmonary effort is normal.   Abdominal:      General: Abdomen is flat. There is no distension. Tenderness: There is no abdominal tenderness. There is no right CVA tenderness or left CVA tenderness. Musculoskeletal:         General: Normal range of motion. Cervical back: Normal range of motion. Right lower leg: Edema present. Left lower leg: Edema present. Neurological:      General: No focal deficit present. Mental Status: She is alert and oriented to person, place, and time.       Gait: Gait abnormal.   Psychiatric:         Mood and Affect: Mood normal.         Behavior: Behavior normal.         Past History  Past Medical History:   Diagnosis Date    Acute and chronic respiratory failure with hypoxia (HCC)     Acute kidney failure (HCC)     Anemia     Angina pectoris (HCC)     Arthritis     osteoarthritis    Atrial fibrillation (HCC)     Bacteremia     Bacterial infection due to Proteus mirabilis 5/23/2019    Chafing     folds of skin and back of thighs    CHF (congestive heart failure) (HCC)     Chronic indwelling [place] catheter     removed    Chronic pain disorder     pain in thoracic spine    Colon polyp     Constipation     Coronary artery disease     Difficulty walking     lack of coordination    Discitis     Discitis     Disease of thyroid gland     hypothyroid    Dyspepsia     Dysphagia     Dysuria     Gait abnormality     GERD (gastroesophageal reflux disease)     Heart failure ([address])     History of transfusion     Hx of bleeding disorder     Hydronephrosis     Hydronephrosis with obstructing calculus 5/21/2019    Added automatically from request for surgery [id number]    Hyperlipidemia     Hypertension     Hypokalemia     Hypothyroidism     Insomnia     Irregular heart beat     Kidney stone     Left ureteral calculus 6/12/2019    Added automatically from request for surgery [id number]    Lymphedema     Macular degeneration     Major depressive disorder     Morbid obesity ([address])     Morbid obesity ([address])     Muscle weakness     Myocardial infarction ([address])     NSTEMI (non-ST elevated myocardial infarction) ([address])     Osteoporosis     Paroxysmal A-fib ([address])     Personal history of other infectious and parasitic diseases     Recurrent UTI     Renal disorder     RLS (restless legs syndrome)     Sepsis ([address])     Sleep apnea     Symptomatic anemia 1/15/2020    Syncope 1/17/2020    Vitamin D deficiency     Wheelchair bound     unable to bear weight , hx infected prosthesis     Social History     Socioeconomic History    Marital status:      Spouse name: None    Number of children: None    Years of education: None    Highest education level: None   Occupational History    None   Tobacco Use    Smoking status: Former     Types: Cigars    Smokeless tobacco: Never    Tobacco comments:     smoked when was very much younger for one summer   Vaping Use    Vaping Use: Never used   Substance and Sexual Activity    Alcohol use:  Yes     Alcohol/week: 1.0 standard drink of alcohol     Types: 1 Glasses of wine per week     Comment: ocaccional    Drug use: Never    Sexual activity: Not Currently     Partners: Male   Other Topics Concern    None   Social History Narrative    None     Social Determinants of Health     Financial Resource Strain: Not on file   Food Insecurity: Not on file   Transportation Needs: Not on file   Physical Activity: Not on file   Stress: Not on file   Social Connections: Not on file   Intimate Partner Violence: Not on file   Housing Stability: Not on file     Social History     Tobacco Use   Smoking Status Former    Types: Cigars   Smokeless Tobacco Never   Tobacco Comments    smoked when was very much younger for one summer     History reviewed. No pertinent family history. The following portions of the patient's history were reviewed and updated as appropriate: allergies, current medications, past medical history, past social history, past surgical history and problem list.    Results  No results found for this or any previous visit (from the past 1 hour(s)). ]  No results found for: "PSA"  Lab Results   Component Value Date    GLUCOSE 154 (H) 08/10/2020    CALCIUM 8.5 10/06/2020     10/10/2014    K 4.0 10/06/2020    CO2 24 10/06/2020     10/06/2020    BUN 9 10/06/2020    CREATININE 0.87 10/06/2020     Lab Results   Component Value Date    WBC 5.98 10/06/2020    HGB 9.1 (L) 10/06/2020    HCT 33.3 (L) 10/06/2020    MCV 84 10/06/2020     10/06/2020

## 2024-02-08 ENCOUNTER — TELEPHONE (OUTPATIENT)
Age: 89
End: 2024-02-08

## 2024-02-08 NOTE — TELEPHONE ENCOUNTER
Viri from [facility] called and stated pt needs a sooner appt due to having dysuria and ecoli in recent UA that was completed at facility. Facility provider is requesting pt to be seen asap. Please review when pt can be seen    Call back-[phone] Viri ([facility])

## 2024-02-21 PROBLEM — R65.20 SEVERE SEPSIS (HCC): Status: RESOLVED | Noted: 2020-08-10 | Resolved: 2024-02-21

## 2024-02-21 PROBLEM — A41.9 SEVERE SEPSIS (HCC): Status: RESOLVED | Noted: 2020-08-10 | Resolved: 2024-02-21

## 2024-02-21 PROBLEM — N39.0 URINARY TRACT INFECTION: Status: RESOLVED | Noted: 2019-05-21 | Resolved: 2024-02-21

## 2024-03-21 ENCOUNTER — OFFICE VISIT (OUTPATIENT)
Dept: UROLOGY | Facility: CLINIC | Age: 89
End: 2024-03-21
Payer: COMMERCIAL

## 2024-03-21 VITALS — HEART RATE: 73 BPM | DIASTOLIC BLOOD PRESSURE: 80 MMHG | OXYGEN SATURATION: 96 % | SYSTOLIC BLOOD PRESSURE: 132 MMHG

## 2024-03-21 DIAGNOSIS — N39.0 RECURRENT UTI: Primary | ICD-10-CM

## 2024-03-21 DIAGNOSIS — N18.30 STAGE 3 CHRONIC KIDNEY DISEASE, UNSPECIFIED WHETHER STAGE 3A OR 3B CKD (HCC): ICD-10-CM

## 2024-03-21 DIAGNOSIS — N28.1 RENAL CYST: ICD-10-CM

## 2024-03-21 DIAGNOSIS — E66.01 MORBID OBESITY WITH BMI OF 45.0-49.9, ADULT (HCC): ICD-10-CM

## 2024-03-21 PROCEDURE — 99214 OFFICE O/P EST MOD 30 MIN: CPT | Performed by: PHYSICIAN ASSISTANT

## 2024-03-21 RX ORDER — PHENAZOPYRIDINE HYDROCHLORIDE 200 MG/1
TABLET, FILM COATED ORAL
COMMUNITY
Start: 2024-01-12

## 2024-03-21 RX ORDER — METHENAMINE HIPPURATE 1000 MG/1
1 TABLET ORAL 2 TIMES DAILY WITH MEALS
Qty: 180 TABLET | Refills: 3 | Status: SHIPPED | OUTPATIENT
Start: 2024-03-21

## 2024-03-21 NOTE — PROGRESS NOTES
3/21/2024      Chief Complaint   Patient presents with    Follow-up         Assessment and Plan    88 y.o. female managed by Dr. Arias    Renal cyst  2 cm left Bosniak 1 or 2 renal cyst stable, no intervention.  Can check 1 more ultrasound in 1 year    Nephrolithiasis  Large nonobstructing right renal pelvis calculus stable unchanged for several years, last attempted surgical intervention few years back, no additional surgical therapy planned.  She has no hematuria flank pain or nephrosis.  Risks of surgery outweigh the benefit at this time.    Recurrent UTI  Drug-resistant organisms dating back the past decade, also multiple antibiotic allergies  She has a positive culture from 6 weeks ago she has not been treated.  She has no new or progressive focal symptoms i.e no bladder pain no hematuria no fevers.  I think this represents asymptomatic bacteriuria and there is no directed therapy planned.  She is comfortable with this.  She can try methenamine 1 g twice daily to help reduce her urinary bacterial load.  Would avoid routine screening urinalyses/cultures in the absence of acute focal symptoms above.    Follow-up in ultrasound in 1 year.  Her goals are community service, and quality of life.  She is getting her first tattoo in her life next week and she plans to jump out of a plane for enjoyment sometime soon-tells me she has to make the 230 pound weight requirement to be able to do that.    History of Present Illness  Ld Savage is a 88 y.o. female here for evaluation of recurrent UTIs.  She had a urine culture November was mixed contaminants, January mixed contaminants, February 6 ESBL E. coli.  She has multiple allergies to antibiotics and organism is multiresistant.  There was no antibiotic therapy given for that culture.  She has no acute symptoms or progressive symptoms over the last 6 weeks since this culture was collected.  Specifically she has some intermittent dysuria at times it appears that was  the indication for collecting the culture.other questioning occasional burning does not occur daily a few times a week and has been this way for 10 years, she has had no bladder pain; no urgency no hematuria and no fevers.  Cultures dating back to 2015 are multidrug-resistant.    She is known to us for bosniak 2 left renal cystic lesion stable for years, and history of stones- She has a nonobstructing left renal pelvis stone unchanged for years. , last attempted procedures few years ago with repeat surgery not recommended as she had significant sepsis episodes with even simple stent manipulation.    Review of Systems             Vitals  Vitals:    03/21/24 1043   BP: 132/80   BP Location: Left arm   Patient Position: Sitting   Cuff Size: Standard   Pulse: 73   SpO2: 96%       Physical Exam      Past History  Past Medical History:   Diagnosis Date    Acute and chronic respiratory failure with hypoxia (HCC)     Acute kidney failure (HCC)     Anemia     Angina pectoris (HCC)     Arthritis     osteoarthritis    Atrial fibrillation (HCC)     Bacteremia     Bacterial infection due to Proteus mirabilis 5/23/2019    Chafing     folds of skin and back of thighs    CHF (congestive heart failure) (HCC)     Chronic indwelling [place] catheter     removed    Chronic pain disorder     pain in thoracic spine    Colon polyp     Constipation     Coronary artery disease     Difficulty walking     lack of coordination    Discitis     Discitis     Disease of thyroid gland     hypothyroid    Dyspepsia     Dysphagia     Dysuria     Gait abnormality     GERD (gastroesophageal reflux disease)     Heart failure (HCC)     History of transfusion     Hx of bleeding disorder     Hydronephrosis     Hydronephrosis with obstructing calculus 5/21/2019    Added automatically from request for surgery [id number]    Hyperlipidemia     Hypertension     Hypokalemia     Hypothyroidism     Insomnia     Irregular heart beat     Kidney stone     Left ureteral  calculus 6/12/2019    Added automatically from request for surgery [id number]    Lymphedema     Macular degeneration     Major depressive disorder     Morbid obesity (HCC)     Morbid obesity (HCC)     Muscle weakness     Myocardial infarction (HCC)     NSTEMI (non-ST elevated myocardial infarction) (HCC)     Osteoporosis     Paroxysmal A-fib (HCC)     Personal history of other infectious and parasitic diseases     Recurrent UTI     Renal disorder     RLS (restless legs syndrome)     Sepsis (HCC)     Sleep apnea     Symptomatic anemia 1/15/2020    Syncope 1/17/2020    Vitamin D deficiency     Wheelchair bound     unable to bear weight , hx infected prosthesis     Social History     Socioeconomic History    Marital status:      Spouse name: None    Number of children: None    Years of education: None    Highest education level: None   Occupational History    None   Tobacco Use    Smoking status: Former     Types: Cigars    Smokeless tobacco: Never    Tobacco comments:     smoked when was very much younger for one summer   Vaping Use    Vaping status: Never Used   Substance and Sexual Activity    Alcohol use: Yes     Alcohol/week: 1.0 standard drink of alcohol     Types: 1 Glasses of wine per week     Comment: ocaccional    Drug use: Never    Sexual activity: Not Currently     Partners: Male   Other Topics Concern    None   Social History Narrative    None     Social Determinants of Health     Financial Resource Strain: Not on file   Food Insecurity: Not on file   Transportation Needs: Not on file   Physical Activity: Not on file   Stress: Not on file   Social Connections: Not on file   Intimate Partner Violence: Not on file   Housing Stability: Not on file     Social History     Tobacco Use   Smoking Status Former    Types: Cigars   Smokeless Tobacco Never   Tobacco Comments    smoked when was very much younger for one summer     History reviewed. No pertinent family history.    The following portions of the  "patient's history were reviewed and updated as appropriate: allergies, current medications, past medical history, past social history, past surgical history and problem list.    Results  No results found for this or any previous visit (from the past 1 hour(s)).]  No results found for: \"PSA\"  Lab Results   Component Value Date    GLUCOSE 154 (H) 08/10/2020    CALCIUM 8.7 03/11/2024     10/10/2014    K 3.8 03/11/2024    CO2 26 03/11/2024     03/11/2024    BUN 17 03/11/2024    CREATININE 1.07 03/11/2024     Lab Results   Component Value Date    WBC 5.98 10/06/2020    HGB 9.1 (L) 10/06/2020    HCT 33.3 (L) 10/06/2020    MCV 84 10/06/2020     10/06/2020       "

## 2024-03-22 PROBLEM — N28.1 RENAL CYST: Status: ACTIVE | Noted: 2023-04-13

## 2024-03-22 NOTE — ASSESSMENT & PLAN NOTE
Large nonobstructing right renal pelvis calculus stable unchanged for several years, last attempted surgical intervention few years back, no additional surgical therapy planned.  She has no hematuria flank pain or nephrosis.  Risks of surgery outweigh the benefit at this time.

## 2024-03-22 NOTE — ASSESSMENT & PLAN NOTE
2 cm left Bosniak 1 or 2 renal cyst stable, no intervention.  Can check 1 more ultrasound in 1 year

## 2024-03-22 NOTE — ASSESSMENT & PLAN NOTE
Drug-resistant organisms dating back the past decade, also multiple antibiotic allergies  She has a positive culture from 6 weeks ago she has not been treated.  She has no new or progressive focal symptoms i.e no bladder pain no hematuria no fevers.  I think this represents asymptomatic bacteriuria and there is no directed therapy planned.  She is comfortable with this.  She can try methenamine 1 g twice daily to help reduce her urinary bacterial load.  Would avoid routine screening urinalyses/cultures in the absence of acute focal symptoms above.

## 2024-05-01 PROBLEM — N39.0 RECURRENT UTI: Status: RESOLVED | Noted: 2019-05-21 | Resolved: 2024-05-01

## 2024-05-31 ENCOUNTER — OFFICE VISIT (OUTPATIENT)
Dept: DERMATOLOGY | Facility: CLINIC | Age: 89
End: 2024-05-31
Payer: COMMERCIAL

## 2024-05-31 VITALS — HEIGHT: 63 IN | WEIGHT: 246 LBS | BODY MASS INDEX: 43.59 KG/M2

## 2024-05-31 DIAGNOSIS — L82.1 SEBORRHEIC KERATOSIS: ICD-10-CM

## 2024-05-31 DIAGNOSIS — L81.4 LENTIGO: ICD-10-CM

## 2024-05-31 DIAGNOSIS — L72.0 EIC (EPIDERMAL INCLUSION CYST): Primary | ICD-10-CM

## 2024-05-31 DIAGNOSIS — D22.9 MULTIPLE MELANOCYTIC NEVI: ICD-10-CM

## 2024-05-31 PROCEDURE — 99204 OFFICE O/P NEW MOD 45 MIN: CPT | Performed by: DERMATOLOGY

## 2024-05-31 NOTE — PROGRESS NOTES
"[facility] Note     Patient Name: Ld Savage  Encounter Date: 05/31/2024     Have you been cared for by a [facility] Dermatologist in the last 3 years and, if so, which description applies to you?    NO.   I am considered a \"new\" patient and must complete all patient intake questions. I am FEMALE/of child-bearing potential.    REVIEW OF SYSTEMS:  Have you recently had or currently have any of the following? Recent fever or chills? No  Any non-healing wound? No  Are you pregnant or planning to become pregnant? No  Are you currently or planning to be nursing or breast feeding? No   PAST MEDICAL HISTORY:  Have you personally ever had or currently have any of the following?  If \"YES,\" then please provide more detail. Skin cancer (such as Melanoma, Basal Cell Carcinoma, Squamous Cell Carcinoma?  No  Tuberculosis, HIV/AIDS, Hepatitis B or C: No  Radiation Treatment No   HISTORY OF IMMUNOSUPPRESSION:   Do you have a history of any of the following:  Systemic Immunosuppression such as Diabetes, Biologic or Immunotherapy, Chemotherapy, Organ Transplantation, Bone Marrow Transplantation?  No    Answering \"YES\" requires the addition of the dotphrase \"IMMUNOSUPPRESSED\" as the first diagnosis of the patient's visit.   FAMILY HISTORY:  Any \"first degree relatives\" (parent, brother, sister, or child) with the following?    Skin Cancer, Pancreatic or Other Cancer? YES, father had skin cancer   PATIENT EXPERIENCE:    Do you want the Dermatologist to perform a COMPLETE skin exam today including a clinical examination under the \"bra and underwear\" areas?  Yes - only able to check face, arms, chest. Patient in wheelchair and unable to stand up without her walker.   If necessary, do we have your permission to call and leave a detailed message on your Preferred Phone number that includes your specific medical information?  NO      Allergies   Allergen Reactions   • Augmentin [Amoxicillin-Pot Clavulanate] Rash     " "Patient reports that she has tolerated amoxicillin in the past and would be willing to try penicillin if needed.   • Hydralazine Other (See Comments)     Headache, dizziness, nausea,   • Sulfamethoxazole-Trimethoprim Rash   • Actonel  [Risedronate Sodium]    • Ciprofloxacin Other (See Comments)     Cipro per faxed information from [place], no reaction listed   • Latex Other (See Comments)     Pt presents to PACU with green band, confirms latex allergy   • Lisinopril Other (See Comments)     \"cough\"     • Macrobid [Nitrofurantoin] Hives   • Medical Tape      Pulls off skin     • Meloxicam Cough   • Risedronate    • Wound Dressing Adhesive Other (See Comments)     \"pulls skin off\"- darrel paper tape   • Conjugated Estrogens Rash     Premarin Cream     • Neurontin [Gabapentin] Rash      Current Outpatient Medications:   •  acetaminophen (TYLENOL) 325 mg tablet, Take 650 mg by mouth every 6 (six) hours as needed for mild pain , Disp: , Rfl:   •  alendronate (FOSAMAX) 70 mg tablet, Take 70 mg by mouth see administration instructions Give once a day on friday, Disp: , Rfl:   •  atorvastatin (LIPITOR) 10 mg tablet, Take 10 mg by mouth daily at bedtime , Disp: , Rfl:   •  Banophen 25 MG capsule, , Disp: , Rfl:   •  bisacodyl (DULCOLAX) 10 mg suppository, Insert 10 mg into the rectum as needed, Disp: , Rfl:   •  calcium carbonate (TUMS) 500 mg chewable tablet, Chew 1 tablet daily, Disp: , Rfl:   •  Coenzyme Q10 (CO Q10) 100 MG CAPS, Take by mouth, Disp: , Rfl:   •  acetaminophen-codeine (TYLENOL with CODEINE #3) 300-30 MG per tablet, acetaminophen 300 mg-codeine 30 mg tablet, Disp: , Rfl:   •  amLODIPine (NORVASC) 10 mg tablet, , Disp: , Rfl:   •  apixaban (ELIQUIS) 5 mg, Take 5 mg by mouth 2 (two) times a day, Disp: , Rfl:   •  ascorbic acid (VITAMIN C) 500 mg tablet, , Disp: , Rfl:   •  aspirin (ECOTRIN LOW STRENGTH) 81 mg EC tablet, Take 81 mg by mouth daily, Disp: , Rfl:   •  Banophen 25 MG tablet, , Disp: , Rfl:   •  " Calcium Carb-Cholecalciferol (calcium carbonate-vitamin D) 500 mg-5 mcg tablet, , Disp: , Rfl:   •  calcium carbonate-vitamin D (OSCAL-D) 500 mg-200 units per tablet, , Disp: , Rfl:   •  Cholecalciferol (Vitamin D) 50 MCG (2000 UT) tablet, , Disp: , Rfl:   •  cholecalciferol (VITAMIN D3) 1,000 units tablet, Take 2,000 Units by mouth daily , Disp: , Rfl:   •  Diclofenac Sodium (VOLTAREN) 1 %, , Disp: , Rfl:   •  docusate sodium (COLACE) 100 mg capsule, Take 100 mg by mouth 2 (two) times a day, Disp: , Rfl:   •  Eliquis 2.5 MG, , Disp: , Rfl:   •  esomeprazole (NexIUM) 40 MG capsule, , Disp: , Rfl:   •  famotidine (PEPCID) 20 mg tablet, Take 1 tablet (20 mg total) by mouth 2 (two) times a day, Disp: 60 tablet, Rfl: 5  •  ferrous sulfate 325 (65 Fe) mg tablet, ferrous sulfate 325 mg (65 mg iron) tablet, Disp: , Rfl:   •  furosemide (LASIX) 40 mg tablet, Take 40 mg by mouth daily , Disp: , Rfl:   •  HYDROcodone-acetaminophen (NORCO) 5-325 mg per tablet, , Disp: , Rfl:   •  iron polysaccharides (FERREX) 150 mg capsule, Take 150 mg by mouth daily, Disp: , Rfl:   •  levothyroxine 150 mcg tablet, Take 150 mcg by mouth daily in the early morning , Disp: , Rfl:   •  lidocaine, PF, (XYLOCAINE-MPF) 1 %, , Disp: , Rfl:   •  loperamide (IMODIUM) 2 mg capsule, , Disp: , Rfl:   •  lutein 6 mg, Take 6 mg by mouth daily, Disp: , Rfl:   •  magnesium hydroxide (MILK OF MAGNESIA) 400 mg/5 mL oral suspension, daily as needed, Disp: , Rfl:   •  magnesium oxide (MAG-OX) 400 mg tablet, Take 400 mg by mouth, Disp: , Rfl:   •  Melatonin 5 MG TABS, Take 5 mg by mouth daily at bedtime , Disp: , Rfl:   •  methenamine hippurate (HIPREX) 1 g tablet, Take 1 tablet (1 g total) by mouth 2 (two) times a day with meals, Disp: 180 tablet, Rfl: 3  •  metoprolol tartrate (LOPRESSOR) 25 mg tablet, Take 25 mg by mouth every 12 (twelve) hours, Disp: , Rfl:   •  multivitamin (THERAGRAN) TABS, Insert 1 suppository every day by rectal route., Disp: , Rfl:   •   multivitamin (THERAGRAN) TABS, , Disp: , Rfl:   •  multivitamin (THERAGRAN) TABS, , Disp: , Rfl:   •  nitroglycerin (NITROSTAT) 0.4 mg SL tablet, Place 0.4 mg under the tongue every 5 (five) minutes as needed for chest pain, Disp: , Rfl:   •  omeprazole (PriLOSEC) 20 mg delayed release capsule, , Disp: , Rfl:   •  ondansetron (ZOFRAN) 4 mg tablet, Take 4 mg by mouth every 8 (eight) hours as needed for nausea or vomiting, Disp: , Rfl:   •  oxybutynin (DITROPAN) 5 mg tablet, Take 1 tablet (5 mg total) by mouth 3 (three) times a day as needed (bladder spasm), Disp: 20 tablet, Rfl: 0  •  oxyCODONE (ROXICODONE) 5 mg immediate release tablet, , Disp: , Rfl:   •  pantoprazole (PROTONIX) 40 mg tablet, Take 1 tablet (40 mg total) by mouth 2 (two) times a day before meals, Disp: 170 tablet, Rfl: 0  •  phenazopyridine (PYRIDIUM) 100 mg tablet, , Disp: , Rfl:   •  phenazopyridine (PYRIDIUM) 200 mg tablet, , Disp: , Rfl:   •  polyethylene glycol (MIRALAX) 17 g packet, Take 17 g by mouth daily as needed, Disp: , Rfl:   •  potassium chloride (K-DUR,KLOR-CON) 20 mEq tablet, Take 20 mEq by mouth 2 (two) times a day, Disp: , Rfl:   •  potassium chloride (MICRO-K) 10 MEQ CR capsule, , Disp: , Rfl:   •  potassium chloride 10% oral solution, , Disp: , Rfl:   •  pramipexole (MIRAPEX) 0.5 mg tablet, , Disp: , Rfl:   •  pramipexole (MIRAPEX) 1 mg tablet, Take 0.5 mg by mouth daily at bedtime , Disp: , Rfl:   •  Senexon-S 8.6-50 MG per tablet, , Disp: , Rfl:   •  Skin Protectants, Misc. (DERMACERIN) CREA, Apply topically, Disp: , Rfl:   •  talc, Apply topically as needed for irritation, Disp: , Rfl:   •  traMADol (ULTRAM) 50 mg tablet, , Disp: , Rfl:   •  triamcinolone acetonide (KENALOG-40) 40 mg/mL, Kenalog 40 mg/mL suspension for injection, Disp: , Rfl:           Whom besides the patient is providing clinical information about today's encounter?   NO ADDITIONAL HISTORIAN (patient alone provided history)    Physical Exam and  Assessment/Plan by Diagnosis:    CHIEF COMPLAINT: 88 year old female, new patient. No hx of skin cancer. She has a possible cyst on mid chest for 15-20 years. Has tried warm compresses and area has drained before. Painful when it was bigger.     EPIDERMAL INCLUSION CYST    Physical Exam:  Anatomic Location Affected:  mid chest  Morphological Description:  2 cm x 2.5 cm subcutaneous nodule   Pertinent Positives:  Pertinent Negatives:    Additional History of Present Condition:  present for about 15-20 years and has tired warm compresses. Area has drained before and can be painful when bigger.     Assessment and Plan:  Based on a thorough discussion of this condition and the management approach to it (including a comprehensive discussion of the known risks, side effects and potential benefits of treatment), the patient (family) agrees to implement the following specific plan:  Schedule for excision in office at [place]   Patient prefers to do surgery in her wheel chair. Able to maneuver     What are epidermal inclusion cysts?  Epidermal inclusion cysts are the most common, benign cutaneous cysts. There are many different names for epidermal inclusion cysts, including epidermoid cyst, epidermal cyst, infundibular cyst, inclusion cyst, and keratin cyst. These cysts can occur anywhere on the body and typically present as nodules directly underneath the skin. There is often a visible pore or opening in the center. The cysts are freely moveable and can range from a few millimeters to several centimeters in diameter. The center of epidermoid cysts almost always contains keratin, which has a cheesy appearance, and not sebum. They also do not originate from sebaceous glands. Therefore, epidermal inclusion cysts are not the same as sebaceous cysts.    Cysts may remain stable or progressively enlarge over time. There are no reliable predictive factors to tell if an epidermal inclusion cyst will enlarge, become inflamed, or  remain quiescent. Infected cysts tend to become larger, turn red, and are more noticeable to the patient. There may be accompanying pain and discomfort.     What causes epidermal inclusion cysts?  Epidermal inclusion cysts often appear out of the blue and are not contagious. They are due to a proliferation of epidermal cells within the dermis and are more common in men than women. They occur more frequently in patients in their 20s to 40s. Epidermal inclusion cysts by themselves are usually not inherited, but they can be hereditary in rare syndromes such as Schreiber syndrome, nodular elastosis with cysts and comedones (Favre-Racouchot syndrome), and basal cell nevus syndrome (Gorlin syndrome). Elderly patients with chronic sun-damaged skin areas have a higher likelihood of developing epidermoid cysts. They often occur in areas where hair follicles have been inflamed or repeatedly irritated are more frequent in patients with acne vulgaris. In the  period, they are called milia.     Patients on BRAF inhibitors such as imiquimod and cyclosporine have a higher incidence of epidermoid cysts of the face.    How do we diagnose an epidermal inclusion cyst?  Epidermoid inclusion cysts are often diagnosed by history and physical exam. There is usually no need for biopsy prior to removal.  Radiographic and laboratory exams, such as ultrasound studies, are unnecessary and not typically ordered unless the practitioner suspects a genetic condition.    What is the treatment for an epidermal inclusion cyst?  Inflamed, uninfected epidermal inclusion cysts rarely resolve spontaneously without therapy or surgical intervention. Treatment is not emergent unless desired by the patient.     Definitive treatment is via surgical excision with walls intact. This method will prevent recurrence. This is best done when the cyst is not inflamed, to decrease the probability of rupture during surgery.   A local anesthetic will be injected  around the cyst  A small incision is made in the skin overlying the cyst, and contents are expressed  The incision is repaired with sutures    Another option is to use a 4mm punch biopsy with cyst extraction through the defect.    Incision and drainage is often needed if the cyst is infected or inflamed. If there is surrounding cellulitis, oral antibiotic therapy may be necessary. The common agents used target methicillin sensitive Staphylococcal aureus and methicillin resistant S aureus in areas of high prevalence.      SEBORRHEIC KERATOSES  - Relevant exam: Scattered over the trunk/extremities are waxy brown to black plaques and papules with stuck on appearance and consistent dermoscopy  - Exam and clinical history consistent with seborrheic keratoses  - Counseled that these are benign growths that do not require treatment    MELANOCYTIC NEVI  -Relevant exam: Scattered over the trunk/extremities are homogenously pigmented brown macules and papules. ELM performed and without concerning findings. No outliers unless otherwise noted in today's note  - Exam and clinical history consistent with melanocytic nevi  - Counseled to return to clinic prior to scheduled appointment should any of these lesions change or should any new lesions of concern arise  - Counseled on use of sun protection daily. Reviewed latest FDA sunscreen guidelines, including use of broad spectrum (UVA and UVB blocking) sunscreen or sun protective clothing with SPF 30-50 every 2-3 hours and reapplied after exposure to water    LENTIGINES  OTHER SKIN CHANGES DUE TO CHRONIC EXPOSURE TO NONIONIZING RADIATION  - Relevant exam: Over sun exposed areas are brown macules. ELM performed and without concerning findings.  - Exam and clinical history consistent with lentigines.  - Counseled to return to clinic prior to scheduled appointment should any of these lesions change or should any new lesions of concern arise.  - Recommended use of sunscreen as above and  below.        Scribe Attestation    I,:  Shaniqua Pham am acting as a scribe while in the presence of the attending physician.:       I,:  Jesse Odom MD personally performed the services described in this documentation    as scribed in my presence.:

## 2024-05-31 NOTE — PATIENT INSTRUCTIONS
EPIDERMAL INCLUSION CYST     Assessment and Plan:  Based on a thorough discussion of this condition and the management approach to it (including a comprehensive discussion of the known risks, side effects and potential benefits of treatment), the patient (family) agrees to implement the following specific plan:  Schedule for excision in office at [place]   Patient prefers to do surgery in her wheel chair. Able to maneuver      What are epidermal inclusion cysts?  Epidermal inclusion cysts are the most common, benign cutaneous cysts. There are many different names for epidermal inclusion cysts, including epidermoid cyst, epidermal cyst, infundibular cyst, inclusion cyst, and keratin cyst. These cysts can occur anywhere on the body and typically present as nodules directly underneath the skin. There is often a visible pore or opening in the center. The cysts are freely moveable and can range from a few millimeters to several centimeters in diameter. The center of epidermoid cysts almost always contains keratin, which has a cheesy appearance, and not sebum. They also do not originate from sebaceous glands. Therefore, epidermal inclusion cysts are not the same as sebaceous cysts.     Cysts may remain stable or progressively enlarge over time. There are no reliable predictive factors to tell if an epidermal inclusion cyst will enlarge, become inflamed, or remain quiescent. Infected cysts tend to become larger, turn red, and are more noticeable to the patient. There may be accompanying pain and discomfort.      What causes epidermal inclusion cysts?  Epidermal inclusion cysts often appear out of the blue and are not contagious. They are due to a proliferation of epidermal cells within the dermis and are more common in men than women. They occur more frequently in patients in their 20s to 40s. Epidermal inclusion cysts by themselves are usually not inherited, but they can be hereditary in rare syndromes such as Schreiber  syndrome, nodular elastosis with cysts and comedones (Favre-Racouchot syndrome), and basal cell nevus syndrome (Gorlin syndrome). Elderly patients with chronic sun-damaged skin areas have a higher likelihood of developing epidermoid cysts. They often occur in areas where hair follicles have been inflamed or repeatedly irritated are more frequent in patients with acne vulgaris. In the  period, they are called milia.      Patients on BRAF inhibitors such as imiquimod and cyclosporine have a higher incidence of epidermoid cysts of the face.     How do we diagnose an epidermal inclusion cyst?  Epidermoid inclusion cysts are often diagnosed by history and physical exam. There is usually no need for biopsy prior to removal.  Radiographic and laboratory exams, such as ultrasound studies, are unnecessary and not typically ordered unless the practitioner suspects a genetic condition.     What is the treatment for an epidermal inclusion cyst?  Inflamed, uninfected epidermal inclusion cysts rarely resolve spontaneously without therapy or surgical intervention. Treatment is not emergent unless desired by the patient.      Definitive treatment is via surgical excision with walls intact. This method will prevent recurrence. This is best done when the cyst is not inflamed, to decrease the probability of rupture during surgery.   A local anesthetic will be injected around the cyst  A small incision is made in the skin overlying the cyst, and contents are expressed  The incision is repaired with sutures     Another option is to use a 4mm punch biopsy with cyst extraction through the defect.     Incision and drainage is often needed if the cyst is infected or inflamed. If there is surrounding cellulitis, oral antibiotic therapy may be necessary. The common agents used target methicillin sensitive Staphylococcal aureus and methicillin resistant S aureus in areas of high prevalence.       SEBORRHEIC KERATOSES  - Relevant exam:  Scattered over the trunk/extremities are waxy brown to black plaques and papules with stuck on appearance and consistent dermoscopy  - Exam and clinical history consistent with seborrheic keratoses  - Counseled that these are benign growths that do not require treatment     MELANOCYTIC NEVI  -Relevant exam: Scattered over the trunk/extremities are homogenously pigmented brown macules and papules. ELM performed and without concerning findings. No outliers unless otherwise noted in today's note  - Exam and clinical history consistent with melanocytic nevi  - Counseled to return to clinic prior to scheduled appointment should any of these lesions change or should any new lesions of concern arise  - Counseled on use of sun protection daily. Reviewed latest FDA sunscreen guidelines, including use of broad spectrum (UVA and UVB blocking) sunscreen or sun protective clothing with SPF 30-50 every 2-3 hours and reapplied after exposure to water     LENTIGINES  OTHER SKIN CHANGES DUE TO CHRONIC EXPOSURE TO NONIONIZING RADIATION  - Relevant exam: Over sun exposed areas are brown macules. ELM performed and without concerning findings.  - Exam and clinical history consistent with lentigines.  - Counseled to return to clinic prior to scheduled appointment should any of these lesions change or should any new lesions of concern arise.  - Recommended use of sunscreen as above and below.

## 2024-09-30 ENCOUNTER — HOSPITAL ENCOUNTER (OUTPATIENT)
Dept: ULTRASOUND IMAGING | Facility: HOSPITAL | Age: 89
Discharge: HOME | End: 2024-09-30
Payer: COMMERCIAL

## 2024-09-30 DIAGNOSIS — N28.89 RENAL MASS: ICD-10-CM

## 2024-09-30 PROCEDURE — 76775 US EXAM ABDO BACK WALL LIM: CPT

## 2024-10-03 ENCOUNTER — TELEPHONE (OUTPATIENT)
Dept: GASTROENTEROLOGY | Facility: MEDICAL CENTER | Age: 89
End: 2024-10-03

## 2024-10-03 NOTE — TELEPHONE ENCOUNTER
Left voicemail and requested call back     Called patient to reschedule follow up from 12/31 to 03/11 due to provider template change, Patient will be placed on cancellation list. Asked patient if need to reschedule please give us a call

## 2024-10-09 ENCOUNTER — PROCEDURE VISIT (OUTPATIENT)
Dept: DERMATOLOGY | Facility: CLINIC | Age: 89
End: 2024-10-09
Payer: COMMERCIAL

## 2024-10-09 VITALS
BODY MASS INDEX: 40.88 KG/M2 | TEMPERATURE: 96.8 F | WEIGHT: 230.7 LBS | HEIGHT: 63 IN | DIASTOLIC BLOOD PRESSURE: 79 MMHG | SYSTOLIC BLOOD PRESSURE: 120 MMHG

## 2024-10-09 DIAGNOSIS — L72.0 EPIDERMAL INCLUSION CYST: Primary | ICD-10-CM

## 2024-10-09 PROCEDURE — 88304 TISSUE EXAM BY PATHOLOGIST: CPT | Performed by: STUDENT IN AN ORGANIZED HEALTH CARE EDUCATION/TRAINING PROGRAM

## 2024-10-09 PROCEDURE — 12032 INTMD RPR S/A/T/EXT 2.6-7.5: CPT | Performed by: DERMATOLOGY

## 2024-10-09 PROCEDURE — 11402 EXC TR-EXT B9+MARG 1.1-2 CM: CPT | Performed by: DERMATOLOGY

## 2024-10-09 NOTE — PROGRESS NOTES
"[facility] Note     Patient Name: Ld Savage  Encounter Date: 10/09/24     Have you been cared for by a [facility] Dermatologist in the last 3 years and, if so, which description applies to you?    Yes.  I have been here within the last 3 years, and my medical history has NOT changed since that time.  I am FEMALE/of child-bearing potential.    REVIEW OF SYSTEMS:  Have you recently had or currently have any of the following? No changes in my recent health.   PAST MEDICAL HISTORY:  Have you personally ever had or currently have any of the following?  If \"YES,\" then please provide more detail. No changes in my medical history.   HISTORY OF IMMUNOSUPPRESSION: Do you have a history of any of the following:  Systemic Immunosuppression such as Diabetes, Biologic or Immunotherapy, Chemotherapy, Organ Transplantation, Bone Marrow Transplantation or Prednisone?  No     Answering \"YES\" requires the addition of the dotphrase \"IMMUNOSUPPRESSED\" as the first diagnosis of the patient's visit.   FAMILY HISTORY:  Any \"first degree relatives\" (parent, brother, sister, or child) with the following?    No changes in my family's known health.   PATIENT EXPERIENCE:    Do you want the Dermatologist to perform a COMPLETE skin exam today including a clinical examination under the \"bra and underwear\" areas?  NO  If necessary, do we have your permission to call and leave a detailed message on your Preferred Phone number that includes your specific medical information?  Yes      Allergies   Allergen Reactions    Augmentin [Amoxicillin-Pot Clavulanate] Rash     Patient reports that she has tolerated amoxicillin in the past and would be willing to try penicillin if needed.    Hydralazine Other (See Comments)     Headache, dizziness, nausea,    Sulfamethoxazole-Trimethoprim Rash    Actonel  [Risedronate Sodium]     Ciprofloxacin Other (See Comments)     Cipro per faxed information from [facility], no reaction listed    " "Latex Other (See Comments)     Pt presents to PACU with green band, confirms latex allergy    Lisinopril Other (See Comments)     \"cough\"      Macrobid [Nitrofurantoin] Hives    Medical Tape      Pulls off skin      Meloxicam Cough    Risedronate     Wound Dressing Adhesive Other (See Comments)     \"pulls skin off\"- darrel paper tape    Conjugated Estrogens Rash     Premarin Cream      Neurontin [Gabapentin] Rash      Current Outpatient Medications:     acetaminophen (TYLENOL) 325 mg tablet, Take 650 mg by mouth every 6 (six) hours as needed for mild pain , Disp: , Rfl:     acetaminophen-codeine (TYLENOL with CODEINE #3) 300-30 MG per tablet, acetaminophen 300 mg-codeine 30 mg tablet, Disp: , Rfl:     alendronate (FOSAMAX) 70 mg tablet, Take 70 mg by mouth see administration instructions Give once a day on friday, Disp: , Rfl:     amLODIPine (NORVASC) 10 mg tablet, , Disp: , Rfl:     apixaban (ELIQUIS) 5 mg, Take 5 mg by mouth 2 (two) times a day, Disp: , Rfl:     ascorbic acid (VITAMIN C) 500 mg tablet, , Disp: , Rfl:     aspirin (ECOTRIN LOW STRENGTH) 81 mg EC tablet, Take 81 mg by mouth daily, Disp: , Rfl:     atorvastatin (LIPITOR) 10 mg tablet, Take 10 mg by mouth daily at bedtime , Disp: , Rfl:     Banophen 25 MG capsule, , Disp: , Rfl:     Banophen 25 MG tablet, , Disp: , Rfl:     bisacodyl (DULCOLAX) 10 mg suppository, Insert 10 mg into the rectum as needed, Disp: , Rfl:     Calcium Carb-Cholecalciferol (calcium carbonate-vitamin D) 500 mg-5 mcg tablet, , Disp: , Rfl:     calcium carbonate (TUMS) 500 mg chewable tablet, Chew 1 tablet daily, Disp: , Rfl:     calcium carbonate-vitamin D (OSCAL-D) 500 mg-200 units per tablet, , Disp: , Rfl:     Cholecalciferol (Vitamin D) 50 MCG (2000 UT) tablet, , Disp: , Rfl:     cholecalciferol (VITAMIN D3) 1,000 units tablet, Take 2,000 Units by mouth daily , Disp: , Rfl:     Coenzyme Q10 (CO Q10) 100 MG CAPS, Take by mouth, Disp: , Rfl:     Diclofenac Sodium (VOLTAREN) 1 %, , " Disp: , Rfl:     docusate sodium (COLACE) 100 mg capsule, Take 100 mg by mouth 2 (two) times a day, Disp: , Rfl:     Eliquis 2.5 MG, , Disp: , Rfl:     esomeprazole (NexIUM) 40 MG capsule, , Disp: , Rfl:     famotidine (PEPCID) 20 mg tablet, Take 1 tablet (20 mg total) by mouth 2 (two) times a day, Disp: 60 tablet, Rfl: 5    ferrous sulfate 325 (65 Fe) mg tablet, ferrous sulfate 325 mg (65 mg iron) tablet, Disp: , Rfl:     furosemide (LASIX) 40 mg tablet, Take 40 mg by mouth daily , Disp: , Rfl:     HYDROcodone-acetaminophen (NORCO) 5-325 mg per tablet, , Disp: , Rfl:     iron polysaccharides (FERREX) 150 mg capsule, Take 150 mg by mouth daily, Disp: , Rfl:     levothyroxine 150 mcg tablet, Take 150 mcg by mouth daily in the early morning , Disp: , Rfl:     lidocaine, PF, (XYLOCAINE-MPF) 1 %, , Disp: , Rfl:     loperamide (IMODIUM) 2 mg capsule, , Disp: , Rfl:     lutein 6 mg, Take 6 mg by mouth daily, Disp: , Rfl:     magnesium hydroxide (MILK OF MAGNESIA) 400 mg/5 mL oral suspension, daily as needed, Disp: , Rfl:     magnesium oxide (MAG-OX) 400 mg tablet, Take 400 mg by mouth, Disp: , Rfl:     Melatonin 5 MG TABS, Take 5 mg by mouth daily at bedtime , Disp: , Rfl:     methenamine hippurate (HIPREX) 1 g tablet, Take 1 tablet (1 g total) by mouth 2 (two) times a day with meals, Disp: 180 tablet, Rfl: 3    metoprolol tartrate (LOPRESSOR) 25 mg tablet, Take 25 mg by mouth every 12 (twelve) hours, Disp: , Rfl:     multivitamin (THERAGRAN) TABS, Insert 1 suppository every day by rectal route., Disp: , Rfl:     multivitamin (THERAGRAN) TABS, , Disp: , Rfl:     multivitamin (THERAGRAN) TABS, , Disp: , Rfl:     nitroglycerin (NITROSTAT) 0.4 mg SL tablet, Place 0.4 mg under the tongue every 5 (five) minutes as needed for chest pain, Disp: , Rfl:     omeprazole (PriLOSEC) 20 mg delayed release capsule, , Disp: , Rfl:     ondansetron (ZOFRAN) 4 mg tablet, Take 4 mg by mouth every 8 (eight) hours as needed for nausea or  vomiting, Disp: , Rfl:     oxybutynin (DITROPAN) 5 mg tablet, Take 1 tablet (5 mg total) by mouth 3 (three) times a day as needed (bladder spasm), Disp: 20 tablet, Rfl: 0    oxyCODONE (ROXICODONE) 5 mg immediate release tablet, , Disp: , Rfl:     pantoprazole (PROTONIX) 40 mg tablet, Take 1 tablet (40 mg total) by mouth 2 (two) times a day before meals, Disp: 170 tablet, Rfl: 0    phenazopyridine (PYRIDIUM) 100 mg tablet, , Disp: , Rfl:     phenazopyridine (PYRIDIUM) 200 mg tablet, , Disp: , Rfl:     polyethylene glycol (MIRALAX) 17 g packet, Take 17 g by mouth daily as needed, Disp: , Rfl:     potassium chloride (K-DUR,KLOR-CON) 20 mEq tablet, Take 20 mEq by mouth 2 (two) times a day, Disp: , Rfl:     potassium chloride (MICRO-K) 10 MEQ CR capsule, , Disp: , Rfl:     potassium chloride 10% oral solution, , Disp: , Rfl:     pramipexole (MIRAPEX) 0.5 mg tablet, , Disp: , Rfl:     pramipexole (MIRAPEX) 1 mg tablet, Take 0.5 mg by mouth daily at bedtime , Disp: , Rfl:     Senexon-S 8.6-50 MG per tablet, , Disp: , Rfl:     Skin Protectants, Misc. (DERMACERIN) CREA, Apply topically, Disp: , Rfl:     talc, Apply topically as needed for irritation, Disp: , Rfl:     traMADol (ULTRAM) 50 mg tablet, , Disp: , Rfl:     triamcinolone acetonide (KENALOG-40) 40 mg/mL, Kenalog 40 mg/mL suspension for injection, Disp: , Rfl:           Whom besides the patient is providing clinical information about today's encounter?   NO ADDITIONAL HISTORIAN (patient alone provided history)    Physical Exam and Assessment/Plan by Diagnosis:      .

## 2024-10-09 NOTE — PROGRESS NOTES
"[facility] Note     Patient Name: Ld Savage  Encounter Date: 10/9/24     Have you been cared for by a [facility] Dermatologist in the last 3 years and, if so, which description applies to you?    Yes.  I have been here within the last 3 years, and my medical history has NOT changed since that time.  I am FEMALE/of child-bearing potential.    REVIEW OF SYSTEMS:  Have you recently had or currently have any of the following? No changes in my recent health.   PAST MEDICAL HISTORY:  Have you personally ever had or currently have any of the following?  If \"YES,\" then please provide more detail. No changes in my medical history.   HISTORY OF IMMUNOSUPPRESSION: Do you have a history of any of the following:  Systemic Immunosuppression such as Diabetes, Biologic or Immunotherapy, Chemotherapy, Organ Transplantation, Bone Marrow Transplantation or Prednisone?  No     Answering \"YES\" requires the addition of the dotphrase \"IMMUNOSUPPRESSED\" as the first diagnosis of the patient's visit.   FAMILY HISTORY:  Any \"first degree relatives\" (parent, brother, sister, or child) with the following?    No changes in my family's known health.   PATIENT EXPERIENCE:    Do you want the Dermatologist to perform a COMPLETE skin exam today including a clinical examination under the \"bra and underwear\" areas?  NO  If necessary, do we have your permission to call and leave a detailed message on your Preferred Phone number that includes your specific medical information?  Yes      Allergies   Allergen Reactions    Augmentin [Amoxicillin-Pot Clavulanate] Rash     Patient reports that she has tolerated amoxicillin in the past and would be willing to try penicillin if needed.    Hydralazine Other (See Comments)     Headache, dizziness, nausea,    Sulfamethoxazole-Trimethoprim Rash    Actonel  [Risedronate Sodium]     Ciprofloxacin Other (See Comments)     Cipro per faxed information from [facility], no reaction listed    " "Latex Other (See Comments)     Pt presents to PACU with green band, confirms latex allergy    Lisinopril Other (See Comments)     \"cough\"      Macrobid [Nitrofurantoin] Hives    Medical Tape      Pulls off skin      Meloxicam Cough    Risedronate     Wound Dressing Adhesive Other (See Comments)     \"pulls skin off\"- darrel paper tape    Conjugated Estrogens Rash     Premarin Cream      Neurontin [Gabapentin] Rash      Current Outpatient Medications:     acetaminophen (TYLENOL) 325 mg tablet, Take 650 mg by mouth every 6 (six) hours as needed for mild pain , Disp: , Rfl:     alendronate (FOSAMAX) 70 mg tablet, Take 70 mg by mouth see administration instructions Give once a day on friday, Disp: , Rfl:     amLODIPine (NORVASC) 10 mg tablet, , Disp: , Rfl:     apixaban (ELIQUIS) 5 mg, Take 5 mg by mouth 2 (two) times a day, Disp: , Rfl:     ascorbic acid (VITAMIN C) 500 mg tablet, , Disp: , Rfl:     aspirin (ECOTRIN LOW STRENGTH) 81 mg EC tablet, Take 81 mg by mouth daily, Disp: , Rfl:     atorvastatin (LIPITOR) 10 mg tablet, Take 10 mg by mouth daily at bedtime , Disp: , Rfl:     Banophen 25 MG capsule, , Disp: , Rfl:     Banophen 25 MG tablet, , Disp: , Rfl:     Calcium Carb-Cholecalciferol (calcium carbonate-vitamin D) 500 mg-5 mcg tablet, , Disp: , Rfl:     calcium carbonate (TUMS) 500 mg chewable tablet, Chew 1 tablet daily, Disp: , Rfl:     calcium carbonate-vitamin D (OSCAL-D) 500 mg-200 units per tablet, , Disp: , Rfl:     Cholecalciferol (Vitamin D) 50 MCG (2000 UT) tablet, , Disp: , Rfl:     cholecalciferol (VITAMIN D3) 1,000 units tablet, Take 2,000 Units by mouth daily , Disp: , Rfl:     Coenzyme Q10 (CO Q10) 100 MG CAPS, Take by mouth, Disp: , Rfl:     Diclofenac Sodium (VOLTAREN) 1 %, , Disp: , Rfl:     docusate sodium (COLACE) 100 mg capsule, Take 100 mg by mouth 2 (two) times a day, Disp: , Rfl:     Eliquis 2.5 MG, , Disp: , Rfl:     esomeprazole (NexIUM) 40 MG capsule, , Disp: , Rfl:     famotidine " (PEPCID) 20 mg tablet, Take 1 tablet (20 mg total) by mouth 2 (two) times a day, Disp: 60 tablet, Rfl: 5    ferrous sulfate 325 (65 Fe) mg tablet, ferrous sulfate 325 mg (65 mg iron) tablet, Disp: , Rfl:     furosemide (LASIX) 40 mg tablet, Take 40 mg by mouth daily , Disp: , Rfl:     HYDROcodone-acetaminophen (NORCO) 5-325 mg per tablet, , Disp: , Rfl:     iron polysaccharides (FERREX) 150 mg capsule, Take 150 mg by mouth daily, Disp: , Rfl:     levothyroxine 150 mcg tablet, Take 150 mcg by mouth daily in the early morning , Disp: , Rfl:     lidocaine, PF, (XYLOCAINE-MPF) 1 %, , Disp: , Rfl:     loperamide (IMODIUM) 2 mg capsule, , Disp: , Rfl:     lutein 6 mg, Take 6 mg by mouth daily, Disp: , Rfl:     magnesium hydroxide (MILK OF MAGNESIA) 400 mg/5 mL oral suspension, daily as needed, Disp: , Rfl:     magnesium oxide (MAG-OX) 400 mg tablet, Take 400 mg by mouth, Disp: , Rfl:     Melatonin 5 MG TABS, Take 5 mg by mouth daily at bedtime , Disp: , Rfl:     methenamine hippurate (HIPREX) 1 g tablet, Take 1 tablet (1 g total) by mouth 2 (two) times a day with meals, Disp: 180 tablet, Rfl: 3    metoprolol tartrate (LOPRESSOR) 25 mg tablet, Take 25 mg by mouth every 12 (twelve) hours, Disp: , Rfl:     multivitamin (THERAGRAN) TABS, Insert 1 suppository every day by rectal route., Disp: , Rfl:     multivitamin (THERAGRAN) TABS, , Disp: , Rfl:     multivitamin (THERAGRAN) TABS, , Disp: , Rfl:     nitroglycerin (NITROSTAT) 0.4 mg SL tablet, Place 0.4 mg under the tongue every 5 (five) minutes as needed for chest pain, Disp: , Rfl:     omeprazole (PriLOSEC) 20 mg delayed release capsule, , Disp: , Rfl:     ondansetron (ZOFRAN) 4 mg tablet, Take 4 mg by mouth every 8 (eight) hours as needed for nausea or vomiting, Disp: , Rfl:     oxybutynin (DITROPAN) 5 mg tablet, Take 1 tablet (5 mg total) by mouth 3 (three) times a day as needed (bladder spasm), Disp: 20 tablet, Rfl: 0    oxyCODONE (ROXICODONE) 5 mg immediate release  "tablet, , Disp: , Rfl:     pantoprazole (PROTONIX) 40 mg tablet, Take 1 tablet (40 mg total) by mouth 2 (two) times a day before meals, Disp: 170 tablet, Rfl: 0    phenazopyridine (PYRIDIUM) 100 mg tablet, , Disp: , Rfl:     phenazopyridine (PYRIDIUM) 200 mg tablet, , Disp: , Rfl:     polyethylene glycol (MIRALAX) 17 g packet, Take 17 g by mouth daily as needed, Disp: , Rfl:     potassium chloride (K-DUR,KLOR-CON) 20 mEq tablet, Take 20 mEq by mouth 2 (two) times a day, Disp: , Rfl:     potassium chloride (MICRO-K) 10 MEQ CR capsule, , Disp: , Rfl:     potassium chloride 10% oral solution, , Disp: , Rfl:     pramipexole (MIRAPEX) 0.5 mg tablet, , Disp: , Rfl:     pramipexole (MIRAPEX) 1 mg tablet, Take 0.5 mg by mouth daily at bedtime , Disp: , Rfl:     Senexon-S 8.6-50 MG per tablet, , Disp: , Rfl:     Skin Protectants, Misc. (DERMACERIN) CREA, Apply topically, Disp: , Rfl:     talc, Apply topically as needed for irritation, Disp: , Rfl:     traMADol (ULTRAM) 50 mg tablet, , Disp: , Rfl:     triamcinolone acetonide (KENALOG-40) 40 mg/mL, Kenalog 40 mg/mL suspension for injection, Disp: , Rfl:           Whom besides the patient is providing clinical information about today's encounter?   NO ADDITIONAL HISTORIAN (patient alone provided history)    Physical Exam and Assessment/Plan by Diagnosis:      PROCEDURE:  EXCISION NOTE     Procedural Plan:     Attending: /Say  Assistant:  ALBERT Coronado  Lesion Anatomic Location (use description from previous biopsy if applicable): mid chest  Pre-Op Diagnosis: Epidermal Inclusion Cyst  Lesion is being treated as \"benign\" or \"MALIGNANT\": benign      Written and verbal (witnessed) informed consent was obtained. We discussed that \"excision\" is a method of removing lesions, both benign and malignant lesions.  A portion of normal skin is often taken to ensure completeness of removal.  I reviewed that this procedure will include numbing the area, cutting around and " "under the skin lesion, undermining (\"freeing up\") surrounding tissue, and closing the wound with sutures (stitches) both inside and out.  Risks include and are not limited to the following:  Bleeding, pain, infection, scarring, recurrence, more required treatment, no additional information, numbness and/or loss of function (if nerves are damaged).  These risks were considered against the benefits that we discussed, and the patient opted to continue with the procedure. It was discussed with patient that every effort is made to minimize scarring, but scarring is influenced also by extrinsic factor such as location, age and genetics.     Procedural Time Out:      Correct patient? yes  Correct site per Clinic Report? yes  Correct site per previous Path Report? yes  Correct site per Patient's recollection? yes    Anesthesia:      Local anesthesia: 1% xylocaine with epi     Excision Description:      Post-Op Diagnosis: Same as Pre-Op Diagnosis (above)  Pre-op Size: 1 cm subcutaneous nodule with central punctum   TOTAL POSTOPERATIVE DEFECT SIZE (I.e., Pre-op Size + Margins on both sides):  1.4 with 0.2 cm margins    The patient was seated in the procedure/exam room, anesthetized locally, prepped and draped in the usual fashion. Using a #15 blade with a scalpel, the lesion was excised in elliptical fashion.     REQUIRED Mine MELANOMA DATA      This procedure was not performed to treat primary cutaneous melanoma through wide local excision      Closure Description:      The specific type of closure that was utilized:     INTERMEDIATE Closure  INTERMEDIATE CLOSURE     The patient was brought back into the procedure room, anesthetized locally, prepped and draped in the usual fashion. Using a #15 blade with a scalpel, the lesion was excised in elliptical fashion. The wound was  undermined in the fascial plane.  Purpose of undermining was to decrease wound tension and facilitate closure. Hemostasis was achieved with light " "electrocoagulation.    The wound was closed with subcutaneous sutures as follows:    Deep Suture Throw, Size, and Type (select all that apply):  Interrupted Deeps; 4-0; vicryl     Epidermal edge closure was accomplished with superficial sutures as follows:    Superficial Suture Throw, Size and Type (select all that apply):  Simple Interrupted; 4-0; Prolene    FINAL LENGTH OF CLOSURE (please enter a length even for lipoma/cyst or similar diagnosis): 2.7 cm       Postoperative Care:      The wound was cleaned with sterile saline, dried off, surgical vaseline ointment was applied, and the wound was covered. A pressure dressing was applied for stabilization and light pressure.    Estimated blood loss:  Less than 3ml.  Complications: none  Post-op medications: none  Additional notes: none    Discharge Plans:      Discharge plans: Plan for return to us for suture removal, as scheduled in 10-14 days.   Patient condition at discharge: STABLE  Patient can have facility she lives at remove sutures as well    The patient was given detailed oral and written instructions on postoperative care as detailed in consent. We urged the patient to call us if any problems or question should arise.         Please complete this section and then \"cut and paste\" it into the Patient Instructions section.  These notes should be printed and shared directly with the patient for review PRIOR TO leaving our office.         YOUR \"AFTER SURGERY\" REVIEW & INSTRUCTIONS    What to Know About Your Procedure  An \"excision\" was performed today to allow the dermatologist to remove a skin lesion. The procedure involves a local numbing medication and removing the entire lesion (or as much as possible). Typically, the lesion is being removed because it does not look \"normal,\" because it is becoming irritated and traumatized, or for significant appearance reasons.  The skin was cut deeply and then repaired - usually with sutures (stitches).  The removed tissue " has been sent to the pathologist who will confirm the diagnosis and verify if the lesion has been completely removed.  Surgical “Vaseline-type” ointment has been applied after the procedure to help create a barrier between your wound and the outside world.     The advantage of using sutures (stitches) to repair a skin excision is that it allows the lesion to heal as quickly as possible with the least amount of scarring and risk of infection, Still, there are some risks and potential complications that you should watch out for that include but are not limited to the following:    Some bleeding is normal at the time of procedure and some bleeding on the gauze bandage after the procedure is normal for the first few days after surgery.  Profuse bleeding or bleeding with swelling and pain is NOT normal and should be reported as detailed below.  Infection is uncommon after skin surgery.  Infection should be reported and is indicated by pain, redness, and discharge of purulent material.  Some pain may occur initially the day after surgery.  Persistent pain or increasing pain days after surgery is not expected and should be reported.  Every effort is made to minimize scar, but location, size, and genetics do play a role in scar appearance.  A surgical wound does not achieve its optimal appearance until 6 months.  There are several treatments available if scarring would be problematic including scar creams, silicone pad, laser and scar revision.  Skin discoloration can occur especially in people of color.  Its important to avoid sun on wound in first 6 months after surgery.  Treatment is available if pigment is problematic.  Incomplete removal of the lesion or recurrence of lesion can occur and - depending on the lesion - this would then require further treatment and more surgery.  Nerve damage/numbness and/or loss of function is very rare, but is most likely to occur if the lesion being removed is large or if it is in a  "\"high risk\" location.  Allergic reaction to lidocaine is rare.  More commonly, epinephrine is used with the lidocaine, and, occasionally, epinephrine (a.k.a., adrenalin) may cause a brief feeling of anxiety or jitteriness.  The person at the microscope (pathologist) may provide additional information that was unexpected. This unexpected finding could prompt the need for additional treatment or evaluation.    At-Home Wound Care  Try NOT to remove the pressure bandage for 48 hours. Keep the area clean and dry while this bandage is on.   After removing the bandage for the first time, gently clean the area with soap and water. If the bandage is difficult to remove, getting the bandage wet in the shower will sometimes help soften the adhesive and allow it to be removed more easily.   You will now need to cleanse this area daily in the shower with gentle soap. There is no need to scrub the area. You will need to apply plain Vaseline ointment (this is over the counter and not a prescription) to the site for up to 2 weeks followed by a clean appropriately sized bandage to area.  Non stick dressing and paper tape (or Hypafix) are recommended for sensitive skin but a bandaid is fine if it covers the area well.  In general, sutures (stitches) are removed in about 5-7 days for face wounds and in about 12-14 days for the body wounds.  Your dermatologist wants you to return for suture removal in 10-14 DAYS.     General Restrictions  For TWO (2) DAYS:  You will need to take it very easy as this time is highest risk for bleeding. Being a \"couch potato\" during these two days is generally recommended.   For surgeries on the face/neck/scalp: Avoid leaning down to pick things up off the floor as this brings blood up to your head. Instead, squat down to pick things up.     For TWO WEEKS (14 DAYS):   No heavy lifting (anything greater than 10 pounds)   You can start to do slow, gentle activities such as slow walking but nothing to " "increase your heart rate and blood pressure too much (such as cardiovascular exercise).  It is important to take it easy as there is still a risk for bleeding and a high risk popping of stitches open during this time.     Site Specific Restrictions  If we did surgery near your eyes (including the nose, forehead, front part of your scalp, cheeks): It is VERY common to get a large amount of swelling around your eyes (puffy eyes). Although less frequent, this can be enough to swell your eyes shut and can also come along with bruising. This should not hurt and is very expected and normal. It is typically worst at ~ 3 days out from your surgery and dramatically better 1 week post-operatively.   If we did surgery around your nose: No blowing your nose as this puts you at higher risk of popping stitches durign this time. Instead dab under your nose with a tissue or use a Q-tip inside your nose.  If we did surgery on the skin above or below your lip or your lip itself: No sipping from straws as this uses a lot of the muscles around your mouth and increases the risk of popping stitches during this time.    Managing Your Pain After Surgery  You can expect to have some pain after surgery. This is normal. The pain is typically worse the first two days after surgery, and quickly begins to get better.   You can use heating pads or ice packs on your incisions to help reduce your pain.   The best strategy for controlling your pain after surgery is \"around the clock\" pain control. You can take \"over-the-counter\" (non-prescription) Acetaminophen (Tylenol) for discomfort, unless you have been told not to by your physician.  If you are taking Tylenol at the maximum dose, you can alternate Tylenol with Advil/Motrin (ibuprofen) as long as there are no contraindications.  Alternating these medications with each other (I.e., Tylenol followed by Motrin/Advil) allows you to maximize your pain control.  To alternate these medications " "properly, you will take a dose of pain medication every three hours, alternating Tylenol (acetaminophen) and Advil/Motrin (ibuprofen).  Start by taking 650 mg of Tylenol (2 pills of 325 mg)  3 hours later take 600 mg of Motrin (3 pills of 200 mg)  3 hours after taking the Motrin take 650 mg of Tylenol  3 hours after that take 600 mg of Motrin.    As an example, if your first dose of Tylenol (acetaminophen) is at 12:00 PM, then you would alternate with Motrin as directed below, continuing usually for no more than a total of 48 hours straight:     12:00 PM  Tylenol 650 mg (2 pills of 325 mg)    3:00 PM  Motrin 600 mg (3 pills of 200 mg)    6:00 PM  Tylenol 650 mg (2 pills of 325 mg)    9:00 PM  Motrin 600 mg (3 pills of 200 mg)      WARNING:  Do NOT take more than 4000 mg of Tylenol or 3200 mg of Motrin in a \"24-hour\" period.       What if you still have pain?    If you have pain that is not controlled with the over-the-counter pain medications (Tylenol and Motrin/Advil), do not hesitate to call our staff using the number provided. We will help make sure you are managing your pain in the best way possible, and if necessary, we can provide a prescription for additional pain medication.     Call our office IMMEDIATELY with any signs of wound infection.  This includes fever, chills, increasing redness, warmth, tenderness, severe discomfort/pain, or pus or foul smell coming from the wound. [facility] can be directly at [phone] (SKIN) and ask for the on-call Dermatologist covering surgery/Mohs.    If Bleeding is Noticed  If bleeding is soaking through the bandage, remove the bandage and see where the bleeding is coming from.  Place a clean cloth over the area and apply firm pressure directly to the area that is bleeding for thirty minutes.    Check the wound ONLY after 30 minutes of direct pressure; do not cheat and sneak a peak, as that does not count (i.e., resets the clock back to zero).  If bleeding " persists after 30 minutes of legitimate direct pressure, then try one more round of direct pressure to the area.    Should bleeding become heavier or not stop after the second application of direct pressure for 30 minutes, then call [facility] directly at [phone] (SKIN) and ask to speak with the on-call Dermatologist covering surgery/Mohs.  If after hours, go to your nearest Emergency Room or Urgent Care and have the team call [facility] directly at [phone] (SKIN); you will be connected to our after hours team.  Patient can have facility she lives at remove sutures as well          1.4cm cyst excised with 2.7cm closure.  Well tolerated.  S/R in 2 weeks.      Scribe Attestation      I,:  Kaity Garzon MA am acting as a scribe while in the presence of the attending physician.:       I,:  Jatin Deal MD personally performed the services described in this documentation    as scribed in my presence.:

## 2024-10-09 NOTE — PATIENT INSTRUCTIONS
"YOUR \"AFTER SURGERY\" REVIEW & INSTRUCTIONS    What to Know About Your Procedure  An \"excision\" was performed today to allow the dermatologist to remove a skin lesion. The procedure involves a local numbing medication and removing the entire lesion (or as much as possible). Typically, the lesion is being removed because it does not look \"normal,\" because it is becoming irritated and traumatized, or for significant appearance reasons.  The skin was cut deeply and then repaired - usually with sutures (stitches).  The removed tissue has been sent to the pathologist who will confirm the diagnosis and verify if the lesion has been completely removed.  Surgical “Vaseline-type” ointment has been applied after the procedure to help create a barrier between your wound and the outside world.     The advantage of using sutures (stitches) to repair a skin excision is that it allows the lesion to heal as quickly as possible with the least amount of scarring and risk of infection, Still, there are some risks and potential complications that you should watch out for that include but are not limited to the following:    Some bleeding is normal at the time of procedure and some bleeding on the gauze bandage after the procedure is normal for the first few days after surgery.  Profuse bleeding or bleeding with swelling and pain is NOT normal and should be reported as detailed below.  Infection is uncommon after skin surgery.  Infection should be reported and is indicated by pain, redness, and discharge of purulent material.  Some pain may occur initially the day after surgery.  Persistent pain or increasing pain days after surgery is not expected and should be reported.  Every effort is made to minimize scar, but location, size, and genetics do play a role in scar appearance.  A surgical wound does not achieve its optimal appearance until 6 months.  There are several treatments available if scarring would be problematic including scar " "creams, silicone pad, laser and scar revision.  Skin discoloration can occur especially in people of color.  Its important to avoid sun on wound in first 6 months after surgery.  Treatment is available if pigment is problematic.  Incomplete removal of the lesion or recurrence of lesion can occur and - depending on the lesion - this would then require further treatment and more surgery.  Nerve damage/numbness and/or loss of function is very rare, but is most likely to occur if the lesion being removed is large or if it is in a \"high risk\" location.  Allergic reaction to lidocaine is rare.  More commonly, epinephrine is used with the lidocaine, and, occasionally, epinephrine (a.k.a., adrenalin) may cause a brief feeling of anxiety or jitteriness.  The person at the microscope (pathologist) may provide additional information that was unexpected. This unexpected finding could prompt the need for additional treatment or evaluation.    At-Home Wound Care  Try NOT to remove the pressure bandage for 48 hours. Keep the area clean and dry while this bandage is on.   After removing the bandage for the first time, gently clean the area with soap and water. If the bandage is difficult to remove, getting the bandage wet in the shower will sometimes help soften the adhesive and allow it to be removed more easily.   You will now need to cleanse this area daily in the shower with gentle soap. There is no need to scrub the area. You will need to apply plain Vaseline ointment (this is over the counter and not a prescription) to the site for up to 2 weeks followed by a clean appropriately sized bandage to area.  Non stick dressing and paper tape (or Hypafix) are recommended for sensitive skin but a bandaid is fine if it covers the area well.  In general, sutures (stitches) are removed in about 5-7 days for face wounds and in about 12-14 days for the body wounds.  Your dermatologist wants you to return for suture removal in 10-14 DAYS. " "    General Restrictions  For TWO (2) DAYS:  You will need to take it very easy as this time is highest risk for bleeding. Being a \"couch potato\" during these two days is generally recommended.   For surgeries on the face/neck/scalp: Avoid leaning down to pick things up off the floor as this brings blood up to your head. Instead, squat down to pick things up.     For TWO WEEKS (14 DAYS):   No heavy lifting (anything greater than 10 pounds)   You can start to do slow, gentle activities such as slow walking but nothing to increase your heart rate and blood pressure too much (such as cardiovascular exercise).  It is important to take it easy as there is still a risk for bleeding and a high risk popping of stitches open during this time.     Site Specific Restrictions  If we did surgery near your eyes (including the nose, forehead, front part of your scalp, cheeks): It is VERY common to get a large amount of swelling around your eyes (puffy eyes). Although less frequent, this can be enough to swell your eyes shut and can also come along with bruising. This should not hurt and is very expected and normal. It is typically worst at ~ 3 days out from your surgery and dramatically better 1 week post-operatively.   If we did surgery around your nose: No blowing your nose as this puts you at higher risk of popping stitches durign this time. Instead dab under your nose with a tissue or use a Q-tip inside your nose.  If we did surgery on the skin above or below your lip or your lip itself: No sipping from straws as this uses a lot of the muscles around your mouth and increases the risk of popping stitches during this time.    Managing Your Pain After Surgery  You can expect to have some pain after surgery. This is normal. The pain is typically worse the first two days after surgery, and quickly begins to get better.   You can use heating pads or ice packs on your incisions to help reduce your pain.   The best strategy for " "controlling your pain after surgery is \"around the clock\" pain control. You can take \"over-the-counter\" (non-prescription) Acetaminophen (Tylenol) for discomfort, unless you have been told not to by your physician.  If you are taking Tylenol at the maximum dose, you can alternate Tylenol with Advil/Motrin (ibuprofen) as long as there are no contraindications.  Alternating these medications with each other (I.e., Tylenol followed by Motrin/Advil) allows you to maximize your pain control.  To alternate these medications properly, you will take a dose of pain medication every three hours, alternating Tylenol (acetaminophen) and Advil/Motrin (ibuprofen).  Start by taking 650 mg of Tylenol (2 pills of 325 mg)  3 hours later take 600 mg of Motrin (3 pills of 200 mg)  3 hours after taking the Motrin take 650 mg of Tylenol  3 hours after that take 600 mg of Motrin.    As an example, if your first dose of Tylenol (acetaminophen) is at 12:00 PM, then you would alternate with Motrin as directed below, continuing usually for no more than a total of 48 hours straight:     12:00 PM  Tylenol 650 mg (2 pills of 325 mg)    3:00 PM  Motrin 600 mg (3 pills of 200 mg)    6:00 PM  Tylenol 650 mg (2 pills of 325 mg)    9:00 PM  Motrin 600 mg (3 pills of 200 mg)      WARNING:  Do NOT take more than 4000 mg of Tylenol or 3200 mg of Motrin in a \"24-hour\" period.       What if you still have pain?    If you have pain that is not controlled with the over-the-counter pain medications (Tylenol and Motrin/Advil), do not hesitate to call our staff using the number provided. We will help make sure you are managing your pain in the best way possible, and if necessary, we can provide a prescription for additional pain medication.     Call our office IMMEDIATELY with any signs of wound infection.  This includes fever, chills, increasing redness, warmth, tenderness, severe discomfort/pain, or pus or foul smell coming from the wound. [facility] can be directly at [phone] (SKIN) and ask for the on-call Dermatologist covering surgery/Mohs.    If Bleeding is Noticed  If bleeding is soaking through the bandage, remove the bandage and see where the bleeding is coming from.  Place a clean cloth over the area and apply firm pressure directly to the area that is bleeding for thirty minutes.    Check the wound ONLY after 30 minutes of direct pressure; do not cheat and sneak a peak, as that does not count (i.e., resets the clock back to zero).  If bleeding persists after 30 minutes of legitimate direct pressure, then try one more round of direct pressure to the area.    Should bleeding become heavier or not stop after the second application of direct pressure for 30 minutes, then call [facility] directly at [phone] (SKIN) and ask to speak with the on-call Dermatologist covering surgery/Mohs.  If after hours, go to your nearest Emergency Room or Urgent Care and have the team call [facility] directly at [phone] (SKIN); you will be connected to our after hours team.

## 2024-10-11 NOTE — PROGRESS NOTES
Ambulatory Visit  Name: Ld Savage      : 1935      MRN: [id number]  Encounter Provider: Finn Patino MD  Encounter Date: 10/14/2024   Encounter department: [facility] [place]    Assessment & Plan  Unintentional weight loss  Per chart review, patient had 46 lb weight loss within the past year. Reports difficulty eating due to dysphagia. See below for workup for dysphagia. Will obtain CT C/A/P with IV contrast after the patient sees a nephrologist for evaluation of her CKD        Esophageal dysphagia  Barium swallow in  with esophageal dysmotility c/f mild achalasia or peptic stricture but limited exam due to body habitus and immobility. Had subsequent EGDs with most recent EGD in  which did not show a stricture. Of note patient does have hx of LA grade C esophagitis. She has been taking pantoprazole 40 mg BID for a long period of time. Given her recent worsening esophageal dysphagia to solids for the past 3-4 months which limited her food intake and most likely contributed to her unintentional weight loss, would ideally perform EGD but given her comorbidities including old age, immobility (though she does stand up intermittently at home) and pAfib on Eliquis, will first proceed with timed barium esophagram  - obtain timed barium esophagram  - continue pantoprazole 40 mg BID but take it 30 minutes before breakfast and dinner  - continue Carafate BID for now (of note patient was taking it QID without much improvement)  - unclear if the patient is on famotidine BID  - if TBE unremarkable, will pursue both EGD with cardiac clearance and will pursue CT C/A/P with contrast to rule out malignancy given unintentional weight loss   Orders:    FL barium swallow ROUTINE esophagus; Future    Stage 3a chronic kidney disease (HCC)  Lab Results   Component Value Date    EGFR 45 (L) 2024    EGFR 47 (L) 2024    EGFR 50 (L) 2024    CREATININE 1.16 (H) 2024     CREATININE 1.13 (H) 05/14/2024    CREATININE 1.07 03/11/2024   Patient was not aware of CKD. Given hx of CKD, will refer to nephrology for further evaluation. Given unintentional weight loss, if TBE is unremarkable, will need CT C/A/P with IV contrast to rule out malignancy but have the patient be evaluated by a nephrologist prior to CT scans with IV contrast.     Orders:    Ambulatory Referral to Nephrology; Future    Constipation, unspecified constipation type  - continue Miralax 1 cupful daily or 1/2 cupful daily and titrate as needed       RTC in 2 months; contact  patient via her cellphone under demographic     History of Present Illness     Ld Savage is a 88 y.o. female PMH HTN, HLD, hypothyroidism, pAfib on Eliquis, NSTEMI (2/2016), hematuria 2/2 hemorrhagic cystitis and nephrolithiasis s/p lithotripsy), lymphedema, MARIBETH, LBP s/p wheelchair dependent (since 2015), osteoporosis, lap band, CCY, hernia repair, hysterectomy, who presents dysphagia and constipation.     Last seen by DAVID Millan on 6/21/2022 - GERD and constipation - pantoprazole 40 BID and famotidine 20 BID with Tums prn, miralax every other day    Wt Readings from Last 10 Encounters:   10/09/24 105 kg (230 lb 11.2 oz)   05/31/24 112 kg (246 lb)   08/11/23 125 kg (276 lb)   04/13/23 125 kg (276 lb)   12/17/20 112 kg (248 lb)   09/28/20 116 kg (256 lb 8 oz)   08/11/20 132 kg (290 lb)   04/28/20 132 kg (291 lb 10.7 oz)   03/12/20 (!) 144 kg (318 lb)   01/22/20 133 kg (292 lb 8.8 oz)     Dysphagia to solids for years >+8 yrs. When eating food, food gets stuck in her epigastric area. Reports no difficulty swallowing upon in her neck/chest but reports food getting stuck in her epigastric area. Mainly with solids. Worse within the past 3-4 months. Lost weight due to this per patient. No melena. Having green stool.    Reports taking Miralax once daily usually but last week she took Miralax 1/2 cup daily.   Reports the last time she was  weighed was 10/1/2024 which was 230.7 lb.  No BRBPR.     EGD 1/2020 for anemia: 1 cm HH, LA grade C esophagitis, white-juan plaque in the lower third of the esophagus, otherwise normal   Path negative for malignancy; + candida  EGD 12/2020 for esophagitis: ulcerated mucosa with erosion and plaque in the lower third of the esophagus, 1 cm HH, mild localized abnormal mucosa in the body of the stomach   Path with hyperplastic polyp, negative H. Pylori and normal duodenum, + candida  EGD 1/2022 for GERD: normal esophagus, 1 cm HH, edematous and erythematous mucosa in the body and antrum of the stomach, 1 polyp in the stomach s/p removal   Path with negative H. Pylori, fundic gland polyps    Esophagram 2018:  FINDINGS:     The study is significantly limited as only supine images could be obtained due to the patient's body habitus and immobility.     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  There was narrowing of the distal esophagus with mild distention and retention of barium proximally and the possibility of a peptic   stricture is not excluded.  These are best identified on prone imaging, however, which could not be obtained.  Mild achalasia is not excluded.     Gastroesophageal reflux was not observed.       There is a small hiatal hernia.     IMPRESSION:     Limited study as above.     1.  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  However, there was also narrowing at the GE junction with proximal esophageal dilatation and retention of contrast.  This could represent mild achalasia   or a peptic stricture though strictures are best visualized on prone imaging which could not be obtained.     2.  Small hiatal hernia.      History obtained from : patient  Review of Systems    Current med list from her paperwork as below  Unclear why she is on bASA daily  Tylenol, alendronate, atorvastatin,  Tums as needed (last took yesterday), docusate daily, Eliquis 5 bid, enema prn (not recent), famotidine BID (patient is not sure if she is taking it), iron pill, Lasix 40 mg qhs, levothyroxine, metoprolol, oxybutynin, pantoprazole BID, potassium, sucralfate QID, pramipexole, tramadol daily, vit D      Objective     /75   Pulse 55   Temp (!) 96.9 °F (36.1 °C)     Physical Exam  Vitals reviewed.   Constitutional:       Appearance: Normal appearance. She is obese.   HENT:      Head: Normocephalic and atraumatic.   Cardiovascular:      Rate and Rhythm: Normal rate.   Pulmonary:      Effort: Pulmonary effort is normal. No respiratory distress.   Abdominal:      General: There is no distension.      Palpations: Abdomen is soft.      Tenderness: There is no abdominal tenderness.   Musculoskeletal:      Comments: Lymphedema b/l LE   Skin:     General: Skin is warm and dry.   Neurological:      Mental Status: She is alert.      Comments: On wheelchair

## 2024-10-14 ENCOUNTER — OFFICE VISIT (OUTPATIENT)
Dept: GASTROENTEROLOGY | Facility: MEDICAL CENTER | Age: 89
End: 2024-10-14
Payer: COMMERCIAL

## 2024-10-14 VITALS — DIASTOLIC BLOOD PRESSURE: 75 MMHG | HEART RATE: 55 BPM | TEMPERATURE: 96.9 F | SYSTOLIC BLOOD PRESSURE: 119 MMHG

## 2024-10-14 DIAGNOSIS — K59.00 CONSTIPATION, UNSPECIFIED CONSTIPATION TYPE: ICD-10-CM

## 2024-10-14 DIAGNOSIS — N18.31 STAGE 3A CHRONIC KIDNEY DISEASE (HCC): ICD-10-CM

## 2024-10-14 DIAGNOSIS — R63.4 UNINTENTIONAL WEIGHT LOSS: Primary | ICD-10-CM

## 2024-10-14 DIAGNOSIS — R13.19 ESOPHAGEAL DYSPHAGIA: ICD-10-CM

## 2024-10-14 PROCEDURE — 99214 OFFICE O/P EST MOD 30 MIN: CPT | Performed by: INTERNAL MEDICINE

## 2024-10-14 PROCEDURE — 88304 TISSUE EXAM BY PATHOLOGIST: CPT | Performed by: STUDENT IN AN ORGANIZED HEALTH CARE EDUCATION/TRAINING PROGRAM

## 2024-10-25 ENCOUNTER — HOSPITAL ENCOUNTER (OUTPATIENT)
Dept: RADIOLOGY | Facility: HOSPITAL | Age: 89
Discharge: HOME | End: 2024-10-25
Attending: INTERNAL MEDICINE

## 2024-10-25 DIAGNOSIS — R13.19 ESOPHAGEAL DYSPHAGIA: ICD-10-CM

## 2024-11-21 ENCOUNTER — OFFICE VISIT (OUTPATIENT)
Dept: UROLOGY | Facility: CLINIC | Age: 89
End: 2024-11-21
Payer: COMMERCIAL

## 2024-11-21 VITALS
OXYGEN SATURATION: 96 % | HEART RATE: 69 BPM | HEIGHT: 63 IN | DIASTOLIC BLOOD PRESSURE: 80 MMHG | SYSTOLIC BLOOD PRESSURE: 110 MMHG | BODY MASS INDEX: 40.87 KG/M2

## 2024-11-21 DIAGNOSIS — N28.1 RENAL CYST: ICD-10-CM

## 2024-11-21 DIAGNOSIS — N39.0 RECURRENT UTI: ICD-10-CM

## 2024-11-21 DIAGNOSIS — N20.0 NEPHROLITHIASIS: Primary | ICD-10-CM

## 2024-11-21 PROCEDURE — 99213 OFFICE O/P EST LOW 20 MIN: CPT | Performed by: PHYSICIAN ASSISTANT

## 2024-11-21 RX ORDER — METHENAMINE HIPPURATE 1000 MG/1
1 TABLET ORAL 2 TIMES DAILY WITH MEALS
Qty: 180 TABLET | Refills: 3 | Status: SHIPPED | OUTPATIENT
Start: 2024-11-21

## 2024-11-21 NOTE — PROGRESS NOTES
Name: Ld Savage      : 1935      MRN: [id number]  Encounter Provider: Nayla Otero PA-C  Encounter Date: 2024   Encounter department: [facility] [place] [facility]  :  Assessment & Plan  Nephrolithiasis  stable asymptomatic nonobstructing right renal pelvis stone, last lithotripsy ; observation planned at this time without active surgical treatment; pt agrees  watch for fevers acute pain that could suggest new obstruction requiring more urgent decompression  Orders:    US kidney and bladder; Future    Recurrent UTI  asymptomatic since earlier this year; now on methenamine maintenance- cntinue  Orders:    methenamine hippurate (HIPREX) 1 g tablet; Take 1 tablet (1 g total) by mouth 2 (two) times a day with meals    Renal cyst  left 2cm simple renal cyst, stable on CT and US  no further workup planned         f/u 1 yr renal US prior for stone surveillance      History of Present Illness   Ld Savage is a 88 y.o. female who presents routine follow-up sub-2 cm simple renal cyst confirmed on this year's US (last ct possibly mildly complex, current simple without septation or debris); 1cm right renal pelvis calculus without hydronephrosis that was last treated with laser lithotripsy in . Sepsis with prior stone interventions worse each time, as such Dr Arias has recommended observation at this time without additional surgeries; recurrent E. coli UTI not recently symptomatic, on maintenance methenamine this year tolerates well.    Ld did get her tattoo and happy with that. She is planning her kiana diving adventure in the coming months. She went locally viral on a podcast recently as well. She's living at [facility] still. Here with her aid today.    Review of Systems   Constitutional: Negative.    Respiratory: Negative.     Cardiovascular: Negative.    Genitourinary:  Negative for decreased urine volume, difficulty urinating, dysuria, flank pain, frequency, hematuria and  "urgency.   Musculoskeletal: Negative.           Objective   /80 (BP Location: Left arm, Patient Position: Sitting, Cuff Size: Standard)   Pulse 69   Ht 5' 3\" (1.6 m)   SpO2 96%   BMI 40.87 kg/m²     Physical Exam  Vitals and nursing note reviewed.   Constitutional:       Appearance: She is well-developed.   HENT:      Head: Normocephalic and atraumatic.   Pulmonary:      Effort: Pulmonary effort is normal.   Skin:     General: Skin is warm.   Neurological:      Mental Status: She is alert and oriented to person, place, and time.      Comments: wheelchair          Results  No results found for: \"PSA\"  Lab Results   Component Value Date    GLUCOSE 154 (H) 08/10/2020    CALCIUM 9.0 09/11/2024     10/10/2014    K 3.5 09/11/2024    CO2 27 09/11/2024     09/11/2024    BUN 14 09/11/2024    CREATININE 1.16 (H) 09/11/2024     Lab Results   Component Value Date    WBC 5.98 10/06/2020    HGB 9.1 (L) 10/06/2020    HCT 33.3 (L) 10/06/2020    MCV 84 10/06/2020     10/06/2020       Office Urine Dip  No results found for this or any previous visit (from the past hour).]      "

## 2024-11-21 NOTE — ASSESSMENT & PLAN NOTE
asymptomatic since earlier this year; now on methenamine maintenance- cntinue  Orders:    methenamine hippurate (HIPREX) 1 g tablet; Take 1 tablet (1 g total) by mouth 2 (two) times a day with meals

## 2024-11-21 NOTE — ASSESSMENT & PLAN NOTE
stable asymptomatic nonobstructing right renal pelvis stone, last lithotripsy 2020; observation planned at this time without active surgical treatment; pt agrees  watch for fevers acute pain that could suggest new obstruction requiring more urgent decompression  Orders:    US kidney and bladder; Future

## 2024-12-21 PROBLEM — N39.0 RECURRENT UTI: Status: RESOLVED | Noted: 2019-05-21 | Resolved: 2024-12-21

## 2025-04-02 ENCOUNTER — NURSING HOME VISIT (OUTPATIENT)
Dept: GERIATRICS | Facility: OTHER | Age: 89
End: 2025-04-02

## 2025-04-02 DIAGNOSIS — L03.115 CELLULITIS OF RIGHT LOWER EXTREMITY: Primary | ICD-10-CM

## 2025-04-02 DIAGNOSIS — Z71.89 ADVANCE CARE PLANNING: ICD-10-CM

## 2025-04-02 DIAGNOSIS — N18.32 STAGE 3B CHRONIC KIDNEY DISEASE (HCC): ICD-10-CM

## 2025-04-02 DIAGNOSIS — E03.9 ACQUIRED HYPOTHYROIDISM: ICD-10-CM

## 2025-04-02 DIAGNOSIS — M81.0 AGE-RELATED OSTEOPOROSIS WITHOUT CURRENT PATHOLOGICAL FRACTURE: ICD-10-CM

## 2025-04-02 DIAGNOSIS — E66.01 OBESITY, MORBID, BMI 40.0-49.9 (HCC): ICD-10-CM

## 2025-04-02 DIAGNOSIS — E78.2 MIXED HYPERLIPIDEMIA: ICD-10-CM

## 2025-04-02 DIAGNOSIS — I48.0 PAROXYSMAL A-FIB (HCC): ICD-10-CM

## 2025-04-02 DIAGNOSIS — K21.00 GASTROESOPHAGEAL REFLUX DISEASE WITH ESOPHAGITIS WITHOUT HEMORRHAGE: ICD-10-CM

## 2025-04-02 DIAGNOSIS — G25.81 RLS (RESTLESS LEGS SYNDROME): ICD-10-CM

## 2025-04-02 DIAGNOSIS — G89.4 CHRONIC PAIN DISORDER: ICD-10-CM

## 2025-04-02 DIAGNOSIS — I89.0 LYMPHEDEMA: ICD-10-CM

## 2025-04-02 DIAGNOSIS — I10 ESSENTIAL HYPERTENSION: ICD-10-CM

## 2025-04-02 DIAGNOSIS — G47.33 OBSTRUCTIVE SLEEP APNEA SYNDROME: ICD-10-CM

## 2025-04-02 PROBLEM — E87.6 ACUTE HYPOKALEMIA: Status: RESOLVED | Noted: 2020-08-10 | Resolved: 2025-04-02

## 2025-04-02 PROBLEM — N18.9 CKD (CHRONIC KIDNEY DISEASE): Status: ACTIVE | Noted: 2024-03-21

## 2025-04-02 PROBLEM — R74.01 TRANSAMINITIS: Status: RESOLVED | Noted: 2020-09-30 | Resolved: 2025-04-02

## 2025-04-02 PROBLEM — J96.21 ACUTE ON CHRONIC RESPIRATORY FAILURE WITH HYPOXIA (HCC): Status: RESOLVED | Noted: 2020-01-17 | Resolved: 2025-04-02

## 2025-04-02 PROBLEM — B49 FUNGEMIA: Status: RESOLVED | Noted: 2020-10-03 | Resolved: 2025-04-02

## 2025-04-02 NOTE — ASSESSMENT & PLAN NOTE
"Discussed ACP/GOC as this was my first visit with patient  Patient verbalizes primary HCP/POA as daughter Stacie, consistent with chart  discussion/education with patient today regarding their wishes with respect to resuscitative measures; discussed as appropriate potential risks vs benefits of resuscitative measures; patient verbalizes understanding, appears to have capacity to make this decision presently, and clearly verbalizes a desire for remaining DNR as prior  Patient very clear in her goals that she prefers \"quality of life not quantity of life\", she wants to be able to enjoy the time she has, she does not want particularly aggressive workup or restrictions at this time  She expresses she is trying to do at least one thing she really wants every year, for example last year she got a tattoo, this year she is quite adamant she intends to go skydiving in July 2025 having discussed this on a podcast and expresses being/accepting of the risks including risk of death.  "

## 2025-04-02 NOTE — ASSESSMENT & PLAN NOTE
Recent lipid panel well controlled  Encourage lifestyle modifications including healthy diet, weight management, exercise as appropriate  Continue statin

## 2025-04-02 NOTE — ASSESSMENT & PLAN NOTE
Encourage lifestyle modifications including healthy diet, weight management, exercise as appropriate  Patient highly motivated, has been independently working on losing weight through diet control.  She endorses having intentionally lost >100 lb since being at facility over several years. As of 2020 she was >300 lb. She has gradually trended down overall, most recently in 220s which she is happy about as she reports her recent goal was to be below 230 lb to be allowed to go skydiving.  She denies any unintentional weight loss  Continue to monitor  Dietary team to follow as appropriate at facility and to help with safe weight/diet management

## 2025-04-02 NOTE — ASSESSMENT & PLAN NOTE
Monitor BP - generally stable around 110s-130s systolic  Avoid hypotension  No acute cardiac complaints  Continue regimen including lasix 40mg daily, metoprolol tartrate 12.5mg BID  Follow up with Cardiology as appropriate

## 2025-04-02 NOTE — PROGRESS NOTES
[facility]  Facility: [facility]    PROGRESS NOTE  Nursing Home Place of Service: nursing home place of service: POS 32 Unskilled- No Part A Coverage    NAME: Ld Savage  : 1935 AGE: 89 y.o. SEX: female MRN: [id number]  DATE OF ENCOUNTER: 2025    Assessment and Plan     Problem List Items Addressed This Visit       Essential hypertension    Monitor BP - generally stable around 110s-130s systolic  Avoid hypotension  No acute cardiac complaints  Continue regimen including lasix 40mg daily, metoprolol tartrate 12.5mg BID  Follow up with Cardiology as appropriate           Mixed hyperlipidemia    Recent lipid panel well controlled  Encourage lifestyle modifications including healthy diet, weight management, exercise as appropriate  Continue statin         Paroxysmal A-fib (HCC)    No acute cardiac complaints  Continue Eliquis for AC (hx of bleeding on Xarelto)  Continue beta blocker for rate  HR controlled generally 60s-80s  Following Cardiology         Obesity, morbid, BMI 40.0-49.9 ([facility])    Encourage lifestyle modifications including healthy diet, weight management, exercise as appropriate  Patient highly motivated, has been independently working on losing weight through diet control.  She endorses having intentionally lost >100 lb since being at facility over several years. As of 2020 she was >300 lb. She has gradually trended down overall, most recently in 220s which she is happy about as she reports her recent goal was to be below 230 lb to be allowed to go skydiving.  She denies any unintentional weight loss  Continue to monitor  Dietary team to follow as appropriate at facility and to help with safe weight/diet management         Gastroesophageal reflux disease with esophagitis without hemorrhage    -stable, patient notes she does get intermittent reflux symptoms  -recommend OOB with meals, sit upright for at least 30 minutes afterwards, avoid trigger foods  -continue to  "monitor  -continue regimen including pantoprazole 40mg BID, famotidine 20mg BID  -consider weaning regimen a bit and/or discussing GI f/u for updated EGD at a future visit if in line with goals of care  -follow up with GI as appropriate/needed           CKD (chronic kidney disease)    Lab Results   Component Value Date    EGFR 47 (L) 03/24/2025    EGFR 46 (L) 03/11/2025    EGFR 45 (L) 09/11/2024    CREATININE 1.13 (H) 03/24/2025    CREATININE 1.14 (H) 03/11/2025    CREATININE 1.16 (H) 09/11/2024     Baseline seems consistent with CKD 3a bordering on 3b  Monitor renal function on routine labs - stable  Avoid nephrotoxins, NSAIDs as able  Encourage PO hydration, respecting volume status  Follow up with Nephrology as appropriate/needed. Does follow with urology.           Advance care planning    Discussed ACP/GOC as this was my first visit with patient  Patient verbalizes primary HCP/POA as daughter Stacie, consistent with chart  discussion/education with patient today regarding their wishes with respect to resuscitative measures; discussed as appropriate potential risks vs benefits of resuscitative measures; patient verbalizes understanding, appears to have capacity to make this decision presently, and clearly verbalizes a desire for remaining DNR as prior  Patient very clear in her goals that she prefers \"quality of life not quantity of life\", she wants to be able to enjoy the time she has, she does not want particularly aggressive workup or restrictions at this time  She expresses she is trying to do at least one thing she really wants every year, for example last year she got a tattoo, this year she is quite adamant she intends to go skydiving in July 2025 having discussed this on a podcast and expresses being/accepting of the risks including risk of death.         Lymphedema    Noted chronic hx of lymphedema  Following Cardiology and lymphedema clinic  It does not seem her edema is particularly cardiogenic in " nature. Last Cardiology visit from May 2024 - did not appear she had heart failure. Last echo from 2020 with preserved EF and no notable valvular disease.  Edema likely multifactorial including obesity, dependent edema, vascular insufficiency  Monitor weight - no alarming uptrend. Overall gradually trending down with intentional diet control  Monitor volume status clinically - lymphedema reported stable if not better than what it used to be. No orthopnea. Seems euvolemic.  Encourage elevation, compression of lower extremities as able  Continue lasix 40mg daily  Follow up with Cardiology as appropriate - next visit May 2025           RLS (restless legs syndrome)    Stable on pramipexole         Chronic pain disorder    Endorses chronic multifactorial pain including most significantly chronic bilateral shoulder pain (endorses R shoulder due to hx of rotator cuff tear and L shoulder with severe arthritis) and relates she would not want to pursue surgery and has been told she would not be a good/safe surgical candidate.  She feels present pain regimen insufficient. Has been chronically on tramadol and feels it is ineffective and that in the past oxycodone was more helpful.  With shared decision-making and patient education regarding opioids and cross tolerance, made the following optimizations:  Increased tylenol from 650mg BID to 975mg TID  Switched topical aspercreme to lidocaine patches bilat shoulders  Discontinued tramadol  Initiated oxycodone 5mg BID scheduled + BID PRN  Adjust regimen as appropriate  Continue to monitor         Sleep apnea    Noted hx of MARIBETH  Patient denies any recent associated issues overnight. Feels it has improved since she has been intentionally losing weight over time. Reports inability/unwillingness to tolerate CPAP  Continue to monitor  Supportive care         Acquired hypothyroidism    Recent TSH WNL  No apparent acute/associated symptoms  Continue levothyroxine 150mcg daily  Monitor  periodically         Cellulitis of right lower extremity - Primary    Recent concern of RLE cellulitis since early March 2025  Has been evaluated by previous provider at facility as well as by [facility] NP  Has been on several courses of keflex with improvement  Recent XR foot/ankle negative for bony involvement  She is scheduled for RLE CT on 4/8/25 to further characterize/rule out osteomyelitis  At present the cellulitis seems much improved from a comparison picture and per discussion with patient. Does not appear to require further antibiotics course at present though may require such in future if recurrent symptoms.  Supportive care  Monitor skin for breakdown, wounds, evidence of worsening/recurrent infection  Continue lymphedema management         Age-related osteoporosis without current pathological fracture    Noted dx of osteoporosis  DXA 2019 consistent with osteoporosis  Continue calcium/vit D supplementation  Presently maintained on alendronate 70mg weekly. Per chart review it seems she has been on this since 2013 which puts her around 12 years of bisphosphonate therapy. Typically courses are limited to 5 years due to risk of adverse effects with prolonged use. She appears to be tolerating well though this could be contributory to ongoing GERD symptoms as well. Would plan to discuss in more detail at a future visit if she would be amenable to discontinuation and perhaps alternate therapy such as prolia if needed.                Chief Complaint     Follow up 30 day    History of Present Illness     Ld Savage is a 89 y.o. female who was seen today for follow up.  PMH includes: HTN, nephrolithiasis, Afib, HLD, lymphedema, GERD, CKD  Code Status: DNR    Patient has been at [facility] since Nov 2015. Lately she had been under the care of Dr. Kamari Corado at facility. She had recently requested to switch physicians. Staff reached out to me several days ago regarding transfer of care. I am seeing her  today for the first time. She was last seen for a physician visit at facility by Dr. Corado on 2/25/25 per records.    Patient seen and examined in room  Others present for encounter: none  Patient seated in power wheelchair, demonstrates good independent operation.  Appears comfortable, awake, alert, oriented to situation, able to converse appropriately  Patient polite, appears in good spirits, Aox3, appears to be a good historian able to give strong medical history. She notes she is doing Ok overall. She relates reason for switching physicians to be related to wanting to be seen sooner for acute concerns (mainly related to recent issue with recurrent right leg cellulitis).  No new/acute pain anywhere. Endorses chronic multifactorial pain including most significantly chronic bilateral shoulder pain (endorses R shoulder due to hx of rotator cuff tear and L shoulder with severe arthritis) and relates she would not want to pursue surgery and has been told she would not be a good/safe surgical candidate. We discussed her pain regimen extensively, see Plan.  Feels her RLE cellulitis concern is much improved with recent keflex course, no associated pain there at present end shows me a picture from 3/6/25 in comparison to which the redness of anterior right shin seems very mild/resolving at this time.  Breathing fine, on room air, no acute SOB, no orthopnea  No recent CP/palpitations or orthostatic lightheadedness  Feels her chronic lymphedema is stable, endorses following with lymphedema clinic.  Appetite good/stable, no acute swallowing concerns. Endorses she has been working hard to watch what she eats in order to intentionally lose weight, reports recent goal was to be below 230 lb (which she has now achieved).  Urinating well without acute symptoms  Endorses regular/daily easy BM with bowel regimen. No acute abd pain/N/V  Does not feel acutely sick or confused or feverish  No acute cardiopulmonary, abdominal, or  urinary symptoms; see ROS for more details.  Endorses she gets around mainly via power wheelchair and at times with walker with goal of making more progress with walker. Endorses chronic baseline R foot drop for which she wears a brace (going to  tomorrow for updated brace). Denies recent falls. Enjoys socializing and reports she is very involved including heading up several groups here at facility.    No further questions or acute concerns identified.  No further acute concerns per nursing.    Subsequently I met with Dr. Corado on unit and he also signed patient out to me.      PDMP reviewed 4/2/25 03/28/2025 03/28/2025 1 Tramadol Hcl 50 Mg Tablet 30.00 10 Mi Kis [id number] Gra (6979) 0 30.00 MME Medicare [place]   03/19/2025 02/20/2025 1 Tramadol Hcl 50 Mg Tablet 30.00 10 Mi Kis [id number] Gra (6979) 0 30.00 MME Medicare [place]   03/09/2025 03/08/2025 1 Tramadol Hcl 50 Mg Tablet 30.00 10 Mi Kis [id number] Gra (6979) 0 30.00 MME Medicare [place]   02/26/2025 02/26/2025 1 Tramadol Hcl 50 Mg Tablet 30.00 10 Mi Kis [id number] Gra (6979) 0 30.00 MME Medicare [place]   02/20/2025 02/20/2025 1 Tramadol Hcl 50 Mg Tablet 30.00 10 Mi Kis [id number] Gra (6979) 0 30.00 MME Medicare [place]   02/07/2025 02/07/2025 1 Tramadol Hcl 50 Mg Tablet 30.00 10 Sh Log [id number] Gra (6979) 0 30.00 MME Medicare [place]   01/29/2025 01/27/2025 1 Tramadol Hcl 50 Mg Tablet 30.00 10 Sh Log [id number] Gra (6979) 0 30.00 MME Medicare [place]   01/20/2025 01/20/2025 1 Tramadol Hcl 50 Mg Tablet 30.00 10 Sh Log [id number] Gra (6979) 0 30.00 MME Medicare [place]   01/08/2025 12/09/2024 1 Tramadol Hcl 50 Mg Tablet 30.00 10 Sh Log [id number] Gra (6979) 0 30.00 MME Medicare [place]         Lab Review:   Baseline GFR: 40s  Baseline Hb: around 12-13  3/11/25: CBC generally non-actionable, WBC 3.5; CMP with Cr 1.14, GFR 46; total chol 107, LDL 49; Mg 2.0; TSH WNL; vit D 47  3/24/25: CBC generally non-actionable, WBC 2.5 (generally chronically low in 3-4 range); CMP with Cr 1.13, GFR 47; ESR 41 (slightly  "high), CRP 10.1 (slightly high)      No results found for: \"HGBA1C\"  Lab Results   Component Value Date    FJZ3BDUSKDGF 1.772 10/01/2014    TSH 1.31 03/11/2025     Lab Results   Component Value Date    JBPYSFZO88 247 11/01/2022     Lab Results   Component Value Date    IRON 41 02/25/2020    TIBC 273 02/25/2020    FERRITIN 61 04/28/2020     No results found for: \"CHOLESTEROL\"  No results found for: \"HDL\"  No results found for: \"TRIG\"  No results found for: \"NONHDLC\"  No results found for: \"LDLCALC\"      XR R foot 3/25/25 \"Degenerative changes, demineralization, and soft tissue swelling without acute osseous abnormality\"  XR R ankle 3/25/25 \"No acute osseous abnormality\"  US RLE 3/19/25 \"Right lower extremity visualized veins demonstrate no thrombosis\"    Echo Oct 2020: EF 60, no WMA, Left ventricular diastolic function was not assessed, no notable valvular disease        The following portions of the patient's history were reviewed and updated as appropriate: allergies, current medications, past family history, past medical history, past social history, past surgical history and problem list.    Review of Systems     Review of Systems   Constitutional:  Negative for appetite change, chills, diaphoresis and fever.   HENT:  Negative for drooling, ear pain, hearing loss, rhinorrhea, sore throat and trouble swallowing.    Eyes:  Negative for pain, discharge, redness, itching and visual disturbance.   Respiratory:  Negative for cough, chest tightness, shortness of breath and wheezing.    Cardiovascular:  Positive for leg swelling. Negative for chest pain and palpitations.   Gastrointestinal:  Negative for abdominal pain, blood in stool, constipation, diarrhea, nausea and vomiting.   Genitourinary:  Negative for difficulty urinating, dysuria and hematuria.   Musculoskeletal:  Positive for arthralgias and gait problem. Negative for back pain and neck pain.   Skin:  Negative for color change.   Neurological:  Negative for " dizziness, facial asymmetry, speech difficulty, weakness, light-headedness and headaches.   Psychiatric/Behavioral:  Negative for agitation, behavioral problems and confusion. The patient is not nervous/anxious and is not hyperactive.    All other systems reviewed and are negative.      Active Problem List     Patient Active Problem List   Diagnosis    Nephrolithiasis    Essential hypertension    Mixed hyperlipidemia    Paroxysmal A-fib (HCC)    Thrombocytopenia (HCC)    Chronic diastolic (congestive) heart failure (HCC)    Obesity, morbid, BMI 40.0-49.9 (HCC)    Anemia    Acute blood loss anemia    Gastroesophageal reflux disease with esophagitis without hemorrhage    Other constipation    Non-ST elevation myocardial infarction (NSTEMI) ([facility])    Renal cyst    Hx of abdominal supracervical subtotal hysterectomy    CKD (chronic kidney disease)    Advance care planning    Lymphedema    RLS (restless legs syndrome)    Personal history of other infectious and parasitic diseases    Chronic pain disorder    Sleep apnea    Acquired hypothyroidism    Cellulitis of right lower extremity    Age-related osteoporosis without current pathological fracture       Objective     Vital Signs:     BP: 141/82 mmHg  4/2/2025 16:55   Temp:97.7 °F  4/2/2025 16:55 Pulse:67 bpm  4/2/2025 16:55 Weight:222.5 Lbs  4/1/2025 12:44   Resp:18 Breaths/min  4/2/2025 16:55 BS:97.1 mg/dL  10/27/2021 19:35 O2:95 %  3/31/2025 16:10 Pain:0  4/2/2025 16:46       Physical Exam  Vitals reviewed.   Constitutional:       General: She is not in acute distress.     Appearance: She is obese. She is not toxic-appearing or diaphoretic.   HENT:      Head: Normocephalic and atraumatic.      Right Ear: External ear normal.      Left Ear: External ear normal.      Nose: Nose normal. No rhinorrhea.      Mouth/Throat:      Mouth: Mucous membranes are dry.      Pharynx: Oropharynx is clear. No posterior oropharyngeal erythema.   Eyes:      General: No scleral icterus.         Right eye: No discharge.         Left eye: No discharge.      Extraocular Movements: Extraocular movements intact.      Conjunctiva/sclera: Conjunctivae normal.      Pupils: Pupils are equal, round, and reactive to light.   Cardiovascular:      Rate and Rhythm: Normal rate and regular rhythm.   Pulmonary:      Effort: Pulmonary effort is normal. No respiratory distress.      Breath sounds: Normal breath sounds. No wheezing or rales.   Abdominal:      General: Bowel sounds are normal. There is no distension.      Palpations: Abdomen is soft.      Tenderness: There is no abdominal tenderness. There is no guarding.   Musculoskeletal:         General: Swelling present.      Cervical back: No rigidity.      Comments: 2+ bilateral lower extremity edema  Right lower extremity at anterior/medial shin with very mild/slight erythema (appearing much improved from a comparison image patient had from 3/6/25)  No open wounds/bleed/drainage from legs  Compression stockings in place   Skin:     General: Skin is warm and dry.      Coloration: Skin is not jaundiced.   Neurological:      General: No focal deficit present.      Mental Status: She is alert and oriented to person, place, and time. Mental status is at baseline.   Psychiatric:         Mood and Affect: Mood normal.         Behavior: Behavior normal.         Thought Content: Thought content normal.         Judgment: Judgment normal.         Pertinent Laboratory/Diagnostic Studies:  Laboratory and Imaging studies reviewed. Full report in the paper chart.     Current Medications   Medications reviewed and updated in facility chart.      -Total time spent on this encounter today including documentation and workup review, face to face time, history and exam, and documentation/orders, advance care planning on this my initial visit with patient was approximately 60 minutes.  -This note will be copied to [facility] EMR where vitals and medication orders are placed.    Maco Baez  [facility]  Geriatric Medicine  4/2/2025 6:40 PM

## 2025-04-02 NOTE — ASSESSMENT & PLAN NOTE
-stable, patient notes she does get intermittent reflux symptoms  -recommend OOB with meals, sit upright for at least 30 minutes afterwards, avoid trigger foods  -continue to monitor  -continue regimen including pantoprazole 40mg BID, famotidine 20mg BID  -consider weaning regimen a bit and/or discussing GI f/u for updated EGD at a future visit if in line with goals of care  -follow up with GI as appropriate/needed

## 2025-04-02 NOTE — ASSESSMENT & PLAN NOTE
Noted hx of MARIBETH  Patient denies any recent associated issues overnight. Feels it has improved since she has been intentionally losing weight over time. Reports inability/unwillingness to tolerate CPAP  Continue to monitor  Supportive care

## 2025-04-02 NOTE — ASSESSMENT & PLAN NOTE
Noted dx of osteoporosis  DXA 2019 consistent with osteoporosis  Continue calcium/vit D supplementation  Presently maintained on alendronate 70mg weekly. Per chart review it seems she has been on this since 2013 which puts her around 12 years of bisphosphonate therapy. Typically courses are limited to 5 years due to risk of adverse effects with prolonged use. She appears to be tolerating well though this could be contributory to ongoing GERD symptoms as well. Would plan to discuss in more detail at a future visit if she would be amenable to discontinuation and perhaps alternate therapy such as prolia if needed.

## 2025-04-02 NOTE — ASSESSMENT & PLAN NOTE
Endorses chronic multifactorial pain including most significantly chronic bilateral shoulder pain (endorses R shoulder due to hx of rotator cuff tear and L shoulder with severe arthritis) and relates she would not want to pursue surgery and has been told she would not be a good/safe surgical candidate.  She feels present pain regimen insufficient. Has been chronically on tramadol and feels it is ineffective and that in the past oxycodone was more helpful.  With shared decision-making and patient education regarding opioids and cross tolerance, made the following optimizations:  Increased tylenol from 650mg BID to 975mg TID  Switched topical aspercreme to lidocaine patches bilat shoulders  Discontinued tramadol  Initiated oxycodone 5mg BID scheduled + BID PRN  Adjust regimen as appropriate  Continue to monitor

## 2025-04-02 NOTE — ASSESSMENT & PLAN NOTE
Noted chronic hx of lymphedema  Following Cardiology and lymphedema clinic  It does not seem her edema is particularly cardiogenic in nature. Last Cardiology visit from May 2024 - did not appear she had heart failure. Last echo from 2020 with preserved EF and no notable valvular disease.  Edema likely multifactorial including obesity, dependent edema, vascular insufficiency  Monitor weight - no alarming uptrend. Overall gradually trending down with intentional diet control  Monitor volume status clinically - lymphedema reported stable if not better than what it used to be. No orthopnea. Seems euvolemic.  Encourage elevation, compression of lower extremities as able  Continue lasix 40mg daily  Follow up with Cardiology as appropriate - next visit May 2025

## 2025-04-02 NOTE — ASSESSMENT & PLAN NOTE
No acute cardiac complaints  Continue Eliquis for AC (hx of bleeding on Xarelto)  Continue beta blocker for rate  HR controlled generally 60s-80s  Following Cardiology

## 2025-04-02 NOTE — ASSESSMENT & PLAN NOTE
Lab Results   Component Value Date    EGFR 47 (L) 03/24/2025    EGFR 46 (L) 03/11/2025    EGFR 45 (L) 09/11/2024    CREATININE 1.13 (H) 03/24/2025    CREATININE 1.14 (H) 03/11/2025    CREATININE 1.16 (H) 09/11/2024     Baseline seems consistent with CKD 3a bordering on 3b  Monitor renal function on routine labs - stable  Avoid nephrotoxins, NSAIDs as able  Encourage PO hydration, respecting volume status  Follow up with Nephrology as appropriate/needed. Does follow with urology.

## 2025-04-02 NOTE — ASSESSMENT & PLAN NOTE
Recent concern of RLE cellulitis since early March 2025  Has been evaluated by previous provider at facility as well as by [facility] NP  Has been on several courses of keflex with improvement  Recent XR foot/ankle negative for bony involvement  She is scheduled for RLE CT on 4/8/25 to further characterize/rule out osteomyelitis  At present the cellulitis seems much improved from a comparison picture and per discussion with patient. Does not appear to require further antibiotics course at present though may require such in future if recurrent symptoms.  Supportive care  Monitor skin for breakdown, wounds, evidence of worsening/recurrent infection  Continue lymphedema management

## 2025-04-02 NOTE — ASSESSMENT & PLAN NOTE
Recent TSH WNL  No apparent acute/associated symptoms  Continue levothyroxine 150mcg daily  Monitor periodically

## 2025-04-26 ENCOUNTER — TELEPHONE (OUTPATIENT)
Dept: OTHER | Facility: OTHER | Age: 89
End: 2025-04-26

## 2025-04-26 NOTE — TELEPHONE ENCOUNTER
"LING Ruiz, stated, \"Tara Lucas requested labs earlier and the results came back. I would like to review them with her.\"    On call notified via secure chat.  "

## 2025-04-26 NOTE — TELEPHONE ENCOUNTER
"RN stated, \"Patient had chills over night. She had this before but her right leg is red and warm. Would like to speak with the on call.\"    On call notified via secure chat.  "

## 2025-04-29 ENCOUNTER — NURSING HOME VISIT (OUTPATIENT)
Dept: GERIATRICS | Facility: OTHER | Age: 89
End: 2025-04-29
Payer: COMMERCIAL

## 2025-04-29 DIAGNOSIS — G89.4 CHRONIC PAIN DISORDER: ICD-10-CM

## 2025-04-29 DIAGNOSIS — I89.0 LYMPHEDEMA: ICD-10-CM

## 2025-04-29 DIAGNOSIS — D72.820 LYMPHOCYTOSIS: ICD-10-CM

## 2025-04-29 DIAGNOSIS — D69.6 THROMBOCYTOPENIA (HCC): ICD-10-CM

## 2025-04-29 DIAGNOSIS — L03.115 CELLULITIS OF RIGHT LOWER EXTREMITY: Primary | ICD-10-CM

## 2025-04-29 DIAGNOSIS — I50.32 CHRONIC DIASTOLIC (CONGESTIVE) HEART FAILURE (HCC): ICD-10-CM

## 2025-04-29 PROCEDURE — 99309 SBSQ NF CARE MODERATE MDM 30: CPT | Performed by: STUDENT IN AN ORGANIZED HEALTH CARE EDUCATION/TRAINING PROGRAM

## 2025-04-29 NOTE — ASSESSMENT & PLAN NOTE
Noted chronic hx of lymphedema  Following Cardiology and lymphedema clinic  It does not seem her edema is particularly cardiogenic in nature. Last Cardiology visit from May 2024 - did not appear she had heart failure although she has noted history of diastolic dysfunction. Last echo from 2020 with preserved EF and no notable valvular disease.  Edema likely multifactorial including obesity, dependent edema, vascular insufficiency  Monitor weight - no alarming uptrend. Overall gradually trending down with intentional diet control  Monitor volume status clinically - lymphedema reported stable. No orthopnea. Seems euvolemic.  Encourage elevation, compression of lower extremities as able  Continue lasix - see rest of plan  Follow up with Cardiology as appropriate - next visit May 2025

## 2025-04-29 NOTE — ASSESSMENT & PLAN NOTE
Seems to have mild intermittent thrombocytopenia on lab review  -monitor on routine labs  -monitor for acute bleed - no present signs  -consider further workup, Heme consult if persistent/worsening

## 2025-04-29 NOTE — ASSESSMENT & PLAN NOTE
Wt Readings from Last 3 Encounters:   10/09/24 105 kg (230 lb 11.2 oz)   05/31/24 112 kg (246 lb)   08/11/23 125 kg (276 lb)     Noted hx of chronic diastolic CHF  Monitor weight - overall trending down (she is intentionally trying to lose weight), no alarming uptrend  Monitor volume status clinically - peripheral edema reported stable, mucus membranes slightly dry - overall seems euvolemic/compensated  Encourage elevation, compression of lower extremities as able  Continue beta blocker  Continue lasix, increasing at this time from 40mg to 60mg daily to see if edema might be improved with this  Follow up with Cardiology as appropriate

## 2025-04-29 NOTE — ASSESSMENT & PLAN NOTE
chronic multifactorial pain including most significantly chronic bilateral shoulder pain (endorsed R shoulder due to hx of rotator cuff tear and L shoulder with severe arthritis) - patient has expressed she would not want to pursue surgery and has been told she would not be a good/safe surgical candidate.  She feels present pain regimen is much better and sufficient since recent switch from tramadol to oxycodone  Current regimen including: tylenol 975mg TID, oxycodone 5mg BID scheduled + BID PRN, topical lidocaine  Adjust regimen as appropriate  Continue to monitor - improved/stable

## 2025-04-29 NOTE — PROGRESS NOTES
[facility]  Facility: [facility]    PROGRESS NOTE  Nursing Home Place of Service: nursing home place of service: POS [facility] Unskilled- No Part A Coverage    NAME: Ld Saavge  : 1935 AGE: 89 y.o. SEX: female MRN: [id number]  DATE OF ENCOUNTER: 2025    Assessment and Plan     Problem List Items Addressed This Visit       Thrombocytopenia (HCC)    Seems to have mild intermittent thrombocytopenia on lab review  -monitor on routine labs  -monitor for acute bleed - no present signs  -consider further workup, Heme consult if persistent/worsening           Chronic diastolic (congestive) heart failure (HCC)    Wt Readings from Last 3 Encounters:   10/09/24 105 kg (230 lb 11.2 oz)   24 112 kg (246 lb)   23 125 kg (276 lb)     Noted hx of chronic diastolic CHF  Monitor weight - overall trending down (she is intentionally trying to lose weight), no alarming uptrend  Monitor volume status clinically - peripheral edema reported stable, mucus membranes slightly dry - overall seems euvolemic/compensated  Encourage elevation, compression of lower extremities as able  Continue beta blocker  Continue lasix, increasing at this time from 40mg to 60mg daily to see if edema might be improved with this  Follow up with Cardiology as appropriate                 Lymphedema    Noted chronic hx of lymphedema  Following Cardiology and lymphedema clinic  It does not seem her edema is particularly cardiogenic in nature. Last Cardiology visit from May 2024 - did not appear she had heart failure although she has noted history of diastolic dysfunction. Last echo from  with preserved EF and no notable valvular disease.  Edema likely multifactorial including obesity, dependent edema, vascular insufficiency  Monitor weight - no alarming uptrend. Overall gradually trending down with intentional diet control  Monitor volume status clinically - lymphedema reported stable. No orthopnea. Seems  euvolemic.  Encourage elevation, compression of lower extremities as able  Continue lasix - see rest of plan  Follow up with Cardiology as appropriate - next visit May 2025           Chronic pain disorder    chronic multifactorial pain including most significantly chronic bilateral shoulder pain (endorsed R shoulder due to hx of rotator cuff tear and L shoulder with severe arthritis) - patient has expressed she would not want to pursue surgery and has been told she would not be a good/safe surgical candidate.  She feels present pain regimen is much better and sufficient since recent switch from tramadol to oxycodone  Current regimen including: tylenol 975mg TID, oxycodone 5mg BID scheduled + BID PRN, topical lidocaine  Adjust regimen as appropriate  Continue to monitor - improved/stable         Cellulitis of right lower extremity - Primary    Recent concern of recurrent RLE cellulitis since early March 2025  Has been previously evaluated by previous provider at facility as well as by [facility] NP  Has been on several courses of keflex with improvement  Recent right lower extremity XR (march 2025) and CT (April 2025) negative for bony involvement  Most recently recurrence of cellulitis since ~4/25/25. Started on keflex course 4/26. Improving with keflex course.  Supportive care  Monitor skin for breakdown, wounds, evidence of worsening/recurrent infection  Continue lymphedema management  Discussed extensively with patient today. With shared decision-making, taking the following actions:  -patient will f/u with lymphedema clinic with goal to reduce swelling to try to improve skin integrity  -ordered ammonium lactate BID to legs to provide some level of ongoing massage/monitoring and provide a moisture/barrier to the dry skin of the legs  -trial increase of lasix to 60mg daily which may help with edema though etiology of edema is likely multifactorial and not entirely cardiogenic  -referral to Vascular to consider further  workup/intervention in line with her goals of care.         Lymphocytosis    Noted leukocytosis on recent labs  Likely in setting of recurrent cellulitis  Monitor on routine labs - recheck next week  Monitor for acute infectious symptoms - cellulitis seems improving with keflex                    Chief Complaint     Follow up acute - recurrent RLE cellulitis    History of Present Illness     Ld Savage is a 89 y.o. female who was seen today for follow up.  PMH includes: HTN, nephrolithiasis, Afib, HLD, lymphedema, GERD, CKD  Code Status: DNR      Asked by nursing to eval patient due to recent concern of recurred RLE cellulitis. Patient felt right leg was normal recently up until Friday 4/25 when she felt some chills/low grade fever and noted redness to the anterior/medial right lower extremity at location of prior cellulitis. On-call provider over weekend ordered labs and patient was started on keflex 500mg q12hr x7 days as of 4/26/25.      Patient seen and examined in room  Others present for encounter: none  Patient seated up in bed having had breakfast and talking on phone  Appears comfortable, awake, alert, oriented to situation, able to converse appropriately  Patient polite, appears in good spirits, Aox3, appears to be a good historian, mentation seems stable from prior. Xpresses happiness to see me and notes she is doing well lately apart from concern of the recurrent RLE cellulitis. She has not associated pain in the R leg. She feels the cellulitis is notably better today and improving/responding with the keflex. See Plan.   Remainder of ROS briefly reviewed and stable. She is due for a full/60-day visit later this week at which time will recheck on cellulitis.  No new/acute pain anywhere. Endorses chronic multifactorial pain including most significantly chronic bilateral shoulder pain (endorsed R shoulder due to hx of rotator cuff tear and L shoulder with severe arthritis), she feels the pain is overall  "much better controlled since recent switch to oxycodone from other opioid and happy with pain regimen presently.  Breathing fine, on room air, no acute SOB, no orthopnea  No recent CP/palpitations or orthostatic lightheadedness  Appetite good/stable, no acute swallowing concerns. Endorses she has been working hard to watch what she eats in order to intentionally lose weight, reports recent goal was to be below 230 lb (which she has now achieved).  Urinating well without acute symptoms  Endorses regular easy BM with bowel regimen. No acute abd pain/N/V  Does not feel acutely sick or confused or feverish, feels prior chills have resolved since starting keflex  No acute cardiopulmonary, abdominal, or urinary symptoms; see ROS for more details.    No further questions or acute concerns identified.  No further acute concerns per nursing. I discussed case extensively with unit nurse Joseph.      Lab Review:   Baseline GFR: 40s  Baseline Hb: around 12-13  3/11/25: CBC generally non-actionable, WBC 3.5; CMP with Cr 1.14, GFR 46; total chol 107, LDL 49; Mg 2.0; TSH WNL; vit D 47  3/24/25: CBC generally non-actionable, WBC 2.5 (generally chronically low in 3-4 range); CMP with Cr 1.13, GFR 47; ESR 41 (slightly high), CRP 10.1 (slightly high)  4/26/25: CBC with WBC 12.9, plt 134; BMP with Cr 1.18, GFR 44        No results found for: \"HGBA1C\"  Lab Results   Component Value Date    TNP8WYSTUKZS 1.772 10/01/2014    TSH 1.31 03/11/2025     Lab Results   Component Value Date    HKXNDTEC19 247 11/01/2022     Lab Results   Component Value Date    IRON 41 02/25/2020    TIBC 273 02/25/2020    FERRITIN 61 04/28/2020     No results found for: \"CHOLESTEROL\"  No results found for: \"HDL\"  No results found for: \"TRIG\"  No results found for: \"NONHDLC\"  No results found for: \"LDLCALC\"        CT ANKLE RIGHT W CONTRAST  Result Date: 4/8/2025  1. The osseous structures are demineralized. There are no CT findings to suggest osteomyelitis. No acute " "fracture or dislocation identified. Degenerative changes. Soft tissue swelling and subcutaneous edema      XR R foot 3/25/25 \"Degenerative changes, demineralization, and soft tissue swelling without acute osseous abnormality\"  XR R ankle 3/25/25 \"No acute osseous abnormality\"  US RLE 3/19/25 \"Right lower extremity visualized veins demonstrate no thrombosis\"    Echo Oct 2020: EF 60, no WMA, Left ventricular diastolic function was not assessed, no notable valvular disease        The following portions of the patient's history were reviewed and updated as appropriate: allergies, current medications, past family history, past medical history, past social history, past surgical history and problem list.    Review of Systems     Review of Systems   Constitutional:  Negative for appetite change, chills, diaphoresis and fever.   HENT:  Negative for drooling, ear pain, hearing loss, rhinorrhea, sore throat and trouble swallowing.    Eyes:  Negative for pain, discharge, redness, itching and visual disturbance.   Respiratory:  Negative for cough, chest tightness, shortness of breath and wheezing.    Cardiovascular:  Positive for leg swelling. Negative for chest pain and palpitations.   Gastrointestinal:  Negative for abdominal pain, blood in stool, constipation, diarrhea, nausea and vomiting.   Genitourinary:  Negative for difficulty urinating, dysuria and hematuria.   Musculoskeletal:  Positive for arthralgias and gait problem. Negative for back pain and neck pain.   Skin:  Positive for color change.   Neurological:  Negative for dizziness, facial asymmetry, speech difficulty, weakness, light-headedness and headaches.   Psychiatric/Behavioral:  Negative for agitation, behavioral problems and confusion. The patient is not nervous/anxious and is not hyperactive.    All other systems reviewed and are negative.      Active Problem List     Patient Active Problem List   Diagnosis    Nephrolithiasis    Essential hypertension    " Mixed hyperlipidemia    Paroxysmal A-fib (HCC)    Thrombocytopenia (HCC)    Chronic diastolic (congestive) heart failure (HCC)    Obesity, morbid, BMI 40.0-49.9 (HCC)    Anemia    Acute blood loss anemia    Gastroesophageal reflux disease with esophagitis without hemorrhage    Other constipation    Non-ST elevation myocardial infarction (NSTEMI) (HCC)    Renal cyst    Hx of abdominal supracervical subtotal hysterectomy    CKD (chronic kidney disease)    Advance care planning    Lymphedema    RLS (restless legs syndrome)    Personal history of other infectious and parasitic diseases    Chronic pain disorder    Sleep apnea    Acquired hypothyroidism    Cellulitis of right lower extremity    Age-related osteoporosis without current pathological fracture    Lymphocytosis       Objective     Vital Signs:     BP: 117/65 mmHg  4/29/2025 11:20   Temp:97.5 °F  4/29/2025 11:20 Pulse:67 bpm  4/29/2025 11:20 Weight:222.5 Lbs  4/1/2025 12:44   Resp:18 Breaths/min  4/29/2025 11:20 BS:97.1 mg/dL  10/27/2021 19:35 O2:97 %  4/29/2025 11:20 Pain:0  4/29/2025 10:23       Physical Exam  Vitals reviewed.   Constitutional:       General: She is not in acute distress.     Appearance: She is obese. She is not toxic-appearing or diaphoretic.   HENT:      Head: Normocephalic and atraumatic.      Right Ear: External ear normal.      Left Ear: External ear normal.      Nose: Nose normal. No rhinorrhea.      Mouth/Throat:      Mouth: Mucous membranes are dry.      Pharynx: Oropharynx is clear. No posterior oropharyngeal erythema.   Eyes:      General: No scleral icterus.        Right eye: No discharge.         Left eye: No discharge.      Extraocular Movements: Extraocular movements intact.      Conjunctiva/sclera: Conjunctivae normal.      Pupils: Pupils are equal, round, and reactive to light.   Pulmonary:      Effort: Pulmonary effort is normal. No respiratory distress.      Breath sounds: Normal breath sounds.   Abdominal:      General:  There is no distension.      Palpations: Abdomen is soft.      Tenderness: There is no abdominal tenderness. There is no guarding.   Musculoskeletal:         General: Swelling present.      Cervical back: No rigidity.      Comments: 2+ bilateral lower extremity edema with dry skin  Right lower extremity at anterior/medial shin with mild confluent erythema  No open wounds/bleed/drainage from legs     Skin:     General: Skin is warm and dry.      Coloration: Skin is not jaundiced.   Neurological:      General: No focal deficit present.      Mental Status: She is alert and oriented to person, place, and time. Mental status is at baseline.   Psychiatric:         Mood and Affect: Mood normal.         Behavior: Behavior normal.         Thought Content: Thought content normal.         Judgment: Judgment normal.         Pertinent Laboratory/Diagnostic Studies:  Laboratory and Imaging studies reviewed. Full report in the paper chart.     Current Medications   Medications reviewed and updated in facility chart.      -Total time spent on this encounter today including documentation and workup review, face to face time, history and exam, and documentation/orders, was approximately 30 minutes.  -This note will be copied to [facility] EMR where vitals and medication orders are placed.    Maco Baez D.O.  Geriatric Medicine  4/29/2025 2:35 PM

## 2025-04-29 NOTE — ASSESSMENT & PLAN NOTE
Recent concern of recurrent RLE cellulitis since early March 2025  Has been previously evaluated by previous provider at facility as well as by [facility] NP  Has been on several courses of keflex with improvement  Recent right lower extremity XR (march 2025) and CT (April 2025) negative for bony involvement  Most recently recurrence of cellulitis since ~4/25/25. Started on keflex course 4/26. Improving with keflex course.  Supportive care  Monitor skin for breakdown, wounds, evidence of worsening/recurrent infection  Continue lymphedema management  Discussed extensively with patient today. With shared decision-making, taking the following actions:  -patient will f/u with lymphedema clinic with goal to reduce swelling to try to improve skin integrity  -ordered ammonium lactate BID to legs to provide some level of ongoing massage/monitoring and provide a moisture/barrier to the dry skin of the legs  -trial increase of lasix to 60mg daily which may help with edema though etiology of edema is likely multifactorial and not entirely cardiogenic  -referral to Vascular to consider further workup/intervention in line with her goals of care.

## 2025-04-29 NOTE — ASSESSMENT & PLAN NOTE
Noted leukocytosis on recent labs  Likely in setting of recurrent cellulitis  Monitor on routine labs - recheck next week  Monitor for acute infectious symptoms - cellulitis seems improving with keflex

## 2025-05-02 ENCOUNTER — NURSING HOME VISIT (OUTPATIENT)
Dept: GERIATRICS | Facility: OTHER | Age: 89
End: 2025-05-02
Payer: COMMERCIAL

## 2025-05-02 DIAGNOSIS — R39.81 FUNCTIONAL URINARY INCONTINENCE: ICD-10-CM

## 2025-05-02 DIAGNOSIS — I48.0 PAROXYSMAL A-FIB (HCC): ICD-10-CM

## 2025-05-02 DIAGNOSIS — G89.4 CHRONIC PAIN DISORDER: ICD-10-CM

## 2025-05-02 DIAGNOSIS — E66.01 OBESITY, MORBID, BMI 40.0-49.9 (HCC): ICD-10-CM

## 2025-05-02 DIAGNOSIS — I10 ESSENTIAL HYPERTENSION: ICD-10-CM

## 2025-05-02 DIAGNOSIS — I89.0 LYMPHEDEMA: ICD-10-CM

## 2025-05-02 DIAGNOSIS — G25.81 RLS (RESTLESS LEGS SYNDROME): ICD-10-CM

## 2025-05-02 DIAGNOSIS — E03.9 ACQUIRED HYPOTHYROIDISM: ICD-10-CM

## 2025-05-02 DIAGNOSIS — G47.33 OBSTRUCTIVE SLEEP APNEA SYNDROME: ICD-10-CM

## 2025-05-02 DIAGNOSIS — D69.6 THROMBOCYTOPENIA (HCC): ICD-10-CM

## 2025-05-02 DIAGNOSIS — D72.820 LYMPHOCYTOSIS: ICD-10-CM

## 2025-05-02 DIAGNOSIS — R05.3 CHRONIC COUGH: ICD-10-CM

## 2025-05-02 DIAGNOSIS — K59.09 OTHER CONSTIPATION: ICD-10-CM

## 2025-05-02 DIAGNOSIS — L03.115 CELLULITIS OF RIGHT LOWER EXTREMITY: Primary | ICD-10-CM

## 2025-05-02 DIAGNOSIS — K21.00 GASTROESOPHAGEAL REFLUX DISEASE WITH ESOPHAGITIS WITHOUT HEMORRHAGE: ICD-10-CM

## 2025-05-02 DIAGNOSIS — M81.0 AGE-RELATED OSTEOPOROSIS WITHOUT CURRENT PATHOLOGICAL FRACTURE: ICD-10-CM

## 2025-05-02 DIAGNOSIS — I50.32 CHRONIC DIASTOLIC (CONGESTIVE) HEART FAILURE (HCC): ICD-10-CM

## 2025-05-02 DIAGNOSIS — N18.32 STAGE 3B CHRONIC KIDNEY DISEASE (HCC): ICD-10-CM

## 2025-05-02 DIAGNOSIS — E78.2 MIXED HYPERLIPIDEMIA: ICD-10-CM

## 2025-05-02 DIAGNOSIS — Z79.899 POLYPHARMACY: ICD-10-CM

## 2025-05-02 PROCEDURE — 99310 SBSQ NF CARE HIGH MDM 45: CPT | Performed by: STUDENT IN AN ORGANIZED HEALTH CARE EDUCATION/TRAINING PROGRAM

## 2025-05-03 PROBLEM — D62 ACUTE BLOOD LOSS ANEMIA: Status: RESOLVED | Noted: 2020-10-01 | Resolved: 2025-05-03

## 2025-05-03 PROBLEM — I21.4 NON-ST ELEVATION MYOCARDIAL INFARCTION (NSTEMI) (HCC): Status: RESOLVED | Noted: 2022-01-05 | Resolved: 2025-05-03

## 2025-05-03 PROBLEM — R39.81 FUNCTIONAL URINARY INCONTINENCE: Status: ACTIVE | Noted: 2025-05-03

## 2025-05-03 PROBLEM — R05.3 CHRONIC COUGH: Status: ACTIVE | Noted: 2025-05-03

## 2025-05-03 PROBLEM — Z79.899 POLYPHARMACY: Status: ACTIVE | Noted: 2025-05-03

## 2025-05-03 NOTE — ASSESSMENT & PLAN NOTE
Noted chronic urinary incontinence, largely with a functional component (she reports she does not urinate much during the day but typically will void overnight and wake up with wet undergarments)  Has been maintained on oxybutynin 5mg TID. Denies any recent bladder spasm/urgency symptoms. With shared decision-making reducing dose to 5mg daily for now.  Continue to monitor  Supportive care  PT/OT as appropriate  Following Urology outpatient

## 2025-05-03 NOTE — ASSESSMENT & PLAN NOTE
Noted hx of MARIBETH  Patient denies any recent associated issues overnight. Feels it has improved since she has been intentionally losing weight over time. Reports inability/unwillingness to tolerate CPAP  Continue to monitor  Supportive care  Consider f/u with Pulmonary/Sleep Medicine as needed/if symptoms worsen

## 2025-05-03 NOTE — ASSESSMENT & PLAN NOTE
No acute cardiac complaints  Continue Eliquis for AC (hx of bleeding on Xarelto)  Continue beta blocker for rate  HR controlled generally 60s-80s, at times borderline bradycardic  Following Cardiology

## 2025-05-03 NOTE — ASSESSMENT & PLAN NOTE
Patient endorses a chronic intermittent cough where she feels there is thick phlegm in her throat and she has trouble bringing it up  No recent/acute change  Probably a multifactorial issue including dryness from medication side effects  No acute respiratory concerns  Trial daily mucinex  Continue to monitor

## 2025-05-03 NOTE — ASSESSMENT & PLAN NOTE
Noted dx of osteoporosis  DXA 2019 consistent with osteoporosis  Continue calcium/vit D supplementation  Has been maintained on alendronate 70mg weekly. Per chart review it seems she has been on this since 2013 which puts her around 12 years of bisphosphonate therapy. Typically courses are limited to 5 years due to risk of adverse effects with prolonged use. She appears to be tolerating well though this could be contributory to ongoing GERD symptoms as well.  Extensive discussion about this at present visit with patient including education regarding potential risks/benefits of various therapies.  With shared decision-making  -discontinue alendronate due to risks of long-term use  -order updated DXA to eval for changes in bone density since last time  -pending results, consider alternative treatment such as prolia

## 2025-05-03 NOTE — ASSESSMENT & PLAN NOTE
Recent concern of recurrent RLE cellulitis since early March 2025  Has been previously evaluated by previous provider at facility as well as by [facility] NP  Has been on several courses of keflex with improvement  Recent right lower extremity XR (march 2025) and CT (April 2025) negative for bony involvement  Most recently recurrence of cellulitis since ~4/25/25. Started on keflex course 4/26. Improving with keflex course. Extended duration from 7 to 10 days total  Supportive care  Monitor skin for breakdown, wounds, evidence of worsening/recurrent infection  Continue lymphedema management  -patient will f/u with lymphedema clinic with goal to reduce swelling to try to improve skin integrity  -recently ordered ammonium lactate BID to legs to provide some level of ongoing massage/monitoring and provide a moisture/barrier to the dry skin of the legs - seems helpful  -recent increase of lasix to 60mg daily which may help with edema though etiology of edema is likely multifactorial and not entirely cardiogenic  -recently have provided referral to Vascular to consider further workup/intervention in line with her goals of care.

## 2025-05-03 NOTE — ASSESSMENT & PLAN NOTE
-stable, patient reports she does get intermittent reflux symptoms  -recommend OOB with meals, sit upright for at least 30 minutes afterwards, avoid trigger foods  -continue to monitor  -continue regimen including pantoprazole 40mg BID, famotidine 20mg BID  -consider weaning regimen a bit and/or discussing GI f/u for updated EGD at a future visit if in line with goals of care. Patient prefers to maintain same regimen for now.  -follow up with GI as appropriate/needed

## 2025-05-03 NOTE — ASSESSMENT & PLAN NOTE
At risk due to relative immobility, comorbidities, opioid pain regimen  Monitor stool output - lately stable on bowel regimen  Bowel regimen at facility: as per protocol, also continue regimen as ordered, adjust as appropriate; consider bisacodyl suppository PRN if no BM in 2-3 days  Encourage mobility as tolerated, PO hydration as appropriate, high fiber diet/prune juice (in outpatient setting as appropriate)  Goal is for 1 easy BM every 1-2 days

## 2025-05-03 NOTE — ASSESSMENT & PLAN NOTE
chronic multifactorial pain including most significantly chronic bilateral shoulder pain (endorsed R shoulder due to hx of rotator cuff tear and L shoulder with severe arthritis) - patient has expressed she would not want to pursue surgery and has been told she would not be a good/safe surgical candidate.  She feels pain much better controlled overall since recent switch from tramadol to oxycodone  Current regimen including: tylenol 975mg TID, oxycodone 5mg TID scheduled (increasing slightly today from BID to better control pain per discussion) + BID PRN, topical lidocaine  Adjust regimen as appropriate  Continue to monitor - improved/stable

## 2025-05-03 NOTE — PROGRESS NOTES
[facility]  Facility: [facility]    PROGRESS NOTE  Nursing Home Place of Service: nursing home place of service: POS 32 Unskilled- No Part A Coverage    NAME: Ld Savage  : 1935 AGE: 89 y.o. SEX: female MRN: [id number]  DATE OF ENCOUNTER: 2025    Assessment and Plan     Problem List Items Addressed This Visit       Essential hypertension    Monitor BP - generally stable around 110s-130s systolic  Avoid hypotension  No acute cardiac complaints  Continue regimen including lasix 60mg daily, metoprolol tartrate 12.5mg BID  Follow up with Cardiology as appropriate           Mixed hyperlipidemia    Recent lipid panel well controlled  Encourage lifestyle modifications including healthy diet, weight management, exercise as appropriate  Continue statin         Paroxysmal A-fib (HCC)    No acute cardiac complaints  Continue Eliquis for AC (hx of bleeding on Xarelto)  Continue beta blocker for rate  HR controlled generally 60s-80s, at times borderline bradycardic  Following Cardiology         Thrombocytopenia (HCC)    Seems to have mild intermittent thrombocytopenia on lab review  -monitor on routine labs - stable  -monitor for acute bleed - no present signs  -consider further workup, Heme consult if persistent/worsening           Chronic diastolic (congestive) heart failure (HCC)    Wt Readings from Last 3 Encounters:   10/09/24 105 kg (230 lb 11.2 oz)   24 112 kg (246 lb)   23 125 kg (276 lb)     Noted hx of chronic diastolic CHF  Monitor weight - overall trending down (she is intentionally trying to lose weight), no alarming uptrend  Monitor volume status clinically - peripheral edema reported stable, mucus membranes slightly dry - overall seems euvolemic/compensated  Encourage elevation, compression of lower extremities as able  Continue beta blocker  Continue lasix 60mg daily (recent increase to try to further improve edema, seems tolerating well)  Follow up with  Cardiology as appropriate           Obesity, morbid, BMI 40.0-49.9 ([facility])    Encourage lifestyle modifications including healthy diet, weight management, exercise as appropriate  Patient highly motivated, has been independently working on losing weight through diet control.  She endorses having intentionally lost >100 lb since being at facility over several years. As of 2020 she was >300 lb. She has gradually trended down overall, most recently in 220s which she is happy about as she reports her recent goal was to be below 230 lb to be allowed to go skydiving.  She denies any unintentional weight loss  Continue to monitor  Dietary team to follow as appropriate at facility and to help with safe weight/diet management         Gastroesophageal reflux disease with esophagitis without hemorrhage    -stable, patient reports she does get intermittent reflux symptoms  -recommend OOB with meals, sit upright for at least 30 minutes afterwards, avoid trigger foods  -continue to monitor  -continue regimen including pantoprazole 40mg BID, famotidine 20mg BID  -consider weaning regimen a bit and/or discussing GI f/u for updated EGD at a future visit if in line with goals of care. Patient prefers to maintain same regimen for now.  -follow up with GI as appropriate/needed         Other constipation    At risk due to relative immobility, comorbidities, opioid pain regimen  Monitor stool output - lately stable on bowel regimen  Bowel regimen at facility: as per protocol, also continue regimen as ordered, adjust as appropriate; consider bisacodyl suppository PRN if no BM in 2-3 days  Encourage mobility as tolerated, PO hydration as appropriate, high fiber diet/prune juice (in outpatient setting as appropriate)  Goal is for 1 easy BM every 1-2 days           CKD (chronic kidney disease)    Lab Results   Component Value Date    EGFR 47 (L) 03/24/2025    EGFR 46 (L) 03/11/2025    EGFR 45 (L) 09/11/2024    CREATININE 1.13 (H) 03/24/2025     CREATININE 1.14 (H) 03/11/2025    CREATININE 1.16 (H) 09/11/2024     Baseline seems consistent with CKD 3a bordering on 3b  Monitor renal function on routine labs - stable  Avoid nephrotoxins, NSAIDs as able  Encourage PO hydration, respecting volume status  Follow up with Nephrology as appropriate/needed. Does follow with urology.           Lymphedema    Noted chronic hx of lymphedema  Following Cardiology and lymphedema clinic  It does not seem her edema is particularly cardiogenic in nature. Last Cardiology visit from May 2024 - did not appear she had heart failure although she has noted history of diastolic dysfunction. Last echo from 2020 with preserved EF and no notable valvular disease.  Edema likely multifactorial including obesity, dependent edema, vascular insufficiency, diastolic dysfunction  Monitor weight - no alarming uptrend. Overall gradually trending down with intentional diet control  Monitor volume status clinically - lymphedema reported stable. No orthopnea. Seems euvolemic.  Encourage elevation, compression of lower extremities as able  Continue lasix - see rest of plan  Follow up with Cardiology as appropriate - next visit May 2025         RLS (restless legs syndrome)    Stable on pramipexole  Patient denies any recent associated RLS symptoms, she states she used to get symptoms in the past but has been well controlled lately  Amenable to wean, reduced pramipexole from 0.5 to 0.25mg qHS, consider further wean as appropriate         Chronic pain disorder    chronic multifactorial pain including most significantly chronic bilateral shoulder pain (endorsed R shoulder due to hx of rotator cuff tear and L shoulder with severe arthritis) - patient has expressed she would not want to pursue surgery and has been told she would not be a good/safe surgical candidate.  She feels pain much better controlled overall since recent switch from tramadol to oxycodone  Current regimen including: tylenol 975mg  TID, oxycodone 5mg TID scheduled (increasing slightly today from BID to better control pain per discussion) + BID PRN, topical lidocaine  Adjust regimen as appropriate  Continue to monitor - improved/stable         Sleep apnea    Noted hx of MARIBETH  Patient denies any recent associated issues overnight. Feels it has improved since she has been intentionally losing weight over time. Reports inability/unwillingness to tolerate CPAP  Continue to monitor  Supportive care  Consider f/u with Pulmonary/Sleep Medicine as needed/if symptoms worsen         Acquired hypothyroidism    Recent TSH WNL  No apparent acute/associated symptoms  Continue levothyroxine 150mcg daily  Monitor periodically         Cellulitis of right lower extremity - Primary    Recent concern of recurrent RLE cellulitis since early March 2025  Has been previously evaluated by previous provider at facility as well as by [facility] NP  Has been on several courses of keflex with improvement  Recent right lower extremity XR (march 2025) and CT (April 2025) negative for bony involvement  Most recently recurrence of cellulitis since ~4/25/25. Started on keflex course 4/26. Improving with keflex course. Extended duration from 7 to 10 days total  Supportive care  Monitor skin for breakdown, wounds, evidence of worsening/recurrent infection  Continue lymphedema management  -patient will f/u with lymphedema clinic with goal to reduce swelling to try to improve skin integrity  -recently ordered ammonium lactate BID to legs to provide some level of ongoing massage/monitoring and provide a moisture/barrier to the dry skin of the legs - seems helpful  -recent increase of lasix to 60mg daily which may help with edema though etiology of edema is likely multifactorial and not entirely cardiogenic  -recently have provided referral to Vascular to consider further workup/intervention in line with her goals of care.         Age-related osteoporosis without current pathological  fracture    Noted dx of osteoporosis  DXA 2019 consistent with osteoporosis  Continue calcium/vit D supplementation  Has been maintained on alendronate 70mg weekly. Per chart review it seems she has been on this since 2013 which puts her around 12 years of bisphosphonate therapy. Typically courses are limited to 5 years due to risk of adverse effects with prolonged use. She appears to be tolerating well though this could be contributory to ongoing GERD symptoms as well.  Extensive discussion about this at present visit with patient including education regarding potential risks/benefits of various therapies.  With shared decision-making  -discontinue alendronate due to risks of long-term use  -order updated DXA to eval for changes in bone density since last time  -pending results, consider alternative treatment such as prolia         Lymphocytosis    Noted leukocytosis on recent labs  Likely in setting of recurrent cellulitis  Monitor on routine labs - recheck next week  Monitor for acute infectious symptoms - cellulitis seems improving with keflex           Polypharmacy    Patient has been on many scheduled meds. At high risk for adverse effects/interactions. Likely some of her ongoing symptoms including dry mouth and constipation are related to medication side effects.  Reviewed complete med list with patient this visit including providing education and discussing indications and potential risks/benefits of her various meds  With shared decision-making, made the following changes:  -discontinued oral iron (hx of anemia which has been lately better and last iron level was WNL)  -discontinued melatonin (reports sleeping well lately)  -discontinued vitamin D supplement (already also on calcium-vit D combo)  -discontinued alendronate (see plan under osteoporosis)  -decreased pramipexole dose (see plan under RLS)  -decreased oxybutynin (see plan under urinary incontinence)  Continue to look for opportunities to  streamline regimen         Functional urinary incontinence    Noted chronic urinary incontinence, largely with a functional component (she reports she does not urinate much during the day but typically will void overnight and wake up with wet undergarments)  Has been maintained on oxybutynin 5mg TID. Denies any recent bladder spasm/urgency symptoms. With shared decision-making reducing dose to 5mg daily for now.  Continue to monitor  Supportive care  PT/OT as appropriate  Following Urology outpatient         Chronic cough    Patient endorses a chronic intermittent cough where she feels there is thick phlegm in her throat and she has trouble bringing it up  No recent/acute change  Probably a multifactorial issue including dryness from medication side effects  No acute respiratory concerns  Trial daily mucinex  Continue to monitor                    Chief Complaint     Follow up 60 day    History of Present Illness     Ld Savage is a 89 y.o. female who was seen today for follow up.  PMH includes: HTN, nephrolithiasis, Afib, HLD, lymphedema, GERD, CKD  Code Status: DNR      Patient seen and examined in room  Others present for encounter: none  Patient seated up in bed talking on phone  Appears comfortable, awake, alert, oriented to situation, able to converse appropriately  Patient polite, appears in good spirits, Aox3, appears to be a good historian, mentation seems stable from prior.  The RLE cellulitis concern seems to be improving steadily, notably better than last visit. She has not associated pain in the R leg. She feels the cellulitis is notably better today and improving/responding with the keflex. See Plan.   No new/acute pain anywhere. She has chronic multifactorial pain including most significantly chronic bilateral shoulder pain (endorsed R shoulder due to hx of rotator cuff tear and L shoulder with severe arthritis), she feels the pain is overall notably better controlled since recent switch to  "oxycodone from other opioid, see Plan.  Breathing fine, on room air, no acute SOB, no orthopnea  No recent CP/palpitations or orthostatic lightheadedness  Appetite good/stable, no acute swallowing concerns. Again endorses she has been working hard to watch what she eats in order to intentionally lose weight, reports recent goal was to be below 230 lb (which she has now achieved).  Urinating well without acute symptoms  Endorses regular easy BM with bowel regimen. No acute abd pain/N/V  Does not feel acutely sick or confused or feverish.  No acute cardiopulmonary, abdominal, or urinary symptoms; see ROS for more details.  She remains with a goal of participating in raksul this summer and accepting of the risks, goals of care clearly identified that she wants to be able to do things she enjoys at this stage in life even if there is a significant risk.    No further questions or acute concerns identified.  No further acute concerns per nursing.      Lab Review:   Baseline GFR: 40s  Baseline Hb: around 12-13  3/11/25: CBC generally non-actionable, WBC 3.5; CMP with Cr 1.14, GFR 46; total chol 107, LDL 49; Mg 2.0; TSH WNL; vit D 47  3/24/25: CBC generally non-actionable, WBC 2.5 (generally chronically low in 3-4 range); CMP with Cr 1.13, GFR 47; ESR 41 (slightly high), CRP 10.1 (slightly high)  4/26/25: CBC with WBC 12.9, plt 134; BMP with Cr 1.18, GFR 44        No results found for: \"HGBA1C\"  Lab Results   Component Value Date    NXZ2VGYIHQHQ 1.772 10/01/2014    TSH 1.31 03/11/2025     Lab Results   Component Value Date    QNLKARMH86 247 11/01/2022     Lab Results   Component Value Date    IRON 41 02/25/2020    TIBC 273 02/25/2020    FERRITIN 61 04/28/2020     No results found for: \"CHOLESTEROL\"  No results found for: \"HDL\"  No results found for: \"TRIG\"  No results found for: \"NONHDLC\"  No results found for: \"LDLCALC\"        CT ANKLE RIGHT W CONTRAST  Result Date: 4/8/2025  1. The osseous structures are " "demineralized. There are no CT findings to suggest osteomyelitis. No acute fracture or dislocation identified. Degenerative changes. Soft tissue swelling and subcutaneous edema      XR R foot 3/25/25 \"Degenerative changes, demineralization, and soft tissue swelling without acute osseous abnormality\"  XR R ankle 3/25/25 \"No acute osseous abnormality\"  US RLE 3/19/25 \"Right lower extremity visualized veins demonstrate no thrombosis\"    Echo Oct 2020: EF 60, no WMA, Left ventricular diastolic function was not assessed, no notable valvular disease        The following portions of the patient's history were reviewed and updated as appropriate: allergies, current medications, past family history, past medical history, past social history, past surgical history and problem list.    Review of Systems     Review of Systems   Constitutional:  Negative for appetite change, chills, diaphoresis, fatigue and fever.   HENT:  Negative for drooling, ear pain, hearing loss, rhinorrhea, sore throat and trouble swallowing.    Eyes:  Negative for pain, discharge, redness, itching and visual disturbance.   Respiratory:  Negative for cough, chest tightness, shortness of breath and wheezing.    Cardiovascular:  Positive for leg swelling. Negative for chest pain and palpitations.   Gastrointestinal:  Negative for abdominal pain, blood in stool, constipation, diarrhea, nausea and vomiting.   Genitourinary:  Negative for difficulty urinating, dysuria, flank pain, hematuria and urgency.   Musculoskeletal:  Positive for arthralgias and gait problem. Negative for back pain and neck pain.   Skin:  Negative for color change.   Neurological:  Negative for dizziness, facial asymmetry, speech difficulty, weakness, light-headedness and headaches.   Psychiatric/Behavioral:  Negative for agitation, behavioral problems and confusion. The patient is not nervous/anxious and is not hyperactive.    All other systems reviewed and are negative.      Active " Problem List     Patient Active Problem List   Diagnosis    Nephrolithiasis    Essential hypertension    Mixed hyperlipidemia    Paroxysmal A-fib (HCC)    Thrombocytopenia (HCC)    Chronic diastolic (congestive) heart failure (HCC)    Obesity, morbid, BMI 40.0-49.9 (HCC)    Anemia    Gastroesophageal reflux disease with esophagitis without hemorrhage    Other constipation    Renal cyst    Hx of abdominal supracervical subtotal hysterectomy    CKD (chronic kidney disease)    Advance care planning    Lymphedema    RLS (restless legs syndrome)    Personal history of other infectious and parasitic diseases    Chronic pain disorder    Sleep apnea    Acquired hypothyroidism    Cellulitis of right lower extremity    Age-related osteoporosis without current pathological fracture    Lymphocytosis    Polypharmacy    Functional urinary incontinence    Chronic cough       Objective     Vital Signs:     BP: 117/65 mmHg  4/29/2025 11:20   Temp:97.7 °F   Pulse:67 bpm  4/29/2025 11:20 Weight:227 Lbs  5/1/2025 11:45   Resp:18 Breaths/min  4/29/2025 11:20 BS:97.1 mg/dL  10/27/2021 19:35 O2:97 %  4/29/2025 11:20 Pain:0         Physical Exam  Vitals reviewed.   Constitutional:       General: She is not in acute distress.     Appearance: She is obese. She is not toxic-appearing or diaphoretic.   HENT:      Head: Normocephalic and atraumatic.      Right Ear: External ear normal.      Left Ear: External ear normal.      Nose: Nose normal. No rhinorrhea.      Mouth/Throat:      Mouth: Mucous membranes are dry.      Pharynx: Oropharynx is clear. No posterior oropharyngeal erythema.   Eyes:      General: No scleral icterus.        Right eye: No discharge.         Left eye: No discharge.      Extraocular Movements: Extraocular movements intact.      Conjunctiva/sclera: Conjunctivae normal.      Pupils: Pupils are equal, round, and reactive to light.   Cardiovascular:      Rate and Rhythm: Normal rate and regular rhythm.   Pulmonary:       Effort: Pulmonary effort is normal. No respiratory distress.      Breath sounds: Normal breath sounds. No wheezing or rales.   Abdominal:      General: Bowel sounds are normal. There is no distension.      Palpations: Abdomen is soft.      Tenderness: There is no abdominal tenderness. There is no guarding.   Musculoskeletal:         General: Swelling present.      Cervical back: No rigidity.      Comments: 2+ bilateral lower extremity edema with dry skin  Right lower extremity at anterior/medial shin with mild/fading erythema  No open wounds/bleed/drainage from legs     Skin:     General: Skin is warm and dry.      Coloration: Skin is not jaundiced.   Neurological:      General: No focal deficit present.      Mental Status: She is alert and oriented to person, place, and time. Mental status is at baseline.   Psychiatric:         Mood and Affect: Mood normal.         Behavior: Behavior normal.         Thought Content: Thought content normal.         Judgment: Judgment normal.         Pertinent Laboratory/Diagnostic Studies:  Laboratory and Imaging studies reviewed. Full report in the paper chart.     Current Medications   Medications reviewed and updated in facility chart.      -Total time spent on this encounter today including documentation and workup review, face to face time, history and exam, and documentation/orders, polypharmacy discussion/review was approximately 60 minutes.  -This note will be copied to [facility] EMR where vitals and medication orders are placed.    Maco Baez D.O.  Geriatric Medicine  5/3/2025 12:54 PM

## 2025-05-03 NOTE — ASSESSMENT & PLAN NOTE
Stable on pramipexole  Patient denies any recent associated RLS symptoms, she states she used to get symptoms in the past but has been well controlled lately  Amenable to wean, reduced pramipexole from 0.5 to 0.25mg qHS, consider further wean as appropriate

## 2025-05-03 NOTE — ASSESSMENT & PLAN NOTE
Monitor BP - generally stable around 110s-130s systolic  Avoid hypotension  No acute cardiac complaints  Continue regimen including lasix 60mg daily, metoprolol tartrate 12.5mg BID  Follow up with Cardiology as appropriate

## 2025-05-03 NOTE — ASSESSMENT & PLAN NOTE
Noted chronic hx of lymphedema  Following Cardiology and lymphedema clinic  It does not seem her edema is particularly cardiogenic in nature. Last Cardiology visit from May 2024 - did not appear she had heart failure although she has noted history of diastolic dysfunction. Last echo from 2020 with preserved EF and no notable valvular disease.  Edema likely multifactorial including obesity, dependent edema, vascular insufficiency, diastolic dysfunction  Monitor weight - no alarming uptrend. Overall gradually trending down with intentional diet control  Monitor volume status clinically - lymphedema reported stable. No orthopnea. Seems euvolemic.  Encourage elevation, compression of lower extremities as able  Continue lasix - see rest of plan  Follow up with Cardiology as appropriate - next visit May 2025

## 2025-05-03 NOTE — ASSESSMENT & PLAN NOTE
Seems to have mild intermittent thrombocytopenia on lab review  -monitor on routine labs - stable  -monitor for acute bleed - no present signs  -consider further workup, Heme consult if persistent/worsening

## 2025-05-03 NOTE — ASSESSMENT & PLAN NOTE
Patient has been on many scheduled meds. At high risk for adverse effects/interactions. Likely some of her ongoing symptoms including dry mouth and constipation are related to medication side effects.  Reviewed complete med list with patient this visit including providing education and discussing indications and potential risks/benefits of her various meds  With shared decision-making, made the following changes:  -discontinued oral iron (hx of anemia which has been lately better and last iron level was WNL)  -discontinued melatonin (reports sleeping well lately)  -discontinued vitamin D supplement (already also on calcium-vit D combo)  -discontinued alendronate (see plan under osteoporosis)  -decreased pramipexole dose (see plan under RLS)  -decreased oxybutynin (see plan under urinary incontinence)  Continue to look for opportunities to streamline regimen

## 2025-05-03 NOTE — ASSESSMENT & PLAN NOTE
Wt Readings from Last 3 Encounters:   10/09/24 105 kg (230 lb 11.2 oz)   05/31/24 112 kg (246 lb)   08/11/23 125 kg (276 lb)     Noted hx of chronic diastolic CHF  Monitor weight - overall trending down (she is intentionally trying to lose weight), no alarming uptrend  Monitor volume status clinically - peripheral edema reported stable, mucus membranes slightly dry - overall seems euvolemic/compensated  Encourage elevation, compression of lower extremities as able  Continue beta blocker  Continue lasix 60mg daily (recent increase to try to further improve edema, seems tolerating well)  Follow up with Cardiology as appropriate

## 2025-05-30 ENCOUNTER — HOSPITAL ENCOUNTER (OUTPATIENT)
Dept: BONE DENSITY | Facility: CLINIC | Age: 89
Discharge: HOME | End: 2025-05-30
Attending: STUDENT IN AN ORGANIZED HEALTH CARE EDUCATION/TRAINING PROGRAM
Payer: COMMERCIAL

## 2025-05-30 VITALS — HEIGHT: 62 IN | WEIGHT: 229 LBS | BODY MASS INDEX: 42.14 KG/M2

## 2025-05-30 DIAGNOSIS — Z13.820 ENCOUNTER FOR SCREENING FOR OSTEOPOROSIS: ICD-10-CM

## 2025-05-30 PROCEDURE — 77080 DXA BONE DENSITY AXIAL: CPT

## 2025-06-03 ENCOUNTER — NURSING HOME VISIT (OUTPATIENT)
Dept: GERIATRICS | Facility: OTHER | Age: 89
End: 2025-06-03
Payer: COMMERCIAL

## 2025-06-03 DIAGNOSIS — Z79.899 POLYPHARMACY: ICD-10-CM

## 2025-06-03 DIAGNOSIS — K59.09 OTHER CONSTIPATION: ICD-10-CM

## 2025-06-03 DIAGNOSIS — G89.4 CHRONIC PAIN DISORDER: ICD-10-CM

## 2025-06-03 DIAGNOSIS — I89.0 LYMPHEDEMA: ICD-10-CM

## 2025-06-03 DIAGNOSIS — K21.00 GASTROESOPHAGEAL REFLUX DISEASE WITH ESOPHAGITIS WITHOUT HEMORRHAGE: ICD-10-CM

## 2025-06-03 DIAGNOSIS — I50.32 CHRONIC DIASTOLIC (CONGESTIVE) HEART FAILURE (HCC): Primary | ICD-10-CM

## 2025-06-03 DIAGNOSIS — G25.81 RLS (RESTLESS LEGS SYNDROME): ICD-10-CM

## 2025-06-03 DIAGNOSIS — Z87.2 HISTORY OF CELLULITIS: ICD-10-CM

## 2025-06-03 DIAGNOSIS — N18.32 STAGE 3B CHRONIC KIDNEY DISEASE (HCC): ICD-10-CM

## 2025-06-03 DIAGNOSIS — D69.6 THROMBOCYTOPENIA (HCC): ICD-10-CM

## 2025-06-03 DIAGNOSIS — I10 ESSENTIAL HYPERTENSION: ICD-10-CM

## 2025-06-03 DIAGNOSIS — R05.3 CHRONIC COUGH: ICD-10-CM

## 2025-06-03 DIAGNOSIS — R09.81 SINUS CONGESTION: ICD-10-CM

## 2025-06-03 DIAGNOSIS — E66.01 OBESITY, MORBID, BMI 40.0-49.9 (HCC): ICD-10-CM

## 2025-06-03 DIAGNOSIS — M81.0 AGE-RELATED OSTEOPOROSIS WITHOUT CURRENT PATHOLOGICAL FRACTURE: ICD-10-CM

## 2025-06-03 DIAGNOSIS — I48.0 PAROXYSMAL A-FIB (HCC): ICD-10-CM

## 2025-06-03 DIAGNOSIS — R39.81 FUNCTIONAL URINARY INCONTINENCE: ICD-10-CM

## 2025-06-03 DIAGNOSIS — G47.33 OBSTRUCTIVE SLEEP APNEA SYNDROME: ICD-10-CM

## 2025-06-03 DIAGNOSIS — E78.2 MIXED HYPERLIPIDEMIA: ICD-10-CM

## 2025-06-03 DIAGNOSIS — E03.9 ACQUIRED HYPOTHYROIDISM: ICD-10-CM

## 2025-06-03 PROCEDURE — 99310 SBSQ NF CARE HIGH MDM 45: CPT | Performed by: STUDENT IN AN ORGANIZED HEALTH CARE EDUCATION/TRAINING PROGRAM

## 2025-06-04 PROBLEM — R09.81 SINUS CONGESTION: Status: ACTIVE | Noted: 2025-06-04

## 2025-06-04 PROBLEM — D72.820 LYMPHOCYTOSIS: Status: RESOLVED | Noted: 2025-04-29 | Resolved: 2025-06-04

## 2025-06-04 PROBLEM — Z87.2 HISTORY OF CELLULITIS: Status: ACTIVE | Noted: 2025-06-04

## 2025-06-04 NOTE — ASSESSMENT & PLAN NOTE
Patient has been on many scheduled meds. At high risk for adverse effects/interactions.   With shared decision-making, made the following changes:  -reducing lasix  -discontinue pramipexole  -reducing pantoprazole and famotidine  Continue to look for opportunities to streamline regimen

## 2025-06-04 NOTE — ASSESSMENT & PLAN NOTE
chronic multifactorial pain including most significantly chronic bilateral shoulder pain (endorsed R shoulder due to hx of rotator cuff tear and L shoulder with severe arthritis) - patient has expressed she would not want to pursue surgery and has been told she would not be a good/safe surgical candidate.  She feels pain much better controlled overall since recent switch from tramadol to oxycodone  Current regimen including: tylenol 975mg TID, oxycodone 5mg TID scheduled + BID PRN, topical lidocaine  Adjust regimen as appropriate  Continue to monitor - improved/stable

## 2025-06-04 NOTE — ASSESSMENT & PLAN NOTE
Noted dx of osteoporosis  DXA 2019 consistent with osteoporosis  Continue calcium/vit D supplementation  Had been maintained on alendronate 70mg weekly. Per chart review it seems she has been on this since 2013 which puts her around 12 years of bisphosphonate therapy. Was discontinued in May 2025.  Updated DXA pending results  Pending above, consider further treatment, patient amenable to Prolia for example

## 2025-06-04 NOTE — ASSESSMENT & PLAN NOTE
Noted chronic hx of lymphedema  Following Cardiology and lymphedema clinic  It does not seem her edema is particularly cardiogenic in nature. Last Cardiology visit from May 2024 - did not appear she had heart failure although she has noted history of diastolic dysfunction. Last echo from 2020 with preserved EF and no notable valvular disease.  Edema likely multifactorial including obesity, dependent edema, vascular insufficiency, diastolic dysfunction  Monitor weight - no alarming uptrend. Overall gradually trending down with intentional diet control  Monitor volume status clinically - lymphedema reported stable. No orthopnea. Seems euvolemic.  Encourage elevation, compression of lower extremities as able  Continue lasix - see rest of plan  Follow up with Cardiology as appropriate - recent visit May 2025

## 2025-06-04 NOTE — PROGRESS NOTES
[facility]  Facility: [facility]    PROGRESS NOTE  Nursing Home Place of Service: nursing home place of service: POS 32 Unskilled- No Part A Coverage    NAME: Ld Savage  : 1935 AGE: 89 y.o. SEX: female MRN: [id number]  DATE OF ENCOUNTER: 6/3/2025    Assessment and Plan     Problem List Items Addressed This Visit       Essential hypertension    Monitor BP - generally stable around 110s-130s systolic  Avoid hypotension  No acute cardiac complaints  Continue regimen including lasix 40mg daily, metoprolol tartrate 12.5mg BID  Follow up with Cardiology as appropriate           Mixed hyperlipidemia    Recent lipid panel well controlled  Encourage lifestyle modifications including healthy diet, weight management, exercise as appropriate  Continue statin         Paroxysmal A-fib (HCC)    No acute cardiac complaints  Continue Eliquis for AC (hx of bleeding on Xarelto)  Continue beta blocker for rate  HR controlled generally 60s-80s, at times borderline bradycardic  Following Cardiology         Thrombocytopenia (HCC)    Seems to have mild intermittent thrombocytopenia on lab review  -monitor on routine labs - stable  -monitor for acute bleed - no present signs  -consider further workup, Heme consult if persistent/worsening           Chronic diastolic (congestive) heart failure (HCC) - Primary    Wt Readings from Last 3 Encounters:   25 104 kg (229 lb)   10/09/24 105 kg (230 lb 11.2 oz)   24 112 kg (246 lb)     Noted hx of chronic diastolic CHF  Monitor weight - overall trending down (she is intentionally trying to lose weight), no alarming uptrend  Monitor volume status clinically - peripheral edema seems improved, mucus membranes slightly dry - overall seems euvolemic/compensated  Encourage elevation, compression of lower extremities as able  Continue beta blocker  Continue lasix - patient finding 60mg dose to be too much leading to worse quality of life, with shared decision  making reducing back to 40mg daily at 5pm to best control her urine output and meet her quality of life goals  Follow up with Cardiology as appropriate           Obesity, morbid, BMI 40.0-49.9 ([facility])    Encourage lifestyle modifications including healthy diet, weight management, exercise as appropriate  Patient highly motivated, has been independently working on losing weight through diet control.  She endorses having intentionally lost >100 lb since being at facility over several years. As of 2020 she was >300 lb. She has gradually trended down overall, most recently in 220s which she is happy about as she reports her recent goal was to be below 230 lb to be allowed to go skydiving.  She denies any unintentional weight loss  Continue to monitor  Dietary team to follow as appropriate at facility and to help with safe weight/diet management         Gastroesophageal reflux disease with esophagitis without hemorrhage    -much improved, patient feels she has had no recent reflux symptoms, some of her prior symptoms could have been medication side effects (possibly from prior alendronate), she is very happy with the improvement  -recommend OOB with meals, sit upright for at least 30 minutes afterwards, avoid trigger foods  -continue to monitor  -continue regimen including pantoprazole (reducing from 40 BID to 20mg daily), famotidine (reducing from 20mg BID to daily)  -consider weaning regimen further if symptoms remain controlled  -follow up with GI as appropriate/needed         Other constipation    At risk due to relative immobility, comorbidities, opioid pain regimen  Monitor stool output - lately stable on bowel regimen  Bowel regimen at facility: as per protocol, also continue regimen as ordered, adjust as appropriate; consider bisacodyl suppository PRN if no BM in 2-3 days  Encourage mobility as tolerated, PO hydration as appropriate, high fiber diet/prune juice (in outpatient setting as appropriate)  Goal is for 1  easy BM every 1-2 days           CKD (chronic kidney disease)    Lab Results   Component Value Date    EGFR 47 (L) 03/24/2025    EGFR 46 (L) 03/11/2025    EGFR 45 (L) 09/11/2024    CREATININE 1.13 (H) 03/24/2025    CREATININE 1.14 (H) 03/11/2025    CREATININE 1.16 (H) 09/11/2024     Baseline seems consistent with CKD 3a bordering on 3b  Monitor renal function on routine labs - stable  Avoid nephrotoxins, NSAIDs as able  Encourage PO hydration, respecting volume status  Follow up with Nephrology as appropriate/needed. Does follow with urology.           Lymphedema    Noted chronic hx of lymphedema  Following Cardiology and lymphedema clinic  It does not seem her edema is particularly cardiogenic in nature. Last Cardiology visit from May 2024 - did not appear she had heart failure although she has noted history of diastolic dysfunction. Last echo from 2020 with preserved EF and no notable valvular disease.  Edema likely multifactorial including obesity, dependent edema, vascular insufficiency, diastolic dysfunction  Monitor weight - no alarming uptrend. Overall gradually trending down with intentional diet control  Monitor volume status clinically - lymphedema reported stable. No orthopnea. Seems euvolemic.  Encourage elevation, compression of lower extremities as able  Continue lasix - see rest of plan  Follow up with Cardiology as appropriate - recent visit May 2025         RLS (restless legs syndrome)    Stable /asymptomatic per patient  Patient denies any recent associated RLS symptoms, she states she used to get symptoms in the past but has been well controlled lately  With shared decision making, discontinue pramipexole at this time  Continue to monitor         Chronic pain disorder    chronic multifactorial pain including most significantly chronic bilateral shoulder pain (endorsed R shoulder due to hx of rotator cuff tear and L shoulder with severe arthritis) - patient has expressed she would not want to  pursue surgery and has been told she would not be a good/safe surgical candidate.  She feels pain much better controlled overall since recent switch from tramadol to oxycodone  Current regimen including: tylenol 975mg TID, oxycodone 5mg TID scheduled + BID PRN, topical lidocaine  Adjust regimen as appropriate  Continue to monitor - improved/stable         Sleep apnea    Noted hx of MARIBETH  Patient denies any recent associated issues overnight. Feels it has improved since she has been intentionally losing weight over time. Reports inability/unwillingness to tolerate CPAP  Continue to monitor  Supportive care  Consider f/u with Pulmonary/Sleep Medicine as needed/if symptoms worsen         Acquired hypothyroidism    Recent TSH WNL  No apparent acute/associated symptoms  Continue levothyroxine 150mcg daily  Monitor periodically         Age-related osteoporosis without current pathological fracture    Noted dx of osteoporosis  DXA 2019 consistent with osteoporosis  Continue calcium/vit D supplementation  Had been maintained on alendronate 70mg weekly. Per chart review it seems she has been on this since 2013 which puts her around 12 years of bisphosphonate therapy. Was discontinued in May 2025.  Updated DXA pending results  Pending above, consider further treatment, patient amenable to Prolia for example         Polypharmacy    Patient has been on many scheduled meds. At high risk for adverse effects/interactions.   With shared decision-making, made the following changes:  -reducing lasix  -discontinue pramipexole  -reducing pantoprazole and famotidine  Continue to look for opportunities to streamline regimen         Functional urinary incontinence    Noted chronic urinary incontinence, largely with a functional component (she reports she does not urinate much during the day but typically will void overnight and wake up with wet undergarments)  Has been maintained on oxybutynin. Historically was on 5mg TID, she feels  somewhat worsened incontinence with recent reduction to 5mg daily so with shared decision-making will increase dosing to 5mg BID at this time.  Continue to monitor  Supportive care  PT/OT as appropriate  Following Urology outpatient         Chronic cough    Patient endorsed a chronic intermittent cough where she feels there is thick phlegm in her throat and she has trouble bringing it up  No recent/acute change  Probably a multifactorial issue including dryness from medication side effects  No acute respiratory concerns  Continue mucinex  Continue to monitor - improved/stable with mucinex         History of cellulitis    Recent concern of recurrent RLE cellulitis since early March 2025  Has been previously evaluated by previous provider at facility as well as by [facility] NP  Has been on several courses of keflex with improvement  Recent right lower extremity XR (march 2025) and CT (April 2025) negative for bony involvement  Most recently recurrence of cellulitis since ~4/25/25. Seems resolved s/p keflex course.  Supportive care  Monitor skin for breakdown, wounds, evidence of worsening/recurrent infection  Continue lymphedema management  -patient to f/u with lymphedema clinic with goal to reduce swelling to try to improve skin integrity (reports she will be switching insurance to allow lymphedema clinic coverage)  -continue ammonium lactate BID to legs to provide some level of ongoing massage/monitoring and provide a moisture/barrier to the dry skin of the legs - seems helpful  -continue lasix which may help with edema though etiology of edema is likely multifactorial and not entirely cardiogenic  -recently have provided referral to Vascular to consider further workup/intervention in line with her goals of care - scheduled in July         Sinus congestion    Patient notes occasional sinus/nasalcongestion when weather changes  No acute respiratory/infectious symptoms  Trial flonase PRN                      Chief  Complaint     Follow up 30 day    History of Present Illness     Ld Savage is a 89 y.o. female who was seen today for follow up.  PMH includes: HTN, nephrolithiasis, Afib, HLD, lymphedema, GERD, CKD  Code Status: DNR      Patient seen and examined in room  Others present for encounter: none  Patient seated up in bed  Appears comfortable, awake, alert, oriented to situation, able to converse appropriately  Patient polite, appears in good spirits, Aox3, appears to be a good historian, mentation seems stable from prior.  Endorses she feels well and overall better lately.  Endorses sleeping better lately.  She is happy with reduction in polypharmacy, we discussed this extensively today, she believes some of her historical issues were likely medication side effects and is happier with reduced regimen and happy to continue trying to streamline her meds.  Prior RLE cellulitis issue seems resolved, no recent associated concern. She was concerned last week of some mild redness to left shin which she thinks is better at this time and on further discussion she believes it was actually some skin irritation related to a recent leonidas lift strap or similar strap.  No new/acute pain anywhere. She has chronic multifactorial pain including most significantly chronic bilateral shoulder pain (endorsed R shoulder due to hx of rotator cuff tear and L shoulder with severe arthritis), she feels the pain is overall notably better controlled and stable since recent switch to oxycodone from other opioid, see Plan.  Breathing fine, on room air, no acute SOB, no orthopnea  No recent CP/palpitations or orthostatic lightheadedness  Appetite good/stable, no acute swallowing concerns.  Urinating well without acute symptoms  Endorses regular easy BM with bowel regimen. No acute abd pain/N/V  Does not feel acutely sick or confused or feverish.  No acute cardiopulmonary, abdominal, or urinary symptoms; see ROS for more details.  She remains with  "a goal of participating in [employer] this summer and accepting of the risks, goals of care clearly identified that she wants to be able to do things she enjoys at this stage in life even if there is a significant risk.    No further questions or acute concerns identified.  No further acute concerns per nursing.      Lab Review:   Baseline GFR: 40s  Baseline Hb: around 12-13  3/11/25: CBC generally non-actionable, WBC 3.5; CMP with Cr 1.14, GFR 46; total chol 107, LDL 49; Mg 2.0; TSH WNL; vit D 47  3/24/25: CBC generally non-actionable, WBC 2.5 (generally chronically low in 3-4 range); CMP with Cr 1.13, GFR 47; ESR 41 (slightly high), CRP 10.1 (slightly high)  4/26/25: CBC with WBC 12.9, plt 134; BMP with Cr 1.18, GFR 44  5/5/25: CBC generally stable/non-actionable WBC 3.1, Hb 13.1      No results found for: \"HGBA1C\"  Lab Results   Component Value Date    REJ3PLUWFCPS 1.772 10/01/2014    TSH 1.31 03/11/2025     Lab Results   Component Value Date    AFSVVFNE48 247 11/01/2022     Lab Results   Component Value Date    IRON 41 02/25/2020    TIBC 273 02/25/2020    FERRITIN 61 04/28/2020     No results found for: \"CHOLESTEROL\"  No results found for: \"HDL\"  No results found for: \"TRIG\"  No results found for: \"NONHDLC\"  No results found for: \"LDLCALC\"        CT ANKLE RIGHT W CONTRAST  Result Date: 4/8/2025  1. The osseous structures are demineralized. There are no CT findings to suggest osteomyelitis. No acute fracture or dislocation identified. Degenerative changes. Soft tissue swelling and subcutaneous edema      XR R foot 3/25/25 \"Degenerative changes, demineralization, and soft tissue swelling without acute osseous abnormality\"  XR R ankle 3/25/25 \"No acute osseous abnormality\"  US RLE 3/19/25 \"Right lower extremity visualized veins demonstrate no thrombosis\"    Echo Oct 2020: EF 60, no WMA, Left ventricular diastolic function was not assessed, no notable valvular disease        The following portions of the patient's " history were reviewed and updated as appropriate: allergies, current medications, past family history, past medical history, past social history, past surgical history and problem list.    Review of Systems     Review of Systems   Constitutional:  Negative for appetite change, chills, diaphoresis, fatigue and fever.   HENT:  Positive for congestion. Negative for drooling, ear pain, hearing loss, rhinorrhea, sore throat and trouble swallowing.    Eyes:  Negative for pain, discharge, redness, itching and visual disturbance.   Respiratory:  Negative for cough, chest tightness, shortness of breath and wheezing.    Cardiovascular:  Positive for leg swelling. Negative for chest pain and palpitations.   Gastrointestinal:  Negative for abdominal pain, blood in stool, constipation, diarrhea, nausea and vomiting.   Genitourinary:  Negative for difficulty urinating, dysuria, flank pain, hematuria and urgency.   Musculoskeletal:  Positive for arthralgias and gait problem. Negative for back pain and neck pain.   Skin:  Negative for color change.   Neurological:  Negative for dizziness, facial asymmetry, speech difficulty, weakness, light-headedness and headaches.   Psychiatric/Behavioral:  Negative for agitation, behavioral problems and confusion. The patient is not nervous/anxious and is not hyperactive.    All other systems reviewed and are negative.      Active Problem List     Patient Active Problem List   Diagnosis    Nephrolithiasis    Essential hypertension    Mixed hyperlipidemia    Paroxysmal A-fib (HCC)    Thrombocytopenia (HCC)    Chronic diastolic (congestive) heart failure (HCC)    Obesity, morbid, BMI 40.0-49.9 (HCC)    Anemia    Gastroesophageal reflux disease with esophagitis without hemorrhage    Other constipation    Renal cyst    Hx of abdominal supracervical subtotal hysterectomy    CKD (chronic kidney disease)    Advance care planning    Lymphedema    RLS (restless legs syndrome)    Personal history of other  infectious and parasitic diseases    Chronic pain disorder    Sleep apnea    Acquired hypothyroidism    Cellulitis of right lower extremity    Age-related osteoporosis without current pathological fracture    Polypharmacy    Functional urinary incontinence    Chronic cough    History of cellulitis    Sinus congestion       Objective     Vital Signs:     BP: 139/83 mmHg  6/2/2025 16:38   Temp:97.7 °F  6/2/2025 16:42 Pulse:71 bpm  6/2/2025 09:58 Weight:227.5 Lbs  6/1/2025 11:31   Resp:18 Breaths/min  5/10/2025 09:18 BS:97.1 mg/dL  10/27/2021 19:35 O2:96 %  5/10/2025 09:18 Pain:0         Physical Exam  Vitals reviewed.   Constitutional:       General: She is not in acute distress.     Appearance: She is obese. She is not toxic-appearing or diaphoretic.   HENT:      Head: Normocephalic and atraumatic.      Right Ear: External ear normal.      Left Ear: External ear normal.      Nose: Nose normal. No rhinorrhea.      Mouth/Throat:      Mouth: Mucous membranes are dry.      Pharynx: Oropharynx is clear. No posterior oropharyngeal erythema.     Eyes:      General: No scleral icterus.        Right eye: No discharge.         Left eye: No discharge.      Extraocular Movements: Extraocular movements intact.      Conjunctiva/sclera: Conjunctivae normal.      Pupils: Pupils are equal, round, and reactive to light.       Cardiovascular:      Rate and Rhythm: Normal rate and regular rhythm.   Pulmonary:      Effort: Pulmonary effort is normal. No respiratory distress.      Breath sounds: Normal breath sounds. No wheezing or rales.   Abdominal:      General: Bowel sounds are normal. There is no distension.      Palpations: Abdomen is soft.      Tenderness: There is no abdominal tenderness. There is no guarding.     Musculoskeletal:         General: Swelling present.      Cervical back: No rigidity.      Comments: 1+ bilateral lower extremity edema with dry skin  No open wounds/bleed/drainage from legs  No notable redness of RLE  ~2  inch lightly pink ovoid region at L anterior shin without excess warmth/tenderness       Skin:     General: Skin is warm and dry.      Coloration: Skin is not jaundiced.     Neurological:      General: No focal deficit present.      Mental Status: She is alert and oriented to person, place, and time. Mental status is at baseline.     Psychiatric:         Mood and Affect: Mood normal.         Behavior: Behavior normal.         Thought Content: Thought content normal.         Judgment: Judgment normal.         Pertinent Laboratory/Diagnostic Studies:  Laboratory and Imaging studies reviewed. Full report in the paper chart.     Current Medications   Medications reviewed and updated in facility chart.      -Total time spent on this encounter today including documentation and workup review, face to face time, history and exam, and documentation/orders, polypharmacy discussion/review was approximately 55 minutes.  -This note will be copied to [facility] EMR where vitals and medication orders are placed.    Maco Baez D.O.  Geriatric Medicine  6/4/2025 11:43 AM

## 2025-06-04 NOTE — ASSESSMENT & PLAN NOTE
-much improved, patient feels she has had no recent reflux symptoms, some of her prior symptoms could have been medication side effects (possibly from prior alendronate), she is very happy with the improvement  -recommend OOB with meals, sit upright for at least 30 minutes afterwards, avoid trigger foods  -continue to monitor  -continue regimen including pantoprazole (reducing from 40 BID to 20mg daily), famotidine (reducing from 20mg BID to daily)  -consider weaning regimen further if symptoms remain controlled  -follow up with GI as appropriate/needed

## 2025-06-04 NOTE — ASSESSMENT & PLAN NOTE
Noted chronic urinary incontinence, largely with a functional component (she reports she does not urinate much during the day but typically will void overnight and wake up with wet undergarments)  Has been maintained on oxybutynin. Historically was on 5mg TID, she feels somewhat worsened incontinence with recent reduction to 5mg daily so with shared decision-making will increase dosing to 5mg BID at this time.  Continue to monitor  Supportive care  PT/OT as appropriate  Following Urology outpatient

## 2025-06-04 NOTE — ASSESSMENT & PLAN NOTE
Patient endorsed a chronic intermittent cough where she feels there is thick phlegm in her throat and she has trouble bringing it up  No recent/acute change  Probably a multifactorial issue including dryness from medication side effects  No acute respiratory concerns  Continue mucinex  Continue to monitor - improved/stable with mucinex

## 2025-06-04 NOTE — ASSESSMENT & PLAN NOTE
Patient notes occasional sinus/nasalcongestion when weather changes  No acute respiratory/infectious symptoms  Trial flonase PRN

## 2025-06-04 NOTE — ASSESSMENT & PLAN NOTE
Wt Readings from Last 3 Encounters:   05/30/25 104 kg (229 lb)   10/09/24 105 kg (230 lb 11.2 oz)   05/31/24 112 kg (246 lb)     Noted hx of chronic diastolic CHF  Monitor weight - overall trending down (she is intentionally trying to lose weight), no alarming uptrend  Monitor volume status clinically - peripheral edema seems improved, mucus membranes slightly dry - overall seems euvolemic/compensated  Encourage elevation, compression of lower extremities as able  Continue beta blocker  Continue lasix - patient finding 60mg dose to be too much leading to worse quality of life, with shared decision making reducing back to 40mg daily at 5pm to best control her urine output and meet her quality of life goals  Follow up with Cardiology as appropriate

## 2025-06-04 NOTE — ASSESSMENT & PLAN NOTE
Stable /asymptomatic per patient  Patient denies any recent associated RLS symptoms, she states she used to get symptoms in the past but has been well controlled lately  With shared decision making, discontinue pramipexole at this time  Continue to monitor

## 2025-06-04 NOTE — ASSESSMENT & PLAN NOTE
Recent concern of recurrent RLE cellulitis since early March 2025  Has been previously evaluated by previous provider at facility as well as by [facility] NP  Has been on several courses of keflex with improvement  Recent right lower extremity XR (march 2025) and CT (April 2025) negative for bony involvement  Most recently recurrence of cellulitis since ~4/25/25. Seems resolved s/p keflex course.  Supportive care  Monitor skin for breakdown, wounds, evidence of worsening/recurrent infection  Continue lymphedema management  -patient to f/u with lymphedema clinic with goal to reduce swelling to try to improve skin integrity (reports she will be switching insurance to allow lymphedema clinic coverage)  -continue ammonium lactate BID to legs to provide some level of ongoing massage/monitoring and provide a moisture/barrier to the dry skin of the legs - seems helpful  -continue lasix which may help with edema though etiology of edema is likely multifactorial and not entirely cardiogenic  -recently have provided referral to Vascular to consider further workup/intervention in line with her goals of care - scheduled in July

## 2025-07-08 ENCOUNTER — NURSING HOME VISIT (OUTPATIENT)
Dept: GERIATRICS | Facility: OTHER | Age: 89
End: 2025-07-08
Payer: COMMERCIAL

## 2025-07-08 DIAGNOSIS — E78.2 MIXED HYPERLIPIDEMIA: ICD-10-CM

## 2025-07-08 DIAGNOSIS — G89.4 CHRONIC PAIN DISORDER: ICD-10-CM

## 2025-07-08 DIAGNOSIS — Z87.2 HISTORY OF CELLULITIS: ICD-10-CM

## 2025-07-08 DIAGNOSIS — G47.33 OBSTRUCTIVE SLEEP APNEA SYNDROME: ICD-10-CM

## 2025-07-08 DIAGNOSIS — E66.01 OBESITY, MORBID, BMI 40.0-49.9 (HCC): ICD-10-CM

## 2025-07-08 DIAGNOSIS — M81.0 AGE-RELATED OSTEOPOROSIS WITHOUT CURRENT PATHOLOGICAL FRACTURE: ICD-10-CM

## 2025-07-08 DIAGNOSIS — N18.32 STAGE 3B CHRONIC KIDNEY DISEASE (HCC): ICD-10-CM

## 2025-07-08 DIAGNOSIS — G25.81 RLS (RESTLESS LEGS SYNDROME): ICD-10-CM

## 2025-07-08 DIAGNOSIS — R39.81 FUNCTIONAL URINARY INCONTINENCE: ICD-10-CM

## 2025-07-08 DIAGNOSIS — I89.0 LYMPHEDEMA: ICD-10-CM

## 2025-07-08 DIAGNOSIS — D69.6 THROMBOCYTOPENIA (HCC): ICD-10-CM

## 2025-07-08 DIAGNOSIS — R09.81 SINUS CONGESTION: ICD-10-CM

## 2025-07-08 DIAGNOSIS — Z79.899 POLYPHARMACY: ICD-10-CM

## 2025-07-08 DIAGNOSIS — I10 ESSENTIAL HYPERTENSION: ICD-10-CM

## 2025-07-08 DIAGNOSIS — I48.0 PAROXYSMAL A-FIB (HCC): ICD-10-CM

## 2025-07-08 DIAGNOSIS — R05.3 CHRONIC COUGH: ICD-10-CM

## 2025-07-08 DIAGNOSIS — K59.09 OTHER CONSTIPATION: ICD-10-CM

## 2025-07-08 DIAGNOSIS — I50.32 CHRONIC DIASTOLIC (CONGESTIVE) HEART FAILURE (HCC): Primary | ICD-10-CM

## 2025-07-08 DIAGNOSIS — K21.00 GASTROESOPHAGEAL REFLUX DISEASE WITH ESOPHAGITIS WITHOUT HEMORRHAGE: ICD-10-CM

## 2025-07-08 DIAGNOSIS — E03.9 ACQUIRED HYPOTHYROIDISM: ICD-10-CM

## 2025-07-08 PROBLEM — D64.9 ANEMIA: Status: RESOLVED | Noted: 2020-08-13 | Resolved: 2025-07-08

## 2025-07-08 PROCEDURE — 99310 SBSQ NF CARE HIGH MDM 45: CPT | Performed by: STUDENT IN AN ORGANIZED HEALTH CARE EDUCATION/TRAINING PROGRAM

## 2025-07-09 ENCOUNTER — OFFICE VISIT (OUTPATIENT)
Dept: VASCULAR SURGERY | Facility: CLINIC | Age: 89
End: 2025-07-09
Payer: COMMERCIAL

## 2025-07-09 VITALS — HEART RATE: 62 BPM | SYSTOLIC BLOOD PRESSURE: 130 MMHG | DIASTOLIC BLOOD PRESSURE: 82 MMHG | OXYGEN SATURATION: 96 %

## 2025-07-09 DIAGNOSIS — I89.0 LYMPHEDEMA: ICD-10-CM

## 2025-07-09 PROCEDURE — 99203 OFFICE O/P NEW LOW 30 MIN: CPT

## 2025-07-09 RX ORDER — PANTOPRAZOLE SODIUM 20 MG/1
20 TABLET, DELAYED RELEASE ORAL DAILY
COMMUNITY
Start: 2025-06-04

## 2025-07-09 RX ORDER — PRAMIPEXOLE DIHYDROCHLORIDE 0.25 MG/1
0.25 TABLET ORAL
COMMUNITY
Start: 2025-06-02

## 2025-07-09 RX ORDER — FLUTICASONE PROPIONATE 50 MCG
SPRAY, SUSPENSION (ML) NASAL
COMMUNITY
Start: 2025-06-03

## 2025-07-09 NOTE — ASSESSMENT & PLAN NOTE
Patient endorsed a chronic intermittent cough where she feels there is thick phlegm in her throat and she has trouble bringing it up  No recent/acute change  Probably a multifactorial issue including dryness from medication side effects  No acute respiratory concerns  Continue mucinex  Continue to monitor - improved/stable with mucinex and reduction in polypharmacy

## 2025-07-09 NOTE — ASSESSMENT & PLAN NOTE
Wt Readings from Last 3 Encounters:   05/30/25 104 kg (229 lb)   10/09/24 105 kg (230 lb 11.2 oz)   05/31/24 112 kg (246 lb)     Noted hx of chronic diastolic CHF  Monitor weight - overall fairly stable with some uptrend she feels is related to improved appetite - frequency changed from weekly to q2 weeks as per patient quality of life discussion  Monitor volume status clinically - peripheral edema seems stable, mucus membranes slightly dry - overall seems euvolemic/compensated  Encourage elevation, compression of lower extremities as able  Continue beta blocker  Continue lasix 40mg daily at 5pm (did not tolerated higher dose due to quality of life)  Follow up with Cardiology as appropriate

## 2025-07-09 NOTE — ASSESSMENT & PLAN NOTE
Patient noted occasional sinus/nasal congestion when weather changes  No acute respiratory/infectious symptoms  Flonase has seemed to help a bit  Trial cetirizine for allergy concern

## 2025-07-09 NOTE — ASSESSMENT & PLAN NOTE
Noted chronic hx of lymphedema  Following Cardiology and lymphedema clinic  It does not seem her edema is particularly cardiogenic in nature. Last Cardiology visit from May 2024 - did not appear she had heart failure although she has noted history of diastolic dysfunction. Last echo from 2020 with preserved EF and no notable valvular disease.  Edema likely multifactorial including obesity, dependent edema, vascular insufficiency, diastolic dysfunction  Monitor weight - no alarming uptrend  Monitor volume status clinically - lymphedema reported stable. No orthopnea. Seems euvolemic.  Encourage elevation, compression of lower extremities as able  Continue lasix - see rest of plan  Follow up with Cardiology as appropriate - recent visit May 2025

## 2025-07-09 NOTE — ASSESSMENT & PLAN NOTE
Monitor BP - generally stable around 120s-140s systolic  HR stable around 60s-70s  Avoid hypotension  No acute cardiac complaints  Continue regimen including lasix 40mg daily, metoprolol tartrate 12.5mg BID  Follow up with Cardiology as appropriate

## 2025-07-09 NOTE — PROGRESS NOTES
Name: Ld Savage      : 1935      MRN: [id number]  Encounter Provider: DHRUV Collier  Encounter Date: 2025   Enc[facility] department: [facility]  :  Assessment & Plan  Lymphedema  Patient reports that she has noticed bilateral lower extremity swelling for several years. In March of this year patient developed cellulitis that took several courses of antibiotics to completely resolve.  Prior to this episode patient reports that she only ever had mild cases of cellulitis.  Patient reports that she has gone for lymphedema massage therapy for many years, however has not been there recently due to changes in her insurance. She wears compression stockings on a daily basis and elevates her legs intermittently throughout the day. She varies between knee and thigh high stockings.  Patient denies any pain to her lower extremities.  She also denies any color/temperature change, numbness/tingling, or rest pain.  Patient is primarily wheelchair-bound, however reports that she currently attends physical therapy and is able to ambulate approximately 100 feet.    Plan:  -Continue supportive management with compression stockings, leg elevation, and resume lymphedema massage therapy.  -Continue moisturizing skin twice daily to maintain intact skin integrity  -Patient instructed to notify the office should she develop worsening lower extremity swelling or tissue loss  -Patient may follow-up with vascular surgery as needed    Orders:    Ambulatory Referral to Vascular Surgery        History of Present Illness   HPI  Ld Savage is a 89 y.o. female, former smoker, with PMH HTN, HLD, CHF, obesity, hypothyroidism, osteoporosis, CKD 3, and lymphedema. Patient is presenting to the vascular surgery office upon self-referral for the evaluation of bilateral lower extremity swelling.    Patient reports that she has noticed bilateral lower extremity swelling for several years. In   year patient developed cellulitis that took several courses of antibiotics to completely resolve.  Prior to this episode patient reports that she only ever had mild cases of cellulitis.  Patient reports that she has gone for lymphedema massage therapy for many years, however has not been there recently due to changes in her insurance. She wears compression stockings on a daily basis and elevates her legs intermittently throughout the day. She varies between knee and thigh high stockings.  Patient denies any pain to her lower extremities.  She also denies any color/temperature change, numbness/tingling, or rest pain.  Patient is primarily wheelchair-bound, however reports that she currently attends physical therapy and is able to ambulate approximately 100 feet.    History obtained from: patient    Review of Systems   Constitutional: Negative.  Negative for chills and fever.   HENT: Negative.     Eyes: Negative.    Respiratory: Negative.     Cardiovascular:  Positive for leg swelling.   Gastrointestinal: Negative.    Endocrine: Negative.    Genitourinary: Negative.    Musculoskeletal: Negative.    Skin: Negative.  Negative for color change, pallor and wound.   Allergic/Immunologic: Negative.    Neurological: Negative.  Negative for numbness.   Hematological: Negative.    Psychiatric/Behavioral: Negative.       Pertinent Medical History   Past Medical History[1]           Medical History Reviewed by provider this encounter:     .  Medications Ordered Prior to Encounter[2]   Social History[3]     Objective   There were no vitals taken for this visit.     Physical Exam  Vitals and nursing note reviewed.   Constitutional:       General: She is not in acute distress.     Appearance: Normal appearance. She is well-developed. She is obese.   HENT:      Head: Normocephalic and atraumatic.     Eyes:      Conjunctiva/sclera: Conjunctivae normal.       Cardiovascular:      Rate and Rhythm: Normal rate and regular rhythm.       Pulses:           Radial pulses are 2+ on the right side and 2+ on the left side.        Dorsalis pedis pulses are 2+ on the right side and 2+ on the left side.      Heart sounds: Normal heart sounds. No murmur heard.  Pulmonary:      Effort: Pulmonary effort is normal. No respiratory distress.      Breath sounds: Normal breath sounds.   Abdominal:      Palpations: Abdomen is soft.      Tenderness: There is no abdominal tenderness.     Musculoskeletal:         General: No swelling or tenderness.      Cervical back: Normal range of motion and neck supple.      Right lower le+ Edema present.      Left lower le+ Edema present.     Skin:     General: Skin is warm and dry.      Capillary Refill: Capillary refill takes less than 2 seconds.      Findings: No lesion, rash or wound.     Neurological:      General: No focal deficit present.      Mental Status: She is alert and oriented to person, place, and time.     Psychiatric:         Mood and Affect: Mood normal.         Behavior: Behavior normal.         Administrative Statements   I have spent a total time of 30 minutes in caring for this patient on the day of the visit/encounter including Prognosis, Risks and benefits of tx options, Instructions for management, Patient and family education, Importance of tx compliance, Risk factor reductions, Impressions, Counseling / Coordination of care, Documenting in the medical record, Reviewing/placing orders in the medical record (including tests, medications, and/or procedures), and Obtaining or reviewing history  .       [1]   Past Medical History:  Diagnosis Date    Acute and chronic respiratory failure with hypoxia (HCC)     Acute kidney failure (HCC)     Anemia     Angina pectoris (HCC)     Arthritis     osteoarthritis    Atrial fibrillation (HCC)     Bacteremia     Bacterial infection due to Proteus mirabilis 2019    Chafing     folds of skin and back of thighs    CHF (congestive heart failure) (HCC)      Chronic indwelling [place] catheter     removed    Chronic pain disorder     pain in thoracic spine    Colon polyp     Constipation     Coronary artery disease     Difficulty walking     lack of coordination    Discitis     Discitis     Disease of thyroid gland     hypothyroid    Dyspepsia     Dysphagia     Dysuria     Gait abnormality     GERD (gastroesophageal reflux disease)     Heart failure (HCC)     History of transfusion     Hx of bleeding disorder     Hydronephrosis     Hydronephrosis with obstructing calculus 5/21/2019    Added automatically from request for surgery [id number]    Hyperlipidemia     Hypertension     Hypokalemia     Hypothyroidism     Insomnia     Irregular heart beat     Kidney stone     Left ureteral calculus 6/12/2019    Added automatically from request for surgery [id number]    Lymphedema     Macular degeneration     Major depressive disorder     Morbid obesity (HCC)     Morbid obesity (HCC)     Muscle weakness     Myocardial infarction ([facility])     NSTEMI (non-ST elevated myocardial infarction) ([facility])     Osteoporosis     Paroxysmal A-fib ([facility])     Personal history of other infectious and parasitic diseases     Recurrent UTI     Renal disorder     RLS (restless legs syndrome)     Sepsis ([facility])     Sleep apnea     Symptomatic anemia 1/15/2020    Syncope 1/17/2020    Vitamin D deficiency     Wheelchair bound     unable to bear weight , hx infected prosthesis   [2]   Current Outpatient Medications on File Prior to Visit   Medication Sig Dispense Refill    acetaminophen (TYLENOL) 325 mg tablet Take 650 mg by mouth every 6 (six) hours as needed for mild pain      apixaban (ELIQUIS) 5 mg Take 5 mg by mouth in the morning and 5 mg in the evening.      atorvastatin (LIPITOR) 10 mg tablet Take 10 mg by mouth daily at bedtime      Calcium Carb-Cholecalciferol (calcium carbonate-vitamin D) 500 mg-5 mcg tablet       calcium carbonate (TUMS) 500 mg chewable tablet Chew 1 tablet in the morning.      calcium  carbonate-vitamin D (OSCAL-D) 500 mg-200 units per tablet       Cholecalciferol (Vitamin D) 50 MCG (2000 UT) tablet       cholecalciferol (VITAMIN D3) 1,000 units tablet Take 2,000 Units by mouth in the morning.      Coenzyme Q10 (CO Q10) 100 MG CAPS Take by mouth      docusate sodium (COLACE) 100 mg capsule Take 100 mg by mouth in the morning and 100 mg in the evening.      fluticasone (FLONASE) 50 mcg/act nasal spray       furosemide (LASIX) 40 mg tablet Take 40 mg by mouth in the morning.      levothyroxine 150 mcg tablet Take 150 mcg by mouth daily in the early morning      lutein 6 mg Take 6 mg by mouth in the morning.      magnesium hydroxide (MILK OF MAGNESIA) 400 mg/5 mL oral suspension daily as needed      Melatonin 5 MG TABS Take 5 mg by mouth daily at bedtime      metoprolol tartrate (LOPRESSOR) 25 mg tablet Take 25 mg by mouth every 12 (twelve) hours      oxybutynin (DITROPAN) 5 mg tablet Take 1 tablet (5 mg total) by mouth 3 (three) times a day as needed (bladder spasm) 20 tablet 0    oxyCODONE (ROXICODONE) 5 mg immediate release tablet       pantoprazole (PROTONIX) 20 mg tablet Take 20 mg by mouth daily      polyethylene glycol (MIRALAX) 17 g packet Take 17 g by mouth daily as needed      potassium chloride (MICRO-K) 10 MEQ CR capsule       pramipexole (MIRAPEX) 0.25 mg tablet Take 0.25 mg by mouth      alendronate (FOSAMAX) 70 mg tablet Take 70 mg by mouth see administration instructions Give once a day on friday (Patient not taking: Reported on 7/9/2025)      amLODIPine (NORVASC) 10 mg tablet  (Patient not taking: Reported on 7/9/2025)      ascorbic acid (VITAMIN C) 500 mg tablet       aspirin (ECOTRIN LOW STRENGTH) 81 mg EC tablet Take 81 mg by mouth in the morning.      Banophen 25 MG capsule       Banophen 25 MG tablet       Diclofenac Sodium (VOLTAREN) 1 %  (Patient not taking: Reported on 7/9/2025)      Eliquis 2.5 MG  (Patient not taking: Reported on 7/9/2025)      esomeprazole (NexIUM) 40 MG  capsule  (Patient not taking: Reported on 7/9/2025)      famotidine (PEPCID) 20 mg tablet Take 1 tablet (20 mg total) by mouth 2 (two) times a day (Patient not taking: Reported on 7/9/2025) 60 tablet 5    ferrous sulfate 325 (65 Fe) mg tablet  (Patient not taking: Reported on 7/9/2025)      HYDROcodone-acetaminophen (NORCO) 5-325 mg per tablet  (Patient not taking: Reported on 7/9/2025)      iron polysaccharides (FERREX) 150 mg capsule Take 150 mg by mouth daily (Patient not taking: Reported on 7/9/2025)      lidocaine, PF, (XYLOCAINE-MPF) 1 %  (Patient not taking: Reported on 7/9/2025)      loperamide (IMODIUM) 2 mg capsule  (Patient not taking: Reported on 7/9/2025)      magnesium oxide (MAG-OX) 400 mg tablet Take 400 mg by mouth (Patient not taking: Reported on 7/9/2025)      methenamine hippurate (HIPREX) 1 g tablet Take 1 tablet (1 g total) by mouth 2 (two) times a day with meals (Patient not taking: Reported on 7/9/2025) 180 tablet 3    methenamine hippurate (HIPREX) 1 g tablet Take 1 tablet (1 g total) by mouth 2 (two) times a day with meals (Patient not taking: Reported on 7/9/2025) 180 tablet 3    multivitamin (THERAGRAN) TABS Insert 1 suppository every day by rectal route. (Patient not taking: Reported on 7/9/2025)      multivitamin (THERAGRAN) TABS  (Patient not taking: Reported on 7/9/2025)      multivitamin (THERAGRAN) TABS  (Patient not taking: Reported on 7/9/2025)      nitroglycerin (NITROSTAT) 0.4 mg SL tablet Place 0.4 mg under the tongue every 5 (five) minutes as needed for chest pain (Patient not taking: Reported on 11/21/2024)      omeprazole (PriLOSEC) 20 mg delayed release capsule  (Patient not taking: Reported on 7/9/2025)      ondansetron (ZOFRAN) 4 mg tablet Take 4 mg by mouth every 8 (eight) hours as needed for nausea or vomiting (Patient not taking: Reported on 7/9/2025)      phenazopyridine (PYRIDIUM) 100 mg tablet  (Patient not taking: Reported on 7/9/2025)      phenazopyridine  (PYRIDIUM) 200 mg tablet  (Patient not taking: Reported on 7/9/2025)      potassium chloride (K-DUR,KLOR-CON) 20 mEq tablet Take 20 mEq by mouth 2 (two) times a day (Patient not taking: Reported on 7/9/2025)      potassium chloride 10% oral solution  (Patient not taking: Reported on 7/9/2025)      Senexon-S 8.6-50 MG per tablet  (Patient not taking: Reported on 7/9/2025)      Skin Protectants, Misc. (DERMACERIN) CREA Apply topically (Patient not taking: Reported on 7/9/2025)      talc Apply topically as needed for irritation (Patient not taking: Reported on 7/9/2025)      traMADol (ULTRAM) 50 mg tablet  (Patient not taking: Reported on 7/9/2025)      triamcinolone acetonide (KENALOG-40) 40 mg/mL Kenalog 40 mg/mL suspension for injection (Patient not taking: Reported on 7/9/2025)      [DISCONTINUED] pantoprazole (PROTONIX) 40 mg tablet Take 1 tablet (40 mg total) by mouth 2 (two) times a day before meals 170 tablet 0    [DISCONTINUED] pramipexole (MIRAPEX) 0.5 mg tablet       [DISCONTINUED] pramipexole (MIRAPEX) 1 mg tablet Take 0.5 mg by mouth daily at bedtime        No current facility-administered medications on file prior to visit.   [3]   Social History  Tobacco Use    Smoking status: Former     Types: Cigars    Smokeless tobacco: Never    Tobacco comments:     smoked when was very much younger for one summer   Vaping Use    Vaping status: Never Used   Substance and Sexual Activity    Alcohol use: Yes     Alcohol/week: 1.0 standard drink of alcohol     Types: 1 Glasses of wine per week     Comment: ocaccional    Drug use: Never    Sexual activity: Not Currently     Partners: Male

## 2025-07-09 NOTE — ASSESSMENT & PLAN NOTE
-much improved/resolved, patient feels she has had no recent reflux symptoms since stopping alendronate, some of her prior symptoms could have been medication side effects (possibly from prior alendronate), she is very happy with the improvement  -recommend OOB with meals, sit upright for at least 30 minutes afterwards, avoid trigger foods  -continue to monitor  -continue regimen including famotidine 20mg daily. Will discontinue pantoprazole  -consider weaning regimen further if symptoms remain controlled  -follow up with GI as appropriate/needed

## 2025-07-09 NOTE — ASSESSMENT & PLAN NOTE
Has hx of bariatric surgery  Encourage lifestyle modifications including healthy diet, weight management, exercise as appropriate  Patient highly motivated, has been independently working on losing weight through diet control.  She endorses having intentionally lost >100 lb since being at facility over several years. As of 2020 she was >300 lb. She has gradually trended down overall, though recently increasing a bit again due to good appetite, most recently in 230s  She denies any unintentional weight loss  Continue to monitor  Dietary team to follow as appropriate at facility and to help with safe weight/diet management

## 2025-07-09 NOTE — ASSESSMENT & PLAN NOTE
Noted dx of osteoporosis  DXA 2019 consistent with osteoporosis  DXA May 2025 consistent with osteoporosis  Continue calcium/vit D supplementation  Had been maintained on alendronate 70mg weekly. Per chart review it seems she has been on this since 2013 which puts her around 12 years of bisphosphonate therapy. Was discontinued in May 2025.  Patient amenable to Prolia and specialist consult, will order Rheumatology consult to consider prolia or other therapy (historically prolia has been difficult to get coverage for at this facility)

## 2025-07-09 NOTE — PROGRESS NOTES
[facility]  Facility: [facility]    PROGRESS NOTE  Nursing Home Place of Service: nursing home place of service: POS 32 Unskilled- No Part A Coverage    NAME: Ld Savage  : 1935 AGE: 89 y.o. SEX: female MRN: [id number]  DATE OF ENCOUNTER: 2025    Assessment and Plan     Problem List Items Addressed This Visit       Essential hypertension    Monitor BP - generally stable around 120s-140s systolic  HR stable around 60s-70s  Avoid hypotension  No acute cardiac complaints  Continue regimen including lasix 40mg daily, metoprolol tartrate 12.5mg BID  Follow up with Cardiology as appropriate           Mixed hyperlipidemia    Recent lipid panel well controlled  Encourage lifestyle modifications including healthy diet, weight management, exercise as appropriate  Continue statin         Paroxysmal A-fib (HCC)    No acute cardiac complaints  Continue Eliquis for AC (hx of bleeding on Xarelto)  Continue beta blocker for rate  HR controlled generally 60s-70s  Following Cardiology         Thrombocytopenia (HCC)    Seems to have mild intermittent thrombocytopenia on lab review  -monitor on routine labs - stable  -monitor for acute bleed - no present signs  -consider further workup, Heme consult if persistent/worsening           Chronic diastolic (congestive) heart failure (HCC) - Primary    Wt Readings from Last 3 Encounters:   25 104 kg (229 lb)   10/09/24 105 kg (230 lb 11.2 oz)   24 112 kg (246 lb)     Noted hx of chronic diastolic CHF  Monitor weight - overall fairly stable with some uptrend she feels is related to improved appetite - frequency changed from weekly to q2 weeks as per patient quality of life discussion  Monitor volume status clinically - peripheral edema seems stable, mucus membranes slightly dry - overall seems euvolemic/compensated  Encourage elevation, compression of lower extremities as able  Continue beta blocker  Continue lasix 40mg daily at 5pm (did not  tolerated higher dose due to quality of life)  Follow up with Cardiology as appropriate           Obesity, morbid, BMI 40.0-49.9 ([facility])    Has hx of bariatric surgery  Encourage lifestyle modifications including healthy diet, weight management, exercise as appropriate  Patient highly motivated, has been independently working on losing weight through diet control.  She endorses having intentionally lost >100 lb since being at facility over several years. As of 2020 she was >300 lb. She has gradually trended down overall, though recently increasing a bit again due to good appetite, most recently in 230s  She denies any unintentional weight loss  Continue to monitor  Dietary team to follow as appropriate at facility and to help with safe weight/diet management         Gastroesophageal reflux disease with esophagitis without hemorrhage    -much improved/resolved, patient feels she has had no recent reflux symptoms since stopping alendronate, some of her prior symptoms could have been medication side effects (possibly from prior alendronate), she is very happy with the improvement  -recommend OOB with meals, sit upright for at least 30 minutes afterwards, avoid trigger foods  -continue to monitor  -continue regimen including famotidine 20mg daily. Will discontinue pantoprazole  -consider weaning regimen further if symptoms remain controlled  -follow up with GI as appropriate/needed         Other constipation    At risk due to relative immobility, comorbidities, opioid pain regimen  Monitor stool output - lately stable on bowel regimen  Bowel regimen at facility: as per protocol, also continue regimen as ordered, adjust as appropriate; consider bisacodyl suppository PRN if no BM in 2-3 days  Encourage mobility as tolerated, PO hydration as appropriate, high fiber diet/prune juice (in outpatient setting as appropriate)  Goal is for 1 easy BM every 1-2 days           CKD (chronic kidney disease)    Lab Results   Component  Value Date    EGFR 47 (L) 03/24/2025    EGFR 46 (L) 03/11/2025    EGFR 45 (L) 09/11/2024    CREATININE 1.13 (H) 03/24/2025    CREATININE 1.14 (H) 03/11/2025    CREATININE 1.16 (H) 09/11/2024     Baseline seems consistent with CKD 3a bordering on 3b  Monitor renal function on routine labs - stable  Avoid nephrotoxins, NSAIDs as able  Encourage PO hydration, respecting volume status  Follow up with Nephrology as appropriate/needed. Does follow with urology.           Lymphedema    Noted chronic hx of lymphedema  Following Cardiology and lymphedema clinic  It does not seem her edema is particularly cardiogenic in nature. Last Cardiology visit from May 2024 - did not appear she had heart failure although she has noted history of diastolic dysfunction. Last echo from 2020 with preserved EF and no notable valvular disease.  Edema likely multifactorial including obesity, dependent edema, vascular insufficiency, diastolic dysfunction  Monitor weight - no alarming uptrend  Monitor volume status clinically - lymphedema reported stable. No orthopnea. Seems euvolemic.  Encourage elevation, compression of lower extremities as able  Continue lasix - see rest of plan  Follow up with Cardiology as appropriate - recent visit May 2025         RLS (restless legs syndrome)    Stable/controlled per patient, on pramipexole 0.25mg qHS (did not tolerate cessation)  Continue to monitor         Chronic pain disorder    chronic multifactorial pain including most significantly chronic bilateral shoulder pain (endorsed R shoulder due to hx of rotator cuff tear and L shoulder with severe arthritis) - patient has expressed she would not want to pursue surgery and has been told she would not be a good/safe surgical candidate.  She feels pain much better controlled overall since recent switch from tramadol to oxycodone  Current regimen including: tylenol (reducing from TID to PRN as she felt not very helpful and to reduce pill burden), adding  oxycontin 10mg q12 hours, oxycodone 5mg BID PRN, topical tiger balm  Adjust regimen as appropriate  Continue to monitor - stable         Sleep apnea    Noted hx of MARIBETH  Patient denies any recent associated issues overnight. Feels it has improved since she has been intentionally losing weight over time. Reports inability/unwillingness to tolerate CPAP  Continue to monitor  Supportive care  Consider f/u with Pulmonary/Sleep Medicine as needed/if symptoms worsen - patient prefers not to at this time         Acquired hypothyroidism    Recent TSH WNL  No apparent acute/associated symptoms  Continue levothyroxine 150mcg daily  Monitor periodically         Age-related osteoporosis without current pathological fracture    Noted dx of osteoporosis  DXA 2019 consistent with osteoporosis  DXA May 2025 consistent with osteoporosis  Continue calcium/vit D supplementation  Had been maintained on alendronate 70mg weekly. Per chart review it seems she has been on this since 2013 which puts her around 12 years of bisphosphonate therapy. Was discontinued in May 2025.  Patient amenable to Prolia and specialist consult, will order Rheumatology consult to consider prolia or other therapy (historically prolia has been difficult to get coverage for at this facility)         Polypharmacy    Patient has been on many scheduled meds. At high risk for adverse effects/interactions.   With shared decision-making, made the following changes:  -discontinue pantoprazole and oxybutynin and scheduled tylenol  Continue to look for opportunities to streamline regimen         Functional urinary incontinence    Noted chronic urinary incontinence, largely with a functional component (she reports she does not urinate much during the day but typically will void overnight and wake up with wet undergarments)  Has been maintained on oxybutynin but it appears to be causing significant anticholinergic symptoms including dry mouth  With shared decision-making  will trial switch of oxybutynin to mirabegron 25mg daily to begin with. Can titrate or consider trospium if needed.  Continue to monitor  Supportive care  PT/OT as appropriate  Following Urology outpatient         Chronic cough    Patient endorsed a chronic intermittent cough where she feels there is thick phlegm in her throat and she has trouble bringing it up  No recent/acute change  Probably a multifactorial issue including dryness from medication side effects  No acute respiratory concerns  Continue mucinex  Continue to monitor - improved/stable with mucinex and reduction in polypharmacy         History of cellulitis    Recent concern of recurrent RLE cellulitis since early March 2025  Has been previously evaluated by previous provider at facility as well as by [facility] NP  Has been on several courses of keflex with improvement  Recent right lower extremity XR (march 2025) and CT (April 2025) negative for bony involvement  Most recently recurrence of cellulitis since ~4/25/25. Seems resolved s/p keflex course.  Supportive care  Monitor skin for breakdown, wounds, evidence of worsening/recurrent infection  Continue lymphedema management  -patient to f/u with lymphedema clinic with goal to reduce swelling to try to improve skin integrity (reports she will be switching insurance to allow lymphedema clinic coverage)  -continue ammonium lactate BID to legs to provide some level of ongoing massage/monitoring and provide a moisture/barrier to the dry skin of the legs - seems helpful  -continue lasix which may help with edema though etiology of edema is likely multifactorial and not entirely cardiogenic  -recently have provided referral to Vascular to consider further workup/intervention in line with her goals of care - scheduled in July         Sinus congestion    Patient noted occasional sinus/nasal congestion when weather changes  No acute respiratory/infectious symptoms  Flonase has seemed to help a bit  Trial  cetirizine for allergy concern                        Chief Complaint     Follow up 60 day    History of Present Illness     Ld Savage is a 89 y.o. female who was seen today for follow up.  PMH includes: HTN, nephrolithiasis, Afib, HLD, lymphedema, GERD, CKD  Code Status: DNR      Patient seen and examined in room  Others present for encounter: none  Patient seated up in power WC  Appears comfortable, awake, alert, oriented to situation, able to converse appropriately  Patient polite, appears in good spirits, Aox3, appears to be a good historian, mentation seems stable from prior.  Endorses she feels well overall lately.  Endorses sleeping well lately.  She remains happy with reduction in polypharmacy, we discussed this extensively today, she believes some of her historical issues were likely medication side effects and is happier with reduced regimen and happy to continue trying to streamline her meds.  Prior lower extremity cellulitis issues seem resolved, no recent associated concern.  No new/acute pain anywhere. She has chronic multifactorial pain including most significantly chronic bilateral shoulder pain R>L (endorsed R shoulder due to hx of rotator cuff tear and L shoulder with severe arthritis), she feels the pain is overall notably better controlled since recent switch to oxycodone from other opioid, still can be up and down a bit, see Plan.  Breathing fine, on room air, no acute SOB, no orthopnea  No recent CP/palpitations or orthostatic lightheadedness  Appetite very good in fact has increased since her prior GERD symptoms have resolved; no acute swallowing concerns.  Urinating well without acute symptoms though endorses ongoing overactive bladder symptoms, see plan  Endorses regular easy BM with bowel regimen. No acute abd pain/N/V  Does not feel acutely sick or confused or feverish.  No acute cardiopulmonary, abdominal, or urinary symptoms; see ROS for more details.  She remains with a goal of  "participating in [employer] this summer and accepting of the risks, goals of care clearly identified that she wants to be able to do things she enjoys at this stage in life even if there is a significant risk.    No further questions or acute concerns identified.  No further acute concerns per nursing.      Lab Review:   Baseline GFR: 40s  Baseline Hb: around 12-13  3/11/25: CBC generally non-actionable, WBC 3.5; CMP with Cr 1.14, GFR 46; total chol 107, LDL 49; Mg 2.0; TSH WNL; vit D 47  3/24/25: CBC generally non-actionable, WBC 2.5 (generally chronically low in 3-4 range); CMP with Cr 1.13, GFR 47; ESR 41 (slightly high), CRP 10.1 (slightly high)  4/26/25: CBC with WBC 12.9, plt 134; BMP with Cr 1.18, GFR 44  5/5/25: CBC generally stable/non-actionable WBC 3.1, Hb 13.1  6/26/25: CBC generally stable/non-actionable        No results found for: \"HGBA1C\"  Lab Results   Component Value Date    AWB5SDHBRHAZ 1.772 10/01/2014    TSH 1.31 03/11/2025     Lab Results   Component Value Date    CSOGGTSS24 247 11/01/2022     Lab Results   Component Value Date    IRON 41 02/25/2020    TIBC 273 02/25/2020    FERRITIN 61 04/28/2020     No results found for: \"CHOLESTEROL\"  No results found for: \"HDL\"  No results found for: \"TRIG\"  No results found for: \"NONHDLC\"  No results found for: \"LDLCALC\"        CT ANKLE RIGHT W CONTRAST  Result Date: 4/8/2025  1. The osseous structures are demineralized. There are no CT findings to suggest osteomyelitis. No acute fracture or dislocation identified. Degenerative changes. Soft tissue swelling and subcutaneous edema      XR R foot 3/25/25 \"Degenerative changes, demineralization, and soft tissue swelling without acute osseous abnormality\"  XR R ankle 3/25/25 \"No acute osseous abnormality\"  US RLE 3/19/25 \"Right lower extremity visualized veins demonstrate no thrombosis\"    Echo Oct 2020: EF 60, no WMA, Left ventricular diastolic function was not assessed, no notable valvular disease        The " following portions of the patient's history were reviewed and updated as appropriate: allergies, current medications, past family history, past medical history, past social history, past surgical history and problem list.    Review of Systems     Review of Systems   Constitutional:  Negative for activity change, appetite change, chills, diaphoresis, fatigue and fever.   HENT:  Negative for congestion, drooling, ear pain, hearing loss, rhinorrhea, sore throat and trouble swallowing.    Eyes:  Negative for pain, discharge, redness, itching and visual disturbance.   Respiratory:  Negative for cough, chest tightness, shortness of breath and wheezing.    Cardiovascular:  Positive for leg swelling. Negative for chest pain and palpitations.   Gastrointestinal:  Negative for abdominal pain, blood in stool, constipation, diarrhea, nausea and vomiting.   Genitourinary:  Negative for difficulty urinating, dysuria, flank pain, hematuria and urgency.   Musculoskeletal:  Positive for arthralgias and gait problem. Negative for back pain and neck pain.   Skin:  Negative for color change.   Neurological:  Negative for dizziness, facial asymmetry, speech difficulty, weakness, light-headedness and headaches.   Psychiatric/Behavioral:  Negative for agitation, behavioral problems and confusion. The patient is not nervous/anxious and is not hyperactive.    All other systems reviewed and are negative.      Active Problem List     Patient Active Problem List   Diagnosis    Nephrolithiasis    Essential hypertension    Mixed hyperlipidemia    Paroxysmal A-fib (HCC)    Thrombocytopenia (HCC)    Chronic diastolic (congestive) heart failure (HCC)    Obesity, morbid, BMI 40.0-49.9 (HCC)    Gastroesophageal reflux disease with esophagitis without hemorrhage    Other constipation    Renal cyst    Hx of abdominal supracervical subtotal hysterectomy    CKD (chronic kidney disease)    Advance care planning    Lymphedema    RLS (restless legs  syndrome)    Personal history of other infectious and parasitic diseases    Chronic pain disorder    Sleep apnea    Acquired hypothyroidism    Cellulitis of right lower extremity    Age-related osteoporosis without current pathological fracture    Polypharmacy    Functional urinary incontinence    Chronic cough    History of cellulitis    Sinus congestion       Objective     Vital Signs:     BP: 146/61 mmHg  7/7/2025 16:46 Temp:98.2 °F  6/13/2025 08:47 Pulse:73 bpm  7/7/2025 10:51 Weight:236.8 Lbs  7/6/2025 11:52   Resp:18 Breaths/min  6/13/2025 08:47 BS:97.1 mg/dL  10/27/2021 19:35 O2:100 %  6/13/2025 08:47 Pain:0  7/8/2025 22:14       Physical Exam  Vitals reviewed.   Constitutional:       General: She is not in acute distress.     Appearance: She is obese. She is not toxic-appearing or diaphoretic.   HENT:      Head: Normocephalic and atraumatic.      Right Ear: External ear normal.      Left Ear: External ear normal.      Nose: Nose normal. No rhinorrhea.      Mouth/Throat:      Mouth: Mucous membranes are dry.      Pharynx: Oropharynx is clear. No posterior oropharyngeal erythema.     Eyes:      General: No scleral icterus.        Right eye: No discharge.         Left eye: No discharge.      Extraocular Movements: Extraocular movements intact.      Conjunctiva/sclera: Conjunctivae normal.      Pupils: Pupils are equal, round, and reactive to light.       Cardiovascular:      Rate and Rhythm: Normal rate and regular rhythm.   Pulmonary:      Effort: Pulmonary effort is normal. No respiratory distress.      Breath sounds: Normal breath sounds. No wheezing or rales.   Abdominal:      General: Bowel sounds are normal. There is no distension.      Palpations: Abdomen is soft.      Tenderness: There is no abdominal tenderness. There is no guarding.     Musculoskeletal:         General: Swelling present.      Cervical back: No rigidity.      Comments: 1-2+ bilateral lower extremity edema with dry skin  No open  wounds/bleed/drainage from legs     Skin:     General: Skin is warm and dry.      Coloration: Skin is not jaundiced.     Neurological:      General: No focal deficit present.      Mental Status: She is alert and oriented to person, place, and time. Mental status is at baseline.     Psychiatric:         Mood and Affect: Mood normal.         Behavior: Behavior normal.         Thought Content: Thought content normal.         Judgment: Judgment normal.         Pertinent Laboratory/Diagnostic Studies:  Laboratory and Imaging studies reviewed. Full report in the paper chart.     Current Medications   Medications reviewed and updated in facility chart.      -Total time spent on this encounter today including documentation and workup review, face to face time, history and exam, and documentation/orders, was approximately 60 minutes.  -This note will be copied to [facility] EMR where vitals and medication orders are placed.    Maco Baez D.O.  Geriatric Medicine  7/8/2025 11:46 PM

## 2025-07-09 NOTE — ASSESSMENT & PLAN NOTE
Patient reports that she has noticed bilateral lower extremity swelling for several years. In March of this year patient developed cellulitis that took several courses of antibiotics to completely resolve.  Prior to this episode patient reports that she only ever had mild cases of cellulitis.  Patient reports that she has gone for lymphedema massage therapy for many years, however has not been there recently due to changes in her insurance. She wears compression stockings on a daily basis and elevates her legs intermittently throughout the day. She varies between knee and thigh high stockings.  Patient denies any pain to her lower extremities.  She also denies any color/temperature change, numbness/tingling, or rest pain.  Patient is primarily wheelchair-bound, however reports that she currently attends physical therapy and is able to ambulate approximately 100 feet.    Plan:  -Continue supportive management with compression stockings, leg elevation, and resume lymphedema massage therapy.  -Continue moisturizing skin twice daily to maintain intact skin integrity  -Patient instructed to notify the office should she develop worsening lower extremity swelling or tissue loss  -Patient may follow-up with vascular surgery as needed    Orders:    Ambulatory Referral to Vascular Surgery

## 2025-07-09 NOTE — ASSESSMENT & PLAN NOTE
Noted hx of MARIBETH  Patient denies any recent associated issues overnight. Feels it has improved since she has been intentionally losing weight over time. Reports inability/unwillingness to tolerate CPAP  Continue to monitor  Supportive care  Consider f/u with Pulmonary/Sleep Medicine as needed/if symptoms worsen - patient prefers not to at this time

## 2025-07-09 NOTE — ASSESSMENT & PLAN NOTE
chronic multifactorial pain including most significantly chronic bilateral shoulder pain (endorsed R shoulder due to hx of rotator cuff tear and L shoulder with severe arthritis) - patient has expressed she would not want to pursue surgery and has been told she would not be a good/safe surgical candidate.  She feels pain much better controlled overall since recent switch from tramadol to oxycodone  Current regimen including: tylenol (reducing from TID to PRN as she felt not very helpful and to reduce pill burden), adding oxycontin 10mg q12 hours, oxycodone 5mg BID PRN, topical tiger balm  Adjust regimen as appropriate  Continue to monitor - stable

## 2025-07-09 NOTE — ASSESSMENT & PLAN NOTE
No acute cardiac complaints  Continue Eliquis for AC (hx of bleeding on Xarelto)  Continue beta blocker for rate  HR controlled generally 60s-70s  Following Cardiology

## 2025-07-09 NOTE — ASSESSMENT & PLAN NOTE
Patient has been on many scheduled meds. At high risk for adverse effects/interactions.   With shared decision-making, made the following changes:  -discontinue pantoprazole and oxybutynin and scheduled tylenol  Continue to look for opportunities to streamline regimen

## 2025-07-09 NOTE — ASSESSMENT & PLAN NOTE
Stable/controlled per patient, on pramipexole 0.25mg qHS (did not tolerate cessation)  Continue to monitor

## 2025-07-09 NOTE — ASSESSMENT & PLAN NOTE
Noted chronic urinary incontinence, largely with a functional component (she reports she does not urinate much during the day but typically will void overnight and wake up with wet undergarments)  Has been maintained on oxybutynin but it appears to be causing significant anticholinergic symptoms including dry mouth  With shared decision-making will trial switch of oxybutynin to mirabegron 25mg daily to begin with. Can titrate or consider trospium if needed.  Continue to monitor  Supportive care  PT/OT as appropriate  Following Urology outpatient

## 2025-07-18 ENCOUNTER — TELEPHONE (OUTPATIENT)
Age: 89
End: 2025-07-18

## 2025-07-18 NOTE — TELEPHONE ENCOUNTER
[facility] called in to schedule patient for an appt. I advised that we require a referral to schedule new patient appt. Provided our fax number to obtain referral.

## 2025-07-22 NOTE — TELEPHONE ENCOUNTER
Viri with [facility] called in to schedule an appointment , I advise Viri the patient needs a referral in order to schedule an appointment . Provided central fax #

## 2025-07-25 ENCOUNTER — TELEPHONE (OUTPATIENT)
Age: 89
End: 2025-07-25

## 2025-07-25 NOTE — TELEPHONE ENCOUNTER
Viri cuenca [facility] called to reschedule patient's 11/25 appointment to 12/11 at 10:40 at the [facility]

## 2025-07-25 NOTE — TELEPHONE ENCOUNTER
Viri from [facility] called the office regarding patient's appt on 11/25/25. Patient is scheduled with another speciality She said she meant to call urology not GI.

## 2025-07-28 NOTE — TELEPHONE ENCOUNTER
Alice with [facility] call in asking if patients referral was received. I advised that we did not have it yet.

## 2025-08-01 NOTE — TELEPHONE ENCOUNTER
Alice called in to schedule an appointment , I advise Alice the patient will need a referral . Alice confirm our fax number she will fax over the patient referral

## 2025-08-08 ENCOUNTER — NURSING HOME VISIT (OUTPATIENT)
Dept: GERIATRICS | Facility: OTHER | Age: 89
End: 2025-08-08
Payer: COMMERCIAL

## 2025-08-08 DIAGNOSIS — K21.00 GASTROESOPHAGEAL REFLUX DISEASE WITH ESOPHAGITIS WITHOUT HEMORRHAGE: ICD-10-CM

## 2025-08-08 DIAGNOSIS — I89.0 LYMPHEDEMA: ICD-10-CM

## 2025-08-08 DIAGNOSIS — I48.0 PAROXYSMAL A-FIB (HCC): ICD-10-CM

## 2025-08-08 DIAGNOSIS — E66.01 OBESITY, MORBID, BMI 40.0-49.9 (HCC): ICD-10-CM

## 2025-08-08 DIAGNOSIS — Z87.2 HISTORY OF CELLULITIS: ICD-10-CM

## 2025-08-08 DIAGNOSIS — Z79.899 POLYPHARMACY: ICD-10-CM

## 2025-08-08 DIAGNOSIS — R39.81 FUNCTIONAL URINARY INCONTINENCE: ICD-10-CM

## 2025-08-08 DIAGNOSIS — R05.3 CHRONIC COUGH: ICD-10-CM

## 2025-08-08 DIAGNOSIS — I50.32 CHRONIC DIASTOLIC (CONGESTIVE) HEART FAILURE (HCC): Primary | ICD-10-CM

## 2025-08-08 DIAGNOSIS — G47.33 OBSTRUCTIVE SLEEP APNEA SYNDROME: ICD-10-CM

## 2025-08-08 DIAGNOSIS — N18.32 STAGE 3B CHRONIC KIDNEY DISEASE (HCC): ICD-10-CM

## 2025-08-08 DIAGNOSIS — G89.4 CHRONIC PAIN DISORDER: ICD-10-CM

## 2025-08-08 DIAGNOSIS — R09.81 SINUS CONGESTION: ICD-10-CM

## 2025-08-08 DIAGNOSIS — M81.0 AGE-RELATED OSTEOPOROSIS WITHOUT CURRENT PATHOLOGICAL FRACTURE: ICD-10-CM

## 2025-08-08 DIAGNOSIS — D69.6 THROMBOCYTOPENIA (HCC): ICD-10-CM

## 2025-08-08 DIAGNOSIS — E03.9 ACQUIRED HYPOTHYROIDISM: ICD-10-CM

## 2025-08-08 DIAGNOSIS — E78.2 MIXED HYPERLIPIDEMIA: ICD-10-CM

## 2025-08-08 DIAGNOSIS — G25.81 RLS (RESTLESS LEGS SYNDROME): ICD-10-CM

## 2025-08-08 DIAGNOSIS — I10 ESSENTIAL HYPERTENSION: ICD-10-CM

## 2025-08-08 DIAGNOSIS — K59.09 OTHER CONSTIPATION: ICD-10-CM

## 2025-08-08 PROBLEM — L03.115 CELLULITIS OF RIGHT LOWER EXTREMITY: Status: RESOLVED | Noted: 2025-04-02 | Resolved: 2025-08-08

## 2025-08-08 PROCEDURE — 99310 SBSQ NF CARE HIGH MDM 45: CPT | Performed by: STUDENT IN AN ORGANIZED HEALTH CARE EDUCATION/TRAINING PROGRAM

## (undated) DEVICE — STERILE POLYISOPRENE POWDER-FREE SURGICAL GLOVES: Brand: PROTEXIS

## (undated) DEVICE — THE MONARCH® "D" CARTRIDGE IS A SINGLE-USE POLYPROPYLENE CARTRIDGE FOR POSTERIOR CHAMBER IOL DELIVERY: Brand: MONARCH® III

## (undated) DEVICE — CATH URETERAL 5FR X 70 CM FLEX TIP POLYUR BARD

## (undated) DEVICE — PACK TUR

## (undated) DEVICE — GUIDEWIRE STRGHT TIP 0.035 IN  SOLO PLUS

## (undated) DEVICE — TUBING SUCTION 5MM X 12 FT

## (undated) DEVICE — STERILE SURGICAL LUBRICANT,  TUBE: Brand: SURGILUBE

## (undated) DEVICE — GLOVE SRG BIOGEL 7.5

## (undated) DEVICE — GLOVE SRG BIOGEL 7

## (undated) DEVICE — SCD SEQUENTIAL COMPRESSION COMFORT SLEEVE MEDIUM KNEE LENGTH: Brand: KENDALL SCD

## (undated) DEVICE — LUBRICANT SURGILUBE TUBE 4 OZ  FLIP TOP

## (undated) DEVICE — URETERAL CATHETER POLLACK OPEN ENDED 5FR

## (undated) DEVICE — UROCATCH BAG

## (undated) DEVICE — EXIDINE 4 PCT

## (undated) DEVICE — 30° KELMAN®, 0.9 MM TURBOSONICS® MINI-FLARED ABS® TIP: Brand: ALCON, KELMAN, TURBOSONICS, MINI-FLARED ABS

## (undated) DEVICE — PACK CUSTOM EYE BASIC

## (undated) DEVICE — URETERAL DUAL LUMEN CATH

## (undated) DEVICE — Device

## (undated) DEVICE — MICROSURGICAL INSTRUMENT ANTERIOR CHAMBER CANNULA 27GA: Brand: ALCON

## (undated) DEVICE — CATH FOLEY 16FR 5ML 2WAY LUBRICATH

## (undated) DEVICE — 3M™ STERI-STRIP™ COMPOUND BENZOIN TINCTURE 40 BAGS/CARTON 4 CARTONS/CASE C1544: Brand: 3M™ STERI-STRIP™

## (undated) DEVICE — ENDOSCOPIC VALVE WITH ADAPTER.: Brand: SURSEAL® II

## (undated) DEVICE — SPECIMEN CONTAINER STERILE PEEL PACK

## (undated) DEVICE — UNDERGLOVE PROTEXIS  BLUE SZ 7

## (undated) DEVICE — OPHTHALMIC KNIFE 15°: Brand: ALCON

## (undated) DEVICE — GLOVE INDICATOR PI UNDERGLOVE SZ 8 BLUE

## (undated) DEVICE — LASER FIBER HOLMIUM 272MICRON

## (undated) DEVICE — 3000CC GUARDIAN II: Brand: GUARDIAN

## (undated) DEVICE — 3M™ TEGADERM™ TRANSPARENT FILM DRESSING FRAME STYLE, 1626W, 4 IN X 4-3/4 IN (10 CM X 12 CM), 50/CT 4CT/CASE: Brand: 3M™ TEGADERM™

## (undated) DEVICE — 3M™ TEGADERM™ TRANSPARENT FILM DRESSING FRAME STYLE, 1624W, 2-3/8 IN X 2-3/4 IN (6 CM X 7 CM), 100/CT 4CT/CASE: Brand: 3M™ TEGADERM™

## (undated) DEVICE — STERILE SYNTHETIC NEOPRENE POWDER-FREE SURGICAL GLOVES WITH NITRILE COATING, SMOOTH FINISH, STRAIGHT FINGER: Brand: PROTEXIS

## (undated) DEVICE — PACK PIC GENERIC

## (undated) DEVICE — PREMIUM DRY TRAY LF: Brand: MEDLINE INDUSTRIES, INC.

## (undated) DEVICE — SINGLE PORT MANIFOLD: Brand: NEPTUNE 2

## (undated) DEVICE — GUIDEWIRE STRGHT TIP 0.038 IN SOLO PLUS

## (undated) DEVICE — EYE PADS 1 5/8"X2 5/8": Brand: MCKESSON